# Patient Record
Sex: MALE | Race: BLACK OR AFRICAN AMERICAN | NOT HISPANIC OR LATINO
[De-identification: names, ages, dates, MRNs, and addresses within clinical notes are randomized per-mention and may not be internally consistent; named-entity substitution may affect disease eponyms.]

---

## 2024-03-28 ENCOUNTER — TRANSCRIPTION ENCOUNTER (OUTPATIENT)
Age: 69
End: 2024-03-28

## 2024-03-29 ENCOUNTER — INPATIENT (INPATIENT)
Facility: HOSPITAL | Age: 69
LOS: 24 days | Discharge: NURSING, REHAB OR LONG-TERM CARE | DRG: 329 | End: 2024-04-23
Attending: STUDENT IN AN ORGANIZED HEALTH CARE EDUCATION/TRAINING PROGRAM | Admitting: STUDENT IN AN ORGANIZED HEALTH CARE EDUCATION/TRAINING PROGRAM
Payer: COMMERCIAL

## 2024-03-29 ENCOUNTER — RESULT REVIEW (OUTPATIENT)
Age: 69
End: 2024-03-29

## 2024-03-29 VITALS
HEIGHT: 68 IN | SYSTOLIC BLOOD PRESSURE: 157 MMHG | HEART RATE: 104 BPM | WEIGHT: 139.99 LBS | DIASTOLIC BLOOD PRESSURE: 104 MMHG | RESPIRATION RATE: 18 BRPM | OXYGEN SATURATION: 96 % | TEMPERATURE: 100 F

## 2024-03-29 DIAGNOSIS — Z98.890 OTHER SPECIFIED POSTPROCEDURAL STATES: Chronic | ICD-10-CM

## 2024-03-29 LAB
ALBUMIN SERPL ELPH-MCNC: 3 G/DL — LOW (ref 3.4–5)
ALP SERPL-CCNC: 122 U/L — HIGH (ref 40–120)
ALT FLD-CCNC: 49 U/L — HIGH (ref 12–42)
AMPHET UR-MCNC: NEGATIVE — SIGNIFICANT CHANGE UP
ANION GAP SERPL CALC-SCNC: 10 MMOL/L — SIGNIFICANT CHANGE UP (ref 9–16)
APPEARANCE UR: CLEAR — SIGNIFICANT CHANGE UP
APTT BLD: 31.4 SEC — SIGNIFICANT CHANGE UP (ref 24.5–35.6)
AST SERPL-CCNC: 64 U/L — HIGH (ref 15–37)
BACTERIA # UR AUTO: ABNORMAL /HPF
BARBITURATES UR SCN-MCNC: NEGATIVE — SIGNIFICANT CHANGE UP
BASOPHILS # BLD AUTO: 0.01 K/UL — SIGNIFICANT CHANGE UP (ref 0–0.2)
BASOPHILS NFR BLD AUTO: 0.1 % — SIGNIFICANT CHANGE UP (ref 0–2)
BENZODIAZ UR-MCNC: NEGATIVE — SIGNIFICANT CHANGE UP
BILIRUB SERPL-MCNC: 1.7 MG/DL — HIGH (ref 0.2–1.2)
BILIRUB UR-MCNC: NEGATIVE — SIGNIFICANT CHANGE UP
BLD GP AB SCN SERPL QL: NEGATIVE — SIGNIFICANT CHANGE UP
BUN SERPL-MCNC: 25 MG/DL — HIGH (ref 7–23)
CALCIUM SERPL-MCNC: 9.1 MG/DL — SIGNIFICANT CHANGE UP (ref 8.5–10.5)
CHLORIDE SERPL-SCNC: 103 MMOL/L — SIGNIFICANT CHANGE UP (ref 96–108)
CO2 SERPL-SCNC: 27 MMOL/L — SIGNIFICANT CHANGE UP (ref 22–31)
COCAINE METAB.OTHER UR-MCNC: NEGATIVE — SIGNIFICANT CHANGE UP
COLOR SPEC: SIGNIFICANT CHANGE UP
COMMENT - URINE: SIGNIFICANT CHANGE UP
CREAT SERPL-MCNC: 1.49 MG/DL — HIGH (ref 0.5–1.3)
DIFF PNL FLD: NEGATIVE — SIGNIFICANT CHANGE UP
EGFR: 50 ML/MIN/1.73M2 — LOW
EOSINOPHIL # BLD AUTO: 0 K/UL — SIGNIFICANT CHANGE UP (ref 0–0.5)
EOSINOPHIL NFR BLD AUTO: 0 % — SIGNIFICANT CHANGE UP (ref 0–6)
EPI CELLS # UR: PRESENT
ETHANOL SERPL-MCNC: <3 MG/DL — SIGNIFICANT CHANGE UP
FENTANYL UR QL SCN: NEGATIVE — SIGNIFICANT CHANGE UP
FLUAV AG NPH QL: SIGNIFICANT CHANGE UP
FLUBV AG NPH QL: SIGNIFICANT CHANGE UP
GLUCOSE SERPL-MCNC: 101 MG/DL — HIGH (ref 70–99)
GLUCOSE UR QL: NEGATIVE MG/DL — SIGNIFICANT CHANGE UP
GRAN CASTS # UR COMP ASSIST: PRESENT
HCT VFR BLD CALC: 40.3 % — SIGNIFICANT CHANGE UP (ref 39–50)
HGB BLD-MCNC: 13.8 G/DL — SIGNIFICANT CHANGE UP (ref 13–17)
HIV 1 & 2 AB SERPL IA.RAPID: SIGNIFICANT CHANGE UP
HYALINE CASTS # UR AUTO: PRESENT
IMM GRANULOCYTES NFR BLD AUTO: 0.3 % — SIGNIFICANT CHANGE UP (ref 0–0.9)
INR BLD: 0.97 — SIGNIFICANT CHANGE UP (ref 0.85–1.18)
KETONES UR-MCNC: ABNORMAL MG/DL
LACTATE BLDV-MCNC: 0.9 MMOL/L — SIGNIFICANT CHANGE UP (ref 0.5–2)
LACTATE BLDV-MCNC: 2.2 MMOL/L — HIGH (ref 0.5–2)
LACTATE SERPL-SCNC: 1.3 MMOL/L — SIGNIFICANT CHANGE UP (ref 0.5–2)
LEUKOCYTE ESTERASE UR-ACNC: NEGATIVE — SIGNIFICANT CHANGE UP
LYMPHOCYTES # BLD AUTO: 0.89 K/UL — LOW (ref 1–3.3)
LYMPHOCYTES # BLD AUTO: 11.9 % — LOW (ref 13–44)
MCHC RBC-ENTMCNC: 32.3 PG — SIGNIFICANT CHANGE UP (ref 27–34)
MCHC RBC-ENTMCNC: 34.2 GM/DL — SIGNIFICANT CHANGE UP (ref 32–36)
MCV RBC AUTO: 94.4 FL — SIGNIFICANT CHANGE UP (ref 80–100)
METHADONE UR-MCNC: POSITIVE
MONOCYTES # BLD AUTO: 0.48 K/UL — SIGNIFICANT CHANGE UP (ref 0–0.9)
MONOCYTES NFR BLD AUTO: 6.4 % — SIGNIFICANT CHANGE UP (ref 2–14)
NEUTROPHILS # BLD AUTO: 6.11 K/UL — SIGNIFICANT CHANGE UP (ref 1.8–7.4)
NEUTROPHILS NFR BLD AUTO: 81.3 % — HIGH (ref 43–77)
NITRITE UR-MCNC: NEGATIVE — SIGNIFICANT CHANGE UP
NRBC # BLD: 0 /100 WBCS — SIGNIFICANT CHANGE UP (ref 0–0)
OPIATES UR-MCNC: POSITIVE
PCP SPEC-MCNC: SIGNIFICANT CHANGE UP
PCP UR-MCNC: NEGATIVE — SIGNIFICANT CHANGE UP
PH UR: 6 — SIGNIFICANT CHANGE UP (ref 5–8)
PLATELET # BLD AUTO: 122 K/UL — LOW (ref 150–400)
POTASSIUM SERPL-MCNC: 3.7 MMOL/L — SIGNIFICANT CHANGE UP (ref 3.5–5.3)
POTASSIUM SERPL-SCNC: 3.7 MMOL/L — SIGNIFICANT CHANGE UP (ref 3.5–5.3)
PROT SERPL-MCNC: 8.9 G/DL — HIGH (ref 6.4–8.2)
PROT UR-MCNC: ABNORMAL MG/DL
PROTHROM AB SERPL-ACNC: 11 SEC — SIGNIFICANT CHANGE UP (ref 9.5–13)
RBC # BLD: 4.27 M/UL — SIGNIFICANT CHANGE UP (ref 4.2–5.8)
RBC # FLD: 14.3 % — SIGNIFICANT CHANGE UP (ref 10.3–14.5)
RBC CASTS # UR COMP ASSIST: 2 /HPF — SIGNIFICANT CHANGE UP (ref 0–4)
RH IG SCN BLD-IMP: NEGATIVE — SIGNIFICANT CHANGE UP
RSV RNA NPH QL NAA+NON-PROBE: SIGNIFICANT CHANGE UP
SARS-COV-2 RNA SPEC QL NAA+PROBE: SIGNIFICANT CHANGE UP
SODIUM SERPL-SCNC: 140 MMOL/L — SIGNIFICANT CHANGE UP (ref 132–145)
SP GR SPEC: 1.02 — SIGNIFICANT CHANGE UP (ref 1–1.03)
THC UR QL: NEGATIVE — SIGNIFICANT CHANGE UP
TROPONIN I, HIGH SENSITIVITY RESULT: 5.9 NG/L — SIGNIFICANT CHANGE UP
UROBILINOGEN FLD QL: 1 MG/DL — SIGNIFICANT CHANGE UP (ref 0.2–1)
WBC # BLD: 7.51 K/UL — SIGNIFICANT CHANGE UP (ref 3.8–10.5)
WBC # FLD AUTO: 7.51 K/UL — SIGNIFICANT CHANGE UP (ref 3.8–10.5)
WBC UR QL: 2 /HPF — SIGNIFICANT CHANGE UP (ref 0–5)

## 2024-03-29 PROCEDURE — 93010 ELECTROCARDIOGRAM REPORT: CPT

## 2024-03-29 PROCEDURE — 99291 CRITICAL CARE FIRST HOUR: CPT

## 2024-03-29 PROCEDURE — 44140 PARTIAL REMOVAL OF COLON: CPT

## 2024-03-29 PROCEDURE — 99223 1ST HOSP IP/OBS HIGH 75: CPT | Mod: 57

## 2024-03-29 PROCEDURE — 74177 CT ABD & PELVIS W/CONTRAST: CPT | Mod: 26,MC

## 2024-03-29 PROCEDURE — 71046 X-RAY EXAM CHEST 2 VIEWS: CPT | Mod: 26

## 2024-03-29 PROCEDURE — 71045 X-RAY EXAM CHEST 1 VIEW: CPT | Mod: 26,59

## 2024-03-29 PROCEDURE — 88307 TISSUE EXAM BY PATHOLOGIST: CPT | Mod: 26

## 2024-03-29 DEVICE — SURGICEL POWDER 3 GRAMS: Type: IMPLANTABLE DEVICE | Status: FUNCTIONAL

## 2024-03-29 DEVICE — STAPLER ETHICON PROXIMATE RELOAD 100MM BLUE: Type: IMPLANTABLE DEVICE | Status: FUNCTIONAL

## 2024-03-29 DEVICE — STAPLER ETHICON PROXIMATE 75MM WITH BLUE RELOAD: Type: IMPLANTABLE DEVICE | Status: FUNCTIONAL

## 2024-03-29 DEVICE — STAPLER ETHICON PROXIMATE 100MM WITH BLUE RELOAD: Type: IMPLANTABLE DEVICE | Status: FUNCTIONAL

## 2024-03-29 RX ORDER — ONDANSETRON 8 MG/1
4 TABLET, FILM COATED ORAL EVERY 6 HOURS
Refills: 0 | Status: DISCONTINUED | OUTPATIENT
Start: 2024-03-29 | End: 2024-04-23

## 2024-03-29 RX ORDER — BENZOCAINE 10 %
1 GEL (GRAM) MUCOUS MEMBRANE ONCE
Refills: 0 | Status: COMPLETED | OUTPATIENT
Start: 2024-03-29 | End: 2024-03-29

## 2024-03-29 RX ORDER — SODIUM CHLORIDE 9 MG/ML
1950 INJECTION INTRAMUSCULAR; INTRAVENOUS; SUBCUTANEOUS ONCE
Refills: 0 | Status: COMPLETED | OUTPATIENT
Start: 2024-03-29 | End: 2024-03-29

## 2024-03-29 RX ORDER — MORPHINE SULFATE 50 MG/1
2 CAPSULE, EXTENDED RELEASE ORAL ONCE
Refills: 0 | Status: DISCONTINUED | OUTPATIENT
Start: 2024-03-29 | End: 2024-03-29

## 2024-03-29 RX ORDER — MORPHINE SULFATE 50 MG/1
4 CAPSULE, EXTENDED RELEASE ORAL ONCE
Refills: 0 | Status: DISCONTINUED | OUTPATIENT
Start: 2024-03-29 | End: 2024-03-29

## 2024-03-29 RX ORDER — SODIUM CHLORIDE 9 MG/ML
1000 INJECTION, SOLUTION INTRAVENOUS
Refills: 0 | Status: DISCONTINUED | OUTPATIENT
Start: 2024-03-29 | End: 2024-04-01

## 2024-03-29 RX ORDER — IOHEXOL 300 MG/ML
30 INJECTION, SOLUTION INTRAVENOUS ONCE
Refills: 0 | Status: COMPLETED | OUTPATIENT
Start: 2024-03-29 | End: 2024-03-29

## 2024-03-29 RX ORDER — AZITHROMYCIN 500 MG/1
500 TABLET, FILM COATED ORAL ONCE
Refills: 0 | Status: COMPLETED | OUTPATIENT
Start: 2024-03-29 | End: 2024-03-29

## 2024-03-29 RX ORDER — CEFTRIAXONE 500 MG/1
1000 INJECTION, POWDER, FOR SOLUTION INTRAMUSCULAR; INTRAVENOUS ONCE
Refills: 0 | Status: COMPLETED | OUTPATIENT
Start: 2024-03-29 | End: 2024-03-29

## 2024-03-29 RX ORDER — HYDROMORPHONE HYDROCHLORIDE 2 MG/ML
0.5 INJECTION INTRAMUSCULAR; INTRAVENOUS; SUBCUTANEOUS ONCE
Refills: 0 | Status: DISCONTINUED | OUTPATIENT
Start: 2024-03-29 | End: 2024-03-29

## 2024-03-29 RX ADMIN — HYDROMORPHONE HYDROCHLORIDE 0.5 MILLIGRAM(S): 2 INJECTION INTRAMUSCULAR; INTRAVENOUS; SUBCUTANEOUS at 22:30

## 2024-03-29 RX ADMIN — SODIUM CHLORIDE 1950 MILLILITER(S): 9 INJECTION INTRAMUSCULAR; INTRAVENOUS; SUBCUTANEOUS at 15:41

## 2024-03-29 RX ADMIN — CEFTRIAXONE 100 MILLIGRAM(S): 500 INJECTION, POWDER, FOR SOLUTION INTRAMUSCULAR; INTRAVENOUS at 15:40

## 2024-03-29 RX ADMIN — AZITHROMYCIN 255 MILLIGRAM(S): 500 TABLET, FILM COATED ORAL at 15:56

## 2024-03-29 RX ADMIN — MORPHINE SULFATE 2 MILLIGRAM(S): 50 CAPSULE, EXTENDED RELEASE ORAL at 17:16

## 2024-03-29 RX ADMIN — MORPHINE SULFATE 2 MILLIGRAM(S): 50 CAPSULE, EXTENDED RELEASE ORAL at 20:09

## 2024-03-29 RX ADMIN — IOHEXOL 30 MILLILITER(S): 300 INJECTION, SOLUTION INTRAVENOUS at 15:40

## 2024-03-29 RX ADMIN — MORPHINE SULFATE 4 MILLIGRAM(S): 50 CAPSULE, EXTENDED RELEASE ORAL at 19:07

## 2024-03-29 RX ADMIN — HYDROMORPHONE HYDROCHLORIDE 0.5 MILLIGRAM(S): 2 INJECTION INTRAMUSCULAR; INTRAVENOUS; SUBCUTANEOUS at 22:01

## 2024-03-29 NOTE — ED PROVIDER NOTE - CLINICAL SUMMARY MEDICAL DECISION MAKING FREE TEXT BOX
Left abd pain x days with constipation but had one bloody bm.  No vomiting.  Takes only methadone.  No allergies to meds.    CT abd pelv ordered, sepsis bundle ordered because patient with tachycardia 104 and temp 99. Left abd pain x days with constipation but had one bloody bm.  No vomiting.  Takes only methadone.  No allergies to meds.    CT abd pelv ordered, sepsis bundle ordered because patient with tachycardia 104 and temp 99.    7:12pm cecal volvulus as per radiology, case d/w Dr Rordiguez surgery attending at [facility] admit to monitored bed Tier 1 transfer

## 2024-03-29 NOTE — H&P ADULT - NSHPPHYSICALEXAM_GEN_ALL_CORE
T(C): 37.5 (03-29-24 @ 21:37), Max: 38 (03-29-24 @ 15:46)  HR: 93 (03-29-24 @ 21:37) (84 - 104)  BP: 133/84 (03-29-24 @ 21:37) (133/84 - 158/85)  RR: 20 (03-29-24 @ 21:37) (18 - 20)  SpO2: 95% (03-29-24 @ 21:37) (95% - 99%)    CONSTITUTIONAL: Well groomed, no apparent distress  RESP: No respiratory distress, no use of accessory muscles  CV: RRR; no JVD; no peripheral edema  GI: Soft, diffusely tender to palpation (bilateral lower quadrants worse than upper), mildly distended, no rebound, no guarding; no palpable masses; no hepatosplenomegaly; no hernia palpated  MSK: R upper extremitiy well healed surgical scar noted  NEURO: CN II-XII intact; sensation intact in upper and lower extremities b/l to light touch   PSYCH: Appropriate insight/judgment; A+O x 3, mood and affect appropriate, recent/remote memory intact

## 2024-03-29 NOTE — H&P ADULT - ATTENDING COMMENTS
Patient is a 68 year old gentleman with history of methadone use for unknown pain disorder, right inguinal hernia repair 20 years ago, right upper extremity shrapnel removal 40 years ago ([place] ) who presents with clinical, laboratory, radiographic information consistent with cecal volvulus. He presented to ED with several day history of progressively worsening abdominal pain and obstipation. At time of evaluation, febrile, hemodynamically stable, sinus tachycardia, abdomen diffusely tender, distended, no rebound or guarding. CT imaging consistent with cecal volvulus and associated small bowel obstruction. Plan for admission, bowel rest, NPO, IV fluids, urgent OR for exploratory laparotomy, right hemicolectomy, possible ostomy. Late entry, date of service 3/29/24.

## 2024-03-29 NOTE — ED ADULT TRIAGE NOTE - CHIEF COMPLAINT QUOTE
Pt walks in c/o generalized abdominal pain with constipation for 4 days. Pt states his last BM was bloody.

## 2024-03-29 NOTE — H&P ADULT - ASSESSMENT
68y M with PMHx of methadone use (for unknown pain disorder) and PSHx of R inguinal hernia repair open (20+yrs ago) and R upper arm shrapnel removal (40yrs ago) presented to [facility] with 5d hx of worsening abdominal pain, 1 episode of bloody BM at onset of symptoms, and constipation (last flatus and BM 4d ago). Upon presentation patient was febrile 100.4 (rectal), tachycardic 104, and hypertensive 157/104. 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 Patient was transferred to [facility] surgery for further management of cecal volvulous.    Plan:  NPO/IVF  NGT LIWS  SCD/OOB  AM Labs  Added on class 2 for exlap

## 2024-03-29 NOTE — H&P ADULT - HISTORY OF PRESENT ILLNESS
68y M with PMHx of methadone use (for unknown pain disorder) and PSHx of R inguinal hernia repair open (20+yrs ago) and R upper arm shrapnel removal (40yrs ago) presented to [facility] with 5d hx of worsening abdominal pain, 1 episode of bloody BM at onset of symptoms, and constipation (last flatus and BM 4d ago). Upon presentation patient was febrile 100.4 (rectal), tachycardic 104, and hypertensive 157/104. Labs notable for WBC (7.51) with left shift, Cr 1.49 (unclear baseline), BUN 25, Tbili 1.7, Alk Phos 122, AST/ALT 64/49, and lactate 2.2. Rest of CBC, CMP, UA, Resp Panel, and HIV testing wnl. Urine drug screen positive for opiates and methadone. Patient received 2L NS bolue and repeat lactate normalized to 0.9. The following imaging findings were obtained: CXR wnl w/ exception of gaseous dilatation of the stomach and bowel with nondifferential air-fluid levels. 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 No free air. Main pancreatic duct dilation measuring up to 8 mm. Mild bilateral hydronephrosis may be due to distended bladder. Patient received azithromycin and ceftriaxone at 4pm. Patient was transferred to [facility] surgery for further management of cecal volvulous.     PMHx:Methadone use (unknown pain disorder)  PSHx: R open inguinal hernia repair, R upper arm shrapnel removal  Meds: Methadone (weekly, last taken Tuesday)  Allergies: NKDA  Last C-scope: Denies  FHx: Denies  Social: Denies

## 2024-03-29 NOTE — H&P ADULT - TIME BILLING
evaluation and management of cecal volvulus, review of labs and imaging, discussion with patient regarding benefits and risks of operative intervention, documentation

## 2024-03-29 NOTE — H&P ADULT - NSICDXFAMILYHX_GEN_ALL_CORE_FT
8:42 AM    Reason for Call: OVERBOOK    Patient is having the following symptoms: Possible UTI for 1 day .    The patient is requesting an appointment for Today , or urine sample to [place]  with Dr. Edward.    Was an appointment offered for this call? No  If yes : Appointment type              Date    Preferred method for responding to this message: Telephone Call  What is your phone number ?  [phone]     If we cannot reach you directly, may we leave a detailed response at the number you provided? Yes    Can this message wait until your PCP/provider returns, if unavailable today? Not applicable    Estrella Wyatt     FAMILY HISTORY:  No pertinent family history in first degree relatives

## 2024-03-29 NOTE — ED PROVIDER NOTE - OBJECTIVE STATEMENT
Left abd pain x days with constipation but had one bloody bm.  No vomiting.  Takes only methadone.  No allergies to meds.

## 2024-03-29 NOTE — ED PROVIDER NOTE - CRITICAL CARE ATTENDING CONTRIBUTION TO CARE
cecal volvulus on CT with elevated lactate, Tier 1 transfer to [facility] monitored bed Dr Rodriguez surgery attending d/w cecal volvulus on CT with massively dilated bowels, elevated lactate, Tier 1 transfer to [facility] bed Dr Rodriguez surgery attending d/w him through [facility]-BED

## 2024-03-29 NOTE — H&P ADULT - NSHPLABSRESULTS_GEN_ALL_CORE
LABS:                   13.8   7.51  )-----------( 122      ( 29 Mar 2024 15:32 )             40.3   03-29  140  |  103  |  25<H>  ----------------------------<  101<H>  3.7   |  27  |  1.49<H>  Ca    9.1      29 Mar 2024 15:32  TPro  8.9<H>  /  Alb  3.0<L>  /  TBili  1.7<H>  /  DBili  x   /  AST  64<H>  /  ALT  49<H>  /  AlkPhos  122<H>  03-29  PT/INR - ( 29 Mar 2024 15:32 )   PT: 11.0 sec;   INR: 0.97     PTT - ( 29 Mar 2024 15:32 )  PTT:31.4 sec    CT Abdomen and Pelvis w/ Oral Cont and w/ IV Cont:   ACC: [id number] EXAM:  CT ABDOMEN AND PELVIS OC IC   ORDERED BY: TEJINDER EDDY     PROCEDURE DATE:  03/29/2024          INTERPRETATION:  CLINICAL INFORMATION: Left-sided abdominal pain. Fever.   Tachycardia.    COMPARISON: None.    CONTRAST/COMPLICATIONS:  IV Contrast: Omnipaque 350  96 cc administered   4 cc discarded  Oral Contrast: Omnipaque 300  Complications: None reported at time of study completion    PROCEDURE:  CT of the Abdomen and Pelvis was performed.  Sagittal and coronal reformats were performed.    FINDINGS:  LOWER CHEST: Likely dependent densities of atelectasis posteriorly within   the right lower lobe.  Remote posterior left ninth rib fracture with deformity    LIVER: Probably elongated Riedel's lobe, normal variant.  Subcentimeter hypodensity within the right lobe near dome too small for   definitive characterization.  Small volume perihepatic ascites.  BILE DUCTS: Normal caliber.  GALLBLADDER: Decompressed  SPLEEN: Within normal limits.  PANCREAS: Fusiform main pancreatic duct dilation involving the head and   neck measures up to 8 mm at the neck  Mild main pancreatic duct dilation at the distal body measures up to 3 mm  ADRENALS: Within normal limits.  KIDNEYS/URETERS: Horseshoe kidney. Mild bilateral hydronephrosis which   may be due to distended bladder.  Variably is left simple cortical cysts measuring up to 4.5 cm    BLADDER: Distended. Partially obscured by artifact  REPRODUCTIVE ORGANS: Prostate is enlarged. Partially obscured by artifact   from the right total hip arthroplasty    BOWEL: Massively distended air-filled cecum measuring over 12 cm in   diameter with air-fluid level projects towards the left upper quadrant   with a right upper pelvic area of angulation and kinking in keeping with   acecal volvulus   Dilated distal small bowel measures up to 3.2 cm. Decompressed colon  Oral contrast within nondistended stomach.  PERITONEUM: Small volume abdominal and pelvic ascites No free air.  VESSELS: Tortuous iliac vasculature and aorta  RETROPERITONEUM/LYMPH NODES: No lymphadenopathy.  ABDOMINAL WALL: Tiny fluid-filled umbilical and right inguinal hernias  BONES: Multilevel degenerative changes throughout the spine. Right total   hip arthroplasty    IMPRESSION:  Cecal volvulus resulting in a distal small bowel obstruction. No free air   at this time. Recommend surgical consultation  Findings were discussed with Dr. TEJINDER EDDY [id number] 3/29/2024   6:59 PM by Dr. Digna Curtis    Main pancreatic duct dilation measuring up to 8 mm. Recommend nonemergent   abdominal MRI with MRCP to further evaluate    Mild bilateral hydronephrosis may be due to distended bladder. If the   patient is unable to void, consider Farias catheterization.    --- End of Report ---          DIGNA CURTIS MD; Resident Radiologist  This document has been electronically signed.  JONA MURPHY MD; Attending Radiologist  This document has been electronically signed. Mar 29 2024  7:03PM (03-29-24 @ 17:55)

## 2024-03-30 LAB
A1C WITH ESTIMATED AVERAGE GLUCOSE RESULT: 4.8 % — SIGNIFICANT CHANGE UP (ref 4–5.6)
ALBUMIN SERPL ELPH-MCNC: 2.7 G/DL — LOW (ref 3.3–5)
ALBUMIN SERPL ELPH-MCNC: 2.7 G/DL — LOW (ref 3.3–5)
ALP SERPL-CCNC: 72 U/L — SIGNIFICANT CHANGE UP (ref 40–120)
ALP SERPL-CCNC: 78 U/L — SIGNIFICANT CHANGE UP (ref 40–120)
ALT FLD-CCNC: 22 U/L — SIGNIFICANT CHANGE UP (ref 10–45)
ALT FLD-CCNC: 25 U/L — SIGNIFICANT CHANGE UP (ref 10–45)
ANION GAP SERPL CALC-SCNC: 5 MMOL/L — SIGNIFICANT CHANGE UP (ref 5–17)
ANION GAP SERPL CALC-SCNC: 6 MMOL/L — SIGNIFICANT CHANGE UP (ref 5–17)
APTT BLD: 28.7 SEC — SIGNIFICANT CHANGE UP (ref 24.5–35.6)
AST SERPL-CCNC: 44 U/L — HIGH (ref 10–40)
AST SERPL-CCNC: 47 U/L — HIGH (ref 10–40)
BASE EXCESS BLDA CALC-SCNC: -0.4 MMOL/L — SIGNIFICANT CHANGE UP (ref -2–3)
BILIRUB DIRECT SERPL-MCNC: 0.9 MG/DL — HIGH (ref 0–0.3)
BILIRUB DIRECT SERPL-MCNC: 0.9 MG/DL — HIGH (ref 0–0.3)
BILIRUB INDIRECT FLD-MCNC: 0.7 MG/DL — SIGNIFICANT CHANGE UP (ref 0.2–1)
BILIRUB SERPL-MCNC: 1.5 MG/DL — HIGH (ref 0.2–1.2)
BILIRUB SERPL-MCNC: 1.6 MG/DL — HIGH (ref 0.2–1.2)
BUN SERPL-MCNC: 13 MG/DL — SIGNIFICANT CHANGE UP (ref 7–23)
BUN SERPL-MCNC: 18 MG/DL — SIGNIFICANT CHANGE UP (ref 7–23)
CA-I BLDA-SCNC: 1.19 MMOL/L — SIGNIFICANT CHANGE UP (ref 1.15–1.33)
CALCIUM SERPL-MCNC: 8.1 MG/DL — LOW (ref 8.4–10.5)
CALCIUM SERPL-MCNC: 8.3 MG/DL — LOW (ref 8.4–10.5)
CHLORIDE SERPL-SCNC: 105 MMOL/L — SIGNIFICANT CHANGE UP (ref 96–108)
CHLORIDE SERPL-SCNC: 105 MMOL/L — SIGNIFICANT CHANGE UP (ref 96–108)
CO2 BLDA-SCNC: 25 MMOL/L — HIGH (ref 19–24)
CO2 SERPL-SCNC: 26 MMOL/L — SIGNIFICANT CHANGE UP (ref 22–31)
CO2 SERPL-SCNC: 29 MMOL/L — SIGNIFICANT CHANGE UP (ref 22–31)
COHGB MFR BLDA: 2.6 % — SIGNIFICANT CHANGE UP
CREAT SERPL-MCNC: 0.79 MG/DL — SIGNIFICANT CHANGE UP (ref 0.5–1.3)
CREAT SERPL-MCNC: 0.93 MG/DL — SIGNIFICANT CHANGE UP (ref 0.5–1.3)
CULTURE RESULTS: SIGNIFICANT CHANGE UP
EGFR: 88 ML/MIN/1.73M2 — SIGNIFICANT CHANGE UP
EGFR: 96 ML/MIN/1.73M2 — SIGNIFICANT CHANGE UP
ESTIMATED AVERAGE GLUCOSE: 91 MG/DL — SIGNIFICANT CHANGE UP (ref 68–114)
GLUCOSE BLDA-MCNC: 77 MG/DL — SIGNIFICANT CHANGE UP (ref 70–99)
GLUCOSE SERPL-MCNC: 109 MG/DL — HIGH (ref 70–99)
GLUCOSE SERPL-MCNC: 99 MG/DL — SIGNIFICANT CHANGE UP (ref 70–99)
HCO3 BLDA-SCNC: 24 MMOL/L — SIGNIFICANT CHANGE UP (ref 21–28)
HCT VFR BLD CALC: 33.5 % — LOW (ref 39–50)
HCT VFR BLD CALC: 38.1 % — LOW (ref 39–50)
HCV AB S/CO SERPL IA: 32.62 S/CO — HIGH
HCV AB SERPL-IMP: REACTIVE
HGB BLD-MCNC: 11.7 G/DL — LOW (ref 13–17)
HGB BLD-MCNC: 12.8 G/DL — LOW (ref 13–17)
HGB BLDA-MCNC: 11.3 G/DL — LOW (ref 12.6–17.4)
INR BLD: 1.13 — SIGNIFICANT CHANGE UP (ref 0.85–1.18)
LACTATE SERPL-SCNC: 1 MMOL/L — SIGNIFICANT CHANGE UP (ref 0.5–2)
MAGNESIUM SERPL-MCNC: 1.3 MG/DL — LOW (ref 1.6–2.6)
MCHC RBC-ENTMCNC: 32.5 PG — SIGNIFICANT CHANGE UP (ref 27–34)
MCHC RBC-ENTMCNC: 32.6 PG — SIGNIFICANT CHANGE UP (ref 27–34)
MCHC RBC-ENTMCNC: 33.6 GM/DL — SIGNIFICANT CHANGE UP (ref 32–36)
MCHC RBC-ENTMCNC: 34.9 GM/DL — SIGNIFICANT CHANGE UP (ref 32–36)
MCV RBC AUTO: 93.3 FL — SIGNIFICANT CHANGE UP (ref 80–100)
MCV RBC AUTO: 96.7 FL — SIGNIFICANT CHANGE UP (ref 80–100)
METHGB MFR BLDA: 0.8 % — SIGNIFICANT CHANGE UP
NRBC # BLD: 0 /100 WBCS — SIGNIFICANT CHANGE UP (ref 0–0)
NRBC # BLD: 0 /100 WBCS — SIGNIFICANT CHANGE UP (ref 0–0)
OXYHGB MFR BLDA: 96.6 % — HIGH (ref 90–95)
PCO2 BLDA: 38 MMHG — SIGNIFICANT CHANGE UP (ref 35–48)
PH BLDA: 7.41 — SIGNIFICANT CHANGE UP (ref 7.35–7.45)
PHOSPHATE SERPL-MCNC: 2.7 MG/DL — SIGNIFICANT CHANGE UP (ref 2.5–4.5)
PLATELET # BLD AUTO: 85 K/UL — LOW (ref 150–400)
PLATELET # BLD AUTO: 89 K/UL — LOW (ref 150–400)
PO2 BLDA: 275 MMHG — HIGH (ref 83–108)
POTASSIUM BLDA-SCNC: 3.8 MMOL/L — SIGNIFICANT CHANGE UP (ref 3.5–5.1)
POTASSIUM SERPL-MCNC: 3.6 MMOL/L — SIGNIFICANT CHANGE UP (ref 3.5–5.3)
POTASSIUM SERPL-MCNC: 3.9 MMOL/L — SIGNIFICANT CHANGE UP (ref 3.5–5.3)
POTASSIUM SERPL-SCNC: 3.6 MMOL/L — SIGNIFICANT CHANGE UP (ref 3.5–5.3)
POTASSIUM SERPL-SCNC: 3.9 MMOL/L — SIGNIFICANT CHANGE UP (ref 3.5–5.3)
PROT SERPL-MCNC: 5.5 G/DL — LOW (ref 6–8.3)
PROT SERPL-MCNC: 6 G/DL — SIGNIFICANT CHANGE UP (ref 6–8.3)
PROTHROM AB SERPL-ACNC: 12.8 SEC — SIGNIFICANT CHANGE UP (ref 9.5–13)
RBC # BLD: 3.59 M/UL — LOW (ref 4.2–5.8)
RBC # BLD: 3.94 M/UL — LOW (ref 4.2–5.8)
RBC # FLD: 14 % — SIGNIFICANT CHANGE UP (ref 10.3–14.5)
RBC # FLD: 14.4 % — SIGNIFICANT CHANGE UP (ref 10.3–14.5)
SAO2 % BLDA: 100 % — HIGH (ref 94–98)
SODIUM BLDA-SCNC: 133 MMOL/L — LOW (ref 136–145)
SODIUM SERPL-SCNC: 137 MMOL/L — SIGNIFICANT CHANGE UP (ref 135–145)
SODIUM SERPL-SCNC: 139 MMOL/L — SIGNIFICANT CHANGE UP (ref 135–145)
SPECIMEN SOURCE: SIGNIFICANT CHANGE UP
WBC # BLD: 1.93 K/UL — LOW (ref 3.8–10.5)
WBC # BLD: 8.27 K/UL — SIGNIFICANT CHANGE UP (ref 3.8–10.5)
WBC # FLD AUTO: 1.93 K/UL — LOW (ref 3.8–10.5)
WBC # FLD AUTO: 8.27 K/UL — SIGNIFICANT CHANGE UP (ref 3.8–10.5)

## 2024-03-30 PROCEDURE — 99223 1ST HOSP IP/OBS HIGH 75: CPT

## 2024-03-30 RX ORDER — ACETAMINOPHEN 500 MG
1000 TABLET ORAL EVERY 6 HOURS
Refills: 0 | Status: DISCONTINUED | OUTPATIENT
Start: 2024-03-30 | End: 2024-03-30

## 2024-03-30 RX ORDER — ACETAMINOPHEN 500 MG
1000 TABLET ORAL ONCE
Refills: 0 | Status: DISCONTINUED | OUTPATIENT
Start: 2024-03-30 | End: 2024-03-30

## 2024-03-30 RX ORDER — MAGNESIUM SULFATE 500 MG/ML
2 VIAL (ML) INJECTION EVERY 6 HOURS
Refills: 0 | Status: COMPLETED | OUTPATIENT
Start: 2024-03-30 | End: 2024-03-30

## 2024-03-30 RX ORDER — CEFOTETAN DISODIUM 1 G
2 VIAL (EA) INJECTION EVERY 12 HOURS
Refills: 0 | Status: COMPLETED | OUTPATIENT
Start: 2024-03-30 | End: 2024-03-31

## 2024-03-30 RX ORDER — HYDROMORPHONE HYDROCHLORIDE 2 MG/ML
30 INJECTION INTRAMUSCULAR; INTRAVENOUS; SUBCUTANEOUS
Refills: 0 | Status: DISCONTINUED | OUTPATIENT
Start: 2024-03-30 | End: 2024-03-31

## 2024-03-30 RX ORDER — LIDOCAINE 4 G/100G
1 CREAM TOPICAL ONCE
Refills: 0 | Status: COMPLETED | OUTPATIENT
Start: 2024-03-30 | End: 2024-03-30

## 2024-03-30 RX ORDER — HYDROMORPHONE HYDROCHLORIDE 2 MG/ML
0.5 INJECTION INTRAMUSCULAR; INTRAVENOUS; SUBCUTANEOUS
Refills: 0 | Status: DISCONTINUED | OUTPATIENT
Start: 2024-03-30 | End: 2024-03-30

## 2024-03-30 RX ORDER — INFLUENZA VIRUS VACCINE 15; 15; 15; 15 UG/.5ML; UG/.5ML; UG/.5ML; UG/.5ML
0.7 SUSPENSION INTRAMUSCULAR ONCE
Refills: 0 | Status: DISCONTINUED | OUTPATIENT
Start: 2024-03-30 | End: 2024-04-23

## 2024-03-30 RX ORDER — NALOXONE HYDROCHLORIDE 4 MG/.1ML
0.1 SPRAY NASAL
Refills: 0 | Status: DISCONTINUED | OUTPATIENT
Start: 2024-03-30 | End: 2024-04-23

## 2024-03-30 RX ORDER — ACETAMINOPHEN 500 MG
1000 TABLET ORAL ONCE
Refills: 0 | Status: COMPLETED | OUTPATIENT
Start: 2024-03-30 | End: 2024-03-30

## 2024-03-30 RX ADMIN — LIDOCAINE 1 PATCH: 4 CREAM TOPICAL at 14:19

## 2024-03-30 RX ADMIN — Medication 1000 MILLIGRAM(S): at 07:45

## 2024-03-30 RX ADMIN — LIDOCAINE 1 PATCH: 4 CREAM TOPICAL at 15:23

## 2024-03-30 RX ADMIN — Medication 100 GRAM(S): at 12:02

## 2024-03-30 RX ADMIN — Medication 100 GRAM(S): at 12:04

## 2024-03-30 RX ADMIN — Medication 400 MILLIGRAM(S): at 07:15

## 2024-03-30 RX ADMIN — HYDROMORPHONE HYDROCHLORIDE 0.5 MILLIGRAM(S): 2 INJECTION INTRAMUSCULAR; INTRAVENOUS; SUBCUTANEOUS at 02:56

## 2024-03-30 RX ADMIN — Medication 100 GRAM(S): at 04:15

## 2024-03-30 RX ADMIN — HYDROMORPHONE HYDROCHLORIDE 0.5 MILLIGRAM(S): 2 INJECTION INTRAMUSCULAR; INTRAVENOUS; SUBCUTANEOUS at 03:35

## 2024-03-30 RX ADMIN — HYDROMORPHONE HYDROCHLORIDE 30 MILLILITER(S): 2 INJECTION INTRAMUSCULAR; INTRAVENOUS; SUBCUTANEOUS at 02:55

## 2024-03-30 NOTE — PROGRESS NOTE ADULT - SUBJECTIVE AND OBJECTIVE BOX
O/n: admitted for cecal volvulous, lactate 1.3, s/p exlap, R-hemicolectomy, side to side stapled anastamosis. Post-op labs wnl, mag repleted, Leukopenic 1.93, Hep C+, POC wnl    POST-OP DAY: 1 s/p exlap, R-hemicolectomy, side to side stapled anastomosis     SUBJECTIVE: Patient seen and examined bedside by Surgical resident. pt resting in bed this am, states that pain is present and not limiting but feels it could be better controlled, is using PCA properly. denies ay f/bm, -n/v, sob, cp at this time.     cefoTEtan  IVPB 2 Gram(s) IV Intermittent every 12 hours    MEDICATIONS  (PRN):  HYDROmorphone  Injectable 0.5 milliGRAM(s) IV Push every 1 hour PRN breakthrough pain  naloxone Injectable 0.1 milliGRAM(s) IV Push every 3 minutes PRN For ANY of the following changes in patient status:  A. RR LESS THAN 10 breaths per minute, B. Oxygen saturation LESS THAN 90%, C. Sedation score of 6  ondansetron Injectable 4 milliGRAM(s) IV Push every 6 hours PRN Nausea and/or Vomiting      I&O's Detail    29 Mar 2024 07:01  -  30 Mar 2024 07:00  --------------------------------------------------------  IN:    IV PiggyBack: 100 mL    Lactated Ringers: 600 mL  Total IN: 700 mL    OUT:    Indwelling Catheter - Urethral (mL): 1100 mL    Nasogastric/Oral tube (mL): 100 mL    Voided (mL): 250 mL  Total OUT: 1450 mL    Total NET: -750 mL      30 Mar 2024 07:01  -  30 Mar 2024 08:58  --------------------------------------------------------  IN:    Lactated Ringers: 100 mL  Total IN: 100 mL    OUT:  Total OUT: 0 mL    Total NET: 100 mL          Vital Signs Last 24 Hrs  T(C): 36.8 (30 Mar 2024 05:50), Max: 38 (29 Mar 2024 15:46)  T(F): 98.2 (30 Mar 2024 05:50), Max: 100.4 (29 Mar 2024 15:46)  HR: 80 (30 Mar 2024 05:50) (71 - 104)  BP: 144/90 (30 Mar 2024 05:50) (133/84 - 171/89)  BP(mean): 112 (30 Mar 2024 02:58) (108 - 124)  RR: 18 (30 Mar 2024 05:50) (18 - 20)  SpO2: 95% (30 Mar 2024 05:50) (95% - 100%)    Parameters below as of 30 Mar 2024 05:50  Patient On (Oxygen Delivery Method): room air        Physical Exam:  General: NAD, resting comfortably in bed  Pulmonary: Nonlabored breathing, no respiratory distress  Cardiovascular: NSR  Abdominal: soft, ND, appropriate TTP at incision site; incision c/d/i with abdominal binder in place; Farias in place; NGT draining bilious fluid  Extremities: WWP, normal strength  Neuro: A/O x 3, CNs II-XII grossly intact, no focal deficits, normal sensation  Pulses: palpable distal pulses    LABS:                        11.7   1.93  )-----------( 85       ( 30 Mar 2024 02:15 )             33.5     03-30    139  |  105  |  13  ----------------------------<  99  3.9   |  29  |  0.79    Ca    8.1<L>      30 Mar 2024 05:30  Phos  2.7     03-30  Mg     1.3     03-30    TPro  6.0  /  Alb  2.7<L>  /  TBili  1.6<H>  /  DBili  0.9<H>  /  AST  44<H>  /  ALT  22  /  AlkPhos  72  03-30    PT/INR - ( 30 Mar 2024 02:15 )   PT: 12.8 sec;   INR: 1.13          PTT - ( 30 Mar 2024 02:15 )  PTT:28.7 sec  Urinalysis Basic - ( 30 Mar 2024 05:30 )    Color: x / Appearance: x / SG: x / pH: x  Gluc: 99 mg/dL / Ketone: x  / Bili: x / Urobili: x   Blood: x / Protein: x / Nitrite: x   Leuk Esterase: x / RBC: x / WBC x   Sq Epi: x / Non Sq Epi: x / Bacteria: x        RADIOLOGY & ADDITIONAL STUDIES:  CT Abdomen and Pelvis w/ Oral Cont and w/ IV Cont:   ACC: [id number] EXAM:  CT ABDOMEN AND PELVIS OC IC   ORDERED BY: TEJINDER EDDY     PROCEDURE DATE:  03/29/2024          INTERPRETATION:  CLINICAL INFORMATION: Left-sided abdominal pain. Fever.   Tachycardia.    COMPARISON: None.    CONTRAST/COMPLICATIONS:  IV Contrast: Omnipaque 350  96 cc administered   4 cc discarded  Oral Contrast: Omnipaque 300  Complications: None reported at time of study completion    PROCEDURE:  CT of the Abdomen and Pelvis was performed.  Sagittal and coronal reformats were performed.    FINDINGS:  LOWER CHEST: Likely dependent densities of atelectasis posteriorly within   the right lower lobe.  Remote posterior left ninth rib fracture with deformity    LIVER: Probably elongated Riedel's lobe, normal variant.  Subcentimeter hypodensity within the right lobe near dome too small for   definitive characterization.  Small volume perihepatic ascites.  BILE DUCTS: Normal caliber.  GALLBLADDER: Decompressed  SPLEEN: Within normal limits.  PANCREAS: Fusiform main pancreatic duct dilation involving the head and   neck measures up to 8 mm at the neck  Mild main pancreatic duct dilation at the distal body measures up to 3 mm  ADRENALS: Within normal limits.  KIDNEYS/URETERS: Horseshoe kidney. Mild bilateral hydronephrosis which   may be due to distended bladder.  Variably is left simple cortical cysts measuring up to 4.5 cm    BLADDER: Distended. Partially obscured by artifact  REPRODUCTIVE ORGANS: Prostate is enlarged. Partially obscured by artifact   from the right total hip arthroplasty    BOWEL: Massively distended air-filled cecum measuring over 12 cm in   diameter with air-fluid level projects towards the left upper quadrant   with a right upper pelvic area of angulation and kinking in keeping with   acecal volvulus   Dilated distal small bowel measures up to 3.2 cm. Decompressed colon  Oral contrast within nondistended stomach.  PERITONEUM: Small volume abdominal and pelvic ascites No free air.  VESSELS: Tortuous iliac vasculature and aorta  RETROPERITONEUM/LYMPH NODES: No lymphadenopathy.  ABDOMINAL WALL: Tiny fluid-filled umbilical and right inguinal hernias  BONES: Multilevel degenerative changes throughout the spine. Right total   hip arthroplasty    IMPRESSION:  Cecal volvulus resulting in a distal small bowel obstruction. No free air   at this time. Recommend surgical consultation  Findings were discussed with Dr. TEJINDER EDDY [id number] 3/29/2024   6:59 PM by Dr. Digna Curtis    Main pancreatic duct dilation measuring up to 8 mm. Recommend nonemergent   abdominal MRI with MRCP to further evaluate    Mild bilateral hydronephrosis may be due to distended bladder. If the   patient is unable to void, consider Farias catheterization.    --- End of Report ---          DIGNA CURTIS MD; Resident Radiologist  This document has been electronically signed.  JONA MURPHY MD; Attending Radiologist  This document has been electronically signed. Mar 29 2024  7:03PM (03-29-24 @ 17:55)

## 2024-03-30 NOTE — PROGRESS NOTE ADULT - SUBJECTIVE AND OBJECTIVE BOX
Procedure: Exploratory laparotomy    Open right hemicolectomy      Surgeon: Dr. Rodriguez    Subjective: Patient was evaluated at bedside postoperatively. Patient is doing well and denies nausea or emesis. Patient remarks abdominal pain but believes the PCA to help.      Vital Signs Last 24 Hrs  T(C): 36.7 (30 Mar 2024 03:35), Max: 38 (29 Mar 2024 15:46)  T(F): 98 (30 Mar 2024 03:35), Max: 100.4 (29 Mar 2024 15:46)  HR: 76 (30 Mar 2024 03:35) (71 - 104)  BP: 161/85 (30 Mar 2024 03:35) (133/84 - 171/89)  BP(mean): 112 (30 Mar 2024 02:58) (108 - 124)  RR: 20 (30 Mar 2024 03:35) (18 - 20)  SpO2: 99% (30 Mar 2024 03:35) (95% - 100%)    Parameters below as of 30 Mar 2024 03:35  Patient On (Oxygen Delivery Method): room air        Physical Exam:  General: NAD, resting comfortably in bed  Pulmonary: Nonlabored breathing, no respiratory distress  Cardiovascular: NSR  Abdominal: soft, ND, appropriate TTP at incision site; incision c/d/i with abdominal binder in place; Farias in place; NGT draining bilious fluid  Extremities: WWP, normal strength  Neuro: A/O x 3, CNs II-XII grossly intact, no focal deficits, normal sensation  Pulses: palpable distal pulses      LABS:                        11.7   1.93  )-----------( 85       ( 30 Mar 2024 02:15 )             33.5     03-30    137  |  105  |  18  ----------------------------<  109<H>  3.6   |  26  |  0.93    Ca    8.3<L>      30 Mar 2024 02:15  Phos  2.7     03-30  Mg     1.3     03-30    TPro  5.5<L>  /  Alb  2.7<L>  /  TBili  1.5<H>  /  DBili  0.9<H>  /  AST  47<H>  /  ALT  25  /  AlkPhos  78  03-30    PT/INR - ( 30 Mar 2024 02:15 )   PT: 12.8 sec;   INR: 1.13          PTT - ( 30 Mar 2024 02:15 )  PTT:28.7 sec  CAPILLARY BLOOD GLUCOSE        Urinalysis Basic - ( 30 Mar 2024 02:15 )    Color: x / Appearance: x / SG: x / pH: x  Gluc: 109 mg/dL / Ketone: x  / Bili: x / Urobili: x   Blood: x / Protein: x / Nitrite: x   Leuk Esterase: x / RBC: x / WBC x   Sq Epi: x / Non Sq Epi: x / Bacteria: x      LIVER FUNCTIONS - ( 30 Mar 2024 02:15 )  Alb: 2.7 g/dL / Pro: 5.5 g/dL / ALK PHOS: 78 U/L / ALT: 25 U/L / AST: 47 U/L / GGT: x           ABO Interpretation: O (03-29 @ 23:41)        Radiology and Additional Studies:    Assessment:68y Male s/p above procedure      Plan:  NPO/IVF  Cefotetan  NGT LIWS  SCD/OOB  Farias   Midline penrose   Abdominal bidner   AM Labs

## 2024-03-30 NOTE — PATIENT PROFILE ADULT - FUNCTIONAL ASSESSMENT - DAILY ACTIVITY 3.
[facility]    [address]    [place] [address]    Phone:  [phone]       Thank You for choosing us for your health care visit. We are glad to serve you and happy to provide you with this summary of your visit. Please help us to ensure we have accurate records. If you find anything that needs to be changed, please let our staff know as soon as possible.          Your Demographic Information     Patient Name Sex Caprice Galvin Female 2011       Ethnic Group Patient Race    Not of  or  Origin White      Your Visit Details     Date & Time Provider Department    3/1/2017 2:30 PM Nayely Hawkins MD [facility]      Your Upcoming Appointment*(Max 10)     2017  9:00 AM CDT   Well Child Exam with Nayely Hawkins MD   [facility] ([facility])    [address]  [place] [address] [phone]              Your To Do List     Follow-Up    Return if symptoms worsen or fail to improve.      We Ordered or Performed the Following     STREP GROUP A CULTURE     STREP GROUP A SCREEN RAPID WITH REFLEX TO CULTURE       Conditions Discussed Today or Order-Related Diagnoses        Comments    Sore throat    -  Primary       Your Vitals Were     Temp Weight Smoking Status             99 °F (37.2 °C) (Temporal Artery) 116 lb 9.6 oz (52.9 kg) (>99 %, Z= 3.08)* Never Smoker       *Growth percentiles are based on CDC 2-20 Years data.      Medications Prescribed or Re-Ordered Today     None      Allergies     Bee Sting SWELLING    Limb       Immunizations History as of 3/1/2017     Name Date    DTaP/HIB/IPV 2010, 2010    DTaP/Hep B/IPV 3/10/2010, 2009    DTaP/IPV 10/16/2013  4:05 PM    HEPATITIS A CHILD 3/17/2011, 2010    HIB 3/10/2010, 2009    Hepatitis B Child 2009    Influenza 3/17/2011, 12/15/2010    Influenza Live Trivalent 2012,  9/12/2011    Influenza Preservative Free 10/16/2013  4:08 PM    MMR 12/15/2010    MMRV 10/16/2013  4:06 PM    Pneumococcal Conjugate 13 Valent 9/13/2010    Pneumococcal Conjugate 7 Valent 3/10/2010, 1/11/2010, 2009    Rotavirus - Pentavalent 3/10/2010, 1/11/2010, 2009    Varicella 12/15/2010      Problem List as of 3/1/2017     Toxic effect of venom(989.5)      Results of Testing Done Today     STREP GROUP A SCREEN RAPID WITH REFLEX TO CULTURE      Component    RAPID STREP GROUP A    NEG                        Patient Instructions     None       3 = A little assistance

## 2024-03-30 NOTE — PATIENT PROFILE ADULT - FALL HARM RISK - HARM RISK INTERVENTIONS

## 2024-03-30 NOTE — CONSULT NOTE ADULT - ASSESSMENT
68y M with PMHx of methadone use (for unknown pain disorder) and PSHx of R inguinal hernia repair open (20+yrs ago) and R upper arm shrapnel removal (40yrs ago) presented to [facility] with 5d hx of worsening abdominal pain, 1 episode of bloody BM at onset of symptoms, and constipation. pt admitted to surgery for further management of cecal volvulous.       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  s/p exlap, R-hemicolectomy, side to side stapled anastamosis.  OOTB to chair   NPO w NGt for now   Encourage ambulation ; Encourage incentive spirometry  rest of the management per primary team    #methadone dependance   pt on 40 mg qd dose - npo for now   confirm dose and restart when able to tolerate po meds   pain management per primary     #hypomagnesemia   mag <2 -- recommend replacement   repeat labs in am     dvt ppx - chemo ppx on hold - scd in place

## 2024-03-30 NOTE — CONSULT NOTE ADULT - SUBJECTIVE AND OBJECTIVE BOX
Patient is a 68y old  Male who presents with a chief complaint of Abdominal Pain (30 Mar 2024 06:21)      HPI:  68y M with PMHx of methadone use (for unknown pain disorder) and PSHx of R inguinal hernia repair open (20+yrs ago) and R upper arm shrapnel removal (40yrs ago) presented to [facility] with 5d hx of worsening abdominal pain, 1 episode of bloody BM at onset of symptoms, and constipation (last flatus and BM 4d ago). Upon presentation patient was febrile 100.4 (rectal), tachycardic 104, and hypertensive 157/104. Labs notable for WBC (7.51) with left shift, Cr 1.49 (unclear baseline), BUN 25, Tbili 1.7, Alk Phos 122, AST/ALT 64/49, and lactate 2.2. Rest of CBC, CMP, UA, Resp Panel, and HIV testing wnl. Urine drug screen positive for opiates and methadone. Patient received 2L NS bolue and repeat lactate normalized to 0.9. The following imaging findings were obtained: CXR wnl w/ exception of gaseous dilatation of the stomach and bowel with nondifferential air-fluid levels. 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 No free air. Main pancreatic duct dilation measuring up to 8 mm. Mild bilateral hydronephrosis may be due to distended bladder. Patient received azithromycin and ceftriaxone at 4pm. Patient was transferred to [facility] surgery for further management of cecal volvulous.     PMHx:Methadone use (unknown pain disorder)  PSHx: R open inguinal hernia repair, R upper arm shrapnel removal  Meds: Methadone (weekly, last taken Tuesday)  Allergies: NKDA  Last C-scope: Denies  FHx: Denies  Social: Denies   (29 Mar 2024 21:39)      Allergies    No Known Allergies    Intolerances        MEDICATIONS  (STANDING):  cefoTEtan  IVPB 2 Gram(s) IV Intermittent every 12 hours  HYDROmorphone PCA (1 mG/mL) 30 milliLiter(s) PCA Continuous PCA Continuous  influenza  Vaccine (HIGH DOSE) 0.7 milliLiter(s) IntraMuscular once  lactated ringers. 1000 milliLiter(s) (100 mL/Hr) IV Continuous <Continuous>    MEDICATIONS  (PRN):  naloxone Injectable 0.1 milliGRAM(s) IV Push every 3 minutes PRN For ANY of the following changes in patient status:  A. RR LESS THAN 10 breaths per minute, B. Oxygen saturation LESS THAN 90%, C. Sedation score of 6  ondansetron Injectable 4 milliGRAM(s) IV Push every 6 hours PRN Nausea and/or Vomiting      Daily Height in cm: 172.72 (29 Mar 2024 23:56)    Daily     Drug Dosing Weight  Height (cm): 172.7 (29 Mar 2024 23:56)  Weight (kg): 63.5 (29 Mar 2024 23:56)  BMI (kg/m2): 21.3 (29 Mar 2024 23:56)  BSA (m2): 1.76 (29 Mar 2024 23:56)    PAST MEDICAL & SURGICAL HISTORY:  Methadone use      H/O right inguinal hernia repair          FAMILY HISTORY:  No pertinent family history in first degree relatives          REVIEW OF SYSTEMS:    CONSTITUTIONAL: No fever, generalized body carlos   EYES: No eye pain, visual disturbances, or discharge  ENMT:  No difficulty hearing, tinnitus, vertigo; No sinus or throat pain  NECK: No pain or stiffness  RESPIRATORY: No cough, wheezing, chills or hemoptysis; No shortness of breath  CARDIOVASCULAR: No chest pain, palpitations, dizziness, or leg swelling  GASTROINTESTINAL: + abdominal pain   GENITOURINARY: No dysuria, frequency, hematuria, or incontinence  LYMPH NODES: No enlarged glands  ENDOCRINE: No heat or cold intolerance; No hair loss  MUSCULOSKELETAL: + back pain ,   NEUROLOGICAL: No headaches, memory loss, loss of strength, numbness, or tremors  SKIN: No itching, burning, rashes, or lesion              Vital Signs Last 24 Hrs  T(C): 37.4 (30 Mar 2024 17:27), Max: 37.5 (29 Mar 2024 21:37)  T(F): 99.4 (30 Mar 2024 17:27), Max: 99.5 (29 Mar 2024 21:37)  HR: 86 (30 Mar 2024 17:27) (71 - 93)  BP: 173/81 (30 Mar 2024 17:27) (133/84 - 173/81)  BP(mean): 112 (30 Mar 2024 02:58) (108 - 124)  ABP: 168/62 (30 Mar 2024 02:43) (167/63 - 176/71)  ABP(mean): 96 (30 Mar 2024 02:43) (96 - 109)  RR: 18 (30 Mar 2024 17:27) (18 - 20)  SpO2: 92% (30 Mar 2024 17:27) (92% - 100%)    O2 Parameters below as of 30 Mar 2024 17:27  Patient On (Oxygen Delivery Method): room air            ABG - ( 30 Mar 2024 01:00 )  pH, Arterial: 7.41  pH, Blood: x     /  pCO2: 38    /  pO2: 275   / HCO3: 24    / Base Excess: -0.4  /  SaO2: x                   I&O's Detail    29 Mar 2024 07:01  -  30 Mar 2024 07:00  --------------------------------------------------------  IN:    IV PiggyBack: 100 mL    Lactated Ringers: 600 mL  Total IN: 700 mL    OUT:    Indwelling Catheter - Urethral (mL): 1100 mL    Nasogastric/Oral tube (mL): 100 mL    Voided (mL): 250 mL  Total OUT: 1450 mL    Total NET: -750 mL      30 Mar 2024 07:01  -  30 Mar 2024 20:28  --------------------------------------------------------  IN:    IV PiggyBack: 200 mL    Lactated Ringers: 600 mL  Total IN: 800 mL    OUT:    Indwelling Catheter - Urethral (mL): 1100 mL    Nasogastric/Oral tube (mL): 20 mL  Total OUT: 1120 mL    Total NET: -320 mL          PHYSICAL EXAM:    GENERAL: NAD, well-groomed, well-developed  HEAD:  Atraumatic, Normocephalic  EYES: EOMI, PERRLA, conjunctiva and sclera clear  ENMT: NGT in place  NECK: Supple, No JVD, Normal thyroid  NERVOUS SYSTEM:  Alert & Oriented X3, Good concentration;  CHEST/LUNG: Clear to percussion bilaterally; No rales, rhonchi, wheezing, or rubs  HEART: Regular rate and rhythm; No murmurs, rubs, or gallops  ABDOMEN: Soft, post op tenderness, Nondistended; incision covered with gauze with abdominal binder in place; Farias in place   EXTREMITIES:  2+ Peripheral Pulses, No clubbing, cyanosis, or edema  LYMPH: No lymphadenopathy noted  SKIN: No rashes or lesions    LABS:  CBC Full  -  ( 30 Mar 2024 18:07 )  WBC Count : 8.27 K/uL  RBC Count : 3.94 M/uL  Hemoglobin : 12.8 g/dL  Hematocrit : 38.1 %  Platelet Count - Automated : 89 K/uL  Mean Cell Volume : 96.7 fl  Mean Cell Hemoglobin : 32.5 pg  Mean Cell Hemoglobin Concentration : 33.6 gm/dL  Auto Neutrophil # : x  Auto Lymphocyte # : x  Auto Monocyte # : x  Auto Eosinophil # : x  Auto Basophil # : x  Auto Neutrophil % : x  Auto Lymphocyte % : x  Auto Monocyte % : x  Auto Eosinophil % : x  Auto Basophil % : x    03-30    139  |  105  |  13  ----------------------------<  99  3.9   |  29  |  0.79    Ca    8.1<L>      30 Mar 2024 05:30  Phos  2.7     03-30  Mg     1.3     03-30    TPro  6.0  /  Alb  2.7<L>  /  TBili  1.6<H>  /  DBili  0.9<H>  /  AST  44<H>  /  ALT  22  /  AlkPhos  72  03-30    CAPILLARY BLOOD GLUCOSE        PT/INR - ( 30 Mar 2024 02:15 )   PT: 12.8 sec;   INR: 1.13          PTT - ( 30 Mar 2024 02:15 )  PTT:28.7 sec  Urinalysis Basic - ( 30 Mar 2024 05:30 )    Color: x / Appearance: x / SG: x / pH: x  Gluc: 99 mg/dL / Ketone: x  / Bili: x / Urobili: x   Blood: x / Protein: x / Nitrite: x   Leuk Esterase: x / RBC: x / WBC x   Sq Epi: x / Non Sq Epi: x / Bacteria: x          Culture Results:   No growth at 24 hours (03-29 @ 15:44)  Culture Results:   No growth at 24 hours (03-29 @ 15:44)      EKG:    ECHO, US:    RADIOLOGY:

## 2024-03-30 NOTE — BRIEF OPERATIVE NOTE - OPERATION/FINDINGS
Midline laparotomy incision. Detorsion of volvulus and decompression of cecum. Terminal ileum divided with blue load ROBINSON stapler. Lateral to medial dissection of right colon along white line of Toldt. Colon transected at the proximal transverse colon with blue load ROBINSON stapler. Side to side ileocolic stapled anastomosis with blue load stapler. Common channel closed with 3-0 PDS and oversewn with lembert silk sutures. Mesenteric bed with oozing, surgicel powder applied. Abdomen washed out with warm saline. Fascia closed with #1 PDS x2. Skin closed with staples. Penrose left in subcutaneous spaced.

## 2024-03-30 NOTE — BRIEF OPERATIVE NOTE - NSICDXBRIEFPROCEDURE_GEN_ALL_CORE_FT
PROCEDURES:  Exploratory laparotomy 30-Mar-2024 01:46:24  Vanesa Todd  Open right hemicolectomy 30-Mar-2024 01:46:38  Vanesa Todd

## 2024-03-30 NOTE — PROGRESS NOTE ADULT - ASSESSMENT
68y M with PMHx of methadone use (for unknown pain disorder) and PSHx of R inguinal hernia repair open (20+yrs ago) and R upper arm shrapnel removal (40yrs ago) presented to [facility] with 5d hx of worsening abdominal pain, 1 episode of bloody BM at onset of symptoms, and constipation (last flatus and BM 4d ago). Upon presentation patient was febrile 100.4 (rectal), tachycardic 104, and hypertensive 157/104. 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 Patient was transferred to [facility] surgery for further management of cecal volvulous. Pt is now s/p exlap, R-hemicolectomy, side to side stapled anastamosis.    Plan:  NPO/IVF  Cefotetan  NGT LIWS  SCD/OOB  Farias   Midline penrose   Abdominal bidner   AM Labs

## 2024-03-31 LAB
HCT VFR BLD CALC: 35.1 % — LOW (ref 39–50)
HGB BLD-MCNC: 12.5 G/DL — LOW (ref 13–17)
MAGNESIUM SERPL-MCNC: 2.5 MG/DL — SIGNIFICANT CHANGE UP (ref 1.6–2.6)
MCHC RBC-ENTMCNC: 32.8 PG — SIGNIFICANT CHANGE UP (ref 27–34)
MCHC RBC-ENTMCNC: 35.6 GM/DL — SIGNIFICANT CHANGE UP (ref 32–36)
MCV RBC AUTO: 92.1 FL — SIGNIFICANT CHANGE UP (ref 80–100)
NRBC # BLD: 0 /100 WBCS — SIGNIFICANT CHANGE UP (ref 0–0)
PHOSPHATE SERPL-MCNC: 2.8 MG/DL — SIGNIFICANT CHANGE UP (ref 2.5–4.5)
PLATELET # BLD AUTO: 87 K/UL — LOW (ref 150–400)
RBC # BLD: 3.81 M/UL — LOW (ref 4.2–5.8)
RBC # FLD: 14.1 % — SIGNIFICANT CHANGE UP (ref 10.3–14.5)
WBC # BLD: 8.83 K/UL — SIGNIFICANT CHANGE UP (ref 3.8–10.5)
WBC # FLD AUTO: 8.83 K/UL — SIGNIFICANT CHANGE UP (ref 3.8–10.5)

## 2024-03-31 PROCEDURE — 99233 SBSQ HOSP IP/OBS HIGH 50: CPT

## 2024-03-31 RX ORDER — HYDROMORPHONE HYDROCHLORIDE 2 MG/ML
30 INJECTION INTRAMUSCULAR; INTRAVENOUS; SUBCUTANEOUS
Refills: 0 | Status: DISCONTINUED | OUTPATIENT
Start: 2024-03-31 | End: 2024-03-31

## 2024-03-31 RX ORDER — HYDROMORPHONE HYDROCHLORIDE 2 MG/ML
30 INJECTION INTRAMUSCULAR; INTRAVENOUS; SUBCUTANEOUS
Refills: 0 | Status: DISCONTINUED | OUTPATIENT
Start: 2024-03-31 | End: 2024-04-04

## 2024-03-31 RX ORDER — DIAZEPAM 5 MG
5 TABLET ORAL ONCE
Refills: 0 | Status: DISCONTINUED | OUTPATIENT
Start: 2024-03-31 | End: 2024-03-31

## 2024-03-31 RX ORDER — LABETALOL HCL 100 MG
10 TABLET ORAL ONCE
Refills: 0 | Status: COMPLETED | OUTPATIENT
Start: 2024-03-31 | End: 2024-03-31

## 2024-03-31 RX ORDER — HEPARIN SODIUM 5000 [USP'U]/ML
5000 INJECTION INTRAVENOUS; SUBCUTANEOUS EVERY 8 HOURS
Refills: 0 | Status: DISCONTINUED | OUTPATIENT
Start: 2024-03-31 | End: 2024-04-23

## 2024-03-31 RX ADMIN — LIDOCAINE 1 PATCH: 4 CREAM TOPICAL at 04:28

## 2024-03-31 RX ADMIN — HYDROMORPHONE HYDROCHLORIDE 30 MILLILITER(S): 2 INJECTION INTRAMUSCULAR; INTRAVENOUS; SUBCUTANEOUS at 12:39

## 2024-03-31 RX ADMIN — Medication 10 MILLIGRAM(S): at 16:32

## 2024-03-31 RX ADMIN — SODIUM CHLORIDE 100 MILLILITER(S): 9 INJECTION, SOLUTION INTRAVENOUS at 06:37

## 2024-03-31 RX ADMIN — HEPARIN SODIUM 5000 UNIT(S): 5000 INJECTION INTRAVENOUS; SUBCUTANEOUS at 12:11

## 2024-03-31 RX ADMIN — Medication 100 GRAM(S): at 00:19

## 2024-03-31 RX ADMIN — HYDROMORPHONE HYDROCHLORIDE 30 MILLILITER(S): 2 INJECTION INTRAMUSCULAR; INTRAVENOUS; SUBCUTANEOUS at 12:25

## 2024-03-31 RX ADMIN — HYDROMORPHONE HYDROCHLORIDE 30 MILLILITER(S): 2 INJECTION INTRAMUSCULAR; INTRAVENOUS; SUBCUTANEOUS at 06:30

## 2024-03-31 RX ADMIN — Medication 5 MILLIGRAM(S): at 17:54

## 2024-03-31 RX ADMIN — Medication 10 MILLIGRAM(S): at 20:54

## 2024-03-31 RX ADMIN — LIDOCAINE 1 PATCH: 4 CREAM TOPICAL at 02:05

## 2024-03-31 RX ADMIN — SODIUM CHLORIDE 100 MILLILITER(S): 9 INJECTION, SOLUTION INTRAVENOUS at 00:19

## 2024-03-31 RX ADMIN — Medication 100 GRAM(S): at 12:11

## 2024-03-31 RX ADMIN — HEPARIN SODIUM 5000 UNIT(S): 5000 INJECTION INTRAVENOUS; SUBCUTANEOUS at 21:17

## 2024-03-31 RX ADMIN — SODIUM CHLORIDE 100 MILLILITER(S): 9 INJECTION, SOLUTION INTRAVENOUS at 20:54

## 2024-03-31 NOTE — PROGRESS NOTE ADULT - ASSESSMENT
68y M with PMHx of methadone use (for unknown pain disorder) and PSHx of R inguinal hernia repair open (20+yrs ago) and R upper arm shrapnel removal (40yrs ago) presented to [facility] with 5d hx of worsening abdominal pain, 1 episode of bloody BM at onset of symptoms, and constipation. pt admitted to surgery for further management of cecal volvulous.       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  s/p exlap, R-hemicolectomy, side to side stapled anastamosis.  OOTB to chair   NPO   removed NGt 3/31  Encourage ambulation ; Encourage incentive spirometry  rest of the management per primary team    #methadone dependance   pt on 40 mg qd dose - npo for now   confirm dose and restart when able to tolerate po meds   pain management per primary     #hypomagnesemia   mag <2 -- s/p replacement   improved today 2.5       #thrombocytopenia   plts in 80's lateral   will ctm   dvt ppx - chemo ppx on hold - scd in place

## 2024-03-31 NOTE — PROGRESS NOTE ADULT - SUBJECTIVE AND OBJECTIVE BOX
Patient is a 68y old  Male who presents with a chief complaint of Abdominal Pain (30 Mar 2024 12:14)      INTERVAL HPI/OVERNIGHT EVENTS: offers no new complaints; current symptoms resolving    MEDICATIONS  (STANDING):  heparin   Injectable 5000 Unit(s) SubCutaneous every 8 hours  HYDROmorphone PCA (1 mG/mL) 30 milliLiter(s) PCA Continuous PCA Continuous  influenza  Vaccine (HIGH DOSE) 0.7 milliLiter(s) IntraMuscular once  lactated ringers. 1000 milliLiter(s) (100 mL/Hr) IV Continuous <Continuous>    MEDICATIONS  (PRN):  naloxone Injectable 0.1 milliGRAM(s) IV Push every 3 minutes PRN For ANY of the following changes in patient status:  A. RR LESS THAN 10 breaths per minute, B. Oxygen saturation LESS THAN 90%, C. Sedation score of 6  ondansetron Injectable 4 milliGRAM(s) IV Push every 6 hours PRN Nausea and/or Vomiting      __________________________________________________  REVIEW OF SYSTEMS:    CONSTITUTIONAL: No fever,   EYES: no acute visual disturbances  NECK: No pain or stiffness  RESPIRATORY: No cough; No shortness of breath  CARDIOVASCULAR: No chest pain, no palpitations  GASTROINTESTINAL: +diffuse pain   NEUROLOGICAL: No headache or numbness, no tremors  MUSCULOSKELETAL: No joint pain, no muscle pain  GENITOURINARY: no dysuria, no frequency, no hesitancy  PSYCHIATRY: no depression , no anxiety  ALL OTHER  ROS negative        Vital Signs Last 24 Hrs  T(C): 37.4 (31 Mar 2024 08:05), Max: 37.7 (30 Mar 2024 20:35)  T(F): 99.4 (31 Mar 2024 08:05), Max: 99.9 (30 Mar 2024 20:35)  HR: 84 (31 Mar 2024 08:05) (81 - 91)  BP: 171/99 (31 Mar 2024 08:05) (136/87 - 173/81)  BP(mean): --  RR: 18 (31 Mar 2024 08:05) (17 - 19)  SpO2: 96% (31 Mar 2024 08:05) (92% - 97%)    Parameters below as of 31 Mar 2024 08:05  Patient On (Oxygen Delivery Method): room air        ________________________________________________  PHYSICAL EXAM:    GENERAL: diffuse abd pain   HEAD:  Atraumatic, Normocephalic  EYES: EOMI, PERRLA, conjunctiva and sclera clear  ENMT: NGT in place  NECK: Supple, No JVD, Normal thyroid  NERVOUS SYSTEM:  Alert & Oriented X3, Good concentration;  CHEST/LUNG: Clear to percussion bilaterally; No rales, rhonchi, wheezing, or rubs  HEART: Regular rate and rhythm; No murmurs, rubs, or gallops  ABDOMEN: Soft, post op tenderness, Nondistended; incision covered with gauze with abdominal binder in place;    EXTREMITIES:  2+ Peripheral Pulses, No clubbing, cyanosis, or edema  LYMPH: No lymphadenopathy noted  SKIN: No rashes or lesions    _________________________________________________  LABS:                        12.5   8.83  )-----------( 87       ( 31 Mar 2024 09:58 )             35.1     03-30    139  |  105  |  13  ----------------------------<  99  3.9   |  29  |  0.79    Ca    8.1<L>      30 Mar 2024 05:30  Phos  2.8     03-31  Mg     2.5     03-31    TPro  6.0  /  Alb  2.7<L>  /  TBili  1.6<H>  /  DBili  0.9<H>  /  AST  44<H>  /  ALT  22  /  AlkPhos  72  03-30    PT/INR - ( 30 Mar 2024 02:15 )   PT: 12.8 sec;   INR: 1.13          PTT - ( 30 Mar 2024 02:15 )  PTT:28.7 sec  Urinalysis Basic - ( 30 Mar 2024 05:30 )    Color: x / Appearance: x / SG: x / pH: x  Gluc: 99 mg/dL / Ketone: x  / Bili: x / Urobili: x   Blood: x / Protein: x / Nitrite: x   Leuk Esterase: x / RBC: x / WBC x   Sq Epi: x / Non Sq Epi: x / Bacteria: x      CAPILLARY BLOOD GLUCOSE            RADIOLOGY & ADDITIONAL TESTS:      Plan of care was discussed with patient and /or primary care giver; all questions and concerns were addressed and care was aligned with patient's wishes.

## 2024-03-31 NOTE — PROGRESS NOTE ADULT - ASSESSMENT
68y M with PMHx of methadone use (for unknown pain disorder) and PSHx of R inguinal hernia repair open (20+yrs ago) and R upper arm shrapnel removal (40yrs ago) presented to [facility] with 5d hx of worsening abdominal pain, 1 episode of bloody BM at onset of symptoms, and constipation (last flatus and BM 4d ago). Upon presentation patient was febrile 100.4 (rectal), tachycardic 104, and hypertensive 157/104. 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 Patient was transferred to [facility] surgery for further management of cecal volvulous. Pt is now s/p exlap, R-hemicolectomy, side to side stapled anastamosis.    NPO/IVF  Cefotetan  SQH/SCD/OOB  Midline penrose   AM Labs

## 2024-03-31 NOTE — PROGRESS NOTE ADULT - SUBJECTIVE AND OBJECTIVE BOX
STATUS POST:  Exploratory laparotomy, open right hemicolectomy    POST OPERATIVE DAY #: 1    SUBJECTIVE: Patient was seen and examined by chief resident. Patient resting in bed c/o diffuse pain, PCA only somewhat helping relieve pain.       Vital Signs Last 24 Hrs  T(C): 37.4 (31 Mar 2024 08:05), Max: 37.7 (30 Mar 2024 20:35)  T(F): 99.4 (31 Mar 2024 08:05), Max: 99.9 (30 Mar 2024 20:35)  HR: 84 (31 Mar 2024 08:05) (81 - 91)  BP: 171/99 (31 Mar 2024 08:05) (136/87 - 173/81)  BP(mean): --  RR: 18 (31 Mar 2024 08:05) (17 - 19)  SpO2: 96% (31 Mar 2024 08:05) (92% - 97%)    Parameters below as of 31 Mar 2024 08:05  Patient On (Oxygen Delivery Method): room air        I&O's Summary    30 Mar 2024 07:01  -  31 Mar 2024 07:00  --------------------------------------------------------  IN: 1900 mL / OUT: 1370 mL / NET: 530 mL    31 Mar 2024 07:01  -  31 Mar 2024 13:06  --------------------------------------------------------  IN: 0 mL / OUT: 450 mL / NET: -450 mL        Physical Exam:  General Appearance: Appears well, NAD  Pulmonary: Nonlabored breathing, no respiratory distress  Cardiovascular: NSR  Abdomen: Soft, ND, appropriate incisional tenderness, midline incision w staples CDI, penrose at inferior portion of wound leaking SS output.  Extremities: WWP, SCD's in place     LABS:                        12.5   8.83  )-----------( 87       ( 31 Mar 2024 09:58 )             35.1     03-30    139  |  105  |  13  ----------------------------<  99  3.9   |  29  |  0.79    Ca    8.1<L>      30 Mar 2024 05:30  Phos  2.8     03-31  Mg     2.5     03-31    TPro  6.0  /  Alb  2.7<L>  /  TBili  1.6<H>  /  DBili  0.9<H>  /  AST  44<H>  /  ALT  22  /  AlkPhos  72  03-30    PT/INR - ( 30 Mar 2024 02:15 )   PT: 12.8 sec;   INR: 1.13          PTT - ( 30 Mar 2024 02:15 )  PTT:28.7 sec  Urinalysis Basic - ( 30 Mar 2024 05:30 )    Color: x / Appearance: x / SG: x / pH: x  Gluc: 99 mg/dL / Ketone: x  / Bili: x / Urobili: x   Blood: x / Protein: x / Nitrite: x   Leuk Esterase: x / RBC: x / WBC x   Sq Epi: x / Non Sq Epi: x / Bacteria: x

## 2024-04-01 ENCOUNTER — RESULT REVIEW (OUTPATIENT)
Age: 69
End: 2024-04-01

## 2024-04-01 LAB
ANION GAP SERPL CALC-SCNC: 9 MMOL/L — SIGNIFICANT CHANGE UP (ref 5–17)
APPEARANCE UR: CLEAR — SIGNIFICANT CHANGE UP
BACTERIA # UR AUTO: NEGATIVE /HPF — SIGNIFICANT CHANGE UP
BILIRUB UR-MCNC: NEGATIVE — SIGNIFICANT CHANGE UP
BUN SERPL-MCNC: 17 MG/DL — SIGNIFICANT CHANGE UP (ref 7–23)
CALCIUM SERPL-MCNC: 8.4 MG/DL — SIGNIFICANT CHANGE UP (ref 8.4–10.5)
CAST: 1 /LPF — SIGNIFICANT CHANGE UP (ref 0–4)
CHLORIDE SERPL-SCNC: 98 MMOL/L — SIGNIFICANT CHANGE UP (ref 96–108)
CO2 SERPL-SCNC: 25 MMOL/L — SIGNIFICANT CHANGE UP (ref 22–31)
COLOR SPEC: YELLOW — SIGNIFICANT CHANGE UP
CREAT SERPL-MCNC: 0.7 MG/DL — SIGNIFICANT CHANGE UP (ref 0.5–1.3)
DIFF PNL FLD: ABNORMAL
EGFR: 99 ML/MIN/1.73M2 — SIGNIFICANT CHANGE UP
GLUCOSE SERPL-MCNC: 92 MG/DL — SIGNIFICANT CHANGE UP (ref 70–99)
GLUCOSE UR QL: NEGATIVE MG/DL — SIGNIFICANT CHANGE UP
HCT VFR BLD CALC: 31.5 % — LOW (ref 39–50)
HCV RNA FLD QL NAA+PROBE: SIGNIFICANT CHANGE UP
HCV RNA SPEC QL PROBE+SIG AMP: DETECTED
HGB BLD-MCNC: 10.8 G/DL — LOW (ref 13–17)
KETONES UR-MCNC: NEGATIVE MG/DL — SIGNIFICANT CHANGE UP
LEUKOCYTE ESTERASE UR-ACNC: NEGATIVE — SIGNIFICANT CHANGE UP
MAGNESIUM SERPL-MCNC: 2.2 MG/DL — SIGNIFICANT CHANGE UP (ref 1.6–2.6)
MCHC RBC-ENTMCNC: 32.4 PG — SIGNIFICANT CHANGE UP (ref 27–34)
MCHC RBC-ENTMCNC: 34.3 GM/DL — SIGNIFICANT CHANGE UP (ref 32–36)
MCV RBC AUTO: 94.6 FL — SIGNIFICANT CHANGE UP (ref 80–100)
NITRITE UR-MCNC: NEGATIVE — SIGNIFICANT CHANGE UP
NRBC # BLD: 0 /100 WBCS — SIGNIFICANT CHANGE UP (ref 0–0)
PH UR: 7.5 — SIGNIFICANT CHANGE UP (ref 5–8)
PHOSPHATE SERPL-MCNC: 2.5 MG/DL — SIGNIFICANT CHANGE UP (ref 2.5–4.5)
PLATELET # BLD AUTO: 82 K/UL — LOW (ref 150–400)
POTASSIUM SERPL-MCNC: 4 MMOL/L — SIGNIFICANT CHANGE UP (ref 3.5–5.3)
POTASSIUM SERPL-SCNC: 4 MMOL/L — SIGNIFICANT CHANGE UP (ref 3.5–5.3)
PROT UR-MCNC: NEGATIVE MG/DL — SIGNIFICANT CHANGE UP
RBC # BLD: 3.33 M/UL — LOW (ref 4.2–5.8)
RBC # FLD: 13.6 % — SIGNIFICANT CHANGE UP (ref 10.3–14.5)
RBC CASTS # UR COMP ASSIST: 6 /HPF — HIGH (ref 0–4)
SODIUM SERPL-SCNC: 132 MMOL/L — LOW (ref 135–145)
SP GR SPEC: 1.01 — SIGNIFICANT CHANGE UP (ref 1–1.03)
UROBILINOGEN FLD QL: 1 MG/DL — SIGNIFICANT CHANGE UP (ref 0.2–1)
WBC # BLD: 7.95 K/UL — SIGNIFICANT CHANGE UP (ref 3.8–10.5)
WBC # FLD AUTO: 7.95 K/UL — SIGNIFICANT CHANGE UP (ref 3.8–10.5)
WBC UR QL: 0 /HPF — SIGNIFICANT CHANGE UP (ref 0–5)

## 2024-04-01 PROCEDURE — 93306 TTE W/DOPPLER COMPLETE: CPT | Mod: 26

## 2024-04-01 PROCEDURE — 99233 SBSQ HOSP IP/OBS HIGH 50: CPT

## 2024-04-01 RX ORDER — METHADONE HYDROCHLORIDE 40 MG/1
180 TABLET ORAL ONCE
Refills: 0 | Status: DISCONTINUED | OUTPATIENT
Start: 2024-04-01 | End: 2024-04-01

## 2024-04-01 RX ORDER — DIAZEPAM 5 MG
5 TABLET ORAL DAILY
Refills: 0 | Status: DISCONTINUED | OUTPATIENT
Start: 2024-04-01 | End: 2024-04-08

## 2024-04-01 RX ORDER — GABAPENTIN 400 MG/1
300 CAPSULE ORAL EVERY 24 HOURS
Refills: 0 | Status: DISCONTINUED | OUTPATIENT
Start: 2024-04-01 | End: 2024-04-01

## 2024-04-01 RX ORDER — METHADONE HYDROCHLORIDE 40 MG/1
190 TABLET ORAL DAILY
Refills: 0 | Status: DISCONTINUED | OUTPATIENT
Start: 2024-04-02 | End: 2024-04-09

## 2024-04-01 RX ORDER — GABAPENTIN 400 MG/1
100 CAPSULE ORAL EVERY 24 HOURS
Refills: 0 | Status: DISCONTINUED | OUTPATIENT
Start: 2024-04-01 | End: 2024-04-01

## 2024-04-01 RX ORDER — ACETAMINOPHEN 500 MG
1000 TABLET ORAL ONCE
Refills: 0 | Status: COMPLETED | OUTPATIENT
Start: 2024-04-01 | End: 2024-04-01

## 2024-04-01 RX ORDER — GABAPENTIN 400 MG/1
300 CAPSULE ORAL EVERY 8 HOURS
Refills: 0 | Status: DISCONTINUED | OUTPATIENT
Start: 2024-04-01 | End: 2024-04-23

## 2024-04-01 RX ORDER — DIAZEPAM 5 MG
5 TABLET ORAL DAILY
Refills: 0 | Status: DISCONTINUED | OUTPATIENT
Start: 2024-04-01 | End: 2024-04-01

## 2024-04-01 RX ORDER — ACETAMINOPHEN 500 MG
1000 TABLET ORAL ONCE
Refills: 0 | Status: COMPLETED | OUTPATIENT
Start: 2024-04-02 | End: 2024-04-02

## 2024-04-01 RX ORDER — LABETALOL HCL 100 MG
10 TABLET ORAL ONCE
Refills: 0 | Status: COMPLETED | OUTPATIENT
Start: 2024-04-01 | End: 2024-04-01

## 2024-04-01 RX ORDER — ACETAMINOPHEN 500 MG
1000 TABLET ORAL ONCE
Refills: 0 | Status: DISCONTINUED | OUTPATIENT
Start: 2024-04-02 | End: 2024-04-02

## 2024-04-01 RX ORDER — SODIUM CHLORIDE 9 MG/ML
1000 INJECTION, SOLUTION INTRAVENOUS
Refills: 0 | Status: DISCONTINUED | OUTPATIENT
Start: 2024-04-01 | End: 2024-04-07

## 2024-04-01 RX ORDER — LIDOCAINE 4 G/100G
1 CREAM TOPICAL ONCE
Refills: 0 | Status: COMPLETED | OUTPATIENT
Start: 2024-04-01 | End: 2024-04-01

## 2024-04-01 RX ORDER — ACETAMINOPHEN 500 MG
650 TABLET ORAL EVERY 6 HOURS
Refills: 0 | Status: DISCONTINUED | OUTPATIENT
Start: 2024-04-01 | End: 2024-04-01

## 2024-04-01 RX ADMIN — HEPARIN SODIUM 5000 UNIT(S): 5000 INJECTION INTRAVENOUS; SUBCUTANEOUS at 13:15

## 2024-04-01 RX ADMIN — LIDOCAINE 1 PATCH: 4 CREAM TOPICAL at 17:37

## 2024-04-01 RX ADMIN — GABAPENTIN 300 MILLIGRAM(S): 400 CAPSULE ORAL at 21:11

## 2024-04-01 RX ADMIN — HEPARIN SODIUM 5000 UNIT(S): 5000 INJECTION INTRAVENOUS; SUBCUTANEOUS at 21:09

## 2024-04-01 RX ADMIN — GABAPENTIN 100 MILLIGRAM(S): 400 CAPSULE ORAL at 08:24

## 2024-04-01 RX ADMIN — SODIUM CHLORIDE 100 MILLILITER(S): 9 INJECTION, SOLUTION INTRAVENOUS at 07:44

## 2024-04-01 RX ADMIN — Medication 10 MILLIGRAM(S): at 09:02

## 2024-04-01 RX ADMIN — Medication 1000 MILLIGRAM(S): at 08:39

## 2024-04-01 RX ADMIN — LIDOCAINE 1 PATCH: 4 CREAM TOPICAL at 05:18

## 2024-04-01 RX ADMIN — GABAPENTIN 300 MILLIGRAM(S): 400 CAPSULE ORAL at 13:15

## 2024-04-01 RX ADMIN — SODIUM CHLORIDE 100 MILLILITER(S): 9 INJECTION, SOLUTION INTRAVENOUS at 06:51

## 2024-04-01 RX ADMIN — LIDOCAINE 1 PATCH: 4 CREAM TOPICAL at 06:58

## 2024-04-01 RX ADMIN — HYDROMORPHONE HYDROCHLORIDE 30 MILLILITER(S): 2 INJECTION INTRAMUSCULAR; INTRAVENOUS; SUBCUTANEOUS at 06:51

## 2024-04-01 RX ADMIN — Medication 400 MILLIGRAM(S): at 08:24

## 2024-04-01 RX ADMIN — HEPARIN SODIUM 5000 UNIT(S): 5000 INJECTION INTRAVENOUS; SUBCUTANEOUS at 05:18

## 2024-04-01 RX ADMIN — METHADONE HYDROCHLORIDE 180 MILLIGRAM(S): 40 TABLET ORAL at 18:51

## 2024-04-01 RX ADMIN — Medication 62.5 MILLIMOLE(S): at 09:28

## 2024-04-01 RX ADMIN — Medication 400 MILLIGRAM(S): at 13:16

## 2024-04-01 RX ADMIN — Medication 400 MILLIGRAM(S): at 19:05

## 2024-04-01 NOTE — PHYSICAL THERAPY INITIAL EVALUATION ADULT - GAIT DEVIATIONS NOTED, PT EVAL
decreased chantelle/decreased velocity of limb motion/decreased step length/decreased stride length/decreased weight-shifting ability

## 2024-04-01 NOTE — DIETITIAN INITIAL EVALUATION ADULT - ORAL INTAKE PTA/DIET HISTORY
Visited pt at bedside on 9WO. States that he has been eating less for the past 2 months. Endorses having smaller frequent meals throughout the day. Does not follow any therapeutic diet at home. No cultural, ethnic, Church food preferences noted. Confirms No known food allergies.

## 2024-04-01 NOTE — DIETITIAN INITIAL EVALUATION ADULT - NSFNSGIASSESSMENTFT_GEN_A_CORE
No issues with nausea, vomiting, diarrhea reported at time of visit. Endorses feeling constipated. Last BM noted on 3/26; pt has not had a BM since admission. States that he has not been able to pass flatulence. Pt is not currently on a bowel regimen.

## 2024-04-01 NOTE — DIETITIAN INITIAL EVALUATION ADULT - NSFNSNUTRCHEWSWALLOWFT_GEN_A_CORE
Endorses having chewing difficulties due to poor dentition. States that he typically consumes softer foods at home (yogurt, mashed potatoes, salmon, oatmeal) Recommend slp evaluation to better assess chewing/swallow capabilities.

## 2024-04-01 NOTE — PROGRESS NOTE ADULT - SUBJECTIVE AND OBJECTIVE BOX
Overnight events:       POD#    SUBJECTIVE:      MEDICATIONS  (STANDING):  heparin   Injectable 5000 Unit(s) SubCutaneous every 8 hours  HYDROmorphone PCA (1 mG/mL) 30 milliLiter(s) PCA Continuous PCA Continuous  influenza  Vaccine (HIGH DOSE) 0.7 milliLiter(s) IntraMuscular once  lactated ringers. 1000 milliLiter(s) (100 mL/Hr) IV Continuous <Continuous>    MEDICATIONS  (PRN):  naloxone Injectable 0.1 milliGRAM(s) IV Push every 3 minutes PRN For ANY of the following changes in patient status:  A. RR LESS THAN 10 breaths per minute, B. Oxygen saturation LESS THAN 90%, C. Sedation score of 6  ondansetron Injectable 4 milliGRAM(s) IV Push every 6 hours PRN Nausea and/or Vomiting      Vital Signs Last 24 Hrs  T(C): 37 (01 Apr 2024 04:26), Max: 37.4 (31 Mar 2024 08:05)  T(F): 98.6 (01 Apr 2024 04:26), Max: 99.4 (31 Mar 2024 08:05)  HR: 86 (01 Apr 2024 04:26) (73 - 90)  BP: 173/103 (01 Apr 2024 04:26) (161/91 - 194/80)  BP(mean): --  RR: 19 (01 Apr 2024 04:26) (18 - 19)  SpO2: 98% (01 Apr 2024 04:26) (95% - 98%)    Parameters below as of 01 Apr 2024 04:26  Patient On (Oxygen Delivery Method): room air        Physical Exam:  General: NAD, resting comfortably in bed  Pulmonary: Nonlabored breathing, no respiratory distress  Cardiovascular: NSR  Abdominal: soft, NT/ND  Extremities: WWP, normal strength  Neuro: A/O x 3, CNs II-XII grossly intact, no focal deficits, normal motor/sensation  Pulses: palpable distal pulses    I&O's Summary    30 Mar 2024 07:01  -  31 Mar 2024 07:00  --------------------------------------------------------  IN: 1900 mL / OUT: 1370 mL / NET: 530 mL    31 Mar 2024 07:01  -  01 Apr 2024 06:17  --------------------------------------------------------  IN: 800 mL / OUT: 1775 mL / NET: -975 mL        LABS:                        12.5   8.83  )-----------( 87       ( 31 Mar 2024 09:58 )             35.1       Phos  2.8     03-31  Mg     2.5     03-31          CAPILLARY BLOOD GLUCOSE            RADIOLOGY & ADDITIONAL STUDIES:   Overnight events: hypertensive, labetalol 10 x 1        POD#3/30: exlap, R antoni, side to side stapled anastomosis, penrose x1 midline     SUBJECTIVE: Patient seen at bedside with chief resident. Patient endorses a lot of abdominal pain.       MEDICATIONS  (STANDING):  heparin   Injectable 5000 Unit(s) SubCutaneous every 8 hours  HYDROmorphone PCA (1 mG/mL) 30 milliLiter(s) PCA Continuous PCA Continuous  influenza  Vaccine (HIGH DOSE) 0.7 milliLiter(s) IntraMuscular once  lactated ringers. 1000 milliLiter(s) (100 mL/Hr) IV Continuous <Continuous>    MEDICATIONS  (PRN):  naloxone Injectable 0.1 milliGRAM(s) IV Push every 3 minutes PRN For ANY of the following changes in patient status:  A. RR LESS THAN 10 breaths per minute, B. Oxygen saturation LESS THAN 90%, C. Sedation score of 6  ondansetron Injectable 4 milliGRAM(s) IV Push every 6 hours PRN Nausea and/or Vomiting      Vital Signs Last 24 Hrs  T(C): 37 (01 Apr 2024 04:26), Max: 37.4 (31 Mar 2024 08:05)  T(F): 98.6 (01 Apr 2024 04:26), Max: 99.4 (31 Mar 2024 08:05)  HR: 86 (01 Apr 2024 04:26) (73 - 90)  BP: 173/103 (01 Apr 2024 04:26) (161/91 - 194/80)  BP(mean): --  RR: 19 (01 Apr 2024 04:26) (18 - 19)  SpO2: 98% (01 Apr 2024 04:26) (95% - 98%)    Parameters below as of 01 Apr 2024 04:26  Patient On (Oxygen Delivery Method): room air        Physical Exam:  General: NAD, resting comfortably in bed  Pulmonary: Nonlabored breathing, no respiratory distress  Cardiovascular: NSR  Abdominal: soft, mild distended, diffusely tender but improvement since admission, penrose in place in lower midline dressing   Extremities: WWP, normal strength  Neuro: A/O x 3, CNs II-XII grossly intact, no focal deficits, normal motor/sensation  Pulses: palpable distal pulses    I&O's Summary    30 Mar 2024 07:01  -  31 Mar 2024 07:00  --------------------------------------------------------  IN: 1900 mL / OUT: 1370 mL / NET: 530 mL    31 Mar 2024 07:01  -  01 Apr 2024 06:17  --------------------------------------------------------  IN: 800 mL / OUT: 1775 mL / NET: -975 mL        LABS:                        12.5   8.83  )-----------( 87       ( 31 Mar 2024 09:58 )             35.1       Phos  2.8     03-31  Mg     2.5     03-31          CAPILLARY BLOOD GLUCOSE            RADIOLOGY & ADDITIONAL STUDIES:

## 2024-04-01 NOTE — DIETITIAN INITIAL EVALUATION ADULT - ADD RECOMMEND
1. NPO  **as medically feasible, advance diet as tolerated to Clear liquid, then to low fiber diet, provide nourishments and/or oral nutrition supplements per pt preference  **IF pt's diet is not advanced in the next 24-48h, recommend that team consider alternate means of nutrition** please consult dietitians for recommendations**  2. Recommend micronutrient supplementation: multivitamin to prevent deficiencies unless otherwise contraindicated   When appropriate monitor PO intake, diet tolerance, weight trends, labs, and skin integrity and bowel movement regularity.    3. Nutrition care plan to remain consistent with GOC   4. RD remains available upon request and will follow-up per protocol.  1. NPO  **as medically feasible, advance diet as tolerated to Clear liquid, then to low fiber diet, provide nourishments and/or oral nutrition supplements per pt preference  **IF pt's diet is not advanced in the next 24-48h, recommend that team consider alternate means of nutrition** please consult dietitians for recommendations**  2. Recommend micronutrient supplementation: multivitamin to prevent deficiencies unless otherwise contraindicated   When appropriate monitor PO intake, diet tolerance, weight trends, labs, and skin integrity and bowel movement regularity.    3. Nutrition care plan to remain consistent with GOC   4. RD remains available upon request and will follow-up as able.

## 2024-04-01 NOTE — DIETITIAN INITIAL EVALUATION ADULT - OTHER INFO
- Pt is ordered for dextrose 5% + sodium chloride .9%   -Nutritionally Pertinent Labs 4/1 Na: 132 (L),

## 2024-04-01 NOTE — PROGRESS NOTE ADULT - ASSESSMENT
68y M with PMHx of methadone use (for unknown pain disorder) and PSHx of R inguinal hernia repair open (20+yrs ago) and R upper arm shrapnel removal (40yrs ago) presented to [facility] with 5d hx of worsening abdominal pain, 1 episode of bloody BM at onset of symptoms, and constipation (last flatus and BM 4d ago). Upon presentation patient was febrile 100.4 (rectal), tachycardic 104, and hypertensive 157/104. 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 Patient was transferred to [facility] surgery for further management of cecal volvulous. Pt is now s/p exlap, R-hemicolectomy, side to side stapled anastamosis.    NPO/IVF  Cefotetan  SQH/SCD/OOB  Midline penrose   AM Labs   68y M with PMHx of methadone use (for unknown pain disorder) and PSHx of R inguinal hernia repair open (20+yrs ago) and R upper arm shrapnel removal (40yrs ago) presented to [facility] with 5d hx of worsening abdominal pain, 1 episode of bloody BM at onset of symptoms, and constipation (last flatus and BM 4d ago). Upon presentation patient was febrile 100.4 (rectal), tachycardic 104, and hypertensive 157/104. 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 Patient was transferred to [facility] surgery for further management of cecal volvulous. Pt is now s/p exlap, R-hemicolectomy, side to side stapled anastamosis. Will add acetaminophen to pain regiment along with gabapentin. Will consult PT and ambulate out of bed.     NPO/IVF  Cefotetan  SQH/SCD/OOB  Midline penrose   AM Labs

## 2024-04-01 NOTE — DIETITIAN INITIAL EVALUATION ADULT - REASON FOR ADMISSION
ABDOMINAL PAIN  "68y M with PMHx of methadone use (for unknown pain disorder) and PSHx of R inguinal hernia repair open (20+yrs ago) and R upper arm shrapnel removal (40yrs ago) presented to [facility] with 5d hx of worsening abdominal pain, 1 episode of bloody BM at onset of symptoms, and constipation (last flatus and BM 4d ago). Upon presentation patient was febrile 100.4 (rectal), tachycardic 104, and hypertensive 157/104. 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 Patient was transferred to [facility] surgery for further management of cecal volvulous. Pt is now s/p exlap, R-hemicolectomy, side to side stapled anastamosis. Will add acetaminophen to pain regiment along with gabapentin. Will consult PT and ambulate out of bed."

## 2024-04-01 NOTE — PROVIDER CONTACT NOTE (OTHER) - ACTION/TREATMENT ORDERED:
Awaiting team response for intervention at this time. Pt encouraged to keep trying to void. Care ongoing.
Give pt one hour and encourage pt to ambulate, bladder scan at 02:00 and straight cath if over 350ml. Care ongoing.
Labetalol IVP will be ordered and given, blood pressure to be checked again 15 minutes after administration and team will be notified. Care ongoing.
Provider is aware of blood pressure, no interventions needed. Pt unable to get any other pain medications at this time and is encouraged to use PCA pump as needed. Care ongoing.
MD Martha came to bedside. Ordered IVP Labetalol and administered to Pt.

## 2024-04-01 NOTE — DIETITIAN INITIAL EVALUATION ADULT - PHYSCIAL ASSESSMENT
Weight History:  3/29 140 pounds (dosing)    UBW: ~145 pounds (per pt) x 2 months   IBW: 154 pounds   %IBW: 90%     Weights noted. RD to continue to monitor weight trends as available.

## 2024-04-01 NOTE — PROGRESS NOTE ADULT - ASSESSMENT
68y M with PMHx of methadone use (for unknown pain disorder) and PSHx of R inguinal hernia repair open (20+yrs ago) and R upper arm shrapnel removal (40yrs ago) presented to [facility] with 5d hx of worsening abdominal pain, 1 episode of bloody BM at onset of symptoms, and constipation. pt admitted to surgery for further management of cecal volvulous.       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  s/p exlap, R-hemicolectomy, side to side stapled anastamosis.  OOTB to chair   NPO except for meds  removed NGt 3/31  Encourage ambulation ; Encourage incentive spirometry - patient reports issues using IS due to presence of TMJ and history of jaw surgery    #methadone dependance   pt on 40 mg qd dose - npo for now.  Team to confirm dose and restart meds  pain management per primary     #hypomagnesemia   mag <2 -- s/p replacement   improved today 2.5     #HCV  - is HCV positive (both RNA and antigen)  - small amount of perihepatic ascites, but no comment on cirrhosis.    - should have outpatient follow up    #thrombocytopenia   plts in 80's lateral   will ctm   dvt ppx per primary team.  Monitor for bleeding    #Hypertension  - has been having intermittently elevated systolic blood pressures in the setting of pain.    - once home dose of methadone confirmed and resumed on dose (which he takes for chronic pain) - assess blood pressure.  Would avoid ACE/ARB in setting of ?cirrhosis, but can trial lowest dose of nifedipine PO with careful monitoring if persistently elevated.       High level of medical decision making required for this encounter, including discussion of plan with the surgery team, patient being on PCA dilaudid, personal review of imaging, personal review of labs, trending of labs.

## 2024-04-01 NOTE — DIETITIAN INITIAL EVALUATION ADULT - OTHER CALCULATIONS
Based on Standards of Care pt within % IBW (90%) thus actual body weight (63.5kg) used for all calculations. Needs adjusted for advanced age and malnutrition.

## 2024-04-01 NOTE — PHYSICAL THERAPY INITIAL EVALUATION ADULT - ADDITIONAL COMMENTS
Pt lives alone in apartment with elevator access and 4STE. At baseline, pt in Independent with all functional mobility and utilizes a SC to ambulate.

## 2024-04-01 NOTE — DIETITIAN INITIAL EVALUATION ADULT - PERTINENT MEDS FT
MEDICATIONS  (STANDING):  acetaminophen   IVPB .. 1000 milliGRAM(s) IV Intermittent once  acetaminophen   IVPB .. 1000 milliGRAM(s) IV Intermittent once  dextrose 5% + sodium chloride 0.9%. 1000 milliLiter(s) (100 mL/Hr) IV Continuous <Continuous>  gabapentin 300 milliGRAM(s) Oral every 8 hours  heparin   Injectable 5000 Unit(s) SubCutaneous every 8 hours  HYDROmorphone PCA (1 mG/mL) 30 milliLiter(s) PCA Continuous PCA Continuous  influenza  Vaccine (HIGH DOSE) 0.7 milliLiter(s) IntraMuscular once    MEDICATIONS  (PRN):  diazepam    Tablet 5 milliGRAM(s) Oral daily PRN muscle spasms  naloxone Injectable 0.1 milliGRAM(s) IV Push every 3 minutes PRN For ANY of the following changes in patient status:  A. RR LESS THAN 10 breaths per minute, B. Oxygen saturation LESS THAN 90%, C. Sedation score of 6  ondansetron Injectable 4 milliGRAM(s) IV Push every 6 hours PRN Nausea and/or Vomiting

## 2024-04-01 NOTE — PROVIDER CONTACT NOTE (OTHER) - BACKGROUND
Pt is here s/p ex-lap and right antoni on 3/30.
Pt is s/p exlap with right antoni on 3/30.
Pt is here s/p exlap and right antoni on 3/30.
Pt admitted w SOB on 3/29.
Pt is here s/p exlap and right antoni on 3/30.

## 2024-04-01 NOTE — PHYSICAL THERAPY INITIAL EVALUATION ADULT - PERTINENT HX OF CURRENT PROBLEM, REHAB EVAL
68y M with PMHx of methadone use (for unknown pain disorder) and PSHx of R inguinal hernia repair open (20+yrs ago) and R upper arm shrapnel removal (40yrs ago) presented to [facility] with 5d hx of worsening abdominal pain, 1 episode of bloody BM at onset of symptoms, and constipation (last flatus and BM 4d ago). Upon presentation patient was febrile 100.4 (rectal), tachycardic 104, and hypertensive 157/104. 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 Patient was transferred to [facility] surgery for further management of cecal volvulous. Pt is now s/p exlap, R-hemicolectomy, side to side stapled anastamosis.

## 2024-04-01 NOTE — PHYSICAL THERAPY INITIAL EVALUATION ADULT - IMPAIRMENTS FOUND, PT EVAL
aerobic capacity/endurance/cognitive impairment/decreased midline orientation/ergonomics and body mechanics/gait, locomotion, and balance/gross motor/joint integrity and mobility/muscle strength/posture

## 2024-04-01 NOTE — PHYSICAL THERAPY INITIAL EVALUATION ADULT - MD ORDER
Exploratory laparotomy 30-Mar-2024 01:46:24  Vanesa Todd  Open right hemicolectomy 30-Mar-2024 01:46:38  Vanesa Todd

## 2024-04-01 NOTE — DIETITIAN NUTRITION RISK NOTIFICATION - ADDITIONAL COMMENTS/DIETITIAN RECOMMENDATIONS
Severe Chronic Malnutrition RT inadequate PO intake in setting on physical demands AEB Severe muscle and fat depletions.    1. NPO  **as medically feasible, advance diet as tolerated to Clear liquid, then to low fiber diet, provide nourishments and/or oral nutrition supplements per pt preference  **IF pt's diet is not advanced in the next 24-48h, recommend that team consider alternate means of nutrition** please consult dietitians for recommendations**  2. Recommend micronutrient supplementation: multivitamin to prevent deficiencies unless otherwise contraindicated   When appropriate monitor PO intake, diet tolerance, weight trends, labs, and skin integrity and bowel movement regularity.    3. Nutrition care plan to remain consistent with GOC.  4. RD remains available upon request and will follow-up as able.

## 2024-04-01 NOTE — PROGRESS NOTE ADULT - SUBJECTIVE AND OBJECTIVE BOX
Still having a lot of pain.  Still NPO.  Denies any chest pain, shortness of breath or other new symptoms.     Remaining ROS negative     PHYSICAL EXAM:    General: nad, sitting up in bed  HEENT: NC/AT; MMM  Cardiovascular: +S1/S2, RRR  Respiratory: CTA B/L; no W/R/R  Gastrointestinal: soft, nondistended, bowel sounds present  Extremities: WWP; no edema  Neurological: speech fluent, no focal deficits noted  Psychiatric: pleasant mood and affect    VITAL SIGNS:  Vital Signs Last 24 Hrs  T(C): 36.7 (01 Apr 2024 08:26), Max: 37.2 (31 Mar 2024 16:10)  T(F): 98.1 (01 Apr 2024 08:26), Max: 98.9 (31 Mar 2024 16:10)  HR: 65 (01 Apr 2024 09:10) (65 - 90)  BP: 146/76 (01 Apr 2024 09:10) (146/76 - 194/80)  BP(mean): --  RR: 18 (01 Apr 2024 09:10) (18 - 19)  SpO2: 98% (01 Apr 2024 09:10) (95% - 98%)    Parameters below as of 01 Apr 2024 09:10  Patient On (Oxygen Delivery Method): room air      MEDICATIONS:  MEDICATIONS  (STANDING):  acetaminophen   IVPB .. 1000 milliGRAM(s) IV Intermittent once  acetaminophen   IVPB .. 1000 milliGRAM(s) IV Intermittent once  dextrose 5% + sodium chloride 0.9%. 1000 milliLiter(s) (100 mL/Hr) IV Continuous <Continuous>  gabapentin 300 milliGRAM(s) Oral every 8 hours  heparin   Injectable 5000 Unit(s) SubCutaneous every 8 hours  HYDROmorphone PCA (1 mG/mL) 30 milliLiter(s) PCA Continuous PCA Continuous  influenza  Vaccine (HIGH DOSE) 0.7 milliLiter(s) IntraMuscular once    MEDICATIONS  (PRN):  diazepam    Tablet 5 milliGRAM(s) Oral daily PRN muscle spasms  naloxone Injectable 0.1 milliGRAM(s) IV Push every 3 minutes PRN For ANY of the following changes in patient status:  A. RR LESS THAN 10 breaths per minute, B. Oxygen saturation LESS THAN 90%, C. Sedation score of 6  ondansetron Injectable 4 milliGRAM(s) IV Push every 6 hours PRN Nausea and/or Vomiting      ALLERGIES:  Allergies    No Known Allergies    Intolerances        LABS:                        10.8   7.95  )-----------( 82       ( 01 Apr 2024 05:30 )             31.5     04-01    132<L>  |  98  |  17  ----------------------------<  92  4.0   |  25  |  0.70    Ca    8.4      01 Apr 2024 05:30  Phos  2.5     04-01  Mg     2.2     04-01        Urinalysis Basic - ( 01 Apr 2024 05:30 )    Color: x / Appearance: x / SG: x / pH: x  Gluc: 92 mg/dL / Ketone: x  / Bili: x / Urobili: x   Blood: x / Protein: x / Nitrite: x   Leuk Esterase: x / RBC: x / WBC x   Sq Epi: x / Non Sq Epi: x / Bacteria: x      CAPILLARY BLOOD GLUCOSE          RADIOLOGY & ADDITIONAL TESTS: Reviewed.

## 2024-04-01 NOTE — DIETITIAN INITIAL EVALUATION ADULT - ENERGY INTAKE
NPO Pt is currently NPO since admission, s/p R antoni 3/29. Endorses that not had anything to eat this morning

## 2024-04-01 NOTE — PHYSICAL THERAPY INITIAL EVALUATION ADULT - GENERAL OBSERVATIONS, REHAB EVAL
Pt received/returned semi-supine in bed +PCA, +tele, +BLE SCDs, +IV, +tele. Pt with c/o abdominal pain 8/10

## 2024-04-01 NOTE — DIETITIAN INITIAL EVALUATION ADULT - PERTINENT LABORATORY DATA
04-01    132<L>  |  98  |  17  ----------------------------<  92  4.0   |  25  |  0.70    Ca    8.4      01 Apr 2024 05:30  Phos  2.5     04-01  Mg     2.2     04-01    A1C with Estimated Average Glucose Result: 4.8 % (03-30-24 @ 05:30)

## 2024-04-01 NOTE — PHYSICAL THERAPY INITIAL EVALUATION ADULT - IMPAIRMENTS CONTRIBUTING TO GAIT DEVIATIONS, PT EVAL
impaired balance/impaired coordination/impaired motor control/narrow base of support/pain/impaired postural control/decreased strength

## 2024-04-01 NOTE — PROVIDER CONTACT NOTE (OTHER) - ASSESSMENT
Upon change of shift assessment, pt is hypertensive to 194/80, HR 80, 95% on room air. Pt is asymptomatic.
Pt is c/o bladder discomfort and is unable to void on own. Bladder scan is 485ml.
Pt has not voided since start of shift and is still c/o 10/10 abdominal pain even with Dilaudid PCA. Bladder scan is 430 ml.
Upon change of shift, pt c/o 10/10 pain to abdomen. PCA pump in use. Pt also has BP of 164/86, HR 88.
Pt A&O x4. Pt denies chest pain, SOB and dizziness. Denies nausea/vomitting. Pt in unresolved, ongoing pain with PCA pump in abdomen.

## 2024-04-01 NOTE — DIETITIAN INITIAL EVALUATION ADULT - EDUCATION DIETARY MODIFICATIONS
Discussed/Educated with possible pt diet progression. Answered questions related to diet. When medically appropriate educated/Discussed the importance to prioritize protein at meal times. Reviewed good sources of protein on the menu. Pt amenable to oral nutrition supplement and states that he will consume them in between meals. Pt receptive and verbalizes understanding to education./(2) meets goals/outcomes/verbalization

## 2024-04-02 LAB
ALBUMIN SERPL ELPH-MCNC: 2.2 G/DL — LOW (ref 3.3–5)
ALP SERPL-CCNC: 54 U/L — SIGNIFICANT CHANGE UP (ref 40–120)
ALT FLD-CCNC: 15 U/L — SIGNIFICANT CHANGE UP (ref 10–45)
ANION GAP SERPL CALC-SCNC: 6 MMOL/L — SIGNIFICANT CHANGE UP (ref 5–17)
AST SERPL-CCNC: 33 U/L — SIGNIFICANT CHANGE UP (ref 10–40)
BILIRUB SERPL-MCNC: 0.7 MG/DL — SIGNIFICANT CHANGE UP (ref 0.2–1.2)
BUN SERPL-MCNC: 13 MG/DL — SIGNIFICANT CHANGE UP (ref 7–23)
CALCIUM SERPL-MCNC: 8.2 MG/DL — LOW (ref 8.4–10.5)
CHLORIDE SERPL-SCNC: 101 MMOL/L — SIGNIFICANT CHANGE UP (ref 96–108)
CO2 SERPL-SCNC: 28 MMOL/L — SIGNIFICANT CHANGE UP (ref 22–31)
CREAT SERPL-MCNC: 0.65 MG/DL — SIGNIFICANT CHANGE UP (ref 0.5–1.3)
EGFR: 101 ML/MIN/1.73M2 — SIGNIFICANT CHANGE UP
GLUCOSE SERPL-MCNC: 144 MG/DL — HIGH (ref 70–99)
HCT VFR BLD CALC: 32.4 % — LOW (ref 39–50)
HCV RNA FLD QL NAA+PROBE: SIGNIFICANT CHANGE UP
HCV RNA SPEC QL PROBE+SIG AMP: DETECTED
HGB BLD-MCNC: 11 G/DL — LOW (ref 13–17)
MAGNESIUM SERPL-MCNC: 1.8 MG/DL — SIGNIFICANT CHANGE UP (ref 1.6–2.6)
MCHC RBC-ENTMCNC: 32.2 PG — SIGNIFICANT CHANGE UP (ref 27–34)
MCHC RBC-ENTMCNC: 34 GM/DL — SIGNIFICANT CHANGE UP (ref 32–36)
MCV RBC AUTO: 94.7 FL — SIGNIFICANT CHANGE UP (ref 80–100)
NRBC # BLD: 0 /100 WBCS — SIGNIFICANT CHANGE UP (ref 0–0)
PHOSPHATE SERPL-MCNC: 3.1 MG/DL — SIGNIFICANT CHANGE UP (ref 2.5–4.5)
PLATELET # BLD AUTO: 99 K/UL — LOW (ref 150–400)
POTASSIUM SERPL-MCNC: 3.6 MMOL/L — SIGNIFICANT CHANGE UP (ref 3.5–5.3)
POTASSIUM SERPL-SCNC: 3.6 MMOL/L — SIGNIFICANT CHANGE UP (ref 3.5–5.3)
PROT SERPL-MCNC: 5.6 G/DL — LOW (ref 6–8.3)
RBC # BLD: 3.42 M/UL — LOW (ref 4.2–5.8)
RBC # FLD: 13.3 % — SIGNIFICANT CHANGE UP (ref 10.3–14.5)
SODIUM SERPL-SCNC: 135 MMOL/L — SIGNIFICANT CHANGE UP (ref 135–145)
WBC # BLD: 6.61 K/UL — SIGNIFICANT CHANGE UP (ref 3.8–10.5)
WBC # FLD AUTO: 6.61 K/UL — SIGNIFICANT CHANGE UP (ref 3.8–10.5)

## 2024-04-02 PROCEDURE — 99233 SBSQ HOSP IP/OBS HIGH 50: CPT

## 2024-04-02 PROCEDURE — 74019 RADEX ABDOMEN 2 VIEWS: CPT | Mod: 26

## 2024-04-02 RX ORDER — LABETALOL HCL 100 MG
10 TABLET ORAL EVERY 6 HOURS
Refills: 0 | Status: DISCONTINUED | OUTPATIENT
Start: 2024-04-02 | End: 2024-04-05

## 2024-04-02 RX ORDER — POTASSIUM CHLORIDE 20 MEQ
10 PACKET (EA) ORAL
Refills: 0 | Status: COMPLETED | OUTPATIENT
Start: 2024-04-02 | End: 2024-04-02

## 2024-04-02 RX ORDER — ACETAMINOPHEN 500 MG
1000 TABLET ORAL EVERY 6 HOURS
Refills: 0 | Status: DISCONTINUED | OUTPATIENT
Start: 2024-04-02 | End: 2024-04-23

## 2024-04-02 RX ORDER — MAGNESIUM SULFATE 500 MG/ML
1 VIAL (ML) INJECTION ONCE
Refills: 0 | Status: COMPLETED | OUTPATIENT
Start: 2024-04-02 | End: 2024-04-02

## 2024-04-02 RX ADMIN — HEPARIN SODIUM 5000 UNIT(S): 5000 INJECTION INTRAVENOUS; SUBCUTANEOUS at 21:36

## 2024-04-02 RX ADMIN — Medication 400 MILLIGRAM(S): at 02:07

## 2024-04-02 RX ADMIN — GABAPENTIN 300 MILLIGRAM(S): 400 CAPSULE ORAL at 21:36

## 2024-04-02 RX ADMIN — HEPARIN SODIUM 5000 UNIT(S): 5000 INJECTION INTRAVENOUS; SUBCUTANEOUS at 05:32

## 2024-04-02 RX ADMIN — Medication 100 MILLIEQUIVALENT(S): at 09:55

## 2024-04-02 RX ADMIN — HEPARIN SODIUM 5000 UNIT(S): 5000 INJECTION INTRAVENOUS; SUBCUTANEOUS at 14:04

## 2024-04-02 RX ADMIN — SODIUM CHLORIDE 100 MILLILITER(S): 9 INJECTION, SOLUTION INTRAVENOUS at 05:31

## 2024-04-02 RX ADMIN — Medication 100 GRAM(S): at 07:38

## 2024-04-02 RX ADMIN — Medication 100 MILLIEQUIVALENT(S): at 15:31

## 2024-04-02 RX ADMIN — SODIUM CHLORIDE 100 MILLILITER(S): 9 INJECTION, SOLUTION INTRAVENOUS at 16:29

## 2024-04-02 RX ADMIN — Medication 100 MILLIEQUIVALENT(S): at 12:30

## 2024-04-02 RX ADMIN — GABAPENTIN 300 MILLIGRAM(S): 400 CAPSULE ORAL at 14:04

## 2024-04-02 RX ADMIN — GABAPENTIN 300 MILLIGRAM(S): 400 CAPSULE ORAL at 05:32

## 2024-04-02 RX ADMIN — Medication 1000 MILLIGRAM(S): at 03:07

## 2024-04-02 RX ADMIN — HYDROMORPHONE HYDROCHLORIDE 30 MILLILITER(S): 2 INJECTION INTRAMUSCULAR; INTRAVENOUS; SUBCUTANEOUS at 17:46

## 2024-04-02 NOTE — PROGRESS NOTE ADULT - ASSESSMENT
68y M with PMHx of methadone use (for opioid use) and PSHx of R inguinal hernia repair open (20+yrs ago) and R upper arm shrapnel removal (40yrs ago) presented to [facility] with 5d hx of worsening abdominal pain, 1 episode of bloody BM at onset of symptoms, and constipation (last flatus and BM 4d ago). Upon presentation patient was febrile 100.4 (rectal), tachycardic 104, and hypertensive 157/104. 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 Patient was transferred to [facility] surgery for further management of cecal volvulous. Pt is now s/p exlap, R-hemicolectomy, side to side stapled anastamosis (3/30).    NPO/IVF  Cefotetan  SQH/SCD/OOB  Midline penrose

## 2024-04-02 NOTE — PROGRESS NOTE ADULT - ASSESSMENT
68y M with PMHx of methadone use (for unknown pain disorder) and PSHx of R inguinal hernia repair open (20+yrs ago) and R upper arm shrapnel removal (40yrs ago) presented to [facility] with 5d hx of worsening abdominal pain, 1 episode of bloody BM at onset of symptoms, and constipation. pt admitted to surgery for further management of cecal volvulous.       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  s/p exlap, R-hemicolectomy, side to side stapled anastamosis.  OOTB to chair   NPO except for meds  removed NGt 3/31  Encourage ambulation ; Encourage incentive spirometry - patient reports issues using IS due to presence of TMJ and history of jaw surgery  - currently without NGT, however if develops nausea and vomiting, will need to replace, per surgery team  - abdominal XR shows dilated loops of bowel consistent with ileus    #methadone dependance   pt on 40 mg qd dose - npo for now.  Team to confirm dose and restart meds - now has ileus, holding doses for now  pain management per primary     #hypomagnesemia   mag <2 -- s/p replacement   replete as needed    #HCV  - is HCV positive (both RNA and antigen)  - small amount of perihepatic ascites, but no comment on cirrhosis.    - should have outpatient follow up    #thrombocytopenia   plts in 80's lateral   will ctm   dvt ppx per primary team.  Monitor for bleeding    #Severe protein calorie malnutrition  - appreciate input from nutrition    #Hypertension  - has been having intermittently elevated systolic blood pressures in the setting of pain.    - not on any medications outpatient  - asked team to confirm if cirrhosis present on CT, as this can impact type of regimen.  Given that has ileus at this time, avoiding PO.  Can give PRN labetalol IV for hypertension.      50 minutes spent on this encounter, including face to face with patient, care coordination and documentation. Plan of care discussed with primary team

## 2024-04-02 NOTE — PROGRESS NOTE ADULT - SUBJECTIVE AND OBJECTIVE BOX
POST-OP DAY: #3 s/p exlap, R-hemicolectomy     SUBJECTIVE: Patient seen and examined bedside by chief resident resting comfrtably in bed.     heparin   Injectable 5000 Unit(s) SubCutaneous every 8 hours    MEDICATIONS  (PRN):  diazepam    Tablet 5 milliGRAM(s) Oral daily PRN muscle spasms  naloxone Injectable 0.1 milliGRAM(s) IV Push every 3 minutes PRN For ANY of the following changes in patient status:  A. RR LESS THAN 10 breaths per minute, B. Oxygen saturation LESS THAN 90%, C. Sedation score of 6  ondansetron Injectable 4 milliGRAM(s) IV Push every 6 hours PRN Nausea and/or Vomiting      I&O's Detail    2024 07:01  -  2024 07:00  --------------------------------------------------------  IN:    dextrose 5% + sodium chloride 0.9%: 2300 mL  Total IN: 2300 mL    OUT:    Voided (mL): 2070 mL  Total OUT: 2070 mL    Total NET: 230 mL          Vital Signs Last 24 Hrs  T(C): 36.6 (2024 20:29), Max: 36.7 (2024 08:26)  T(F): 97.8 (2024 20:29), Max: 98.1 (2024 08:26)  HR: 63 (2024 04:15) (63 - 86)  BP: 148/110 (2024 04:15) (140/86 - 200/109)  BP(mean): --  RR: 19 (2024 04:15) (18 - 20)  SpO2: 98% (2024 04:15) (93% - 99%)    Parameters below as of 2024 04:15  Patient On (Oxygen Delivery Method): room air        General: NAD, resting comfortably in bed  C/V: NSR  Pulm: Nonlabored breathing, no respiratory distress  Abd: soft, NT/ND  Extrem: WWP, no edema, SCDs in place    LABS:                        11.0   6.61  )-----------( 99       ( 2024 05:30 )             32.4     04-01    132<L>  |  98  |  17  ----------------------------<  92  4.0   |  25  |  0.70    Ca    8.4      2024 05:30  Phos  2.5     04-01  Mg     2.2     04-01        Urinalysis Basic - ( 2024 15:53 )    Color: Yellow / Appearance: Clear / S.010 / pH: x  Gluc: x / Ketone: Negative mg/dL  / Bili: Negative / Urobili: 1.0 mg/dL   Blood: x / Protein: Negative mg/dL / Nitrite: Negative   Leuk Esterase: Negative / RBC: 6 /HPF / WBC 0 /HPF   Sq Epi: x / Non Sq Epi: x / Bacteria: Negative /HPF        RADIOLOGY & ADDITIONAL STUDIES:      Urinalysis with Rflx Culture (collected 24 @ 15:53)     POST-OP DAY: #3 s/p exlap, R-hemicolectomy     SUBJECTIVE: Patient seen and examined bedside by chief resident resting comfortably in bed. Patient endorses pain wax and wanes and is not well controlled after stopping the PCA. Patient denies nausea vomiting, passing flatus, or having bowel movement. Patient is voiding adequately however has not been ambulating at baseline. Denies chest pain, SOB, HA, dizziness.     heparin   Injectable 5000 Unit(s) SubCutaneous every 8 hours    MEDICATIONS  (PRN):  diazepam    Tablet 5 milliGRAM(s) Oral daily PRN muscle spasms  naloxone Injectable 0.1 milliGRAM(s) IV Push every 3 minutes PRN For ANY of the following changes in patient status:  A. RR LESS THAN 10 breaths per minute, B. Oxygen saturation LESS THAN 90%, C. Sedation score of 6  ondansetron Injectable 4 milliGRAM(s) IV Push every 6 hours PRN Nausea and/or Vomiting      I&O's Detail    2024 07:01  -  2024 07:00  --------------------------------------------------------  IN:    dextrose 5% + sodium chloride 0.9%: 2300 mL  Total IN: 2300 mL    OUT:    Voided (mL): 2070 mL  Total OUT: 2070 mL    Total NET: 230 mL          Vital Signs Last 24 Hrs  T(C): 36.6 (2024 20:29), Max: 36.7 (2024 08:26)  T(F): 97.8 (2024 20:29), Max: 98.1 (2024 08:26)  HR: 63 (2024 04:15) (63 - 86)  BP: 148/110 (2024 04:15) (140/86 - 200/109)  BP(mean): --  RR: 19 (2024 04:15) (18 - 20)  SpO2: 98% (2024 04:15) (93% - 99%)    Parameters below as of 2024 04:15  Patient On (Oxygen Delivery Method): room air        General: NAD, resting comfortably in bed  Pulm: Nonlabored breathing, no respiratory distress  Abd: soft, non-tender. (+) distended.  (+) midline penrose in place with minimal drainage.   Extrem: WWP, no edema, SCDs in place    LABS:                        11.0   6.61  )-----------( 99       ( 2024 05:30 )             32.4     04-01    132<L>  |  98  |  17  ----------------------------<  92  4.0   |  25  |  0.70    Ca    8.4      2024 05:30  Phos  2.5     04-01  Mg     2.2     04-01        Urinalysis Basic - ( 2024 15:53 )    Color: Yellow / Appearance: Clear / S.010 / pH: x  Gluc: x / Ketone: Negative mg/dL  / Bili: Negative / Urobili: 1.0 mg/dL   Blood: x / Protein: Negative mg/dL / Nitrite: Negative   Leuk Esterase: Negative / RBC: 6 /HPF / WBC 0 /HPF   Sq Epi: x / Non Sq Epi: x / Bacteria: Negative /HPF        RADIOLOGY & ADDITIONAL STUDIES:      Urinalysis with Rflx Culture (collected 24 @ 15:53)

## 2024-04-02 NOTE — PROGRESS NOTE ADULT - SUBJECTIVE AND OBJECTIVE BOX
No bowel movement or flatus.  Still having pain.  No vomiting or nausea.      Remaining ROS negative     PHYSICAL EXAM:    General: nad, sitting up in bed  HEENT: NC/AT; MMM  Cardiovascular: +S1/S2, RRR  Respiratory: CTA B/L; no W/R/R  Gastrointestinal: distended, very quiet bowel sounds  Extremities: WWP; no edema  Neurological: speech fluent, no focal deficits noted  Psychiatric: pleasant mood and affect    VITAL SIGNS:  Vital Signs Last 24 Hrs  T(C): 36.5 (02 Apr 2024 14:35), Max: 36.8 (02 Apr 2024 08:58)  T(F): 97.7 (02 Apr 2024 14:35), Max: 98.2 (02 Apr 2024 08:58)  HR: 66 (02 Apr 2024 08:58) (63 - 86)  BP: 155/100 (02 Apr 2024 14:35) (140/86 - 186/109)  BP(mean): --  RR: 18 (02 Apr 2024 14:35) (18 - 20)  SpO2: 98% (02 Apr 2024 14:35) (93% - 98%)    Parameters below as of 02 Apr 2024 14:35  Patient On (Oxygen Delivery Method): room air      MEDICATIONS:  MEDICATIONS  (STANDING):  acetaminophen     Tablet .. 1000 milliGRAM(s) Oral every 6 hours  dextrose 5% + sodium chloride 0.9%. 1000 milliLiter(s) (100 mL/Hr) IV Continuous <Continuous>  gabapentin 300 milliGRAM(s) Oral every 8 hours  heparin   Injectable 5000 Unit(s) SubCutaneous every 8 hours  HYDROmorphone PCA (1 mG/mL) 30 milliLiter(s) PCA Continuous PCA Continuous  influenza  Vaccine (HIGH DOSE) 0.7 milliLiter(s) IntraMuscular once  methadone  Concentrate 190 milliGRAM(s) Oral daily    MEDICATIONS  (PRN):  diazepam    Tablet 5 milliGRAM(s) Oral daily PRN muscle spasms  naloxone Injectable 0.1 milliGRAM(s) IV Push every 3 minutes PRN For ANY of the following changes in patient status:  A. RR LESS THAN 10 breaths per minute, B. Oxygen saturation LESS THAN 90%, C. Sedation score of 6  ondansetron Injectable 4 milliGRAM(s) IV Push every 6 hours PRN Nausea and/or Vomiting      ALLERGIES:  Allergies    No Known Allergies    Intolerances        LABS:                        11.0   6.61  )-----------( 99       ( 02 Apr 2024 05:30 )             32.4     04-02    135  |  101  |  13  ----------------------------<  144<H>  3.6   |  28  |  0.65    Ca    8.2<L>      02 Apr 2024 05:30  Phos  3.1     04-02  Mg     1.8     04-02    TPro  5.6<L>  /  Alb  2.2<L>  /  TBili  0.7  /  DBili  x   /  AST  33  /  ALT  15  /  AlkPhos  54  04-02      Urinalysis Basic - ( 02 Apr 2024 05:30 )    Color: x / Appearance: x / SG: x / pH: x  Gluc: 144 mg/dL / Ketone: x  / Bili: x / Urobili: x   Blood: x / Protein: x / Nitrite: x   Leuk Esterase: x / RBC: x / WBC x   Sq Epi: x / Non Sq Epi: x / Bacteria: x      CAPILLARY BLOOD GLUCOSE          RADIOLOGY & ADDITIONAL TESTS: Reviewed.

## 2024-04-03 LAB
ALBUMIN SERPL ELPH-MCNC: 2.3 G/DL — LOW (ref 3.3–5)
ALP SERPL-CCNC: 53 U/L — SIGNIFICANT CHANGE UP (ref 40–120)
ALT FLD-CCNC: 16 U/L — SIGNIFICANT CHANGE UP (ref 10–45)
ANION GAP SERPL CALC-SCNC: 7 MMOL/L — SIGNIFICANT CHANGE UP (ref 5–17)
AST SERPL-CCNC: 32 U/L — SIGNIFICANT CHANGE UP (ref 10–40)
BILIRUB SERPL-MCNC: 0.6 MG/DL — SIGNIFICANT CHANGE UP (ref 0.2–1.2)
BUN SERPL-MCNC: 12 MG/DL — SIGNIFICANT CHANGE UP (ref 7–23)
CALCIUM SERPL-MCNC: 8.1 MG/DL — LOW (ref 8.4–10.5)
CHLORIDE SERPL-SCNC: 102 MMOL/L — SIGNIFICANT CHANGE UP (ref 96–108)
CO2 SERPL-SCNC: 24 MMOL/L — SIGNIFICANT CHANGE UP (ref 22–31)
CREAT SERPL-MCNC: 0.71 MG/DL — SIGNIFICANT CHANGE UP (ref 0.5–1.3)
CULTURE RESULTS: SIGNIFICANT CHANGE UP
CULTURE RESULTS: SIGNIFICANT CHANGE UP
EGFR: 99 ML/MIN/1.73M2 — SIGNIFICANT CHANGE UP
GLUCOSE SERPL-MCNC: 113 MG/DL — HIGH (ref 70–99)
HCT VFR BLD CALC: 31.8 % — LOW (ref 39–50)
HGB BLD-MCNC: 10.9 G/DL — LOW (ref 13–17)
MAGNESIUM SERPL-MCNC: 1.8 MG/DL — SIGNIFICANT CHANGE UP (ref 1.6–2.6)
MCHC RBC-ENTMCNC: 32.3 PG — SIGNIFICANT CHANGE UP (ref 27–34)
MCHC RBC-ENTMCNC: 34.3 GM/DL — SIGNIFICANT CHANGE UP (ref 32–36)
MCV RBC AUTO: 94.4 FL — SIGNIFICANT CHANGE UP (ref 80–100)
NRBC # BLD: 0 /100 WBCS — SIGNIFICANT CHANGE UP (ref 0–0)
PHOSPHATE SERPL-MCNC: 2.6 MG/DL — SIGNIFICANT CHANGE UP (ref 2.5–4.5)
PLATELET # BLD AUTO: 103 K/UL — LOW (ref 150–400)
POTASSIUM SERPL-MCNC: 3.8 MMOL/L — SIGNIFICANT CHANGE UP (ref 3.5–5.3)
POTASSIUM SERPL-SCNC: 3.8 MMOL/L — SIGNIFICANT CHANGE UP (ref 3.5–5.3)
PROT SERPL-MCNC: 5.9 G/DL — LOW (ref 6–8.3)
RBC # BLD: 3.37 M/UL — LOW (ref 4.2–5.8)
RBC # FLD: 13.8 % — SIGNIFICANT CHANGE UP (ref 10.3–14.5)
SODIUM SERPL-SCNC: 133 MMOL/L — LOW (ref 135–145)
SPECIMEN SOURCE: SIGNIFICANT CHANGE UP
SPECIMEN SOURCE: SIGNIFICANT CHANGE UP
WBC # BLD: 5.16 K/UL — SIGNIFICANT CHANGE UP (ref 3.8–10.5)
WBC # FLD AUTO: 5.16 K/UL — SIGNIFICANT CHANGE UP (ref 3.8–10.5)

## 2024-04-03 PROCEDURE — 99233 SBSQ HOSP IP/OBS HIGH 50: CPT

## 2024-04-03 RX ORDER — POTASSIUM PHOSPHATE, MONOBASIC POTASSIUM PHOSPHATE, DIBASIC 236; 224 MG/ML; MG/ML
15 INJECTION, SOLUTION INTRAVENOUS ONCE
Refills: 0 | Status: COMPLETED | OUTPATIENT
Start: 2024-04-03 | End: 2024-04-03

## 2024-04-03 RX ORDER — MAGNESIUM SULFATE 500 MG/ML
1 VIAL (ML) INJECTION ONCE
Refills: 0 | Status: COMPLETED | OUTPATIENT
Start: 2024-04-03 | End: 2024-04-03

## 2024-04-03 RX ADMIN — HEPARIN SODIUM 5000 UNIT(S): 5000 INJECTION INTRAVENOUS; SUBCUTANEOUS at 22:00

## 2024-04-03 RX ADMIN — METHADONE HYDROCHLORIDE 190 MILLIGRAM(S): 40 TABLET ORAL at 15:25

## 2024-04-03 RX ADMIN — HEPARIN SODIUM 5000 UNIT(S): 5000 INJECTION INTRAVENOUS; SUBCUTANEOUS at 05:43

## 2024-04-03 RX ADMIN — Medication 10 MILLIGRAM(S): at 01:02

## 2024-04-03 RX ADMIN — Medication 100 GRAM(S): at 09:35

## 2024-04-03 RX ADMIN — Medication 1000 MILLIGRAM(S): at 00:27

## 2024-04-03 RX ADMIN — Medication 10 MILLIGRAM(S): at 09:38

## 2024-04-03 RX ADMIN — POTASSIUM PHOSPHATE, MONOBASIC POTASSIUM PHOSPHATE, DIBASIC 62.5 MILLIMOLE(S): 236; 224 INJECTION, SOLUTION INTRAVENOUS at 11:27

## 2024-04-03 RX ADMIN — GABAPENTIN 300 MILLIGRAM(S): 400 CAPSULE ORAL at 22:00

## 2024-04-03 RX ADMIN — HEPARIN SODIUM 5000 UNIT(S): 5000 INJECTION INTRAVENOUS; SUBCUTANEOUS at 15:30

## 2024-04-03 RX ADMIN — Medication 1000 MILLIGRAM(S): at 18:13

## 2024-04-03 RX ADMIN — GABAPENTIN 300 MILLIGRAM(S): 400 CAPSULE ORAL at 15:30

## 2024-04-03 RX ADMIN — HYDROMORPHONE HYDROCHLORIDE 30 MILLILITER(S): 2 INJECTION INTRAMUSCULAR; INTRAVENOUS; SUBCUTANEOUS at 17:52

## 2024-04-03 RX ADMIN — Medication 1000 MILLIGRAM(S): at 05:43

## 2024-04-03 RX ADMIN — GABAPENTIN 300 MILLIGRAM(S): 400 CAPSULE ORAL at 05:43

## 2024-04-03 NOTE — PROGRESS NOTE ADULT - ASSESSMENT
68y M with PMHx of methadone use (for unknown pain disorder) and PSHx of R inguinal hernia repair open (20+yrs ago) and R upper arm shrapnel removal (40yrs ago) presented to [facility] with 5d hx of worsening abdominal pain, 1 episode of bloody BM at onset of symptoms, and constipation. pt admitted to surgery for further management of cecal volvulous.       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  s/p exlap, R-hemicolectomy, side to side stapled anastamosis.  OOTB to chair   NPO except for meds  removed NGt 3/31  Encourage ambulation ; Encourage incentive spirometry - patient reports issues using IS due to presence of TMJ and history of jaw surgery  - currently without NGT, however if develops nausea and vomiting, will need to replace, per surgery team  - abdominal XR shows dilated loops of bowel consistent with ileus    #methadone dependance   pt on 40 mg qd dose - npo for now.  Team to confirm dose and restart meds - now has ileus, holding doses for now  pain management per primary     #hypomagnesemia   mag <2 -- s/p replacement   replete as needed    #HCV  - is HCV positive (both RNA and antigen)  - small amount of perihepatic ascites, but no comment on cirrhosis.    - should have outpatient follow up    #thrombocytopenia   plts in 80's- 100s lateral   will ctm   dvt ppx per primary team.  Monitor for bleeding    #Severe protein calorie malnutrition  - appreciate input from nutrition    #Hypertension  - has been having intermittently elevated systolic blood pressures in the setting of pain.    - not on any medications outpatient  - asked team to confirm if cirrhosis present on CT, as this can impact type of regimen.  Given that has ileus at this time, avoiding PO.  Can give PRN labetalol IV for hypertension.

## 2024-04-03 NOTE — PROGRESS NOTE ADULT - SUBJECTIVE AND OBJECTIVE BOX
Patient is a 68y old  Male who presents with a chief complaint of Abdominal Pain (03 Apr 2024 07:20)      INTERVAL HPI/OVERNIGHT EVENTS: offers no new complaints; current symptoms resolving    MEDICATIONS  (STANDING):  acetaminophen     Tablet .. 1000 milliGRAM(s) Oral every 6 hours  dextrose 5% + sodium chloride 0.9%. 1000 milliLiter(s) (100 mL/Hr) IV Continuous <Continuous>  gabapentin 300 milliGRAM(s) Oral every 8 hours  heparin   Injectable 5000 Unit(s) SubCutaneous every 8 hours  HYDROmorphone PCA (1 mG/mL) 30 milliLiter(s) PCA Continuous PCA Continuous  influenza  Vaccine (HIGH DOSE) 0.7 milliLiter(s) IntraMuscular once  methadone  Concentrate 190 milliGRAM(s) Oral daily    MEDICATIONS  (PRN):  diazepam    Tablet 5 milliGRAM(s) Oral daily PRN muscle spasms  labetalol Injectable 10 milliGRAM(s) IV Push every 6 hours PRN Diastolic blood pressure > 100  naloxone Injectable 0.1 milliGRAM(s) IV Push every 3 minutes PRN For ANY of the following changes in patient status:  A. RR LESS THAN 10 breaths per minute, B. Oxygen saturation LESS THAN 90%, C. Sedation score of 6  ondansetron Injectable 4 milliGRAM(s) IV Push every 6 hours PRN Nausea and/or Vomiting      __________________________________________________  REVIEW OF SYSTEMS:    CONSTITUTIONAL: No fever,   EYES: no acute visual disturbances  NECK: No pain or stiffness  RESPIRATORY: No cough; No shortness of breath  CARDIOVASCULAR: No chest pain, no palpitations  GASTROINTESTINAL: +pain   NEUROLOGICAL: No headache or numbness, no tremors  MUSCULOSKELETAL: No joint pain, no muscle pain  GENITOURINARY: no dysuria, no frequency, no hesitancy  PSYCHIATRY: no depression , no anxiety  ALL OTHER  ROS negative        Vital Signs Last 24 Hrs  T(C): 36.6 (03 Apr 2024 09:16), Max: 37.3 (02 Apr 2024 20:49)  T(F): 97.8 (03 Apr 2024 09:16), Max: 99.2 (02 Apr 2024 20:49)  HR: 58 (03 Apr 2024 09:58) (58 - 94)  BP: 145/90 (03 Apr 2024 09:58) (145/90 - 172/91)  BP(mean): --  RR: 18 (03 Apr 2024 09:58) (18 - 19)  SpO2: 100% (03 Apr 2024 09:58) (94% - 100%)    Parameters below as of 03 Apr 2024 09:58  Patient On (Oxygen Delivery Method): room air        ________________________________________________  PHYSICAL EXAM:  GENERAL: NAD  HEENT: Normocephalic;  conjunctivae and sclerae clear; moist mucous membranes;   NECK : supple  CHEST/LUNG: Clear to auscultation bilaterally with good air entry   HEART: S1 S2  regular; no murmurs, gallops or rubs  ABDOMEN: Soft, Nontender, TTP; Bowel sounds present, incision c/d/i  EXTREMITIES: no cyanosis; no edema; no calf tenderness  SKIN: warm and dry; no rash  NERVOUS SYSTEM:  Awake and alert; Oriented  to place, person and time ; no new deficits    _________________________________________________  LABS:                        10.9   5.16  )-----------( 103      ( 03 Apr 2024 07:21 )             31.8     04-03    133<L>  |  102  |  12  ----------------------------<  113<H>  3.8   |  24  |  0.71    Ca    8.1<L>      03 Apr 2024 07:21  Phos  2.6     04-03  Mg     1.8     04-03    TPro  5.9<L>  /  Alb  2.3<L>  /  TBili  0.6  /  DBili  x   /  AST  32  /  ALT  16  /  AlkPhos  53  04-03      Urinalysis Basic - ( 03 Apr 2024 07:21 )    Color: x / Appearance: x / SG: x / pH: x  Gluc: 113 mg/dL / Ketone: x  / Bili: x / Urobili: x   Blood: x / Protein: x / Nitrite: x   Leuk Esterase: x / RBC: x / WBC x   Sq Epi: x / Non Sq Epi: x / Bacteria: x      CAPILLARY BLOOD GLUCOSE            RADIOLOGY & ADDITIONAL TESTS:      Plan of care was discussed with patient and /or primary care giver; all questions and concerns were addressed and care was aligned with patient's wishes.

## 2024-04-03 NOTE — PROGRESS NOTE ADULT - SUBJECTIVE AND OBJECTIVE BOX
INTERVAL HPI/OVERNIGHT EVENTS: 170/102, x1 labetolol given    STATUS POST: 3/30: exlap, R antoni, side to side stapled anastomosis, penrose x1 midline     SUBJECTIVE: Patient seen and examined bedside with chief resident on AM rounds. He reports feeling a little better this morning and states that his pain has improved slightly. +OOBTC. He reports that he still feels bloated, but admits to feeling some "bubbling" and feels he may have some bowel function today. -BF. Denies cp, sob, nausea, emesis, or fevers.    MEDICATIONS  (STANDING):  acetaminophen     Tablet .. 1000 milliGRAM(s) Oral every 6 hours  dextrose 5% + sodium chloride 0.9%. 1000 milliLiter(s) (100 mL/Hr) IV Continuous <Continuous>  gabapentin 300 milliGRAM(s) Oral every 8 hours  heparin   Injectable 5000 Unit(s) SubCutaneous every 8 hours  HYDROmorphone PCA (1 mG/mL) 30 milliLiter(s) PCA Continuous PCA Continuous  influenza  Vaccine (HIGH DOSE) 0.7 milliLiter(s) IntraMuscular once  methadone  Concentrate 190 milliGRAM(s) Oral daily    MEDICATIONS  (PRN):  diazepam    Tablet 5 milliGRAM(s) Oral daily PRN muscle spasms  labetalol Injectable 10 milliGRAM(s) IV Push every 6 hours PRN Diastolic blood pressure > 100  naloxone Injectable 0.1 milliGRAM(s) IV Push every 3 minutes PRN For ANY of the following changes in patient status:  A. RR LESS THAN 10 breaths per minute, B. Oxygen saturation LESS THAN 90%, C. Sedation score of 6  ondansetron Injectable 4 milliGRAM(s) IV Push every 6 hours PRN Nausea and/or Vomiting      Vital Signs Last 24 Hrs  T(C): 36.6 (03 Apr 2024 05:55), Max: 37.3 (02 Apr 2024 20:49)  T(F): 97.8 (03 Apr 2024 05:55), Max: 99.2 (02 Apr 2024 20:49)  HR: 76 (03 Apr 2024 05:55) (66 - 94)  BP: 161/95 (03 Apr 2024 05:55) (146/93 - 170/102)  BP(mean): --  RR: 19 (03 Apr 2024 05:55) (18 - 19)  SpO2: 97% (03 Apr 2024 05:55) (94% - 99%)    Parameters below as of 03 Apr 2024 05:55  Patient On (Oxygen Delivery Method): room air        PHYSICAL EXAM:  Constitutional: A&Ox3, resting comfortably in bed  Respiratory: non labored breathing, no respiratory distress  Cardiovascular: NSR, RRR  Gastrointestinal: Abdomen soft, moderately distended, nontender to palpation                 Incision: midline incision c/d/i with staples. Penrose removed.   Genitourinary: voiding  Extremities: wwp, no calf tenderness or edema, +SCDs      I&O's Detail    02 Apr 2024 07:01  -  03 Apr 2024 07:00  --------------------------------------------------------  IN:    dextrose 5% + sodium chloride 0.9%: 2300 mL    IV PiggyBack: 300 mL  Total IN: 2600 mL    OUT:    Voided (mL): 1100 mL  Total OUT: 1100 mL    Total NET: 1500 mL          LABS:                        11.0   6.61  )-----------( 99       ( 02 Apr 2024 05:30 )             32.4     04-02    135  |  101  |  13  ----------------------------<  144<H>  3.6   |  28  |  0.65    Ca    8.2<L>      02 Apr 2024 05:30  Phos  3.1     04-02  Mg     1.8     04-02    TPro  5.6<L>  /  Alb  2.2<L>  /  TBili  0.7  /  DBili  x   /  AST  33  /  ALT  15  /  AlkPhos  54  04-02      Urinalysis Basic - ( 02 Apr 2024 05:30 )    Color: x / Appearance: x / SG: x / pH: x  Gluc: 144 mg/dL / Ketone: x  / Bili: x / Urobili: x   Blood: x / Protein: x / Nitrite: x   Leuk Esterase: x / RBC: x / WBC x   Sq Epi: x / Non Sq Epi: x / Bacteria: x        RADIOLOGY & ADDITIONAL STUDIES:

## 2024-04-03 NOTE — PROGRESS NOTE ADULT - ASSESSMENT
68y M with PMHx of methadone use (for opioid use) and PSHx of R inguinal hernia repair open (20+yrs ago) and R upper arm shrapnel removal (40yrs ago) presented to [facility] with 5d hx of worsening abdominal pain, 1 episode of bloody BM at onset of symptoms, and constipation (last flatus and BM 4d ago). Upon presentation patient was febrile 100.4 (rectal), tachycardic 104, and hypertensive 157/104. 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 Patient was transferred to [facility] surgery for further management of cecal volvulous. Pt is now s/p exlap, R-hemicolectomy, side to side stapled anastamosis (3/30).    NPO/IVF  Pain/nausea control PRN  Home methadone  SQH/SCD/OOB  AM Labs  dispo; [facility]

## 2024-04-04 ENCOUNTER — TRANSCRIPTION ENCOUNTER (OUTPATIENT)
Age: 69
End: 2024-04-04

## 2024-04-04 LAB
ANION GAP SERPL CALC-SCNC: 3 MMOL/L — LOW (ref 5–17)
BUN SERPL-MCNC: 8 MG/DL — SIGNIFICANT CHANGE UP (ref 7–23)
CALCIUM SERPL-MCNC: 8.5 MG/DL — SIGNIFICANT CHANGE UP (ref 8.4–10.5)
CHLORIDE SERPL-SCNC: 105 MMOL/L — SIGNIFICANT CHANGE UP (ref 96–108)
CO2 SERPL-SCNC: 29 MMOL/L — SIGNIFICANT CHANGE UP (ref 22–31)
CREAT SERPL-MCNC: 0.8 MG/DL — SIGNIFICANT CHANGE UP (ref 0.5–1.3)
EGFR: 95 ML/MIN/1.73M2 — SIGNIFICANT CHANGE UP
GLUCOSE SERPL-MCNC: 100 MG/DL — HIGH (ref 70–99)
HCT VFR BLD CALC: 28 % — LOW (ref 39–50)
HGB BLD-MCNC: 9.7 G/DL — LOW (ref 13–17)
MAGNESIUM SERPL-MCNC: 2 MG/DL — SIGNIFICANT CHANGE UP (ref 1.6–2.6)
MCHC RBC-ENTMCNC: 32.6 PG — SIGNIFICANT CHANGE UP (ref 27–34)
MCHC RBC-ENTMCNC: 34.6 GM/DL — SIGNIFICANT CHANGE UP (ref 32–36)
MCV RBC AUTO: 94 FL — SIGNIFICANT CHANGE UP (ref 80–100)
NRBC # BLD: 0 /100 WBCS — SIGNIFICANT CHANGE UP (ref 0–0)
PHOSPHATE SERPL-MCNC: 2.7 MG/DL — SIGNIFICANT CHANGE UP (ref 2.5–4.5)
PLATELET # BLD AUTO: 108 K/UL — LOW (ref 150–400)
POTASSIUM SERPL-MCNC: 3.8 MMOL/L — SIGNIFICANT CHANGE UP (ref 3.5–5.3)
POTASSIUM SERPL-SCNC: 3.8 MMOL/L — SIGNIFICANT CHANGE UP (ref 3.5–5.3)
RBC # BLD: 2.98 M/UL — LOW (ref 4.2–5.8)
RBC # FLD: 14.1 % — SIGNIFICANT CHANGE UP (ref 10.3–14.5)
SODIUM SERPL-SCNC: 137 MMOL/L — SIGNIFICANT CHANGE UP (ref 135–145)
SURGICAL PATHOLOGY STUDY: SIGNIFICANT CHANGE UP
WBC # BLD: 4.31 K/UL — SIGNIFICANT CHANGE UP (ref 3.8–10.5)
WBC # FLD AUTO: 4.31 K/UL — SIGNIFICANT CHANGE UP (ref 3.8–10.5)

## 2024-04-04 PROCEDURE — 99233 SBSQ HOSP IP/OBS HIGH 50: CPT

## 2024-04-04 RX ORDER — OXYCODONE HYDROCHLORIDE 5 MG/1
5 TABLET ORAL EVERY 6 HOURS
Refills: 0 | Status: DISCONTINUED | OUTPATIENT
Start: 2024-04-04 | End: 2024-04-08

## 2024-04-04 RX ORDER — SODIUM,POTASSIUM PHOSPHATES 278-250MG
1 POWDER IN PACKET (EA) ORAL ONCE
Refills: 0 | Status: COMPLETED | OUTPATIENT
Start: 2024-04-04 | End: 2024-04-04

## 2024-04-04 RX ORDER — OXYCODONE HYDROCHLORIDE 5 MG/1
10 TABLET ORAL EVERY 6 HOURS
Refills: 0 | Status: DISCONTINUED | OUTPATIENT
Start: 2024-04-04 | End: 2024-04-08

## 2024-04-04 RX ORDER — OXYCODONE HYDROCHLORIDE 5 MG/1
5 TABLET ORAL ONCE
Refills: 0 | Status: DISCONTINUED | OUTPATIENT
Start: 2024-04-04 | End: 2024-04-04

## 2024-04-04 RX ORDER — THIAMINE MONONITRATE (VIT B1) 100 MG
100 TABLET ORAL DAILY
Refills: 0 | Status: COMPLETED | OUTPATIENT
Start: 2024-04-04 | End: 2024-04-06

## 2024-04-04 RX ADMIN — GABAPENTIN 300 MILLIGRAM(S): 400 CAPSULE ORAL at 21:49

## 2024-04-04 RX ADMIN — OXYCODONE HYDROCHLORIDE 5 MILLIGRAM(S): 5 TABLET ORAL at 20:04

## 2024-04-04 RX ADMIN — Medication 1 TABLET(S): at 11:53

## 2024-04-04 RX ADMIN — METHADONE HYDROCHLORIDE 190 MILLIGRAM(S): 40 TABLET ORAL at 12:39

## 2024-04-04 RX ADMIN — OXYCODONE HYDROCHLORIDE 10 MILLIGRAM(S): 5 TABLET ORAL at 15:22

## 2024-04-04 RX ADMIN — OXYCODONE HYDROCHLORIDE 5 MILLIGRAM(S): 5 TABLET ORAL at 20:30

## 2024-04-04 RX ADMIN — OXYCODONE HYDROCHLORIDE 10 MILLIGRAM(S): 5 TABLET ORAL at 16:00

## 2024-04-04 RX ADMIN — HEPARIN SODIUM 5000 UNIT(S): 5000 INJECTION INTRAVENOUS; SUBCUTANEOUS at 21:48

## 2024-04-04 RX ADMIN — GABAPENTIN 300 MILLIGRAM(S): 400 CAPSULE ORAL at 13:14

## 2024-04-04 RX ADMIN — Medication 1000 MILLIGRAM(S): at 11:53

## 2024-04-04 RX ADMIN — OXYCODONE HYDROCHLORIDE 10 MILLIGRAM(S): 5 TABLET ORAL at 21:48

## 2024-04-04 RX ADMIN — GABAPENTIN 300 MILLIGRAM(S): 400 CAPSULE ORAL at 06:28

## 2024-04-04 RX ADMIN — HEPARIN SODIUM 5000 UNIT(S): 5000 INJECTION INTRAVENOUS; SUBCUTANEOUS at 13:14

## 2024-04-04 RX ADMIN — Medication 100 MILLIGRAM(S): at 17:34

## 2024-04-04 RX ADMIN — OXYCODONE HYDROCHLORIDE 10 MILLIGRAM(S): 5 TABLET ORAL at 22:48

## 2024-04-04 RX ADMIN — Medication 1000 MILLIGRAM(S): at 17:34

## 2024-04-04 RX ADMIN — Medication 1000 MILLIGRAM(S): at 00:04

## 2024-04-04 RX ADMIN — Medication 10 MILLIGRAM(S): at 21:48

## 2024-04-04 RX ADMIN — HEPARIN SODIUM 5000 UNIT(S): 5000 INJECTION INTRAVENOUS; SUBCUTANEOUS at 06:28

## 2024-04-04 RX ADMIN — Medication 1000 MILLIGRAM(S): at 06:28

## 2024-04-04 NOTE — DISCHARGE NOTE PROVIDER - HOSPITAL COURSE
68y M with PMHx of methadone use (for opioid use) and PSHx of R inguinal hernia repair open (20+yrs ago) and R upper arm shrapnel removal (40yrs ago) presented to [facility] with 5d hx of worsening abdominal pain, 1 episode of bloody BM at onset of symptoms, and constipation (last flatus and BM 4d ago). Upon presentation patient was febrile 100.4 (rectal), tachycardic 104, and hypertensive 157/104. 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 Patient was transferred to [facility] surgery for further management of cecal volvulous. Pt is now s/p exlap, R-hemicolectomy, side to side stapled anastamosis (3/30). Hospital course was prolonged awaiting return of bowel function and adequate pain control. On 4/2 an abdominal xr was significant for distension of small and large bowel most likely representing an ileus. Physical therapy assessed the patient and recommended [facility]. Patient was restarted on his outpatient Methadone treatment.     The remainder of his postoperative course was unremarkable with advancement of diet, passing trial of void, and pain control. On day of discharge patient was stable to be d/c'd to [facility].      LAST UPDATED 4/4   68y M with PMHx of methadone use (for opioid use) and PSHx of R inguinal hernia repair open (20+yrs ago) and R upper arm shrapnel removal (40yrs ago) presented to [facility] with 5d hx of worsening abdominal pain, 1 episode of bloody BM at onset of symptoms, and constipation (last flatus and BM 4d ago). Upon presentation patient was febrile 100.4 (rectal), tachycardic 104, and hypertensive 157/104. 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 Patient was transferred to [facility] surgery for further management of cecal volvulous. Pt is now s/p exlap, R-hemicolectomy, side to side stapled anastamosis (3/30). Hospital course was prolonged awaiting return of bowel function and adequate pain control. On 4/2 an abdominal xr was significant for distension of small and large bowel most likely representing an ileus. Physical therapy assessed the patient and recommended [facility]. Patient was restarted on his outpatient Methadone treatment. During the patient hospital stay, there were multiple imaging studies done that were significant for post operative ileus.     The remainder of his postoperative course was unremarkable with advancement of diet, passing trial of void, and pain control. On day of discharge patient was stable to be d/c'd to [facility] with TPN __ and on a Clear liquid diet.      LAST UPDATED 4/15   68y M with PMHx of methadone use (for opioid use) and PSHx of R inguinal hernia repair open (20+yrs ago) and R upper arm shrapnel removal (40yrs ago) presented to [facility] with 5d hx of worsening abdominal pain, 1 episode of bloody BM at onset of symptoms, and constipation (last flatus and BM 4d ago). Upon presentation patient was febrile 100.4 (rectal), tachycardic 104, and hypertensive 157/104. 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 Patient was transferred to [facility] surgery for further management of cecal volvulous. Pt is now s/p exlap, R-hemicolectomy, side to side stapled anastamosis (3/30). Hospital course was prolonged awaiting return of bowel function and adequate pain control. On 4/2 an abdominal xr was significant for distension of small and large bowel most likely representing an ileus. Physical therapy assessed the patient and recommended [facility]. Patient was restarted on his outpatient Methadone treatment. During the patient hospital stay, there were multiple imaging studies done that were significant for post operative ileus.     The remainder of his postoperative course was unremarkable with advancement of diet, passing trial of void, and pain control. On day of discharge patient was stable to be d/c'd to [facility].

## 2024-04-04 NOTE — DISCHARGE NOTE PROVIDER - NSDCMRMEDTOKEN_GEN_ALL_CORE_FT
amLODIPine 5 mg oral tablet: 1 tab(s) orally every 24 hours  gabapentin 300 mg oral capsule: 1 cap(s) orally every 8 hours as needed for For pain  methadone 10 mg/mL oral concentrate: 19 milliliter(s) orally every 24 hours

## 2024-04-04 NOTE — OCCUPATIONAL THERAPY INITIAL EVALUATION ADULT - ADDITIONAL COMMENTS
Patient reports living alone in an elevator access apartment building with 4 ANTHONY. Patient states he was independent with all ADL's, IADL's and functional mobility with SC prior to admission. Patient states he is R hand dominant and has a walk in shower.

## 2024-04-04 NOTE — OCCUPATIONAL THERAPY INITIAL EVALUATION ADULT - PERTINENT HX OF CURRENT PROBLEM, REHAB EVAL
68y M with PMHx of methadone use (for opioid use) and PSHx of R inguinal hernia repair open (20+yrs ago) and R upper arm shrapnel removal (40yrs ago) presented to [facility] with 5d hx of worsening abdominal pain, 1 episode of bloody BM at onset of symptoms, and constipation (last flatus and BM 4d ago). Upon presentation patient was febrile 100.4 (rectal), tachycardic 104, and hypertensive 157/104. 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 Patient was transferred to [facility] surgery for further management of cecal volvulous. Pt is now s/p exlap, R-hemicolectomy, side to side stapled anastamosis (3/30). Awaiting return of more consistent bowel function.

## 2024-04-04 NOTE — DISCHARGE NOTE PROVIDER - CARE PROVIDER_API CALL
Shena Rodriguez  Surgery  [address], [address]  [place] [address]  Phone: [phone]  Fax: [phone]  Follow Up Time:

## 2024-04-04 NOTE — PROGRESS NOTE ADULT - ASSESSMENT
68y M with PMHx of methadone use (for unknown pain disorder) and PSHx of R inguinal hernia repair open (20+yrs ago) and R upper arm shrapnel removal (40yrs ago) presented to [facility] with 5d hx of worsening abdominal pain, 1 episode of bloody BM at onset of symptoms, and constipation. pt admitted to surgery for further management of cecal volvulous.       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  s/p exlap, R-hemicolectomy, side to side stapled anastamosis.  OOTB to chair   NPO except for meds  removed NGt 3/31  Encourage ambulation ; Encourage incentive spirometry - patient reports issues using IS due to presence of TMJ and history of jaw surgery  - currently without NGT, however if develops nausea and vomiting, will need to replace, per surgery team  - abdominal XR shows dilated loops of bowel consistent with ileus    #methadone dependance   restarted on home dose - recommend to dc dilaudid pump to avoid overdose /respiratory depression   pain management per primary     #hypomagnesemia   mag <2 -- s/p replacement   replete as needed    #HCV  - is HCV positive (both RNA and antigen)  - CT scan showed small amount of perihepatic ascites, but no comment on cirrhosis.    - should have outpatient follow up    #thrombocytopenia   plts in 80's- 100s lateral   will ctm   dvt ppx per primary team.  Monitor for bleeding    #Severe protein calorie malnutrition  - appreciate input from nutrition    #Hypertension  - has been having intermittently elevated systolic blood pressures in the setting of pain.    - not on any medications outpatient  - asked team to confirm if cirrhosis present on CT, as this can impact type of regimen.  Given that has ileus at this time, avoiding PO.  Can give PRN labetalol IV for hypertension.    - when tolerating diet will start on nifedipine 30 qd     #acute blood loss anemia   Hgb downtrended from time of admission  Hemoglobin: 11.7 on admission   Hemoglobin: 9.7 today   Partially attributable to operative blood losses   continue to trend daily CBC, keep active type and screen      #dvt ppx sqh

## 2024-04-04 NOTE — DISCHARGE NOTE PROVIDER - NSDCFUADDAPPT_GEN_ALL_CORE_FT
Please followup with your primary care provider within 1 week.  Please follow up with Dr. Rodriguez in one week; you may call the office to make an appointment at your earliest convenience.

## 2024-04-04 NOTE — OCCUPATIONAL THERAPY INITIAL EVALUATION ADULT - MODIFIED CLINICAL TEST OF SENSORY INTEGRATION IN BALANCE TEST
Patient functionally ambulated from bed to door with RW and Min A x 2 persons. Patient noted with antalgic gait, kyphotic posture, placing majority of weight on BUE, difficulty pushing RW forward so BLE has room to move, requiring mod verbal/tactile cues throughout for proper positioning and safety.

## 2024-04-04 NOTE — DISCHARGE NOTE PROVIDER - NSDCFUADDINST_GEN_ALL_CORE_FT
General Discharge Instructions:  Please resume all regular home medications unless specifically advised not to take a particular medication. Also, please take any new medications as prescribed.  Please get plenty of rest, continue to ambulate several times per day, and drink adequate amounts of fluids. Avoid lifting weights greater than 5-10 lbs until you follow-up with your surgeon, who will instruct you further regarding activity restrictions.  Avoid driving or operating heavy machinery while taking pain medications.    PAIN MEDS:  Please take Tylenol, the max daily dose is 4000mg. For SEVERE post operative pain you were prescribed Percocet. Please take COLACE while taking percocet to avoid constipation.  Please avoid driving or operating heavy machinery while taking pain medications.      Please follow-up with your surgeon and Primary Care Provider (PCP) as advised.  Incision Care:  *Please call your doctor if you have increased pain, swelling, redness, or drainage from the incision site.  *Avoid swimming and baths until your follow-up appointment.  *You may shower, and wash surgical incisions with a mild soap and warm water. Gently pat the area dry.    Warning Signs:  Please call your doctor if you experience the following:  *You experience new chest pain, pressure, squeezing or tightness.  *New or worsening cough, shortness of breath, or wheeze.  *If you are vomiting and cannot keep down fluids or your medications.  *You are getting dehydrated due to continued vomiting, diarrhea, or other reasons. Signs of dehydration include dry mouth, rapid heartbeat, or feeling dizzy or faint when standing.  *You see blood or dark/black material when you vomit or have a bowel movement.  *You experience burning when you urinate, have blood in your urine, or experience a discharge.  *Your pain is not improving within 8-12 hours or is not gone within 24 hours. Call or return immediately if your pain is getting worse, changes location, or moves to your chest or back.  *You have shaking chills, or fever greater than 101.5 degrees Fahrenheit or 38 degrees Celsius.  *Any change in your symptoms, or any new symptoms that concern you.    .[email or web address] General Discharge Instructions:  Please resume all regular home medications unless specifically advised not to take a particular medication. Also, please take any new medications as prescribed.  Please get plenty of rest, continue to ambulate several times per day, and drink adequate amounts of fluids. Avoid lifting weights greater than 5-10 lbs until you follow-up with your surgeon, who will instruct you further regarding activity restrictions.  Avoid driving or operating heavy machinery while taking pain medications.    PAIN MEDS:  Please take Tylenol, the max daily dose is 4000mg. For SEVERE post operative pain you were prescribed Percocet. Please take COLACE while taking percocet to avoid constipation.  Please avoid driving or operating heavy machinery while taking pain medications.      Please follow-up with your surgeon and Primary Care Provider (PCP) as advised.  Incision Care:  *Please call your doctor if you have increased pain, swelling, redness, or drainage from the incision site.  *Avoid swimming and baths until your follow-up appointment.  *You may shower, and wash surgical incisions with a mild soap and warm water. Gently pat the area dry.    Warning Signs:  Please call your doctor if you experience the following:  *You experience new chest pain, pressure, squeezing or tightness.  *New or worsening cough, shortness of breath, or wheeze.  *If you are vomiting and cannot keep down fluids or your medications.  *You are getting dehydrated due to continued vomiting, diarrhea, or other reasons. Signs of dehydration include dry mouth, rapid heartbeat, or feeling dizzy or faint when standing.  *You see blood or dark/black material when you vomit or have a bowel movement.  *You experience burning when you urinate, have blood in your urine, or experience a discharge.  *Your pain is not improving within 8-12 hours or is not gone within 24 hours. Call or return immediately if your pain is getting worse, changes location, or moves to your chest or back.  *You have shaking chills, or fever greater than 101.5 degrees Fahrenheit or 38 degrees Celsius.  *Any change in your symptoms, or any new symptoms that concern you. General Discharge Instructions:  Please resume all regular home medications unless specifically advised not to take a particular medication. Also, please take any new medications as prescribed.  Please get plenty of rest, continue to ambulate several times per day, and drink adequate amounts of fluids. Avoid lifting weights greater than 5-10 lbs until you follow-up with your surgeon, who will instruct you further regarding activity restrictions.  Avoid driving or operating heavy machinery while taking pain medications.      Please follow-up with your surgeon and Primary Care Provider (PCP) as advised.  Incision Care:  *Please call your doctor if you have increased pain, swelling, redness, or drainage from the incision site.  *Avoid swimming and baths until your follow-up appointment.  *You may shower, and wash surgical incisions with a mild soap and warm water. Gently pat the area dry.    Warning Signs:  Please call your doctor if you experience the following:  *You experience new chest pain, pressure, squeezing or tightness.  *New or worsening cough, shortness of breath, or wheeze.  *If you are vomiting and cannot keep down fluids or your medications.  *You are getting dehydrated due to continued vomiting, diarrhea, or other reasons. Signs of dehydration include dry mouth, rapid heartbeat, or feeling dizzy or faint when standing.  *You see blood or dark/black material when you vomit or have a bowel movement.  *You experience burning when you urinate, have blood in your urine, or experience a discharge.  *Your pain is not improving within 8-12 hours or is not gone within 24 hours. Call or return immediately if your pain is getting worse, changes location, or moves to your chest or back.  *You have shaking chills, or fever greater than 101.5 degrees Fahrenheit or 38 degrees Celsius.  *Any change in your symptoms, or any new symptoms that concern you.

## 2024-04-04 NOTE — OCCUPATIONAL THERAPY INITIAL EVALUATION ADULT - DIAGNOSIS, OT EVAL
Patient POD #5 s/p exlap, R-hemicolectomy, side to side stapled anastamosis, presents with 8/10 abdominal pain at rest, 10/10 during mobility, presents with kyphotic posture, decreased overall strength, balance, activity tolerance impacting independence with functional activities and mobility.

## 2024-04-04 NOTE — DISCHARGE NOTE PROVIDER - DETAILS OF MALNUTRITION DIAGNOSIS/DIAGNOSES
This patient has been assessed with a concern for Malnutrition and was treated during this hospitalization for the following Nutrition diagnosis/diagnoses:     -  04/01/2024: Severe protein-calorie malnutrition

## 2024-04-04 NOTE — OCCUPATIONAL THERAPY INITIAL EVALUATION ADULT - GENERAL OBSERVATIONS, REHAB EVAL
Patient A&Ox4, agreeable and tolerated session fairly. Patient received semisupine in bed +tele, +IV, +PCA, +abdominal binder, +b/l SCD's, room air.

## 2024-04-04 NOTE — DISCHARGE NOTE PROVIDER - NSDCACTIVITY_GEN_ALL_CORE
Do not drive or operate machinery/Do not make important decisions/Stairs allowed/Driving allowed/Walking - Indoors allowed/No heavy lifting/straining/Walking - Outdoors allowed/Follow Instructions Provided by your Surgical Team

## 2024-04-04 NOTE — PROGRESS NOTE ADULT - SUBJECTIVE AND OBJECTIVE BOX
Patient is a 68y old  Male who presents with a chief complaint of Abdominal Pain (04 Apr 2024 08:12)      INTERVAL HPI/OVERNIGHT EVENTS: offers no new complaints; current symptoms resolving    MEDICATIONS  (STANDING):  acetaminophen     Tablet .. 1000 milliGRAM(s) Oral every 6 hours  dextrose 5% + sodium chloride 0.9%. 1000 milliLiter(s) (100 mL/Hr) IV Continuous <Continuous>  gabapentin 300 milliGRAM(s) Oral every 8 hours  heparin   Injectable 5000 Unit(s) SubCutaneous every 8 hours  HYDROmorphone PCA (1 mG/mL) 30 milliLiter(s) PCA Continuous PCA Continuous  influenza  Vaccine (HIGH DOSE) 0.7 milliLiter(s) IntraMuscular once  methadone  Concentrate 190 milliGRAM(s) Oral daily  thiamine Injectable 100 milliGRAM(s) IV Push daily    MEDICATIONS  (PRN):  diazepam    Tablet 5 milliGRAM(s) Oral daily PRN muscle spasms  labetalol Injectable 10 milliGRAM(s) IV Push every 6 hours PRN Diastolic blood pressure > 100  naloxone Injectable 0.1 milliGRAM(s) IV Push every 3 minutes PRN For ANY of the following changes in patient status:  A. RR LESS THAN 10 breaths per minute, B. Oxygen saturation LESS THAN 90%, C. Sedation score of 6  ondansetron Injectable 4 milliGRAM(s) IV Push every 6 hours PRN Nausea and/or Vomiting      __________________________________________________  REVIEW OF SYSTEMS:    CONSTITUTIONAL: No fever,   EYES: no acute visual disturbances  NECK: No pain or stiffness  RESPIRATORY: No cough; No shortness of breath  CARDIOVASCULAR: No chest pain, no palpitations  GASTROINTESTINAL: + pain. no BM +/- flatus   NEUROLOGICAL: No headache or numbness, no tremors  MUSCULOSKELETAL: No joint pain, no muscle pain  GENITOURINARY: no dysuria, no frequency, no hesitancy  PSYCHIATRY: no depression , no anxiety  ALL OTHER  ROS negative        Vital Signs Last 24 Hrs  T(C): 36.4 (04 Apr 2024 08:56), Max: 37.2 (04 Apr 2024 00:10)  T(F): 97.6 (04 Apr 2024 08:56), Max: 98.9 (04 Apr 2024 00:10)  HR: 59 (04 Apr 2024 08:56) (58 - 82)  BP: 159/78 (04 Apr 2024 08:56) (128/88 - 164/90)  BP(mean): --  RR: 18 (04 Apr 2024 08:56) (17 - 19)  SpO2: 99% (04 Apr 2024 08:56) (97% - 99%)    Parameters below as of 04 Apr 2024 08:56  Patient On (Oxygen Delivery Method): room air        ________________________________________________  PHYSICAL EXAM:  GENERAL: NAD  HEENT: Normocephalic;  conjunctivae and sclerae clear; moist mucous membranes;   NECK : supple  CHEST/LUNG: Clear to auscultation bilaterally with good air entry   HEART: S1 S2  regular; no murmurs, gallops or rubs  ABDOMEN: Soft, TTP, + distended; incision covered in gauze   EXTREMITIES: no cyanosis; no edema; no calf tenderness  SKIN: warm and dry; no rash  NERVOUS SYSTEM:  Awake and alert; Oriented  to place, person and time ; no new deficits    _________________________________________________  LABS:                        9.7    4.31  )-----------( 108      ( 04 Apr 2024 06:58 )             28.0     04-04    137  |  105  |  8   ----------------------------<  100<H>  3.8   |  29  |  0.80    Ca    8.5      04 Apr 2024 06:58  Phos  2.7     04-04  Mg     2.0     04-04    TPro  5.9<L>  /  Alb  2.3<L>  /  TBili  0.6  /  DBili  x   /  AST  32  /  ALT  16  /  AlkPhos  53  04-03      Urinalysis Basic - ( 04 Apr 2024 06:58 )    Color: x / Appearance: x / SG: x / pH: x  Gluc: 100 mg/dL / Ketone: x  / Bili: x / Urobili: x   Blood: x / Protein: x / Nitrite: x   Leuk Esterase: x / RBC: x / WBC x   Sq Epi: x / Non Sq Epi: x / Bacteria: x      CAPILLARY BLOOD GLUCOSE            RADIOLOGY & ADDITIONAL TESTS:      Plan of care was discussed with patient and /or primary care giver; all questions and concerns were addressed and care was aligned with patient's wishes.

## 2024-04-04 NOTE — PROGRESS NOTE ADULT - SUBJECTIVE AND OBJECTIVE BOX
1STATUS POST:   3/30: exlap, R antoni, side to side stapled anastomosis, penrose x1 midline      :   ON: SERENA, VS WNL, pain well controlled, +F/-BM, moderate distension    SUBJECTIVE: Patient seen and examined bedside by chief resident. Endorses some flatus ON, no bowel movements., Pain still evident, however controlled with meds. Denies sob/dizziness/cp    heparin   Injectable 5000 Unit(s) SubCutaneous every 8 hours  labetalol Injectable 10 milliGRAM(s) IV Push every 6 hours PRN      Vital Signs Last 24 Hrs  T(C): 36.6 (04 Apr 2024 04:30), Max: 37.2 (04 Apr 2024 00:10)  T(F): 97.9 (04 Apr 2024 04:30), Max: 98.9 (04 Apr 2024 00:10)  HR: 72 (04 Apr 2024 04:30) (58 - 82)  BP: 148/96 (04 Apr 2024 04:30) (128/88 - 172/91)  BP(mean): --  RR: 19 (04 Apr 2024 04:30) (17 - 19)  SpO2: 97% (04 Apr 2024 04:30) (97% - 100%)    Parameters below as of 04 Apr 2024 04:30  Patient On (Oxygen Delivery Method): room air      I&O's Detail    03 Apr 2024 07:01  -  04 Apr 2024 07:00  --------------------------------------------------------  IN:    dextrose 5% + sodium chloride 0.9%: 100 mL  Total IN: 100 mL    OUT:    Voided (mL): 1225 mL  Total OUT: 1225 mL    Total NET: -1125 mL          General: NAD, resting comfortably in bed  C/V: NSR  Pulm: Nonlabored breathing, no respiratory distress  Abd: soft, moderatly distended, NYT. No rebound or guarding  Incisions; c/d/i  Extrem: WWP, no edema, SCDs in place        LABS:                        9.7    4.31  )-----------( 108      ( 04 Apr 2024 06:58 )             28.0     04-04    137  |  105  |  8   ----------------------------<  100<H>  3.8   |  29  |  0.80    Ca    8.5      04 Apr 2024 06:58  Phos  2.7     04-04  Mg     2.0     04-04    TPro  5.9<L>  /  Alb  2.3<L>  /  TBili  0.6  /  DBili  x   /  AST  32  /  ALT  16  /  AlkPhos  53  04-03      Urinalysis Basic - ( 04 Apr 2024 06:58 )    Color: x / Appearance: x / SG: x / pH: x  Gluc: 100 mg/dL / Ketone: x  / Bili: x / Urobili: x   Blood: x / Protein: x / Nitrite: x   Leuk Esterase: x / RBC: x / WBC x   Sq Epi: x / Non Sq Epi: x / Bacteria: x        RADIOLOGY & ADDITIONAL STUDIES:

## 2024-04-04 NOTE — DISCHARGE NOTE PROVIDER - NSDCCPTREATMENT_GEN_ALL_CORE_FT
PRINCIPAL PROCEDURE  Procedure: Open right hemicolectomy  Findings and Treatment:       SECONDARY PROCEDURE  Procedure: Exploratory laparotomy  Findings and Treatment:

## 2024-04-04 NOTE — PROGRESS NOTE ADULT - ASSESSMENT
68y M with PMHx of methadone use (for opioid use) and PSHx of R inguinal hernia repair open (20+yrs ago) and R upper arm shrapnel removal (40yrs ago) presented to [facility] with 5d hx of worsening abdominal pain, 1 episode of bloody BM at onset of symptoms, and constipation (last flatus and BM 4d ago). Upon presentation patient was febrile 100.4 (rectal), tachycardic 104, and hypertensive 157/104. 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 Patient was transferred to [facility] surgery for further management of cecal volvulous. Pt is now s/p exlap, R-hemicolectomy, side to side stapled anastamosis (3/30). Awaiting return of more consistent bowel function.     NPO/IVF  SQH/SCD/OOB  methadone d/c  PCA  AM Labs  dispo; SHERINE

## 2024-04-05 LAB
ADD ON TEST-SPECIMEN IN LAB: SIGNIFICANT CHANGE UP
ANION GAP SERPL CALC-SCNC: 7 MMOL/L — SIGNIFICANT CHANGE UP (ref 5–17)
BUN SERPL-MCNC: 6 MG/DL — LOW (ref 7–23)
CALCIUM SERPL-MCNC: 8.5 MG/DL — SIGNIFICANT CHANGE UP (ref 8.4–10.5)
CHLORIDE SERPL-SCNC: 106 MMOL/L — SIGNIFICANT CHANGE UP (ref 96–108)
CO2 SERPL-SCNC: 24 MMOL/L — SIGNIFICANT CHANGE UP (ref 22–31)
CREAT SERPL-MCNC: 0.8 MG/DL — SIGNIFICANT CHANGE UP (ref 0.5–1.3)
EGFR: 95 ML/MIN/1.73M2 — SIGNIFICANT CHANGE UP
GLUCOSE SERPL-MCNC: 99 MG/DL — SIGNIFICANT CHANGE UP (ref 70–99)
HCT VFR BLD CALC: 31.9 % — LOW (ref 39–50)
HGB BLD-MCNC: 10.8 G/DL — LOW (ref 13–17)
MAGNESIUM SERPL-MCNC: 1.7 MG/DL — SIGNIFICANT CHANGE UP (ref 1.6–2.6)
MCHC RBC-ENTMCNC: 32.3 PG — SIGNIFICANT CHANGE UP (ref 27–34)
MCHC RBC-ENTMCNC: 33.9 GM/DL — SIGNIFICANT CHANGE UP (ref 32–36)
MCV RBC AUTO: 95.5 FL — SIGNIFICANT CHANGE UP (ref 80–100)
NRBC # BLD: 0 /100 WBCS — SIGNIFICANT CHANGE UP (ref 0–0)
PHOSPHATE SERPL-MCNC: 3.5 MG/DL — SIGNIFICANT CHANGE UP (ref 2.5–4.5)
PLATELET # BLD AUTO: 109 K/UL — LOW (ref 150–400)
POTASSIUM SERPL-MCNC: 3.5 MMOL/L — SIGNIFICANT CHANGE UP (ref 3.5–5.3)
POTASSIUM SERPL-SCNC: 3.5 MMOL/L — SIGNIFICANT CHANGE UP (ref 3.5–5.3)
RBC # BLD: 3.34 M/UL — LOW (ref 4.2–5.8)
RBC # FLD: 13.9 % — SIGNIFICANT CHANGE UP (ref 10.3–14.5)
SODIUM SERPL-SCNC: 137 MMOL/L — SIGNIFICANT CHANGE UP (ref 135–145)
TRIGL SERPL-MCNC: 56 MG/DL — SIGNIFICANT CHANGE UP
WBC # BLD: 4.27 K/UL — SIGNIFICANT CHANGE UP (ref 3.8–10.5)
WBC # FLD AUTO: 4.27 K/UL — SIGNIFICANT CHANGE UP (ref 3.8–10.5)

## 2024-04-05 PROCEDURE — 36569 INSJ PICC 5 YR+ W/O IMAGING: CPT

## 2024-04-05 PROCEDURE — 99233 SBSQ HOSP IP/OBS HIGH 50: CPT

## 2024-04-05 RX ORDER — ELECTROLYTE SOLUTION,INJ
1 VIAL (ML) INTRAVENOUS
Refills: 0 | Status: DISCONTINUED | OUTPATIENT
Start: 2024-04-05 | End: 2024-04-05

## 2024-04-05 RX ORDER — HYDROMORPHONE HYDROCHLORIDE 2 MG/ML
0.5 INJECTION INTRAMUSCULAR; INTRAVENOUS; SUBCUTANEOUS ONCE
Refills: 0 | Status: DISCONTINUED | OUTPATIENT
Start: 2024-04-05 | End: 2024-04-05

## 2024-04-05 RX ORDER — CHLORHEXIDINE GLUCONATE 213 G/1000ML
1 SOLUTION TOPICAL
Refills: 0 | Status: DISCONTINUED | OUTPATIENT
Start: 2024-04-05 | End: 2024-04-23

## 2024-04-05 RX ORDER — POTASSIUM CHLORIDE 20 MEQ
10 PACKET (EA) ORAL
Refills: 0 | Status: COMPLETED | OUTPATIENT
Start: 2024-04-05 | End: 2024-04-05

## 2024-04-05 RX ORDER — I.V. FAT EMULSION 20 G/100ML
0.76 EMULSION INTRAVENOUS
Qty: 50 | Refills: 0 | Status: DISCONTINUED | OUTPATIENT
Start: 2024-04-05 | End: 2024-04-05

## 2024-04-05 RX ORDER — MAGNESIUM SULFATE 500 MG/ML
1 VIAL (ML) INJECTION ONCE
Refills: 0 | Status: COMPLETED | OUTPATIENT
Start: 2024-04-05 | End: 2024-04-05

## 2024-04-05 RX ORDER — SODIUM CHLORIDE 9 MG/ML
10 INJECTION INTRAMUSCULAR; INTRAVENOUS; SUBCUTANEOUS
Refills: 0 | Status: DISCONTINUED | OUTPATIENT
Start: 2024-04-05 | End: 2024-04-23

## 2024-04-05 RX ORDER — LABETALOL HCL 100 MG
10 TABLET ORAL EVERY 6 HOURS
Refills: 0 | Status: DISCONTINUED | OUTPATIENT
Start: 2024-04-05 | End: 2024-04-10

## 2024-04-05 RX ADMIN — OXYCODONE HYDROCHLORIDE 5 MILLIGRAM(S): 5 TABLET ORAL at 00:16

## 2024-04-05 RX ADMIN — OXYCODONE HYDROCHLORIDE 10 MILLIGRAM(S): 5 TABLET ORAL at 16:30

## 2024-04-05 RX ADMIN — Medication 1000 MILLIGRAM(S): at 01:13

## 2024-04-05 RX ADMIN — OXYCODONE HYDROCHLORIDE 5 MILLIGRAM(S): 5 TABLET ORAL at 01:16

## 2024-04-05 RX ADMIN — GABAPENTIN 300 MILLIGRAM(S): 400 CAPSULE ORAL at 22:27

## 2024-04-05 RX ADMIN — METHADONE HYDROCHLORIDE 190 MILLIGRAM(S): 40 TABLET ORAL at 11:39

## 2024-04-05 RX ADMIN — OXYCODONE HYDROCHLORIDE 10 MILLIGRAM(S): 5 TABLET ORAL at 14:53

## 2024-04-05 RX ADMIN — Medication 10 MILLIGRAM(S): at 09:52

## 2024-04-05 RX ADMIN — Medication 100 MILLIEQUIVALENT(S): at 16:58

## 2024-04-05 RX ADMIN — GABAPENTIN 300 MILLIGRAM(S): 400 CAPSULE ORAL at 17:13

## 2024-04-05 RX ADMIN — Medication 10 MILLIGRAM(S): at 00:37

## 2024-04-05 RX ADMIN — OXYCODONE HYDROCHLORIDE 10 MILLIGRAM(S): 5 TABLET ORAL at 23:58

## 2024-04-05 RX ADMIN — Medication 1000 MILLIGRAM(S): at 05:57

## 2024-04-05 RX ADMIN — OXYCODONE HYDROCHLORIDE 10 MILLIGRAM(S): 5 TABLET ORAL at 07:27

## 2024-04-05 RX ADMIN — OXYCODONE HYDROCHLORIDE 10 MILLIGRAM(S): 5 TABLET ORAL at 22:58

## 2024-04-05 RX ADMIN — HYDROMORPHONE HYDROCHLORIDE 0.5 MILLIGRAM(S): 2 INJECTION INTRAMUSCULAR; INTRAVENOUS; SUBCUTANEOUS at 02:33

## 2024-04-05 RX ADMIN — Medication 100 GRAM(S): at 19:06

## 2024-04-05 RX ADMIN — Medication 1 EACH: at 17:15

## 2024-04-05 RX ADMIN — HEPARIN SODIUM 5000 UNIT(S): 5000 INJECTION INTRAVENOUS; SUBCUTANEOUS at 06:03

## 2024-04-05 RX ADMIN — Medication 1000 MILLIGRAM(S): at 11:38

## 2024-04-05 RX ADMIN — Medication 1000 MILLIGRAM(S): at 17:13

## 2024-04-05 RX ADMIN — GABAPENTIN 300 MILLIGRAM(S): 400 CAPSULE ORAL at 05:57

## 2024-04-05 RX ADMIN — Medication 10 MILLIGRAM(S): at 17:17

## 2024-04-05 RX ADMIN — Medication 10 MILLIGRAM(S): at 07:27

## 2024-04-05 RX ADMIN — HYDROMORPHONE HYDROCHLORIDE 0.5 MILLIGRAM(S): 2 INJECTION INTRAMUSCULAR; INTRAVENOUS; SUBCUTANEOUS at 02:18

## 2024-04-05 RX ADMIN — I.V. FAT EMULSION 20.83 GM/KG/DAY: 20 EMULSION INTRAVENOUS at 17:15

## 2024-04-05 RX ADMIN — Medication 100 MILLIEQUIVALENT(S): at 14:54

## 2024-04-05 RX ADMIN — HEPARIN SODIUM 5000 UNIT(S): 5000 INJECTION INTRAVENOUS; SUBCUTANEOUS at 17:12

## 2024-04-05 RX ADMIN — Medication 100 MILLIEQUIVALENT(S): at 11:37

## 2024-04-05 RX ADMIN — HEPARIN SODIUM 5000 UNIT(S): 5000 INJECTION INTRAVENOUS; SUBCUTANEOUS at 22:27

## 2024-04-05 RX ADMIN — Medication 1000 MILLIGRAM(S): at 00:13

## 2024-04-05 RX ADMIN — Medication 100 MILLIGRAM(S): at 17:14

## 2024-04-05 NOTE — PROGRESS NOTE ADULT - SUBJECTIVE AND OBJECTIVE BOX
Patient is a 68y old  Male who presents with a chief complaint of Abdominal Pain (05 Apr 2024 07:12)      INTERVAL HPI/OVERNIGHT EVENTS: continued to have pain -    MEDICATIONS  (STANDING):  acetaminophen     Tablet .. 1000 milliGRAM(s) Oral every 6 hours  dextrose 5% + sodium chloride 0.9%. 1000 milliLiter(s) (100 mL/Hr) IV Continuous <Continuous>  gabapentin 300 milliGRAM(s) Oral every 8 hours  heparin   Injectable 5000 Unit(s) SubCutaneous every 8 hours  influenza  Vaccine (HIGH DOSE) 0.7 milliLiter(s) IntraMuscular once  lipid, fat emulsion (Fish Oil and Plant Based) 20% Infusion 0.7633 Gm/kG/Day (20.83 mL/Hr) IV Continuous <Continuous>  magnesium sulfate  IVPB 1 Gram(s) IV Intermittent once  methadone  Concentrate 190 milliGRAM(s) Oral daily  Parenteral Nutrition - Adult 1 Each (63 mL/Hr) TPN Continuous <Continuous>  potassium chloride  10 mEq/100 mL IVPB 10 milliEquivalent(s) IV Intermittent every 1 hour  thiamine Injectable 100 milliGRAM(s) IV Push daily    MEDICATIONS  (PRN):  diazepam    Tablet 5 milliGRAM(s) Oral daily PRN muscle spasms  labetalol Injectable 10 milliGRAM(s) IV Push every 6 hours PRN Diastolic blood pressure > 100  naloxone Injectable 0.1 milliGRAM(s) IV Push every 3 minutes PRN For ANY of the following changes in patient status:  A. RR LESS THAN 10 breaths per minute, B. Oxygen saturation LESS THAN 90%, C. Sedation score of 6  ondansetron Injectable 4 milliGRAM(s) IV Push every 6 hours PRN Nausea and/or Vomiting  oxyCODONE    Solution 10 milliGRAM(s) Oral every 6 hours PRN Severe Pain (7 - 10)  oxyCODONE    Solution 5 milliGRAM(s) Oral every 6 hours PRN Moderate Pain (4 - 6)      __________________________________________________  REVIEW OF SYSTEMS:    CONSTITUTIONAL: No fever,   EYES: no acute visual disturbances  NECK: No pain or stiffness  RESPIRATORY: No cough; No shortness of breath  CARDIOVASCULAR: No chest pain, no palpitations  GASTROINTESTINAL: + pain   NEUROLOGICAL: No headache or numbness, no tremors  MUSCULOSKELETAL: No joint pain, no muscle pain  GENITOURINARY: no dysuria, no frequency, no hesitancy  PSYCHIATRY: no depression , no anxiety  ALL OTHER  ROS negative        Vital Signs Last 24 Hrs  T(C): 36.7 (05 Apr 2024 09:45), Max: 37.2 (04 Apr 2024 21:00)  T(F): 98 (05 Apr 2024 09:45), Max: 99 (04 Apr 2024 21:00)  HR: 70 (05 Apr 2024 09:45) (68 - 80)  BP: 154/96 (05 Apr 2024 10:54) (154/96 - 213/130)  BP(mean): 113 (04 Apr 2024 22:26) (113 - 146)  RR: 17 (05 Apr 2024 09:45) (16 - 18)  SpO2: 96% (05 Apr 2024 09:45) (96% - 97%)    Parameters below as of 05 Apr 2024 09:45  Patient On (Oxygen Delivery Method): room air        ________________________________________________  PHYSICAL EXAM:  GENERAL: NAD  HEENT: Normocephalic;  conjunctivae and sclerae clear; moist mucous membranes;   NECK : supple  CHEST/LUNG: Clear to auscultation bilaterally with good air entry   HEART: S1 S2  regular; no murmurs, gallops or rubs  ABDOMEN: Soft, TTP, + distended; incision clean and dry covered in binder   EXTREMITIES: no cyanosis; no edema; no calf tenderness  SKIN: warm and dry; no rash  NERVOUS SYSTEM:  Awake and alert; Oriented  to place, person and time ; no new deficits    _________________________________________________  LABS:                        10.8   4.27  )-----------( 109      ( 05 Apr 2024 06:29 )             31.9     04-05    137  |  106  |  6<L>  ----------------------------<  99  3.5   |  24  |  0.80    Ca    8.5      05 Apr 2024 06:29  Phos  3.5     04-05  Mg     1.7     04-05        Urinalysis Basic - ( 05 Apr 2024 06:29 )    Color: x / Appearance: x / SG: x / pH: x  Gluc: 99 mg/dL / Ketone: x  / Bili: x / Urobili: x   Blood: x / Protein: x / Nitrite: x   Leuk Esterase: x / RBC: x / WBC x   Sq Epi: x / Non Sq Epi: x / Bacteria: x      CAPILLARY BLOOD GLUCOSE            RADIOLOGY & ADDITIONAL TESTS:      Plan of care was discussed with patient and /or primary care giver; all questions and concerns were addressed and care was aligned with patient's wishes.

## 2024-04-05 NOTE — PROGRESS NOTE ADULT - SUBJECTIVE AND OBJECTIVE BOX
Overnight events: /110, asx, x2 labetolol, resolved, -N/-V        POD#3/30: exlap, R antoni, side to side stapled anastomosis, penrose x1 midline     SUBJECTIVE: Patient seen at bedside with chief resident. Patient reports minimal flatus. Denies any nausea. Endorses minimal ambulation.       MEDICATIONS  (STANDING):  acetaminophen     Tablet .. 1000 milliGRAM(s) Oral every 6 hours  bisacodyl Suppository 10 milliGRAM(s) Rectal once  dextrose 5% + sodium chloride 0.9%. 1000 milliLiter(s) (100 mL/Hr) IV Continuous <Continuous>  gabapentin 300 milliGRAM(s) Oral every 8 hours  heparin   Injectable 5000 Unit(s) SubCutaneous every 8 hours  influenza  Vaccine (HIGH DOSE) 0.7 milliLiter(s) IntraMuscular once  methadone  Concentrate 190 milliGRAM(s) Oral daily  thiamine Injectable 100 milliGRAM(s) IV Push daily    MEDICATIONS  (PRN):  diazepam    Tablet 5 milliGRAM(s) Oral daily PRN muscle spasms  labetalol Injectable 10 milliGRAM(s) IV Push every 6 hours PRN Diastolic blood pressure > 100  naloxone Injectable 0.1 milliGRAM(s) IV Push every 3 minutes PRN For ANY of the following changes in patient status:  A. RR LESS THAN 10 breaths per minute, B. Oxygen saturation LESS THAN 90%, C. Sedation score of 6  ondansetron Injectable 4 milliGRAM(s) IV Push every 6 hours PRN Nausea and/or Vomiting  oxyCODONE    Solution 10 milliGRAM(s) Oral every 6 hours PRN Severe Pain (7 - 10)  oxyCODONE    Solution 5 milliGRAM(s) Oral every 6 hours PRN Moderate Pain (4 - 6)      Vital Signs Last 24 Hrs  T(C): 37.1 (05 Apr 2024 04:10), Max: 37.2 (04 Apr 2024 21:00)  T(F): 98.8 (05 Apr 2024 04:10), Max: 99 (04 Apr 2024 21:00)  HR: 71 (05 Apr 2024 04:10) (59 - 80)  BP: 165/92 (05 Apr 2024 04:10) (157/86 - 213/130)  BP(mean): 113 (04 Apr 2024 22:26) (113 - 146)  RR: 18 (05 Apr 2024 04:10) (16 - 18)  SpO2: 96% (05 Apr 2024 04:10) (96% - 99%)    Parameters below as of 05 Apr 2024 04:10  Patient On (Oxygen Delivery Method): room air        Physical Exam:  General: NAD, resting comfortably in bed  Pulmonary: Nonlabored breathing, no respiratory distress  Cardiovascular: NSR  Abdominal: soft, mod distended, minimal tender, midline incision healing well  Extremities: WWP, normal strength  Neuro: A/O x 3, CNs II-XII grossly intact,     I&O's Summary    04 Apr 2024 07:01  -  05 Apr 2024 07:00  --------------------------------------------------------  IN: 1300 mL / OUT: 900 mL / NET: 400 mL        LABS:                        10.8   4.27  )-----------( 109      ( 05 Apr 2024 06:29 )             31.9     04-05    137  |  106  |  6<L>  ----------------------------<  99  3.5   |  24  |  0.80    Ca    8.5      05 Apr 2024 06:29  Phos  3.5     04-05  Mg     1.7     04-05    TPro  5.9<L>  /  Alb  2.3<L>  /  TBili  0.6  /  DBili  x   /  AST  32  /  ALT  16  /  AlkPhos  53  04-03      Urinalysis Basic - ( 05 Apr 2024 06:29 )    Color: x / Appearance: x / SG: x / pH: x  Gluc: 99 mg/dL / Ketone: x  / Bili: x / Urobili: x   Blood: x / Protein: x / Nitrite: x   Leuk Esterase: x / RBC: x / WBC x   Sq Epi: x / Non Sq Epi: x / Bacteria: x      CAPILLARY BLOOD GLUCOSE        LIVER FUNCTIONS - ( 03 Apr 2024 07:21 )  Alb: 2.3 g/dL / Pro: 5.9 g/dL / ALK PHOS: 53 U/L / ALT: 16 U/L / AST: 32 U/L / GGT: x             RADIOLOGY & ADDITIONAL STUDIES:

## 2024-04-05 NOTE — PROGRESS NOTE ADULT - ASSESSMENT
68y M with PMHx of methadone use (for unknown pain disorder) and PSHx of R inguinal hernia repair open (20+yrs ago) and R upper arm shrapnel removal (40yrs ago) presented to [facility] with 5d hx of worsening abdominal pain, 1 episode of bloody BM at onset of symptoms, and constipation. pt admitted to surgery for further management of cecal volvulous.       #cecal volvulus   #Small bowel obstruction   #post op state   #sepsis on admission   #Ileus  sirs 2/4  febrile 100.4 (rectal), tachycardic 104, on admission  s/p rocephin and flagyl 1 x dose and cefotetan 3 doses post op   CT A/P findings of Cecal volvulus resulting in a distal small bowel obstruction.  s/p exlap, R-hemicolectomy, side to side stapled anastamosis. postop abdominal XR shows dilated loops of bowel consistent with ileus  OOTB to chair   NPO - will get a picc and be started on TPN 4/5   Encourage ambulation ; Encourage incentive spirometry - patient reports issues using IS due to presence of TMJ and history of jaw surgery      #methadone dependance   restarted on home dose - and started on oxy for pain control   pain management per primary     #hypomagnesemia   mag <2 -- s/p replacement   replete as needed    #HCV  - is HCV positive (both RNA and antigen)  - CT scan showed small amount of perihepatic ascites, but no comment on cirrhosis.    - should have outpatient follow up    #thrombocytopenia   plts in 80's- 100s lateral   will ctm   dvt ppx per primary team.  Monitor for bleeding    #Severe protein calorie malnutrition  - appreciate input from nutrition  - plan as above     #Hypertension  - has been having intermittently elevated systolic blood pressures in the setting of pain.    - not on any medications outpatient  - asked team to confirm if cirrhosis present on CT, as this can impact type of regimen.  Given that has ileus at this time, avoiding PO.  Can give PRN labetalol IV for hypertension.    - if tolerating PO will start on nifedipine 30 qd     #acute blood loss anemia   Hgb downtrended from time of admission  Hemoglobin: 11.7 on admission   Hemoglobin: 10.8 today   Partially attributable to operative blood losses   continue to trend daily CBC, keep active type and screen      #dvt ppx sqh

## 2024-04-05 NOTE — CHART NOTE - NSCHARTNOTEFT_GEN_A_CORE
Admitting Diagnosis:   Patient is a 68y old  Male who presents with a chief complaint of Abdominal Pain (05 Apr 2024 07:12)    PAST MEDICAL & SURGICAL HISTORY:  Methadone use  H/O right inguinal hernia repair    Current Nutrition Order: Diet, NPO:   Except Medications (03-31-24 @ 17:53) [Active]    PO Intake: Good (%) [   ]  Fair (50-75%) [   ] Poor (<25%) [   ] NPO [X]     GI Issues: No issues with vomiting, diarrhea reported at time of visit. Endorses feeling nauseas at times. States that he feels constipated, however reports passing flatulence. Last BM noted on 3/26 per flow sheets. Pt is not currently on a bowel regimen.    Pain: no indication of pain at time of visit.     Skin Integrity: No Pressure Injuries per flow sheets. Noted with surgical incisions. Mendez Score: 20  Edema: No Edema per flow sheets.     Labs:   04-05    137  |  106  |  6<L>  ----------------------------<  99  3.5   |  24  |  0.80    Ca    8.5      05 Apr 2024 06:29  Phos  3.5     04-05  Mg     1.7     04-05    CAPILLARY BLOOD GLUCOSE    Medications:  MEDICATIONS  (STANDING):  acetaminophen     Tablet .. 1000 milliGRAM(s) Oral every 6 hours  dextrose 5% + sodium chloride 0.9%. 1000 milliLiter(s) (100 mL/Hr) IV Continuous <Continuous>  gabapentin 300 milliGRAM(s) Oral every 8 hours  heparin   Injectable 5000 Unit(s) SubCutaneous every 8 hours  influenza  Vaccine (HIGH DOSE) 0.7 milliLiter(s) IntraMuscular once  magnesium sulfate  IVPB 1 Gram(s) IV Intermittent once  methadone  Concentrate 190 milliGRAM(s) Oral daily  potassium chloride  10 mEq/100 mL IVPB 10 milliEquivalent(s) IV Intermittent every 1 hour  thiamine Injectable 100 milliGRAM(s) IV Push daily    MEDICATIONS  (PRN):  diazepam    Tablet 5 milliGRAM(s) Oral daily PRN muscle spasms  labetalol Injectable 10 milliGRAM(s) IV Push every 6 hours PRN Diastolic blood pressure > 100  naloxone Injectable 0.1 milliGRAM(s) IV Push every 3 minutes PRN For ANY of the following changes in patient status:  A. RR LESS THAN 10 breaths per minute, B. Oxygen saturation LESS THAN 90%, C. Sedation score of 6  ondansetron Injectable 4 milliGRAM(s) IV Push every 6 hours PRN Nausea and/or Vomiting  oxyCODONE    Solution 5 milliGRAM(s) Oral every 6 hours PRN Moderate Pain (4 - 6)  oxyCODONE    Solution 10 milliGRAM(s) Oral every 6 hours PRN Severe Pain (7 - 10)      Weight History:  3/29 140 pounds (dosing)    UBW: ~145 pounds (per pt) x 2 months   IBW: 154 pounds   %IBW: 90%     No updated weights in charts. RD to continue to monitor weight trends as available.    Estimated energy needs:   Kcal: 30-35kcal/kg = 8309-9416 kcal   Pro: 1.1-1.5g pro/kg = 69-95 g pro  Fluids: 30-35ml/kg = 1905-2222kcal   Based on Standards of Care pt within % IBW (90%) thus actual body weight (63.5kg) used for all calculations. Needs adjusted for advanced age and malnutrition.    Subjective: "68y M with PMHx of methadone use (for opioid use) and PSHx of R inguinal hernia repair open (20+yrs ago) and R upper arm shrapnel removal (40yrs ago) presented to [facility] with 5d hx of worsening abdominal pain, 1 episode of bloody BM at onset of symptoms, and constipation (last flatus and BM 4d ago). Upon presentation patient was febrile 100.4 (rectal), tachycardic 104, and hypertensive 157/104. 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 Patient was transferred to [facility] surgery for further management of cecal volvulous. Pt is now s/p exlap, R-hemicolectomy, side to side stapled anastamosis (3/30). Will give suppository, place PICC for TPN, obtain nutrition recs, will continue to ambulation."     Visited pt at bedside on 9WO for follow up and consult for TPN. At time of visit pt has not had anything to consume. States that he has been experiencing constipation, and discomfort in his abdomen region. Discussed with the pt about plan for TPN to meet his nutritionally needs. Verbalizes understanding. Nutritionally Pertinent Labs 4/5: BUN: (L), Medication reviewed. Pt is currently ordered for Dextrose 5% + NaCl .9%. Ordered for magnesium sulfate, potassium chloride IV and IV thiamin. See nutrition recommendations below.     Previous Nutrition Diagnosis: Severe Chronic Malnutrition RT inadequate PO intake in setting on physical demands AEB Severe muscle and fat depletions.    Active [X]  Resolved [   ]    Goal: Pt to meet >75% of estimated nutrition needs daily per course of hospitalization.     Recommendations:    Education:     Risk Level: High [X] Moderate [   ] Low [   ] Admitting Diagnosis:   Patient is a 68y old  Male who presents with a chief complaint of Abdominal Pain (05 Apr 2024 07:12)    PAST MEDICAL & SURGICAL HISTORY:  Methadone use  H/O right inguinal hernia repair    Current Nutrition Order: Diet, NPO:   Except Medications (03-31-24 @ 17:53) [Active]    PO Intake: Good (%) [   ]  Fair (50-75%) [   ] Poor (<25%) [   ] NPO [X]     GI Issues: No issues with vomiting, diarrhea reported at time of visit. Endorses feeling nauseas at times. States that he feels constipated, however reports passing flatulence. Last BM noted on 3/26 per flow sheets. Pt is not currently on a bowel regimen.    Pain: no indication of pain at time of visit.     Skin Integrity: No Pressure Injuries per flow sheets. Noted with surgical incisions. Mendez Score: 20  Edema: No Edema per flow sheets.     Labs:   04-05    137  |  106  |  6<L>  ----------------------------<  99  3.5   |  24  |  0.80    Ca    8.5      05 Apr 2024 06:29  Phos  3.5     04-05  Mg     1.7     04-05    CAPILLARY BLOOD GLUCOSE    Medications:  MEDICATIONS  (STANDING):  acetaminophen     Tablet .. 1000 milliGRAM(s) Oral every 6 hours  dextrose 5% + sodium chloride 0.9%. 1000 milliLiter(s) (100 mL/Hr) IV Continuous <Continuous>  gabapentin 300 milliGRAM(s) Oral every 8 hours  heparin   Injectable 5000 Unit(s) SubCutaneous every 8 hours  influenza  Vaccine (HIGH DOSE) 0.7 milliLiter(s) IntraMuscular once  magnesium sulfate  IVPB 1 Gram(s) IV Intermittent once  methadone  Concentrate 190 milliGRAM(s) Oral daily  potassium chloride  10 mEq/100 mL IVPB 10 milliEquivalent(s) IV Intermittent every 1 hour  thiamine Injectable 100 milliGRAM(s) IV Push daily    MEDICATIONS  (PRN):  diazepam    Tablet 5 milliGRAM(s) Oral daily PRN muscle spasms  labetalol Injectable 10 milliGRAM(s) IV Push every 6 hours PRN Diastolic blood pressure > 100  naloxone Injectable 0.1 milliGRAM(s) IV Push every 3 minutes PRN For ANY of the following changes in patient status:  A. RR LESS THAN 10 breaths per minute, B. Oxygen saturation LESS THAN 90%, C. Sedation score of 6  ondansetron Injectable 4 milliGRAM(s) IV Push every 6 hours PRN Nausea and/or Vomiting  oxyCODONE    Solution 5 milliGRAM(s) Oral every 6 hours PRN Moderate Pain (4 - 6)  oxyCODONE    Solution 10 milliGRAM(s) Oral every 6 hours PRN Severe Pain (7 - 10)      Weight History:  4/5 140.1 pounds (standing) (63.6 kg)  3/29 140 pounds (dosing)    UBW: ~145 pounds (per pt) x 2 months   IBW: 154 pounds   %IBW: 90%     No updated weights in charts. RD to continue to monitor weight trends as available.    Estimated energy needs:   Kcal: 30-35kcal/kg = 4785-8446 kcal   Pro: 1.1-1.5g pro/kg = 69-95 g pro  Fluids: 30-35ml/kg = 1905-2222kcal   Based on Standards of Care pt within % IBW (90%) thus actual body weight (63.5kg) used for all calculations. Needs adjusted for advanced age and malnutrition.    Subjective: "68y M with PMHx of methadone use (for opioid use) and PSHx of R inguinal hernia repair open (20+yrs ago) and R upper arm shrapnel removal (40yrs ago) presented to [facility] with 5d hx of worsening abdominal pain, 1 episode of bloody BM at onset of symptoms, and constipation (last flatus and BM 4d ago). Upon presentation patient was febrile 100.4 (rectal), tachycardic 104, and hypertensive 157/104. 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 Patient was transferred to [facility] surgery for further management of cecal volvulous. Pt is now s/p exlap, R-hemicolectomy, side to side stapled anastamosis (3/30). Will give suppository, place PICC for TPN, obtain nutrition recs, will continue to ambulation."     Visited pt at bedside on 9WO for follow up and consult for TPN. At time of visit pt has not had anything to consume. States that he has been experiencing constipation, and discomfort in his abdomen region. Discussed with the pt about plan for TPN to meet his nutritionally needs. Verbalizes understanding. Nutritionally Pertinent Labs 4/5: BUN: (L), Medication reviewed. Pt is currently ordered for Dextrose 5% + NaCl .9%. Ordered for magnesium sulfate, potassium chloride IV and IV thiamin. See nutrition recommendations below.     Previous Nutrition Diagnosis: Severe Chronic Malnutrition RT inadequate PO intake in setting on physical demands AEB Severe muscle and fat depletions.    Active [X]  Resolved [   ]    Goal: Pt to meet >75% of estimated nutrition needs daily per course of hospitalization.     Recommendations:  1. TPN via feasible route:  335g Dextrose, 90g AA, 50g 20% Lipids to provide in total  1999 Kcal, 90g protein, 3.6GIR of based on actual body weight (63.6kg), 1.4 grams protein/kg actual body weight  Recommend checking TG before starting TPN and then check TG weekly. Check Magnesium, Phosphorus, potassium daily and POC BG Q6hrs. Trend daily weights. Fluids and electrolytes per MD discretion. Start at 150g Dextrose on Day 1, 250g Dextrose on Day 2, and advance to goal of 335g Dextrose on Day 3.     Education: Discussed with the pt about alternate means of nutrition; TPN. Answered questions related to TPN.     Risk Level: High [X] Moderate [   ] Low [   ] Admitting Diagnosis:   Patient is a 68y old  Male who presents with a chief complaint of Abdominal Pain (05 Apr 2024 07:12)    PAST MEDICAL & SURGICAL HISTORY:  Methadone use  H/O right inguinal hernia repair    Current Nutrition Order: Diet, NPO:   Except Medications (03-31-24 @ 17:53) [Active]    PO Intake: Good (%) [   ]  Fair (50-75%) [   ] Poor (<25%) [   ] NPO [X]     GI Issues: No issues with vomiting, diarrhea reported at time of visit. Endorses feeling nauseas at times. States that he feels constipated, however reports passing flatulence. Last BM noted on 3/26 per flow sheets. Pt is not currently on a bowel regimen.    Pain: no indication of pain at time of visit.     Skin Integrity: No Pressure Injuries per flow sheets. Noted with surgical incisions. Mendez Score: 20  Edema: No Edema per flow sheets.     Labs:   04-05    137  |  106  |  6<L>  ----------------------------<  99  3.5   |  24  |  0.80    Ca    8.5      05 Apr 2024 06:29  Phos  3.5     04-05  Mg     1.7     04-05    CAPILLARY BLOOD GLUCOSE    Medications:  MEDICATIONS  (STANDING):  acetaminophen     Tablet .. 1000 milliGRAM(s) Oral every 6 hours  dextrose 5% + sodium chloride 0.9%. 1000 milliLiter(s) (100 mL/Hr) IV Continuous <Continuous>  gabapentin 300 milliGRAM(s) Oral every 8 hours  heparin   Injectable 5000 Unit(s) SubCutaneous every 8 hours  influenza  Vaccine (HIGH DOSE) 0.7 milliLiter(s) IntraMuscular once  magnesium sulfate  IVPB 1 Gram(s) IV Intermittent once  methadone  Concentrate 190 milliGRAM(s) Oral daily  potassium chloride  10 mEq/100 mL IVPB 10 milliEquivalent(s) IV Intermittent every 1 hour  thiamine Injectable 100 milliGRAM(s) IV Push daily    MEDICATIONS  (PRN):  diazepam    Tablet 5 milliGRAM(s) Oral daily PRN muscle spasms  labetalol Injectable 10 milliGRAM(s) IV Push every 6 hours PRN Diastolic blood pressure > 100  naloxone Injectable 0.1 milliGRAM(s) IV Push every 3 minutes PRN For ANY of the following changes in patient status:  A. RR LESS THAN 10 breaths per minute, B. Oxygen saturation LESS THAN 90%, C. Sedation score of 6  ondansetron Injectable 4 milliGRAM(s) IV Push every 6 hours PRN Nausea and/or Vomiting  oxyCODONE    Solution 5 milliGRAM(s) Oral every 6 hours PRN Moderate Pain (4 - 6)  oxyCODONE    Solution 10 milliGRAM(s) Oral every 6 hours PRN Severe Pain (7 - 10)      Weight History:  4/5 140.1 pounds (standing) (63.6 kg)  3/29 140 pounds (dosing)    UBW: ~145 pounds (per pt) x 2 months   IBW: 154 pounds   %IBW: 90%     No updated weights in charts. RD to continue to monitor weight trends as available.    Estimated energy needs:   Kcal: 30-35kcal/kg = 4723-3737 kcal   Pro: 1.1-1.5g pro/kg = 69-95 g pro  Fluids: 30-35ml/kg = 1905-2222kcal   Based on Standards of Care pt within % IBW (90%) thus actual body weight (63.5kg) used for all calculations. Needs adjusted for advanced age and malnutrition.    Subjective: "68y M with PMHx of methadone use (for opioid use) and PSHx of R inguinal hernia repair open (20+yrs ago) and R upper arm shrapnel removal (40yrs ago) presented to [facility] with 5d hx of worsening abdominal pain, 1 episode of bloody BM at onset of symptoms, and constipation (last flatus and BM 4d ago). Upon presentation patient was febrile 100.4 (rectal), tachycardic 104, and hypertensive 157/104. 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 Patient was transferred to [facility] surgery for further management of cecal volvulous. Pt is now s/p exlap, R-hemicolectomy, side to side stapled anastamosis (3/30). Will give suppository, place PICC for TPN, obtain nutrition recs, will continue to ambulation."     Visited pt at bedside on 9WO for follow up and consult for TPN. At time of visit pt has not had anything to consume. States that he has been experiencing constipation, and discomfort in his abdomen region. Discussed with the pt about plan for TPN to meet his nutritionally needs. Verbalizes understanding. Nutritionally Pertinent Labs 4/5: BUN: (L), Medication reviewed. Pt is currently ordered for Dextrose 5% + NaCl .9%. Ordered for magnesium sulfate, potassium chloride IV and IV thiamin. See nutrition recommendations below.     Previous Nutrition Diagnosis: Severe Chronic Malnutrition RT inadequate PO intake in setting on physical demands AEB Severe muscle and fat depletions.    Active [X]  Resolved [   ]    Goal: Pt to meet >75% of estimated nutrition needs daily per course of hospitalization.     Recommendations:  1. TPN via feasible route:  335g Dextrose, 90g AA, 50g 20% Lipids to provide in total  1999 Kcal, 90g protein, 3.6GIR of based on actual body weight (63.6kg), 1.4 grams protein/kg actual body weight  Recommend checking TG before starting TPN and then check TG weekly. Check Magnesium, Phosphorus, potassium daily and POC BG Q6hrs. Trend daily weights. Fluids and electrolytes per MD discretion. Start at 150g Dextrose on Day 1, 250g Dextrose on Day 2, and advance to goal of 335g Dextrose on Day 3.   2. 2. Lipid panel following first infusion & weekly thereafter  >>>hold if TG >400  3. Hydration per team  4. Daily BMP/Mg/Phos, fingersticks Q4-6hrs   - close monitoring of lytes & repletion outside bag prn  - risk for refeeding syndrome  5. Monitor chemistry, GI function, skin integrity   6. Pain regimen per team    Education: Discussed with the pt about alternate means of nutrition; TPN. Answered questions related to TPN. Pt receptive and verbalizes understanding.     Risk Level: High [X] Moderate [   ] Low [   ]

## 2024-04-05 NOTE — PROGRESS NOTE ADULT - ASSESSMENT
68y M with PMHx of methadone use (for opioid use) and PSHx of R inguinal hernia repair open (20+yrs ago) and R upper arm shrapnel removal (40yrs ago) presented to [facility] with 5d hx of worsening abdominal pain, 1 episode of bloody BM at onset of symptoms, and constipation (last flatus and BM 4d ago). Upon presentation patient was febrile 100.4 (rectal), tachycardic 104, and hypertensive 157/104. 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 Patient was transferred to [facility] surgery for further management of cecal volvulous. Pt is now s/p exlap, R-hemicolectomy, side to side stapled anastamosis (3/30). Will give suppository, place PICC for TPN, obtain nutrition recs, will continue to ambulation.      NPO/IVF  SQH/SCD/OOB  methadone  [facility]  dispo; [facility]

## 2024-04-06 PROCEDURE — 99233 SBSQ HOSP IP/OBS HIGH 50: CPT

## 2024-04-06 RX ORDER — ELECTROLYTE SOLUTION,INJ
1 VIAL (ML) INTRAVENOUS
Refills: 0 | Status: DISCONTINUED | OUTPATIENT
Start: 2024-04-06 | End: 2024-04-06

## 2024-04-06 RX ORDER — LIDOCAINE 4 G/100G
1 CREAM TOPICAL ONCE
Refills: 0 | Status: COMPLETED | OUTPATIENT
Start: 2024-04-06 | End: 2024-04-08

## 2024-04-06 RX ORDER — I.V. FAT EMULSION 20 G/100ML
0.79 EMULSION INTRAVENOUS
Qty: 50 | Refills: 0 | Status: DISCONTINUED | OUTPATIENT
Start: 2024-04-06 | End: 2024-04-06

## 2024-04-06 RX ADMIN — Medication 100 MILLIGRAM(S): at 11:15

## 2024-04-06 RX ADMIN — Medication 10 MILLIGRAM(S): at 09:51

## 2024-04-06 RX ADMIN — I.V. FAT EMULSION 20.8 GM/KG/DAY: 20 EMULSION INTRAVENOUS at 18:00

## 2024-04-06 RX ADMIN — GABAPENTIN 300 MILLIGRAM(S): 400 CAPSULE ORAL at 05:10

## 2024-04-06 RX ADMIN — OXYCODONE HYDROCHLORIDE 10 MILLIGRAM(S): 5 TABLET ORAL at 05:11

## 2024-04-06 RX ADMIN — Medication 1000 MILLIGRAM(S): at 23:49

## 2024-04-06 RX ADMIN — OXYCODONE HYDROCHLORIDE 10 MILLIGRAM(S): 5 TABLET ORAL at 06:11

## 2024-04-06 RX ADMIN — Medication 10 MILLIGRAM(S): at 02:24

## 2024-04-06 RX ADMIN — OXYCODONE HYDROCHLORIDE 10 MILLIGRAM(S): 5 TABLET ORAL at 21:21

## 2024-04-06 RX ADMIN — Medication 5 MILLIGRAM(S): at 02:07

## 2024-04-06 RX ADMIN — Medication 1 EACH: at 18:00

## 2024-04-06 RX ADMIN — GABAPENTIN 300 MILLIGRAM(S): 400 CAPSULE ORAL at 13:29

## 2024-04-06 RX ADMIN — OXYCODONE HYDROCHLORIDE 10 MILLIGRAM(S): 5 TABLET ORAL at 15:01

## 2024-04-06 RX ADMIN — HEPARIN SODIUM 5000 UNIT(S): 5000 INJECTION INTRAVENOUS; SUBCUTANEOUS at 13:29

## 2024-04-06 RX ADMIN — SODIUM CHLORIDE 100 MILLILITER(S): 9 INJECTION, SOLUTION INTRAVENOUS at 06:14

## 2024-04-06 RX ADMIN — OXYCODONE HYDROCHLORIDE 10 MILLIGRAM(S): 5 TABLET ORAL at 20:39

## 2024-04-06 RX ADMIN — CHLORHEXIDINE GLUCONATE 1 APPLICATION(S): 213 SOLUTION TOPICAL at 06:14

## 2024-04-06 RX ADMIN — HEPARIN SODIUM 5000 UNIT(S): 5000 INJECTION INTRAVENOUS; SUBCUTANEOUS at 05:10

## 2024-04-06 RX ADMIN — HEPARIN SODIUM 5000 UNIT(S): 5000 INJECTION INTRAVENOUS; SUBCUTANEOUS at 21:23

## 2024-04-06 RX ADMIN — OXYCODONE HYDROCHLORIDE 10 MILLIGRAM(S): 5 TABLET ORAL at 14:03

## 2024-04-06 RX ADMIN — METHADONE HYDROCHLORIDE 190 MILLIGRAM(S): 40 TABLET ORAL at 11:16

## 2024-04-06 NOTE — PROGRESS NOTE ADULT - SUBJECTIVE AND OBJECTIVE BOX
Overnight events:       POD#    SUBJECTIVE:      MEDICATIONS  (STANDING):  acetaminophen     Tablet .. 1000 milliGRAM(s) Oral every 6 hours  chlorhexidine 2% Cloths 1 Application(s) Topical <User Schedule>  dextrose 5% + sodium chloride 0.9%. 1000 milliLiter(s) (100 mL/Hr) IV Continuous <Continuous>  gabapentin 300 milliGRAM(s) Oral every 8 hours  heparin   Injectable 5000 Unit(s) SubCutaneous every 8 hours  influenza  Vaccine (HIGH DOSE) 0.7 milliLiter(s) IntraMuscular once  lipid, fat emulsion (Fish Oil and Plant Based) 20% Infusion 0.7633 Gm/kG/Day (20.83 mL/Hr) IV Continuous <Continuous>  methadone  Concentrate 190 milliGRAM(s) Oral daily  Parenteral Nutrition - Adult 1 Each (63 mL/Hr) TPN Continuous <Continuous>  thiamine Injectable 100 milliGRAM(s) IV Push daily    MEDICATIONS  (PRN):  diazepam    Tablet 5 milliGRAM(s) Oral daily PRN muscle spasms  labetalol Injectable 10 milliGRAM(s) IV Push every 6 hours PRN Diastolic blood pressure > 100  naloxone Injectable 0.1 milliGRAM(s) IV Push every 3 minutes PRN For ANY of the following changes in patient status:  A. RR LESS THAN 10 breaths per minute, B. Oxygen saturation LESS THAN 90%, C. Sedation score of 6  ondansetron Injectable 4 milliGRAM(s) IV Push every 6 hours PRN Nausea and/or Vomiting  oxyCODONE    Solution 10 milliGRAM(s) Oral every 6 hours PRN Severe Pain (7 - 10)  oxyCODONE    Solution 5 milliGRAM(s) Oral every 6 hours PRN Moderate Pain (4 - 6)  sodium chloride 0.9% lock flush 10 milliLiter(s) IV Push every 1 hour PRN Pre/post blood products, medications, blood draw, and to maintain line patency      Vital Signs Last 24 Hrs  T(C): 36.6 (06 Apr 2024 04:30), Max: 37.1 (05 Apr 2024 21:22)  T(F): 97.8 (06 Apr 2024 04:30), Max: 98.8 (05 Apr 2024 21:22)  HR: 66 (06 Apr 2024 04:30) (66 - 88)  BP: 148/96 (06 Apr 2024 04:30) (117/74 - 187/115)  BP(mean): --  RR: 18 (06 Apr 2024 04:30) (16 - 18)  SpO2: 97% (06 Apr 2024 04:30) (93% - 97%)    Parameters below as of 06 Apr 2024 04:30  Patient On (Oxygen Delivery Method): room air        Physical Exam:  General: NAD, resting comfortably in bed  Pulmonary: Nonlabored breathing, no respiratory distress  Cardiovascular: NSR  Abdominal: soft, NT/ND  Extremities: WWP, normal strength  Neuro: A/O x 3, CNs II-XII grossly intact, no focal deficits, normal motor/sensation  Pulses: palpable distal pulses    I&O's Summary    04 Apr 2024 07:01  -  05 Apr 2024 07:00  --------------------------------------------------------  IN: 1300 mL / OUT: 900 mL / NET: 400 mL    05 Apr 2024 07:01  -  06 Apr 2024 06:10  --------------------------------------------------------  IN: 3610.8 mL / OUT: 1450 mL / NET: 2160.8 mL        LABS:                        10.8   4.27  )-----------( 109      ( 05 Apr 2024 06:29 )             31.9     04-05    137  |  106  |  6<L>  ----------------------------<  99  3.5   |  24  |  0.80    Ca    8.5      05 Apr 2024 06:29  Phos  3.5     04-05  Mg     1.7     04-05        Urinalysis Basic - ( 05 Apr 2024 06:29 )    Color: x / Appearance: x / SG: x / pH: x  Gluc: 99 mg/dL / Ketone: x  / Bili: x / Urobili: x   Blood: x / Protein: x / Nitrite: x   Leuk Esterase: x / RBC: x / WBC x   Sq Epi: x / Non Sq Epi: x / Bacteria: x      CAPILLARY BLOOD GLUCOSE            RADIOLOGY & ADDITIONAL STUDIES:   Overnight events: No acute overnight events.       POD#3/30: exlap, R antoni, side to side stapled anastomosis    SUBJECTIVE: Patient seen at bedside with chief resident. Patient denies any nausea or vomiting. Endorses some flatus. Denies any BM.       MEDICATIONS  (STANDING):  acetaminophen     Tablet .. 1000 milliGRAM(s) Oral every 6 hours  chlorhexidine 2% Cloths 1 Application(s) Topical <User Schedule>  dextrose 5% + sodium chloride 0.9%. 1000 milliLiter(s) (100 mL/Hr) IV Continuous <Continuous>  gabapentin 300 milliGRAM(s) Oral every 8 hours  heparin   Injectable 5000 Unit(s) SubCutaneous every 8 hours  influenza  Vaccine (HIGH DOSE) 0.7 milliLiter(s) IntraMuscular once  lipid, fat emulsion (Fish Oil and Plant Based) 20% Infusion 0.7633 Gm/kG/Day (20.83 mL/Hr) IV Continuous <Continuous>  methadone  Concentrate 190 milliGRAM(s) Oral daily  Parenteral Nutrition - Adult 1 Each (63 mL/Hr) TPN Continuous <Continuous>  thiamine Injectable 100 milliGRAM(s) IV Push daily    MEDICATIONS  (PRN):  diazepam    Tablet 5 milliGRAM(s) Oral daily PRN muscle spasms  labetalol Injectable 10 milliGRAM(s) IV Push every 6 hours PRN Diastolic blood pressure > 100  naloxone Injectable 0.1 milliGRAM(s) IV Push every 3 minutes PRN For ANY of the following changes in patient status:  A. RR LESS THAN 10 breaths per minute, B. Oxygen saturation LESS THAN 90%, C. Sedation score of 6  ondansetron Injectable 4 milliGRAM(s) IV Push every 6 hours PRN Nausea and/or Vomiting  oxyCODONE    Solution 10 milliGRAM(s) Oral every 6 hours PRN Severe Pain (7 - 10)  oxyCODONE    Solution 5 milliGRAM(s) Oral every 6 hours PRN Moderate Pain (4 - 6)  sodium chloride 0.9% lock flush 10 milliLiter(s) IV Push every 1 hour PRN Pre/post blood products, medications, blood draw, and to maintain line patency      Vital Signs Last 24 Hrs  T(C): 36.6 (06 Apr 2024 04:30), Max: 37.1 (05 Apr 2024 21:22)  T(F): 97.8 (06 Apr 2024 04:30), Max: 98.8 (05 Apr 2024 21:22)  HR: 66 (06 Apr 2024 04:30) (66 - 88)  BP: 148/96 (06 Apr 2024 04:30) (117/74 - 187/115)  BP(mean): --  RR: 18 (06 Apr 2024 04:30) (16 - 18)  SpO2: 97% (06 Apr 2024 04:30) (93% - 97%)    Parameters below as of 06 Apr 2024 04:30  Patient On (Oxygen Delivery Method): room air        Physical Exam:  General: NAD, resting comfortably in bed  Pulmonary: Nonlabored breathing, no respiratory distress  Cardiovascular: NSR  Abdominal: soft, NT, mod distended, midline incision healing appropriately  Extremities: WWP, normal strength  Neuro: A/O x 3, CNs II-XII grossly intact,    I&O's Summary    04 Apr 2024 07:01  -  05 Apr 2024 07:00  --------------------------------------------------------  IN: 1300 mL / OUT: 900 mL / NET: 400 mL    05 Apr 2024 07:01  -  06 Apr 2024 06:10  --------------------------------------------------------  IN: 3610.8 mL / OUT: 1450 mL / NET: 2160.8 mL        LABS:                        10.8   4.27  )-----------( 109      ( 05 Apr 2024 06:29 )             31.9     04-05    137  |  106  |  6<L>  ----------------------------<  99  3.5   |  24  |  0.80    Ca    8.5      05 Apr 2024 06:29  Phos  3.5     04-05  Mg     1.7     04-05        Urinalysis Basic - ( 05 Apr 2024 06:29 )    Color: x / Appearance: x / SG: x / pH: x  Gluc: 99 mg/dL / Ketone: x  / Bili: x / Urobili: x   Blood: x / Protein: x / Nitrite: x   Leuk Esterase: x / RBC: x / WBC x   Sq Epi: x / Non Sq Epi: x / Bacteria: x      CAPILLARY BLOOD GLUCOSE            RADIOLOGY & ADDITIONAL STUDIES:

## 2024-04-06 NOTE — PROGRESS NOTE ADULT - SUBJECTIVE AND OBJECTIVE BOX
Patient is a 68y old  Male who presents with a chief complaint of Abdominal Pain (06 Apr 2024 06:10)      SUBJECTIVE / OVERNIGHT EVENTS: Patient seen and examined at bedside. No acute events overnight. Tolerating minimal water/ice chips and methadone pill. No BM/flatus. No nausea/vomiting    MEDICATIONS  (STANDING):  acetaminophen     Tablet .. 1000 milliGRAM(s) Oral every 6 hours  chlorhexidine 2% Cloths 1 Application(s) Topical <User Schedule>  dextrose 5% + sodium chloride 0.9%. 1000 milliLiter(s) (100 mL/Hr) IV Continuous <Continuous>  gabapentin 300 milliGRAM(s) Oral every 8 hours  heparin   Injectable 5000 Unit(s) SubCutaneous every 8 hours  influenza  Vaccine (HIGH DOSE) 0.7 milliLiter(s) IntraMuscular once  lipid, fat emulsion (Fish Oil and Plant Based) 20% Infusion 0.7874 Gm/kG/Day (20.8 mL/Hr) IV Continuous <Continuous>  methadone  Concentrate 190 milliGRAM(s) Oral daily  Parenteral Nutrition - Adult 1 Each (63 mL/Hr) TPN Continuous <Continuous>  Parenteral Nutrition - Adult 1 Each (63 mL/Hr) TPN Continuous <Continuous>    MEDICATIONS  (PRN):  diazepam    Tablet 5 milliGRAM(s) Oral daily PRN muscle spasms  labetalol Injectable 10 milliGRAM(s) IV Push every 6 hours PRN Diastolic blood pressure > 100  naloxone Injectable 0.1 milliGRAM(s) IV Push every 3 minutes PRN For ANY of the following changes in patient status:  A. RR LESS THAN 10 breaths per minute, B. Oxygen saturation LESS THAN 90%, C. Sedation score of 6  ondansetron Injectable 4 milliGRAM(s) IV Push every 6 hours PRN Nausea and/or Vomiting  oxyCODONE    Solution 5 milliGRAM(s) Oral every 6 hours PRN Moderate Pain (4 - 6)  oxyCODONE    Solution 10 milliGRAM(s) Oral every 6 hours PRN Severe Pain (7 - 10)  sodium chloride 0.9% lock flush 10 milliLiter(s) IV Push every 1 hour PRN Pre/post blood products, medications, blood draw, and to maintain line patency      CAPILLARY BLOOD GLUCOSE        I&O's Summary    05 Apr 2024 07:01  -  06 Apr 2024 07:00  --------------------------------------------------------  IN: 3773.8 mL / OUT: 1700 mL / NET: 2073.8 mL        PHYSICAL EXAM:  Vital Signs Last 24 Hrs  T(C): 36.9 (06 Apr 2024 08:20), Max: 37.1 (05 Apr 2024 21:22)  T(F): 98.4 (06 Apr 2024 08:20), Max: 98.8 (05 Apr 2024 21:22)  HR: 69 (06 Apr 2024 10:11) (66 - 88)  BP: 164/81 (06 Apr 2024 10:11) (117/74 - 187/115)  BP(mean): --  RR: 18 (06 Apr 2024 08:20) (16 - 18)  SpO2: 98% (06 Apr 2024 08:20) (93% - 98%)    Parameters below as of 06 Apr 2024 08:20  Patient On (Oxygen Delivery Method): room air        GEN: male in NAD, appears comfortable, no diaphoresis  EYES: No scleral injection, EOMI  ENTM: neck supple & symmetric without tracheal deviation, moist membranes, no gross hearing impairment, thyroid gland not enlarged  CV: +S1/S2, no m/r/g, no abdominal bruit, no LE edema  RESP: breathing comfortably, no respiratory accessory muscle use, CTAB, no w/r/r  GI: normoactive BS, soft, ND, no rebounding/guarding, no palpable masses; +tenderness midabdomen    LABS:                        10.8   4.27  )-----------( 109      ( 05 Apr 2024 06:29 )             31.9     04-05    137  |  106  |  6<L>  ----------------------------<  99  3.5   |  24  |  0.80    Ca    8.5      05 Apr 2024 06:29  Phos  3.5     04-05  Mg     1.7     04-05            Urinalysis Basic - ( 05 Apr 2024 06:29 )    Color: x / Appearance: x / SG: x / pH: x  Gluc: 99 mg/dL / Ketone: x  / Bili: x / Urobili: x   Blood: x / Protein: x / Nitrite: x   Leuk Esterase: x / RBC: x / WBC x   Sq Epi: x / Non Sq Epi: x / Bacteria: x          RADIOLOGY & ADDITIONAL TESTS:  Results Reviewed:   Imaging Personally Reviewed:  Electrocardiogram Personally Reviewed:    COORDINATION OF CARE:  Care Discussed with Consultants/Other Providers [Y/N]:  Prior or Outpatient Records Reviewed [Y/N]:

## 2024-04-06 NOTE — PROGRESS NOTE ADULT - ASSESSMENT
68y M with PMHx of methadone use (for unknown pain disorder) and PSHx of R inguinal hernia repair open (20+yrs ago) and R upper arm shrapnel removal (40yrs ago) presented to [facility] with 5d hx of worsening abdominal pain, 1 episode of bloody BM at onset of symptoms, and constipation. pt admitted to surgery for further management of cecal volvulous.       #cecal volvulus   #Small bowel obstruction   #post op state   #sepsis on admission   #Ileus  sirs 2/4  febrile 100.4 (rectal), tachycardic 104, on admission  s/p rocephin and flagyl 1 x dose and cefotetan 3 doses post op   CT A/P findings of Cecal volvulus resulting in a distal small bowel obstruction.  s/p exlap, R-hemicolectomy, side to side stapled anastamosis. postop abdominal XR shows dilated loops of bowel consistent with ileus  OOTB to chair   NPO  Encourage ambulation ; Encourage incentive spirometry - patient reports issues using IS due to presence of TMJ and history of jaw surgery  s/p PICC on 4/5 for TPN    #methadone dependance   restarted on home dose - and started on oxy for pain control   pain management per primary (likely will need to increase Oxy given significant tolerance)    #hypomagnesemia   mag <2 -- s/p replacement   replete as needed    #HCV  - is HCV positive (both RNA and antigen)  - CT scan showed small amount of perihepatic ascites, but no comment on cirrhosis.    - should have outpatient follow up    #thrombocytopenia   plts in 80's- 100s lateral   will ctm   dvt ppx per primary team.  Monitor for bleeding    #Severe protein calorie malnutrition  - appreciate input from nutrition  - plan as above     #Hypertension  - has been having intermittently elevated systolic blood pressures in the setting of pain.  - not on any medications outpatient  - if tolerating PO will start on nifedipine 30 qd     #acute blood loss anemia   Hgb downtrended from time of admission  Hemoglobin: 11.7 on admission   Partially attributable to operative blood losses   continue to trend daily CBC, keep active type and screen    #dvt ppx sqh

## 2024-04-06 NOTE — PROGRESS NOTE ADULT - ASSESSMENT
68y M with PMHx of methadone use (for opioid use) and PSHx of R inguinal hernia repair open (20+yrs ago) and R upper arm shrapnel removal (40yrs ago) presented to [facility] with 5d hx of worsening abdominal pain, 1 episode of bloody BM at onset of symptoms, and constipation (last flatus and BM 4d ago). Upon presentation patient was febrile 100.4 (rectal), tachycardic 104, and hypertensive 157/104. 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 Patient was transferred to [facility] surgery for further management of cecal volvulous. Pt is now s/p exlap, R-hemicolectomy, side to side stapled anastamosis (3/30).    NPO/IVF/TPN  SQH/SCD/OOB  methadone  [facility]  dispo; SHERINE

## 2024-04-07 LAB
GLUCOSE BLDC GLUCOMTR-MCNC: 109 MG/DL — HIGH (ref 70–99)
GLUCOSE BLDC GLUCOMTR-MCNC: 119 MG/DL — HIGH (ref 70–99)

## 2024-04-07 PROCEDURE — 99233 SBSQ HOSP IP/OBS HIGH 50: CPT

## 2024-04-07 RX ORDER — I.V. FAT EMULSION 20 G/100ML
0.79 EMULSION INTRAVENOUS
Qty: 50 | Refills: 0 | Status: DISCONTINUED | OUTPATIENT
Start: 2024-04-07 | End: 2024-04-07

## 2024-04-07 RX ORDER — ELECTROLYTE SOLUTION,INJ
1 VIAL (ML) INTRAVENOUS
Refills: 0 | Status: DISCONTINUED | OUTPATIENT
Start: 2024-04-07 | End: 2024-04-07

## 2024-04-07 RX ADMIN — OXYCODONE HYDROCHLORIDE 10 MILLIGRAM(S): 5 TABLET ORAL at 03:55

## 2024-04-07 RX ADMIN — Medication 1 EACH: at 18:27

## 2024-04-07 RX ADMIN — I.V. FAT EMULSION 20.8 GM/KG/DAY: 20 EMULSION INTRAVENOUS at 18:27

## 2024-04-07 RX ADMIN — GABAPENTIN 300 MILLIGRAM(S): 400 CAPSULE ORAL at 22:09

## 2024-04-07 RX ADMIN — HEPARIN SODIUM 5000 UNIT(S): 5000 INJECTION INTRAVENOUS; SUBCUTANEOUS at 22:09

## 2024-04-07 RX ADMIN — OXYCODONE HYDROCHLORIDE 10 MILLIGRAM(S): 5 TABLET ORAL at 14:43

## 2024-04-07 RX ADMIN — GABAPENTIN 300 MILLIGRAM(S): 400 CAPSULE ORAL at 13:28

## 2024-04-07 RX ADMIN — CHLORHEXIDINE GLUCONATE 1 APPLICATION(S): 213 SOLUTION TOPICAL at 05:44

## 2024-04-07 RX ADMIN — HEPARIN SODIUM 5000 UNIT(S): 5000 INJECTION INTRAVENOUS; SUBCUTANEOUS at 13:29

## 2024-04-07 RX ADMIN — OXYCODONE HYDROCHLORIDE 10 MILLIGRAM(S): 5 TABLET ORAL at 04:40

## 2024-04-07 RX ADMIN — Medication 10 MILLIGRAM(S): at 13:43

## 2024-04-07 RX ADMIN — METHADONE HYDROCHLORIDE 190 MILLIGRAM(S): 40 TABLET ORAL at 11:42

## 2024-04-07 RX ADMIN — OXYCODONE HYDROCHLORIDE 10 MILLIGRAM(S): 5 TABLET ORAL at 13:43

## 2024-04-07 RX ADMIN — HEPARIN SODIUM 5000 UNIT(S): 5000 INJECTION INTRAVENOUS; SUBCUTANEOUS at 05:25

## 2024-04-07 RX ADMIN — Medication 1000 MILLIGRAM(S): at 05:25

## 2024-04-07 NOTE — PROGRESS NOTE ADULT - ASSESSMENT
68y M with PMHx of methadone use (for unknown pain disorder) and PSHx of R inguinal hernia repair open (20+yrs ago) and R upper arm shrapnel removal (40yrs ago) presented to [facility] with 5d hx of worsening abdominal pain, 1 episode of bloody BM at onset of symptoms, and constipation. 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  s/p exlap, R-hemicolectomy, side to side stapled anastomosis postop abdominal XR shows dilated loops of bowel consistent with ileus  OOTB to chair   NPO  Encourage ambulation ; Encourage incentive spirometry - patient reports issues using IS due to presence of TMJ and history of jaw surgery  s/p PICC on 4/5 for TPN    #methadone dependance   restarted on home dose - and started on oxy for pain control   pain management per primary (likely will need to increase Oxy given significant tolerance)    #hypomagnesemia   replete as needed    #HCV  - is HCV positive (both RNA and antigen)  - CT scan showed small amount of perihepatic ascites, but no comment on cirrhosis.    - should have outpatient follow up    #thrombocytopenia   plts in 80's- 100s lateral   will ctm   dvt ppx per primary team.  Monitor for bleeding    #Severe protein calorie malnutrition  - appreciate input from nutrition  - plan as above     #Hypertension  - has been having intermittently elevated systolic blood pressures  - not on any medications outpatient  - if tolerating PO start NifedipineXL 30 mg daily    #acute blood loss anemia   Hgb downtrended from time of admission  Hemoglobin: 11.7 on admission   Partially attributable to operative blood losses   continue to trend daily CBC, keep active type and screen    #dvt ppx sqh

## 2024-04-07 NOTE — PROGRESS NOTE ADULT - SUBJECTIVE AND OBJECTIVE BOX
Patient is a 68y old  Male who presents with a chief complaint of Abdominal Pain (07 Apr 2024 07:57)      SUBJECTIVE / OVERNIGHT EVENTS: Patient seen and examined at bedside. No acute events overnight. Ambulating with walker. Tolerating some liquids and pills. +flatus, no BM. Tolerating TPN.    MEDICATIONS  (STANDING):  acetaminophen     Tablet .. 1000 milliGRAM(s) Oral every 6 hours  chlorhexidine 2% Cloths 1 Application(s) Topical <User Schedule>  gabapentin 300 milliGRAM(s) Oral every 8 hours  heparin   Injectable 5000 Unit(s) SubCutaneous every 8 hours  influenza  Vaccine (HIGH DOSE) 0.7 milliLiter(s) IntraMuscular once  lipid, fat emulsion (Fish Oil and Plant Based) 20% Infusion 0.7874 Gm/kG/Day (20.8 mL/Hr) IV Continuous <Continuous>  methadone  Concentrate 190 milliGRAM(s) Oral daily  Parenteral Nutrition - Adult 1 Each (63 mL/Hr) TPN Continuous <Continuous>  Parenteral Nutrition - Adult 1 Each (63 mL/Hr) TPN Continuous <Continuous>    MEDICATIONS  (PRN):  diazepam    Tablet 5 milliGRAM(s) Oral daily PRN muscle spasms  labetalol Injectable 10 milliGRAM(s) IV Push every 6 hours PRN Diastolic blood pressure > 100  lidocaine   4% Patch 1 Patch Transdermal once PRN back pain  naloxone Injectable 0.1 milliGRAM(s) IV Push every 3 minutes PRN For ANY of the following changes in patient status:  A. RR LESS THAN 10 breaths per minute, B. Oxygen saturation LESS THAN 90%, C. Sedation score of 6  ondansetron Injectable 4 milliGRAM(s) IV Push every 6 hours PRN Nausea and/or Vomiting  oxyCODONE    Solution 10 milliGRAM(s) Oral every 6 hours PRN Severe Pain (7 - 10)  oxyCODONE    Solution 5 milliGRAM(s) Oral every 6 hours PRN Moderate Pain (4 - 6)  sodium chloride 0.9% lock flush 10 milliLiter(s) IV Push every 1 hour PRN Pre/post blood products, medications, blood draw, and to maintain line patency      CAPILLARY BLOOD GLUCOSE      POCT Blood Glucose.: 109 mg/dL (07 Apr 2024 11:06)    I&O's Summary    06 Apr 2024 07:01  -  07 Apr 2024 07:00  --------------------------------------------------------  IN: 2999.8 mL / OUT: 4350 mL / NET: -1350.2 mL    07 Apr 2024 07:01  -  07 Apr 2024 11:26  --------------------------------------------------------  IN: 452 mL / OUT: 300 mL / NET: 152 mL        PHYSICAL EXAM:  Vital Signs Last 24 Hrs  T(C): 36.4 (07 Apr 2024 08:30), Max: 37.6 (07 Apr 2024 05:35)  T(F): 97.5 (07 Apr 2024 08:30), Max: 99.6 (07 Apr 2024 05:35)  HR: 73 (07 Apr 2024 08:30) (68 - 90)  BP: 158/88 (07 Apr 2024 08:30) (152/83 - 174/91)  BP(mean): --  RR: 18 (07 Apr 2024 08:30) (16 - 18)  SpO2: 98% (07 Apr 2024 08:30) (94% - 98%)    Parameters below as of 07 Apr 2024 08:30  Patient On (Oxygen Delivery Method): room air        GEN: male in NAD, appears comfortable, no diaphoresis  EYES: No scleral injection, EOMI  ENTM: neck supple & symmetric without tracheal deviation, moist membranes, no gross hearing impairment, thyroid gland not enlarged  CV: +S1/S2, no m/r/g, no abdominal bruit, no LE edema  RESP: breathing comfortably, no respiratory accessory muscle use, CTAB, no w/r/r  GI: normoactive BS, soft, NTND, no rebounding/guarding, no palpable masses    LABS:                      RADIOLOGY & ADDITIONAL TESTS:  Results Reviewed:   Imaging Personally Reviewed:  Electrocardiogram Personally Reviewed:    COORDINATION OF CARE:  Care Discussed with Consultants/Other Providers [Y/N]:  Prior or Outpatient Records Reviewed [Y/N]:

## 2024-04-07 NOTE — PROGRESS NOTE ADULT - SUBJECTIVE AND OBJECTIVE BOX
ON  due to pain, resolved with pain meds, +flatus    4/6: TPN ordered, ambulating in [address] x 2, labetatol x1 for SBP >170, SBP >100, +F    POST-OP DAY: X s/p      SUBJECTIVE: Patient seen and examined bedside by Surgical resident.    heparin   Injectable 5000 Unit(s) SubCutaneous every 8 hours  labetalol Injectable 10 milliGRAM(s) IV Push every 6 hours PRN    MEDICATIONS  (PRN):  diazepam    Tablet 5 milliGRAM(s) Oral daily PRN muscle spasms  labetalol Injectable 10 milliGRAM(s) IV Push every 6 hours PRN Diastolic blood pressure > 100  lidocaine   4% Patch 1 Patch Transdermal once PRN back pain  naloxone Injectable 0.1 milliGRAM(s) IV Push every 3 minutes PRN For ANY of the following changes in patient status:  A. RR LESS THAN 10 breaths per minute, B. Oxygen saturation LESS THAN 90%, C. Sedation score of 6  ondansetron Injectable 4 milliGRAM(s) IV Push every 6 hours PRN Nausea and/or Vomiting  oxyCODONE    Solution 10 milliGRAM(s) Oral every 6 hours PRN Severe Pain (7 - 10)  oxyCODONE    Solution 5 milliGRAM(s) Oral every 6 hours PRN Moderate Pain (4 - 6)  sodium chloride 0.9% lock flush 10 milliLiter(s) IV Push every 1 hour PRN Pre/post blood products, medications, blood draw, and to maintain line patency      I&O's Detail    06 Apr 2024 07:01  -  07 Apr 2024 07:00  --------------------------------------------------------  IN:    dextrose 5% + sodium chloride 0.9%: 1700 mL    Fat Emulsion (Fish Oil &amp; Plant Based) 20% Infusion: 228.8 mL    TPN (Total Parenteral Nutrition): 1071 mL  Total IN: 2999.8 mL    OUT:    Voided (mL): 4350 mL  Total OUT: 4350 mL    Total NET: -1350.2 mL          Vital Signs Last 24 Hrs  T(C): 37.6 (07 Apr 2024 05:35), Max: 37.6 (07 Apr 2024 05:35)  T(F): 99.6 (07 Apr 2024 05:35), Max: 99.6 (07 Apr 2024 05:35)  HR: 76 (07 Apr 2024 05:35) (68 - 90)  BP: 155/93 (07 Apr 2024 05:35) (152/83 - 174/91)  BP(mean): --  RR: 16 (07 Apr 2024 05:35) (16 - 18)  SpO2: 95% (07 Apr 2024 05:35) (94% - 98%)    Parameters below as of 07 Apr 2024 05:35  Patient On (Oxygen Delivery Method): room air        General: NAD, resting comfortably in bed  C/V: NSR  Pulm: Nonlabored breathing, no respiratory distress  Abd: soft, NT/ND  Extrem: WWP, no edema, SCDs in place    LABS:                RADIOLOGY & ADDITIONAL STUDIES:     ON  due to pain, resolved with pain meds, +flatus    4/6: TPN ordered, ambulating in [address] x 2, labetatol x1 for SBP >170, SBP >100, +F    POST-OP DAY: 8 s/p exlap, R-hemicolectomy, side to side stapled anastamosis (3/30).     SUBJECTIVE: Patient seen and examined bedside by Surgical resident. Resting comfortably in bed this am states that the pain is well controlled at this time, -n/v, +F/-bms, some OOBA yesterday but will get up and do more in the halls today.     heparin   Injectable 5000 Unit(s) SubCutaneous every 8 hours  labetalol Injectable 10 milliGRAM(s) IV Push every 6 hours PRN    MEDICATIONS  (PRN):  diazepam    Tablet 5 milliGRAM(s) Oral daily PRN muscle spasms  labetalol Injectable 10 milliGRAM(s) IV Push every 6 hours PRN Diastolic blood pressure > 100  lidocaine   4% Patch 1 Patch Transdermal once PRN back pain  naloxone Injectable 0.1 milliGRAM(s) IV Push every 3 minutes PRN For ANY of the following changes in patient status:  A. RR LESS THAN 10 breaths per minute, B. Oxygen saturation LESS THAN 90%, C. Sedation score of 6  ondansetron Injectable 4 milliGRAM(s) IV Push every 6 hours PRN Nausea and/or Vomiting  oxyCODONE    Solution 10 milliGRAM(s) Oral every 6 hours PRN Severe Pain (7 - 10)  oxyCODONE    Solution 5 milliGRAM(s) Oral every 6 hours PRN Moderate Pain (4 - 6)  sodium chloride 0.9% lock flush 10 milliLiter(s) IV Push every 1 hour PRN Pre/post blood products, medications, blood draw, and to maintain line patency      I&O's Detail    06 Apr 2024 07:01  -  07 Apr 2024 07:00  --------------------------------------------------------  IN:    dextrose 5% + sodium chloride 0.9%: 1700 mL    Fat Emulsion (Fish Oil &amp; Plant Based) 20% Infusion: 228.8 mL    TPN (Total Parenteral Nutrition): 1071 mL  Total IN: 2999.8 mL    OUT:    Voided (mL): 4350 mL  Total OUT: 4350 mL    Total NET: -1350.2 mL          Vital Signs Last 24 Hrs  T(C): 37.6 (07 Apr 2024 05:35), Max: 37.6 (07 Apr 2024 05:35)  T(F): 99.6 (07 Apr 2024 05:35), Max: 99.6 (07 Apr 2024 05:35)  HR: 76 (07 Apr 2024 05:35) (68 - 90)  BP: 155/93 (07 Apr 2024 05:35) (152/83 - 174/91)  BP(mean): --  RR: 16 (07 Apr 2024 05:35) (16 - 18)  SpO2: 95% (07 Apr 2024 05:35) (94% - 98%)    Parameters below as of 07 Apr 2024 05:35  Patient On (Oxygen Delivery Method): room air        Physical Exam:  General: NAD, resting comfortably in bed  Pulmonary: Nonlabored breathing, no respiratory distress  Cardiovascular: NSR  Abdominal: soft, NT, nondistended, midline incision healing appropriately, c/d/i  Extremities: WWP, normal strength  Neuro: A/O x 3, CNs II-XII grossly intact,    LABS:                RADIOLOGY & ADDITIONAL STUDIES:

## 2024-04-07 NOTE — PROGRESS NOTE ADULT - ASSESSMENT
68y M with PMHx of methadone use (for opioid use) and PSHx of R inguinal hernia repair open (20+yrs ago) and R upper arm shrapnel removal (40yrs ago) presented to [facility] with 5d hx of worsening abdominal pain, 1 episode of bloody BM at onset of symptoms, and constipation (last flatus and BM 4d ago). Upon presentation patient was febrile 100.4 (rectal), tachycardic 104, and hypertensive 157/104. 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 Patient was transferred to [facility] surgery for further management of cecal volvulous. Pt is now s/p exlap, R-hemicolectomy, side to side stapled anastamosis (3/30).    NPO/IVF  SQH/SCD/OOB  methadone  [facility]  dispo; SHERINE 68y M with PMHx of methadone use (for opioid use) and PSHx of R inguinal hernia repair open (20+yrs ago) and R upper arm shrapnel removal (40yrs ago) presented to [facility] with 5d hx of worsening abdominal pain, 1 episode of bloody BM at onset of symptoms, and constipation (last flatus and BM 4d ago). Upon presentation patient was febrile 100.4 (rectal), tachycardic 104, and hypertensive 157/104. 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 Patient was transferred to [facility] surgery for further management of cecal volvulous. Pt is now s/p exlap, R-hemicolectomy, side to side stapled anastamosis (3/30).    NPO/IVF/ TPN  SQH/SCD/OOB  methadone  [facility]  dispo; SHERINE

## 2024-04-08 LAB
ANION GAP SERPL CALC-SCNC: 6 MMOL/L — SIGNIFICANT CHANGE UP (ref 5–17)
BUN SERPL-MCNC: 11 MG/DL — SIGNIFICANT CHANGE UP (ref 7–23)
CALCIUM SERPL-MCNC: 8.9 MG/DL — SIGNIFICANT CHANGE UP (ref 8.4–10.5)
CHLORIDE SERPL-SCNC: 100 MMOL/L — SIGNIFICANT CHANGE UP (ref 96–108)
CHOLEST SERPL-MCNC: 77 MG/DL — SIGNIFICANT CHANGE UP
CO2 SERPL-SCNC: 32 MMOL/L — HIGH (ref 22–31)
CREAT SERPL-MCNC: 0.67 MG/DL — SIGNIFICANT CHANGE UP (ref 0.5–1.3)
EGFR: 100 ML/MIN/1.73M2 — SIGNIFICANT CHANGE UP
GLUCOSE BLDC GLUCOMTR-MCNC: 102 MG/DL — HIGH (ref 70–99)
GLUCOSE BLDC GLUCOMTR-MCNC: 132 MG/DL — HIGH (ref 70–99)
GLUCOSE BLDC GLUCOMTR-MCNC: 96 MG/DL — SIGNIFICANT CHANGE UP (ref 70–99)
GLUCOSE SERPL-MCNC: 95 MG/DL — SIGNIFICANT CHANGE UP (ref 70–99)
HCT VFR BLD CALC: 28.9 % — LOW (ref 39–50)
HDLC SERPL-MCNC: 44 MG/DL — SIGNIFICANT CHANGE UP
HGB BLD-MCNC: 10 G/DL — LOW (ref 13–17)
LIPID PNL WITH DIRECT LDL SERPL: 18 MG/DL — SIGNIFICANT CHANGE UP
MAGNESIUM SERPL-MCNC: 2 MG/DL — SIGNIFICANT CHANGE UP (ref 1.6–2.6)
MCHC RBC-ENTMCNC: 31.7 PG — SIGNIFICANT CHANGE UP (ref 27–34)
MCHC RBC-ENTMCNC: 34.6 GM/DL — SIGNIFICANT CHANGE UP (ref 32–36)
MCV RBC AUTO: 91.7 FL — SIGNIFICANT CHANGE UP (ref 80–100)
NON HDL CHOLESTEROL: 33 MG/DL — SIGNIFICANT CHANGE UP
NRBC # BLD: 0 /100 WBCS — SIGNIFICANT CHANGE UP (ref 0–0)
PHOSPHATE SERPL-MCNC: 3.1 MG/DL — SIGNIFICANT CHANGE UP (ref 2.5–4.5)
PLATELET # BLD AUTO: 120 K/UL — LOW (ref 150–400)
POTASSIUM SERPL-MCNC: 3.6 MMOL/L — SIGNIFICANT CHANGE UP (ref 3.5–5.3)
POTASSIUM SERPL-SCNC: 3.6 MMOL/L — SIGNIFICANT CHANGE UP (ref 3.5–5.3)
RBC # BLD: 3.15 M/UL — LOW (ref 4.2–5.8)
RBC # FLD: 14.8 % — HIGH (ref 10.3–14.5)
SODIUM SERPL-SCNC: 138 MMOL/L — SIGNIFICANT CHANGE UP (ref 135–145)
TRIGL SERPL-MCNC: 67 MG/DL — SIGNIFICANT CHANGE UP
WBC # BLD: 3.83 K/UL — SIGNIFICANT CHANGE UP (ref 3.8–10.5)
WBC # FLD AUTO: 3.83 K/UL — SIGNIFICANT CHANGE UP (ref 3.8–10.5)

## 2024-04-08 PROCEDURE — 74019 RADEX ABDOMEN 2 VIEWS: CPT | Mod: 26

## 2024-04-08 PROCEDURE — 99233 SBSQ HOSP IP/OBS HIGH 50: CPT

## 2024-04-08 RX ORDER — OXYCODONE HYDROCHLORIDE 5 MG/1
10 TABLET ORAL ONCE
Refills: 0 | Status: DISCONTINUED | OUTPATIENT
Start: 2024-04-08 | End: 2024-04-08

## 2024-04-08 RX ORDER — I.V. FAT EMULSION 20 G/100ML
0.8 EMULSION INTRAVENOUS
Qty: 50 | Refills: 0 | Status: DISCONTINUED | OUTPATIENT
Start: 2024-04-08 | End: 2024-04-08

## 2024-04-08 RX ORDER — ELECTROLYTE SOLUTION,INJ
1 VIAL (ML) INTRAVENOUS
Refills: 0 | Status: DISCONTINUED | OUTPATIENT
Start: 2024-04-08 | End: 2024-04-08

## 2024-04-08 RX ORDER — ACETAMINOPHEN 500 MG
1000 TABLET ORAL ONCE
Refills: 0 | Status: DISCONTINUED | OUTPATIENT
Start: 2024-04-08 | End: 2024-04-08

## 2024-04-08 RX ADMIN — Medication 1000 MILLIGRAM(S): at 05:27

## 2024-04-08 RX ADMIN — GABAPENTIN 300 MILLIGRAM(S): 400 CAPSULE ORAL at 13:34

## 2024-04-08 RX ADMIN — OXYCODONE HYDROCHLORIDE 10 MILLIGRAM(S): 5 TABLET ORAL at 00:52

## 2024-04-08 RX ADMIN — Medication 1000 MILLIGRAM(S): at 11:36

## 2024-04-08 RX ADMIN — LIDOCAINE 1 PATCH: 4 CREAM TOPICAL at 20:39

## 2024-04-08 RX ADMIN — HEPARIN SODIUM 5000 UNIT(S): 5000 INJECTION INTRAVENOUS; SUBCUTANEOUS at 05:27

## 2024-04-08 RX ADMIN — Medication 5 MILLIGRAM(S): at 11:35

## 2024-04-08 RX ADMIN — OXYCODONE HYDROCHLORIDE 10 MILLIGRAM(S): 5 TABLET ORAL at 00:12

## 2024-04-08 RX ADMIN — Medication 1000 MILLIGRAM(S): at 00:06

## 2024-04-08 RX ADMIN — Medication 10 MILLIGRAM(S): at 11:36

## 2024-04-08 RX ADMIN — GABAPENTIN 300 MILLIGRAM(S): 400 CAPSULE ORAL at 05:27

## 2024-04-08 RX ADMIN — METHADONE HYDROCHLORIDE 190 MILLIGRAM(S): 40 TABLET ORAL at 13:34

## 2024-04-08 RX ADMIN — Medication 1 EACH: at 18:35

## 2024-04-08 RX ADMIN — GABAPENTIN 300 MILLIGRAM(S): 400 CAPSULE ORAL at 21:57

## 2024-04-08 RX ADMIN — CHLORHEXIDINE GLUCONATE 1 APPLICATION(S): 213 SOLUTION TOPICAL at 05:37

## 2024-04-08 RX ADMIN — HEPARIN SODIUM 5000 UNIT(S): 5000 INJECTION INTRAVENOUS; SUBCUTANEOUS at 13:34

## 2024-04-08 RX ADMIN — I.V. FAT EMULSION 20.83 GM/KG/DAY: 20 EMULSION INTRAVENOUS at 18:36

## 2024-04-08 RX ADMIN — HEPARIN SODIUM 5000 UNIT(S): 5000 INJECTION INTRAVENOUS; SUBCUTANEOUS at 21:57

## 2024-04-08 NOTE — PROGRESS NOTE ADULT - SUBJECTIVE AND OBJECTIVE BOX
Patient is a 68y old  Male who presents with a chief complaint of Abdominal Pain (08 Apr 2024 06:14)      INTERVAL HPI/OVERNIGHT EVENTS: offers no new complaints; current symptoms resolving    MEDICATIONS  (STANDING):  acetaminophen     Tablet .. 1000 milliGRAM(s) Oral every 6 hours  chlorhexidine 2% Cloths 1 Application(s) Topical <User Schedule>  gabapentin 300 milliGRAM(s) Oral every 8 hours  heparin   Injectable 5000 Unit(s) SubCutaneous every 8 hours  influenza  Vaccine (HIGH DOSE) 0.7 milliLiter(s) IntraMuscular once  methadone  Concentrate 190 milliGRAM(s) Oral daily  Parenteral Nutrition - Adult 1 Each (63 mL/Hr) TPN Continuous <Continuous>    MEDICATIONS  (PRN):  labetalol Injectable 10 milliGRAM(s) IV Push every 6 hours PRN Diastolic blood pressure > 100  lidocaine   4% Patch 1 Patch Transdermal once PRN back pain  naloxone Injectable 0.1 milliGRAM(s) IV Push every 3 minutes PRN For ANY of the following changes in patient status:  A. RR LESS THAN 10 breaths per minute, B. Oxygen saturation LESS THAN 90%, C. Sedation score of 6  ondansetron Injectable 4 milliGRAM(s) IV Push every 6 hours PRN Nausea and/or Vomiting  sodium chloride 0.9% lock flush 10 milliLiter(s) IV Push every 1 hour PRN Pre/post blood products, medications, blood draw, and to maintain line patency      __________________________________________________  REVIEW OF SYSTEMS:    CONSTITUTIONAL: No fever,   EYES: no acute visual disturbances  NECK: No pain or stiffness  RESPIRATORY: No cough; No shortness of breath  CARDIOVASCULAR: No chest pain, no palpitations  GASTROINTESTINAL: + pain. No nausea or vomiting; No BM , + flatus   NEUROLOGICAL: No headache or numbness, no tremors  MUSCULOSKELETAL: No joint pain, no muscle pain  GENITOURINARY: no dysuria, no frequency, no hesitancy  PSYCHIATRY: no depression , no anxiety  ALL OTHER  ROS negative        Vital Signs Last 24 Hrs  T(C): 36.7 (08 Apr 2024 08:22), Max: 37 (08 Apr 2024 00:05)  T(F): 98 (08 Apr 2024 08:22), Max: 98.6 (08 Apr 2024 00:05)  HR: 78 (08 Apr 2024 08:22) (64 - 78)  BP: 139/94 (08 Apr 2024 08:22) (139/94 - 180/112)  BP(mean): --  RR: 19 (08 Apr 2024 08:22) (16 - 19)  SpO2: 97% (08 Apr 2024 08:22) (95% - 100%)    Parameters below as of 08 Apr 2024 08:22  Patient On (Oxygen Delivery Method): room air        ________________________________________________  PHYSICAL EXAM:  GENERAL: NAD  HEENT: Normocephalic;  conjunctivae and sclerae clear; moist mucous membranes;   NECK : supple  CHEST/LUNG: Clear to auscultation bilaterally with good air entry   HEART: S1 S2  regular; no murmurs, gallops or rubs  ABDOMEN: Soft, mildly tender , + distended; incision clean and dry covered in binder   EXTREMITIES: no cyanosis; no edema; no calf tenderness  SKIN: warm and dry; no rash  NERVOUS SYSTEM:  Awake and alert; Oriented  to place, person and time ; no new deficits    _________________________________________________  _________________________________________________  LABS:                        10.0   3.83  )-----------( 120      ( 08 Apr 2024 05:30 )             28.9     04-08    138  |  100  |  11  ----------------------------<  95  3.6   |  32<H>  |  0.67    Ca    8.9      08 Apr 2024 05:30  Phos  3.1     04-08  Mg     2.0     04-08        Urinalysis Basic - ( 08 Apr 2024 05:30 )    Color: x / Appearance: x / SG: x / pH: x  Gluc: 95 mg/dL / Ketone: x  / Bili: x / Urobili: x   Blood: x / Protein: x / Nitrite: x   Leuk Esterase: x / RBC: x / WBC x   Sq Epi: x / Non Sq Epi: x / Bacteria: x      CAPILLARY BLOOD GLUCOSE      POCT Blood Glucose.: 132 mg/dL (08 Apr 2024 07:31)        RADIOLOGY & ADDITIONAL TESTS:      Plan of care was discussed with patient and /or primary care giver; all questions and concerns were addressed and care was aligned with patient's wishes.

## 2024-04-08 NOTE — PROGRESS NOTE ADULT - ASSESSMENT
68y M with PMHx of methadone use (for unknown pain disorder) and PSHx of R inguinal hernia repair open (20+yrs ago) and R upper arm shrapnel removal (40yrs ago) presented to [facility] with 5d hx of worsening abdominal pain, 1 episode of bloody BM at onset of symptoms, and constipation. 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  s/p exlap, R-hemicolectomy, side to side stapled anastomosis postop abdominal XR shows dilated loops of bowel consistent with ileus  OOTB to chair   Encourage ambulation ; Encourage incentive spirometry - patient reports issues using IS due to presence of TMJ and history of jaw surgery  s/p PICC on 4/5  and started on TPN  pending abd xray -- diet will be advanced per primary recs     #methadone dependance   restarted on home dose - and started on oxy for pain control   pain management per primary (likely will need to increase Oxy given significant tolerance)    #hypomagnesemia   replete as needed    #HCV  - is HCV positive (both RNA and antigen)  - CT scan showed small amount of perihepatic ascites, but no comment on cirrhosis.    - should have outpatient follow up    #thrombocytopenia   plts in 80's- 120s lateral   will ctm   dvt ppx per primary team.  Monitor for bleeding    #Severe protein calorie malnutrition  - appreciate input from nutrition  - plan as above     #Hypertension  - has been having intermittently elevated systolic blood pressures  - not on any medications outpatient  - if tolerating PO start NifedipineXL 30 mg daily    #acute blood loss anemia   Hgb downtrended from time of admission  Hemoglobin: 11.7 on admission -- hgb 10 today   Partially attributable to operative blood losses   continue to trend daily CBC, keep active type and screen    #dvt ppx sqh

## 2024-04-08 NOTE — PROGRESS NOTE ADULT - SUBJECTIVE AND OBJECTIVE BOX
STATUS POST:  3/30: exlap, R antoni, side to side stapled anastomosis, penrose x1 midline , IVF     ON: SERENA, VS WNL, -N/-V, +F/+BM     SUBJECTIVE: Patient seen and examined bedside by chief resident. Tolerating diet, =-n/-v/-f/-bm. Denies sob/dizziness/cp    heparin   Injectable 5000 Unit(s) SubCutaneous every 8 hours  labetalol Injectable 10 milliGRAM(s) IV Push every 6 hours PRN      Vital Signs Last 24 Hrs  T(C): 36.7 (08 Apr 2024 05:30), Max: 37 (08 Apr 2024 00:05)  T(F): 98 (08 Apr 2024 05:30), Max: 98.6 (08 Apr 2024 00:05)  HR: 73 (08 Apr 2024 05:30) (64 - 75)  BP: 162/85 (08 Apr 2024 05:30) (144/84 - 180/112)  BP(mean): --  RR: 18 (08 Apr 2024 05:30) (16 - 18)  SpO2: 96% (08 Apr 2024 05:30) (95% - 100%)    Parameters below as of 08 Apr 2024 05:30  Patient On (Oxygen Delivery Method): room air      I&O's Detail    06 Apr 2024 07:01  -  07 Apr 2024 07:00  --------------------------------------------------------  IN:    dextrose 5% + sodium chloride 0.9%: 1700 mL    Fat Emulsion (Fish Oil &amp; Plant Based) 20% Infusion: 228.8 mL    TPN (Total Parenteral Nutrition): 1071 mL  Total IN: 2999.8 mL    OUT:    Voided (mL): 4350 mL  Total OUT: 4350 mL    Total NET: -1350.2 mL      07 Apr 2024 07:01  -  08 Apr 2024 06:14  --------------------------------------------------------  IN:    dextrose 5% + sodium chloride 0.9%: 200 mL    Fat Emulsion (Fish Oil &amp; Plant Based) 20% Infusion: 270.4 mL    TPN (Total Parenteral Nutrition): 1575 mL  Total IN: 2045.4 mL    OUT:    Voided (mL): 3050 mL  Total OUT: 3050 mL    Total NET: -1004.6 mL          General: NAD, resting comfortably in bed  C/V: NSR  Pulm: Nonlabored breathing, no respiratory distress  Abd: soft, NT/ND. No rebound or guarding  Incisions; c/d/i  Extrem: WWP, no edema, SCDs in place        LABS:                RADIOLOGY & ADDITIONAL STUDIES:   STATUS POST:  3/30: exlap, R antoni, side to side stapled anastomosis, penrose x1 midline , IVF     ON: SERENA, VS WNL, -N/-V, +F/+BM     SUBJECTIVE: Patient seen and examined bedside by chief resident. Tolerating diet, =-n/-v/-f/-bm. Denies sob/dizziness/cp    heparin   Injectable 5000 Unit(s) SubCutaneous every 8 hours  labetalol Injectable 10 milliGRAM(s) IV Push every 6 hours PRN      Vital Signs Last 24 Hrs  T(C): 36.7 (08 Apr 2024 05:30), Max: 37 (08 Apr 2024 00:05)  T(F): 98 (08 Apr 2024 05:30), Max: 98.6 (08 Apr 2024 00:05)  HR: 73 (08 Apr 2024 05:30) (64 - 75)  BP: 162/85 (08 Apr 2024 05:30) (144/84 - 180/112)  BP(mean): --  RR: 18 (08 Apr 2024 05:30) (16 - 18)  SpO2: 96% (08 Apr 2024 05:30) (95% - 100%)    Parameters below as of 08 Apr 2024 05:30  Patient On (Oxygen Delivery Method): room air      I&O's Detail    06 Apr 2024 07:01  -  07 Apr 2024 07:00  --------------------------------------------------------  IN:    dextrose 5% + sodium chloride 0.9%: 1700 mL    Fat Emulsion (Fish Oil &amp; Plant Based) 20% Infusion: 228.8 mL    TPN (Total Parenteral Nutrition): 1071 mL  Total IN: 2999.8 mL    OUT:    Voided (mL): 4350 mL  Total OUT: 4350 mL    Total NET: -1350.2 mL      07 Apr 2024 07:01  -  08 Apr 2024 06:14  --------------------------------------------------------  IN:    dextrose 5% + sodium chloride 0.9%: 200 mL    Fat Emulsion (Fish Oil &amp; Plant Based) 20% Infusion: 270.4 mL    TPN (Total Parenteral Nutrition): 1575 mL  Total IN: 2045.4 mL    OUT:    Voided (mL): 3050 mL  Total OUT: 3050 mL    Total NET: -1004.6 mL          General: NAD, resting comfortably in bed  C/V: NSR  Pulm: Nonlabored breathing, no respiratory distress  Abd: soft, NT/mildly distended. No rebound or guarding  Incisions; c/d/i  Extrem: WWP, no edema, SCDs in place        LABS:                RADIOLOGY & ADDITIONAL STUDIES:

## 2024-04-08 NOTE — PROGRESS NOTE ADULT - ASSESSMENT
68y M with PMHx of methadone use (for opioid use) and PSHx of R inguinal hernia repair open (20+yrs ago) and R upper arm shrapnel removal (40yrs ago) presented to [facility] with 5d hx of worsening abdominal pain, 1 episode of bloody BM at onset of symptoms, and constipation (last flatus and BM 4d ago). Upon presentation patient was febrile 100.4 (rectal), tachycardic 104, and hypertensive 157/104. 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 Patient was transferred to [facility] surgery for further management of cecal volvulous. Pt is now s/p exlap, R-hemicolectomy, side to side stapled anastamosis (3/30).     TPN/NPO/IVF  SQH/SCD/OOB  methadone  [facility]  dispo; SHERINE 68y M with PMHx of methadone use (for opioid use) and PSHx of R inguinal hernia repair open (20+yrs ago) and R upper arm shrapnel removal (40yrs ago) presented to [facility] with 5d hx of worsening abdominal pain, 1 episode of bloody BM at onset of symptoms, and constipation (last flatus and BM 4d ago). Upon presentation patient was febrile 100.4 (rectal), tachycardic 104, and hypertensive 157/104. 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 Patient was transferred to [facility] surgery for further management of cecal volvulous. Pt is now s/p exlap, R-hemicolectomy, side to side stapled anastomosis (3/30).     TPN/NPO w. meds /IVF  SQH/SCD/OOB  KUB pending, if OK can give sips  methadone  [facility]  dispo; SHERINE

## 2024-04-09 LAB
ANION GAP SERPL CALC-SCNC: 5 MMOL/L — SIGNIFICANT CHANGE UP (ref 5–17)
BUN SERPL-MCNC: 15 MG/DL — SIGNIFICANT CHANGE UP (ref 7–23)
CALCIUM SERPL-MCNC: 9 MG/DL — SIGNIFICANT CHANGE UP (ref 8.4–10.5)
CHLORIDE SERPL-SCNC: 97 MMOL/L — SIGNIFICANT CHANGE UP (ref 96–108)
CO2 SERPL-SCNC: 31 MMOL/L — SIGNIFICANT CHANGE UP (ref 22–31)
CREAT SERPL-MCNC: 0.69 MG/DL — SIGNIFICANT CHANGE UP (ref 0.5–1.3)
EGFR: 100 ML/MIN/1.73M2 — SIGNIFICANT CHANGE UP
GLUCOSE BLDC GLUCOMTR-MCNC: 120 MG/DL — HIGH (ref 70–99)
GLUCOSE BLDC GLUCOMTR-MCNC: 85 MG/DL — SIGNIFICANT CHANGE UP (ref 70–99)
GLUCOSE SERPL-MCNC: 103 MG/DL — HIGH (ref 70–99)
MAGNESIUM SERPL-MCNC: 2 MG/DL — SIGNIFICANT CHANGE UP (ref 1.6–2.6)
PHOSPHATE SERPL-MCNC: 3.2 MG/DL — SIGNIFICANT CHANGE UP (ref 2.5–4.5)
POTASSIUM SERPL-MCNC: 3.9 MMOL/L — SIGNIFICANT CHANGE UP (ref 3.5–5.3)
POTASSIUM SERPL-SCNC: 3.9 MMOL/L — SIGNIFICANT CHANGE UP (ref 3.5–5.3)
SODIUM SERPL-SCNC: 133 MMOL/L — LOW (ref 135–145)

## 2024-04-09 PROCEDURE — 99233 SBSQ HOSP IP/OBS HIGH 50: CPT

## 2024-04-09 RX ORDER — NIFEDIPINE 30 MG
30 TABLET, EXTENDED RELEASE 24 HR ORAL DAILY
Refills: 0 | Status: DISCONTINUED | OUTPATIENT
Start: 2024-04-09 | End: 2024-04-10

## 2024-04-09 RX ORDER — I.V. FAT EMULSION 20 G/100ML
0.8 EMULSION INTRAVENOUS
Qty: 50 | Refills: 0 | Status: DISCONTINUED | OUTPATIENT
Start: 2024-04-09 | End: 2024-04-09

## 2024-04-09 RX ORDER — METHADONE HYDROCHLORIDE 40 MG/1
190 TABLET ORAL DAILY
Refills: 0 | Status: DISCONTINUED | OUTPATIENT
Start: 2024-04-09 | End: 2024-04-16

## 2024-04-09 RX ORDER — ELECTROLYTE SOLUTION,INJ
1 VIAL (ML) INTRAVENOUS
Refills: 0 | Status: DISCONTINUED | OUTPATIENT
Start: 2024-04-09 | End: 2024-04-10

## 2024-04-09 RX ORDER — SODIUM CHLORIDE 9 MG/ML
1000 INJECTION, SOLUTION INTRAVENOUS
Refills: 0 | Status: DISCONTINUED | OUTPATIENT
Start: 2024-04-09 | End: 2024-04-09

## 2024-04-09 RX ADMIN — Medication 1000 MILLIGRAM(S): at 12:42

## 2024-04-09 RX ADMIN — Medication 30 MILLIGRAM(S): at 12:41

## 2024-04-09 RX ADMIN — OXYCODONE HYDROCHLORIDE 10 MILLIGRAM(S): 5 TABLET ORAL at 00:57

## 2024-04-09 RX ADMIN — Medication 1000 MILLIGRAM(S): at 17:31

## 2024-04-09 RX ADMIN — HEPARIN SODIUM 5000 UNIT(S): 5000 INJECTION INTRAVENOUS; SUBCUTANEOUS at 05:15

## 2024-04-09 RX ADMIN — OXYCODONE HYDROCHLORIDE 10 MILLIGRAM(S): 5 TABLET ORAL at 00:01

## 2024-04-09 RX ADMIN — Medication 10 MILLIGRAM(S): at 07:47

## 2024-04-09 RX ADMIN — LIDOCAINE 1 PATCH: 4 CREAM TOPICAL at 06:01

## 2024-04-09 RX ADMIN — GABAPENTIN 300 MILLIGRAM(S): 400 CAPSULE ORAL at 22:01

## 2024-04-09 RX ADMIN — Medication 1000 MILLIGRAM(S): at 05:15

## 2024-04-09 RX ADMIN — HEPARIN SODIUM 5000 UNIT(S): 5000 INJECTION INTRAVENOUS; SUBCUTANEOUS at 13:18

## 2024-04-09 RX ADMIN — GABAPENTIN 300 MILLIGRAM(S): 400 CAPSULE ORAL at 13:18

## 2024-04-09 RX ADMIN — I.V. FAT EMULSION 20.83 GM/KG/DAY: 20 EMULSION INTRAVENOUS at 17:49

## 2024-04-09 RX ADMIN — Medication 1000 MILLIGRAM(S): at 23:13

## 2024-04-09 RX ADMIN — GABAPENTIN 300 MILLIGRAM(S): 400 CAPSULE ORAL at 05:15

## 2024-04-09 RX ADMIN — METHADONE HYDROCHLORIDE 190 MILLIGRAM(S): 40 TABLET ORAL at 12:49

## 2024-04-09 RX ADMIN — HEPARIN SODIUM 5000 UNIT(S): 5000 INJECTION INTRAVENOUS; SUBCUTANEOUS at 22:01

## 2024-04-09 RX ADMIN — Medication 1 EACH: at 17:48

## 2024-04-09 RX ADMIN — Medication 1000 MILLIGRAM(S): at 00:02

## 2024-04-09 RX ADMIN — CHLORHEXIDINE GLUCONATE 1 APPLICATION(S): 213 SOLUTION TOPICAL at 05:43

## 2024-04-09 NOTE — PROGRESS NOTE ADULT - SUBJECTIVE AND OBJECTIVE BOX
Overnight events:       POD#    SUBJECTIVE:      MEDICATIONS  (STANDING):  acetaminophen     Tablet .. 1000 milliGRAM(s) Oral every 6 hours  chlorhexidine 2% Cloths 1 Application(s) Topical <User Schedule>  gabapentin 300 milliGRAM(s) Oral every 8 hours  heparin   Injectable 5000 Unit(s) SubCutaneous every 8 hours  influenza  Vaccine (HIGH DOSE) 0.7 milliLiter(s) IntraMuscular once  lipid, fat emulsion (Fish Oil and Plant Based) 20% Infusion 0.8 Gm/kG/Day (20.83 mL/Hr) IV Continuous <Continuous>  methadone  Concentrate 190 milliGRAM(s) Oral daily  Parenteral Nutrition - Adult 1 Each (63 mL/Hr) TPN Continuous <Continuous>    MEDICATIONS  (PRN):  labetalol Injectable 10 milliGRAM(s) IV Push every 6 hours PRN Diastolic blood pressure > 100  naloxone Injectable 0.1 milliGRAM(s) IV Push every 3 minutes PRN For ANY of the following changes in patient status:  A. RR LESS THAN 10 breaths per minute, B. Oxygen saturation LESS THAN 90%, C. Sedation score of 6  ondansetron Injectable 4 milliGRAM(s) IV Push every 6 hours PRN Nausea and/or Vomiting  sodium chloride 0.9% lock flush 10 milliLiter(s) IV Push every 1 hour PRN Pre/post blood products, medications, blood draw, and to maintain line patency      Vital Signs Last 24 Hrs  T(C): 36.7 (09 Apr 2024 04:33), Max: 36.8 (08 Apr 2024 20:25)  T(F): 98 (09 Apr 2024 04:33), Max: 98.3 (08 Apr 2024 20:25)  HR: 63 (09 Apr 2024 04:33) (63 - 86)  BP: 144/95 (09 Apr 2024 04:33) (139/94 - 169/96)  BP(mean): --  RR: 16 (09 Apr 2024 04:33) (16 - 20)  SpO2: 94% (09 Apr 2024 04:33) (94% - 98%)    Parameters below as of 09 Apr 2024 04:33  Patient On (Oxygen Delivery Method): room air        Physical Exam:  General: NAD, resting comfortably in bed  Pulmonary: Nonlabored breathing, no respiratory distress  Cardiovascular: NSR  Abdominal: soft, NT/ND  Extremities: WWP, normal strength  Neuro: A/O x 3, CNs II-XII grossly intact, no focal deficits, normal motor/sensation  Pulses: palpable distal pulses    I&O's Summary    07 Apr 2024 07:01  -  08 Apr 2024 07:00  --------------------------------------------------------  IN: 2045.4 mL / OUT: 3050 mL / NET: -1004.6 mL    08 Apr 2024 07:01  -  09 Apr 2024 06:13  --------------------------------------------------------  IN: 1718.6 mL / OUT: 750 mL / NET: 968.6 mL        LABS:                        10.0   3.83  )-----------( 120      ( 08 Apr 2024 05:30 )             28.9     04-08    138  |  100  |  11  ----------------------------<  95  3.6   |  32<H>  |  0.67    Ca    8.9      08 Apr 2024 05:30  Phos  3.1     04-08  Mg     2.0     04-08        Urinalysis Basic - ( 08 Apr 2024 05:30 )    Color: x / Appearance: x / SG: x / pH: x  Gluc: 95 mg/dL / Ketone: x  / Bili: x / Urobili: x   Blood: x / Protein: x / Nitrite: x   Leuk Esterase: x / RBC: x / WBC x   Sq Epi: x / Non Sq Epi: x / Bacteria: x      CAPILLARY BLOOD GLUCOSE      POCT Blood Glucose.: 102 mg/dL (08 Apr 2024 21:35)  POCT Blood Glucose.: 96 mg/dL (08 Apr 2024 17:14)  POCT Blood Glucose.: 132 mg/dL (08 Apr 2024 07:31)        RADIOLOGY & ADDITIONAL STUDIES:   Overnight events: No acute overnight events.       POD#3/30: exlap, R antoni, side to side stapled anastomosis, penrose x1 midline     SUBJECTIVE: Patient seen at bedside with chief resident. Patient reports flatus. Denies nausea. Endorses ambulation       MEDICATIONS  (STANDING):  acetaminophen     Tablet .. 1000 milliGRAM(s) Oral every 6 hours  chlorhexidine 2% Cloths 1 Application(s) Topical <User Schedule>  gabapentin 300 milliGRAM(s) Oral every 8 hours  heparin   Injectable 5000 Unit(s) SubCutaneous every 8 hours  influenza  Vaccine (HIGH DOSE) 0.7 milliLiter(s) IntraMuscular once  lipid, fat emulsion (Fish Oil and Plant Based) 20% Infusion 0.8 Gm/kG/Day (20.83 mL/Hr) IV Continuous <Continuous>  methadone  Concentrate 190 milliGRAM(s) Oral daily  Parenteral Nutrition - Adult 1 Each (63 mL/Hr) TPN Continuous <Continuous>    MEDICATIONS  (PRN):  labetalol Injectable 10 milliGRAM(s) IV Push every 6 hours PRN Diastolic blood pressure > 100  naloxone Injectable 0.1 milliGRAM(s) IV Push every 3 minutes PRN For ANY of the following changes in patient status:  A. RR LESS THAN 10 breaths per minute, B. Oxygen saturation LESS THAN 90%, C. Sedation score of 6  ondansetron Injectable 4 milliGRAM(s) IV Push every 6 hours PRN Nausea and/or Vomiting  sodium chloride 0.9% lock flush 10 milliLiter(s) IV Push every 1 hour PRN Pre/post blood products, medications, blood draw, and to maintain line patency      Vital Signs Last 24 Hrs  T(C): 36.7 (09 Apr 2024 04:33), Max: 36.8 (08 Apr 2024 20:25)  T(F): 98 (09 Apr 2024 04:33), Max: 98.3 (08 Apr 2024 20:25)  HR: 63 (09 Apr 2024 04:33) (63 - 86)  BP: 144/95 (09 Apr 2024 04:33) (139/94 - 169/96)  BP(mean): --  RR: 16 (09 Apr 2024 04:33) (16 - 20)  SpO2: 94% (09 Apr 2024 04:33) (94% - 98%)    Parameters below as of 09 Apr 2024 04:33  Patient On (Oxygen Delivery Method): room air        Physical Exam:  General: NAD, resting comfortably in bed  Pulmonary: Nonlabored breathing, no respiratory distress  Cardiovascular: NSR  Abdominal: soft, NT, mild distended, improvement from yesterday  Extremities: WWP, normal strength  Neuro: A/O x 3, CNs II-XII grossly intact    I&O's Summary    07 Apr 2024 07:01  -  08 Apr 2024 07:00  --------------------------------------------------------  IN: 2045.4 mL / OUT: 3050 mL / NET: -1004.6 mL    08 Apr 2024 07:01  -  09 Apr 2024 06:13  --------------------------------------------------------  IN: 1718.6 mL / OUT: 750 mL / NET: 968.6 mL        LABS:                        10.0   3.83  )-----------( 120      ( 08 Apr 2024 05:30 )             28.9     04-08    138  |  100  |  11  ----------------------------<  95  3.6   |  32<H>  |  0.67    Ca    8.9      08 Apr 2024 05:30  Phos  3.1     04-08  Mg     2.0     04-08        Urinalysis Basic - ( 08 Apr 2024 05:30 )    Color: x / Appearance: x / SG: x / pH: x  Gluc: 95 mg/dL / Ketone: x  / Bili: x / Urobili: x   Blood: x / Protein: x / Nitrite: x   Leuk Esterase: x / RBC: x / WBC x   Sq Epi: x / Non Sq Epi: x / Bacteria: x      CAPILLARY BLOOD GLUCOSE      POCT Blood Glucose.: 102 mg/dL (08 Apr 2024 21:35)  POCT Blood Glucose.: 96 mg/dL (08 Apr 2024 17:14)  POCT Blood Glucose.: 132 mg/dL (08 Apr 2024 07:31)        RADIOLOGY & ADDITIONAL STUDIES:

## 2024-04-09 NOTE — PROGRESS NOTE ADULT - SUBJECTIVE AND OBJECTIVE BOX
Patient is a 68y old  Male who presents with a chief complaint of Abdominal Pain (09 Apr 2024 06:12)      INTERVAL HPI/OVERNIGHT EVENTS: offers no new complaints; current symptoms improving pain is 7/10    MEDICATIONS  (STANDING):  acetaminophen     Tablet .. 1000 milliGRAM(s) Oral every 6 hours  chlorhexidine 2% Cloths 1 Application(s) Topical <User Schedule>  gabapentin 300 milliGRAM(s) Oral every 8 hours  heparin   Injectable 5000 Unit(s) SubCutaneous every 8 hours  influenza  Vaccine (HIGH DOSE) 0.7 milliLiter(s) IntraMuscular once  lipid, fat emulsion (Fish Oil and Plant Based) 20% Infusion 0.8 Gm/kG/Day (20.83 mL/Hr) IV Continuous <Continuous>  methadone  Concentrate 190 milliGRAM(s) Oral daily  NIFEdipine XL 30 milliGRAM(s) Oral daily  Parenteral Nutrition - Adult 1 Each (63 mL/Hr) TPN Continuous <Continuous>  Parenteral Nutrition - Adult 1 Each (63 mL/Hr) TPN Continuous <Continuous>    MEDICATIONS  (PRN):  labetalol Injectable 10 milliGRAM(s) IV Push every 6 hours PRN Diastolic blood pressure > 100  naloxone Injectable 0.1 milliGRAM(s) IV Push every 3 minutes PRN For ANY of the following changes in patient status:  A. RR LESS THAN 10 breaths per minute, B. Oxygen saturation LESS THAN 90%, C. Sedation score of 6  ondansetron Injectable 4 milliGRAM(s) IV Push every 6 hours PRN Nausea and/or Vomiting  sodium chloride 0.9% lock flush 10 milliLiter(s) IV Push every 1 hour PRN Pre/post blood products, medications, blood draw, and to maintain line patency      __________________________________________________  REVIEW OF SYSTEMS:    CONSTITUTIONAL: No fever,   EYES: no acute visual disturbances  NECK: No pain or stiffness  RESPIRATORY: No cough; No shortness of breath  CARDIOVASCULAR: No chest pain, no palpitations  GASTROINTESTINAL: +pain. No nausea or vomiting; No diarrhea   NEUROLOGICAL: No headache or numbness, no tremors  MUSCULOSKELETAL: + back joint pain, no muscle pain  GENITOURINARY: no dysuria, no frequency, no hesitancy  PSYCHIATRY: no depression , no anxiety  ALL OTHER  ROS negative        Vital Signs Last 24 Hrs  T(C): 37 (09 Apr 2024 09:18), Max: 37 (09 Apr 2024 09:18)  T(F): 98.6 (09 Apr 2024 09:18), Max: 98.6 (09 Apr 2024 09:18)  HR: 78 (09 Apr 2024 09:18) (63 - 86)  BP: 152/89 (09 Apr 2024 09:18) (144/95 - 169/96)  BP(mean): --  RR: 18 (09 Apr 2024 09:18) (16 - 20)  SpO2: 98% (09 Apr 2024 09:18) (94% - 98%)    Parameters below as of 09 Apr 2024 09:18  Patient On (Oxygen Delivery Method): room air        ________________________________________________  PHYSICAL EXAM:  GENERAL: NAD  HEENT: Normocephalic;  conjunctivae and sclerae clear; moist mucous membranes;   NECK : supple  CHEST/LUNG: Clear to auscultation bilaterally with good air entry   HEART: S1 S2  regular; no murmurs, gallops or rubs  ABDOMEN: Soft, mildly tender, non distended; incision clean and dry covered in binder   EXTREMITIES: no cyanosis; no edema; no calf tenderness  SKIN: warm and dry; no rash  NERVOUS SYSTEM:  Awake and alert; Oriented  to place, person and time ; no new deficits      _________________________________________________  LABS:                        10.0   3.83  )-----------( 120      ( 08 Apr 2024 05:30 )             28.9     04-09    133<L>  |  97  |  15  ----------------------------<  103<H>  3.9   |  31  |  0.69    Ca    9.0      09 Apr 2024 05:30  Phos  3.2     04-09  Mg     2.0     04-09        Urinalysis Basic - ( 09 Apr 2024 05:30 )    Color: x / Appearance: x / SG: x / pH: x  Gluc: 103 mg/dL / Ketone: x  / Bili: x / Urobili: x   Blood: x / Protein: x / Nitrite: x   Leuk Esterase: x / RBC: x / WBC x   Sq Epi: x / Non Sq Epi: x / Bacteria: x      CAPILLARY BLOOD GLUCOSE      POCT Blood Glucose.: 120 mg/dL (09 Apr 2024 07:32)  POCT Blood Glucose.: 102 mg/dL (08 Apr 2024 21:35)  POCT Blood Glucose.: 96 mg/dL (08 Apr 2024 17:14)        RADIOLOGY & ADDITIONAL TESTS:      Plan of care was discussed with patient and /or primary care giver; all questions and concerns were addressed and care was aligned with patient's wishes.

## 2024-04-09 NOTE — PROGRESS NOTE ADULT - ASSESSMENT
68y M with PMHx of methadone use (for unknown pain disorder) and PSHx of R inguinal hernia repair open (20+yrs ago) and R upper arm shrapnel removal (40yrs ago) presented to [facility] with 5d hx of worsening abdominal pain, 1 episode of bloody BM at onset of symptoms, and constipation. 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  s/p exlap, R-hemicolectomy, side to side stapled anastomosis postop abdominal XR shows dilated loops of bowel consistent with ileus  OOTB to chair   Encourage ambulation ; Encourage incentive spirometry - patient reports issues using IS due to presence of TMJ and history of jaw surgery  s/p PICC on 4/5  and started on TPN  s/p abd xray 4/8 which shows improvement -- started on ice chips   diet will be advanced per primary recs     #methadone dependance   restarted on home dose - and started on oxy for pain control   pain management per primary (likely will need to increase Oxy given significant tolerance)    #hypomagnesemia   replete as needed    #HCV  is HCV positive (both RNA and antigen)  CT scan showed small amount of perihepatic ascites, but no comment on cirrhosis.    should have outpatient follow up    #thrombocytopenia   plts in 80's- 120s lateral   will ctm   dvt ppx per primary team.  Monitor for bleeding    #Severe protein calorie malnutrition  - appreciate input from nutrition  - plan as above     #Hypertension  - has been having intermittently elevated systolic blood pressures  - not on any medications outpatient  - started Nifedipine XL 30 mg daily 4/9    #acute blood loss anemia   Hgb downtrended from time of admission  Hemoglobin: 11.7 on admission -- hgb 10  4/9  Partially attributable to operative blood losses   continue to trend daily CBC, keep active type and screen    #dvt ppx sqh

## 2024-04-10 LAB — GLUCOSE BLDC GLUCOMTR-MCNC: 107 MG/DL — HIGH (ref 70–99)

## 2024-04-10 PROCEDURE — 99233 SBSQ HOSP IP/OBS HIGH 50: CPT

## 2024-04-10 RX ORDER — ELECTROLYTE SOLUTION,INJ
1 VIAL (ML) INTRAVENOUS
Refills: 0 | Status: DISCONTINUED | OUTPATIENT
Start: 2024-04-10 | End: 2024-04-10

## 2024-04-10 RX ORDER — NIFEDIPINE 30 MG
30 TABLET, EXTENDED RELEASE 24 HR ORAL DAILY
Refills: 0 | Status: DISCONTINUED | OUTPATIENT
Start: 2024-04-11 | End: 2024-04-19

## 2024-04-10 RX ORDER — I.V. FAT EMULSION 20 G/100ML
0.79 EMULSION INTRAVENOUS
Qty: 50 | Refills: 0 | Status: DISCONTINUED | OUTPATIENT
Start: 2024-04-10 | End: 2024-04-10

## 2024-04-10 RX ADMIN — Medication 1000 MILLIGRAM(S): at 11:55

## 2024-04-10 RX ADMIN — I.V. FAT EMULSION 20.8 GM/KG/DAY: 20 EMULSION INTRAVENOUS at 19:45

## 2024-04-10 RX ADMIN — GABAPENTIN 300 MILLIGRAM(S): 400 CAPSULE ORAL at 13:20

## 2024-04-10 RX ADMIN — HEPARIN SODIUM 5000 UNIT(S): 5000 INJECTION INTRAVENOUS; SUBCUTANEOUS at 13:20

## 2024-04-10 RX ADMIN — Medication 1000 MILLIGRAM(S): at 17:47

## 2024-04-10 RX ADMIN — HEPARIN SODIUM 5000 UNIT(S): 5000 INJECTION INTRAVENOUS; SUBCUTANEOUS at 21:35

## 2024-04-10 RX ADMIN — Medication 1 EACH: at 19:45

## 2024-04-10 RX ADMIN — METHADONE HYDROCHLORIDE 190 MILLIGRAM(S): 40 TABLET ORAL at 12:49

## 2024-04-10 RX ADMIN — Medication 30 MILLIGRAM(S): at 05:12

## 2024-04-10 RX ADMIN — Medication 1000 MILLIGRAM(S): at 05:12

## 2024-04-10 RX ADMIN — HEPARIN SODIUM 5000 UNIT(S): 5000 INJECTION INTRAVENOUS; SUBCUTANEOUS at 05:12

## 2024-04-10 RX ADMIN — CHLORHEXIDINE GLUCONATE 1 APPLICATION(S): 213 SOLUTION TOPICAL at 05:48

## 2024-04-10 RX ADMIN — Medication 1000 MILLIGRAM(S): at 23:54

## 2024-04-10 RX ADMIN — Medication 1000 MILLIGRAM(S): at 12:45

## 2024-04-10 RX ADMIN — GABAPENTIN 300 MILLIGRAM(S): 400 CAPSULE ORAL at 05:13

## 2024-04-10 RX ADMIN — Medication 1000 MILLIGRAM(S): at 00:10

## 2024-04-10 RX ADMIN — GABAPENTIN 300 MILLIGRAM(S): 400 CAPSULE ORAL at 21:35

## 2024-04-10 RX ADMIN — Medication 1000 MILLIGRAM(S): at 18:14

## 2024-04-10 RX ADMIN — Medication 1 EACH: at 10:05

## 2024-04-10 NOTE — PROGRESS NOTE ADULT - SUBJECTIVE AND OBJECTIVE BOX
Patient is a 68y old  Male who presents with a chief complaint of Abdominal Pain (10 Apr 2024 06:59)      INTERVAL HPI/OVERNIGHT EVENTS: offers no new complaints; current symptoms resolving    MEDICATIONS  (STANDING):  acetaminophen     Tablet .. 1000 milliGRAM(s) Oral every 6 hours  chlorhexidine 2% Cloths 1 Application(s) Topical <User Schedule>  gabapentin 300 milliGRAM(s) Oral every 8 hours  heparin   Injectable 5000 Unit(s) SubCutaneous every 8 hours  influenza  Vaccine (HIGH DOSE) 0.7 milliLiter(s) IntraMuscular once  lipid, fat emulsion (Fish Oil and Plant Based) 20% Infusion 0.7874 Gm/kG/Day (20.8 mL/Hr) IV Continuous <Continuous>  methadone  Concentrate 190 milliGRAM(s) Oral daily  Parenteral Nutrition - Adult 1 Each (63 mL/Hr) TPN Continuous <Continuous>  Parenteral Nutrition - Adult 1 Each (63 mL/Hr) TPN Continuous <Continuous>    MEDICATIONS  (PRN):  naloxone Injectable 0.1 milliGRAM(s) IV Push every 3 minutes PRN For ANY of the following changes in patient status:  A. RR LESS THAN 10 breaths per minute, B. Oxygen saturation LESS THAN 90%, C. Sedation score of 6  ondansetron Injectable 4 milliGRAM(s) IV Push every 6 hours PRN Nausea and/or Vomiting  sodium chloride 0.9% lock flush 10 milliLiter(s) IV Push every 1 hour PRN Pre/post blood products, medications, blood draw, and to maintain line patency      __________________________________________________  REVIEW OF SYSTEMS:    CONSTITUTIONAL: No fever,   EYES: no acute visual disturbances  NECK: No pain or stiffness  RESPIRATORY: No cough; No shortness of breath  CARDIOVASCULAR: No chest pain, no palpitations  GASTROINTESTINAL: + pain. No nausea or vomiting; No diarrhea   NEUROLOGICAL: No headache or numbness, no tremors  MUSCULOSKELETAL: No joint pain, no muscle pain  GENITOURINARY: no dysuria, no frequency, no hesitancy  PSYCHIATRY: no depression , no anxiety  ALL OTHER  ROS negative        Vital Signs Last 24 Hrs  T(C): 37.1 (10 Apr 2024 04:46), Max: 37.1 (10 Apr 2024 04:46)  T(F): 98.7 (10 Apr 2024 04:46), Max: 98.7 (10 Apr 2024 04:46)  HR: 72 (10 Apr 2024 04:46) (66 - 79)  BP: 110/78 (10 Apr 2024 04:46) (108/60 - 119/80)  BP(mean): --  RR: 18 (10 Apr 2024 04:46) (16 - 18)  SpO2: 95% (10 Apr 2024 04:46) (95% - 97%)    Parameters below as of 10 Apr 2024 04:46  Patient On (Oxygen Delivery Method): room air        ________________________________________________  PHYSICAL EXAM:  GENERAL: NAD  HEENT: Normocephalic;  conjunctivae and sclerae clear; moist mucous membranes;   NECK : supple  CHEST/LUNG: Clear to auscultation bilaterally with good air entry   HEART: S1 S2  regular; no murmurs, gallops or rubs  ABDOMEN: Soft, mildly tender, non distended; incision clean and dry covered in binder   EXTREMITIES: no cyanosis; no edema; no calf tenderness  SKIN: warm and dry; no rash  NERVOUS SYSTEM:  Awake and alert; Oriented  to place, person and time ; no new deficits      _________________________________________________  LABS:    04-09    133<L>  |  97  |  15  ----------------------------<  103<H>  3.9   |  31  |  0.69    Ca    9.0      09 Apr 2024 05:30  Phos  3.2     04-09  Mg     2.0     04-09        Urinalysis Basic - ( 09 Apr 2024 05:30 )    Color: x / Appearance: x / SG: x / pH: x  Gluc: 103 mg/dL / Ketone: x  / Bili: x / Urobili: x   Blood: x / Protein: x / Nitrite: x   Leuk Esterase: x / RBC: x / WBC x   Sq Epi: x / Non Sq Epi: x / Bacteria: x      CAPILLARY BLOOD GLUCOSE            RADIOLOGY & ADDITIONAL TESTS:      Plan of care was discussed with patient and /or primary care giver; all questions and concerns were addressed and care was aligned with patient's wishes.

## 2024-04-10 NOTE — PROGRESS NOTE ADULT - ASSESSMENT
68y M with PMHx of methadone use (for unknown pain disorder) and PSHx of R inguinal hernia repair open (20+yrs ago) and R upper arm shrapnel removal (40yrs ago) presented to [facility] with 5d hx of worsening abdominal pain, 1 episode of bloody BM at onset of symptoms, and constipation. 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  s/p exlap, R-hemicolectomy, side to side stapled anastomosis postop abdominal XR shows dilated loops of bowel consistent with ileus  OOTB to chair   Encourage ambulation ; Encourage incentive spirometry - patient reports issues using IS due to presence of TMJ and history of jaw surgery  s/p PICC on 4/5  and started on TPN  s/p abd xray 4/8 which shows improvement -- started on ice chips - will be on clears today   diet will be advanced per primary recs     #methadone dependance   restarted on home dose - and started on oxy for pain control   pain management per primary (likely will need to increase Oxy given significant tolerance)    #hypomagnesemia   replete as needed    #HCV  is HCV positive (both RNA and antigen)  CT scan showed small amount of perihepatic ascites, but no comment on cirrhosis.    should have outpatient follow up    #thrombocytopenia   plts in 80's- 120s lateral   will ctm   dvt ppx per primary team.  Monitor for bleeding    #Severe protein calorie malnutrition  - appreciate input from nutrition  - plan as above     #Hypertension  - has been having intermittently elevated systolic blood pressures  - not on any medications outpatient  - started Nifedipine XL 30 mg daily 4/9-- monitor BP if persistently  < 120 -- can dc medication     #acute blood loss anemia   Hgb downtrended from time of admission  Hemoglobin: 11.7 on admission -- hgb 10  4/9  Partially attributable to operative blood losses   continue to trend daily CBC, keep active type and screen    #dvt ppx sqh

## 2024-04-10 NOTE — PROGRESS NOTE ADULT - SUBJECTIVE AND OBJECTIVE BOX
INTERVAL HPI/OVERNIGHT EVENTS: SERENA, VS WNL     STATUS POST:  3/30: exlap, R antoni, side to side stapled anastomosis, penrose x1 midline , IVF 2L, , Cefotetan     POST OPERATIVE DAY #: 11    SUBJECTIVE: Pt seen and examined at bedside this am by surgery team. No acute complaints. +F. Tolerating sips/chips. pain well controlled. Denies f/n/v/cp/sob.    MEDICATIONS  (STANDING):  acetaminophen     Tablet .. 1000 milliGRAM(s) Oral every 6 hours  chlorhexidine 2% Cloths 1 Application(s) Topical <User Schedule>  gabapentin 300 milliGRAM(s) Oral every 8 hours  heparin   Injectable 5000 Unit(s) SubCutaneous every 8 hours  influenza  Vaccine (HIGH DOSE) 0.7 milliLiter(s) IntraMuscular once  lipid, fat emulsion (Fish Oil and Plant Based) 20% Infusion 0.8 Gm/kG/Day (20.83 mL/Hr) IV Continuous <Continuous>  methadone  Concentrate 190 milliGRAM(s) Oral daily  NIFEdipine XL 30 milliGRAM(s) Oral daily  Parenteral Nutrition - Adult 1 Each (63 mL/Hr) TPN Continuous <Continuous>    MEDICATIONS  (PRN):  labetalol Injectable 10 milliGRAM(s) IV Push every 6 hours PRN Diastolic blood pressure > 100  naloxone Injectable 0.1 milliGRAM(s) IV Push every 3 minutes PRN For ANY of the following changes in patient status:  A. RR LESS THAN 10 breaths per minute, B. Oxygen saturation LESS THAN 90%, C. Sedation score of 6  ondansetron Injectable 4 milliGRAM(s) IV Push every 6 hours PRN Nausea and/or Vomiting  sodium chloride 0.9% lock flush 10 milliLiter(s) IV Push every 1 hour PRN Pre/post blood products, medications, blood draw, and to maintain line patency      Vital Signs Last 24 Hrs  T(C): 37.1 (10 Apr 2024 04:46), Max: 37.1 (10 Apr 2024 04:46)  T(F): 98.7 (10 Apr 2024 04:46), Max: 98.7 (10 Apr 2024 04:46)  HR: 72 (10 Apr 2024 04:46) (66 - 80)  BP: 110/78 (10 Apr 2024 04:46) (108/60 - 166/95)  BP(mean): --  RR: 18 (10 Apr 2024 04:46) (16 - 18)  SpO2: 95% (10 Apr 2024 04:46) (95% - 98%)    Parameters below as of 10 Apr 2024 04:46  Patient On (Oxygen Delivery Method): room air    PHYSICAL EXAM:    Constitutional: A&Ox3, NAD    Respiratory: non labored breathing, no respiratory distress    Cardiovascular: NSR, RRR    Gastrointestinal: abdominal binder in place, abdomen soft, mildly distended, appropriately ttp to midline, staples intact. No R/G.    Extremities: wwp, no calf tenderness or edema. SCDs in place       I&O's Detail    08 Apr 2024 07:01  -  09 Apr 2024 07:00  --------------------------------------------------------  IN:    Fat Emulsion (Fish Oil &amp; Plant Based) 20% Infusion: 259.6 mL    Oral Fluid: 10 mL    TPN (Total Parenteral Nutrition): 1449 mL  Total IN: 1718.6 mL    OUT:    Voided (mL): 750 mL  Total OUT: 750 mL    Total NET: 968.6 mL      09 Apr 2024 07:01  -  10 Apr 2024 06:59  --------------------------------------------------------  IN:    Fat Emulsion (Fish Oil &amp; Plant Based) 20% Infusion: 145.6 mL    Oral Fluid: 100 mL    TPN (Total Parenteral Nutrition): 1134 mL  Total IN: 1379.6 mL    OUT:    Voided (mL): 860 mL  Total OUT: 860 mL    Total NET: 519.6 mL          LABS:    04-09    133<L>  |  97  |  15  ----------------------------<  103<H>  3.9   |  31  |  0.69    Ca    9.0      09 Apr 2024 05:30  Phos  3.2     04-09  Mg     2.0     04-09        Urinalysis Basic - ( 09 Apr 2024 05:30 )    Color: x / Appearance: x / SG: x / pH: x  Gluc: 103 mg/dL / Ketone: x  / Bili: x / Urobili: x   Blood: x / Protein: x / Nitrite: x   Leuk Esterase: x / RBC: x / WBC x   Sq Epi: x / Non Sq Epi: x / Bacteria: x        RADIOLOGY & ADDITIONAL STUDIES:

## 2024-04-10 NOTE — PROGRESS NOTE ADULT - ASSESSMENT
68y M with PMHx of methadone use (for opioid use), HCV (shown on labs) and PSHx of R inguinal hernia repair open (20+yrs ago) and R upper arm shrapnel removal (40yrs ago) presented to [facility] with 5d hx of worsening abdominal pain, 1 episode of bloody BM at onset of symptoms, and constipation (last flatus and BM 4d ago). Upon presentation patient was febrile 100.4 (rectal), tachycardic 104, and hypertensive 157/104. 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 Patient was transferred to [facility] surgery for further management of cecal volvulous. Pt is now s/p exlap, R-hemicolectomy, side to side stapled anastamosis (3/30).    TPN/NPO w/ sips/chips, IVF  SQH/SCD  OOBA/IS  methadone  AM BMPs qod  Dispo: [facility]

## 2024-04-11 LAB
ANION GAP SERPL CALC-SCNC: 7 MMOL/L — SIGNIFICANT CHANGE UP (ref 5–17)
BUN SERPL-MCNC: 20 MG/DL — SIGNIFICANT CHANGE UP (ref 7–23)
CALCIUM SERPL-MCNC: 8.8 MG/DL — SIGNIFICANT CHANGE UP (ref 8.4–10.5)
CHLORIDE SERPL-SCNC: 99 MMOL/L — SIGNIFICANT CHANGE UP (ref 96–108)
CO2 SERPL-SCNC: 29 MMOL/L — SIGNIFICANT CHANGE UP (ref 22–31)
CREAT SERPL-MCNC: 0.77 MG/DL — SIGNIFICANT CHANGE UP (ref 0.5–1.3)
EGFR: 96 ML/MIN/1.73M2 — SIGNIFICANT CHANGE UP
GLUCOSE BLDC GLUCOMTR-MCNC: 149 MG/DL — HIGH (ref 70–99)
GLUCOSE SERPL-MCNC: 113 MG/DL — HIGH (ref 70–99)
MAGNESIUM SERPL-MCNC: 2 MG/DL — SIGNIFICANT CHANGE UP (ref 1.6–2.6)
PHOSPHATE SERPL-MCNC: 3.7 MG/DL — SIGNIFICANT CHANGE UP (ref 2.5–4.5)
POTASSIUM SERPL-MCNC: 4.7 MMOL/L — SIGNIFICANT CHANGE UP (ref 3.5–5.3)
POTASSIUM SERPL-SCNC: 4.7 MMOL/L — SIGNIFICANT CHANGE UP (ref 3.5–5.3)
SODIUM SERPL-SCNC: 135 MMOL/L — SIGNIFICANT CHANGE UP (ref 135–145)

## 2024-04-11 PROCEDURE — 99233 SBSQ HOSP IP/OBS HIGH 50: CPT

## 2024-04-11 PROCEDURE — 74019 RADEX ABDOMEN 2 VIEWS: CPT | Mod: 26

## 2024-04-11 RX ORDER — ELECTROLYTE SOLUTION,INJ
1 VIAL (ML) INTRAVENOUS
Refills: 0 | Status: DISCONTINUED | OUTPATIENT
Start: 2024-04-11 | End: 2024-04-11

## 2024-04-11 RX ORDER — DIATRIZOATE MEGLUMINE 180 MG/ML
50 INJECTION, SOLUTION INTRAVESICAL ONCE
Refills: 0 | Status: COMPLETED | OUTPATIENT
Start: 2024-04-11 | End: 2024-04-11

## 2024-04-11 RX ORDER — I.V. FAT EMULSION 20 G/100ML
0.79 EMULSION INTRAVENOUS
Qty: 50 | Refills: 0 | Status: DISCONTINUED | OUTPATIENT
Start: 2024-04-11 | End: 2024-04-11

## 2024-04-11 RX ADMIN — I.V. FAT EMULSION 20.8 GM/KG/DAY: 20 EMULSION INTRAVENOUS at 18:06

## 2024-04-11 RX ADMIN — GABAPENTIN 300 MILLIGRAM(S): 400 CAPSULE ORAL at 22:58

## 2024-04-11 RX ADMIN — GABAPENTIN 300 MILLIGRAM(S): 400 CAPSULE ORAL at 05:33

## 2024-04-11 RX ADMIN — CHLORHEXIDINE GLUCONATE 1 APPLICATION(S): 213 SOLUTION TOPICAL at 05:41

## 2024-04-11 RX ADMIN — ONDANSETRON 4 MILLIGRAM(S): 8 TABLET, FILM COATED ORAL at 14:10

## 2024-04-11 RX ADMIN — Medication 30 MILLIGRAM(S): at 05:33

## 2024-04-11 RX ADMIN — HEPARIN SODIUM 5000 UNIT(S): 5000 INJECTION INTRAVENOUS; SUBCUTANEOUS at 14:37

## 2024-04-11 RX ADMIN — METHADONE HYDROCHLORIDE 190 MILLIGRAM(S): 40 TABLET ORAL at 11:56

## 2024-04-11 RX ADMIN — HEPARIN SODIUM 5000 UNIT(S): 5000 INJECTION INTRAVENOUS; SUBCUTANEOUS at 05:32

## 2024-04-11 RX ADMIN — HEPARIN SODIUM 5000 UNIT(S): 5000 INJECTION INTRAVENOUS; SUBCUTANEOUS at 22:58

## 2024-04-11 RX ADMIN — DIATRIZOATE MEGLUMINE 50 MILLILITER(S): 180 INJECTION, SOLUTION INTRAVESICAL at 09:16

## 2024-04-11 RX ADMIN — Medication 1 EACH: at 18:06

## 2024-04-11 NOTE — PROGRESS NOTE ADULT - SUBJECTIVE AND OBJECTIVE BOX
Patient is a 68y old  Male who presents with a chief complaint of Abdominal Pain (11 Apr 2024 06:07)      INTERVAL HPI/OVERNIGHT EVENTS: offers no new complaints;     MEDICATIONS  (STANDING):  acetaminophen     Tablet .. 1000 milliGRAM(s) Oral every 6 hours  chlorhexidine 2% Cloths 1 Application(s) Topical <User Schedule>  gabapentin 300 milliGRAM(s) Oral every 8 hours  heparin   Injectable 5000 Unit(s) SubCutaneous every 8 hours  influenza  Vaccine (HIGH DOSE) 0.7 milliLiter(s) IntraMuscular once  lipid, fat emulsion (Fish Oil and Plant Based) 20% Infusion 0.7874 Gm/kG/Day (20.8 mL/Hr) IV Continuous <Continuous>  methadone  Concentrate 190 milliGRAM(s) Oral daily  NIFEdipine XL 30 milliGRAM(s) Oral daily  Parenteral Nutrition - Adult 1 Each (63 mL/Hr) TPN Continuous <Continuous>  Parenteral Nutrition - Adult 1 Each (63 mL/Hr) TPN Continuous <Continuous>    MEDICATIONS  (PRN):  naloxone Injectable 0.1 milliGRAM(s) IV Push every 3 minutes PRN For ANY of the following changes in patient status:  A. RR LESS THAN 10 breaths per minute, B. Oxygen saturation LESS THAN 90%, C. Sedation score of 6  ondansetron Injectable 4 milliGRAM(s) IV Push every 6 hours PRN Nausea and/or Vomiting  sodium chloride 0.9% lock flush 10 milliLiter(s) IV Push every 1 hour PRN Pre/post blood products, medications, blood draw, and to maintain line patency      __________________________________________________  REVIEW OF SYSTEMS:    CONSTITUTIONAL: No fever,   EYES: no acute visual disturbances  NECK: No pain or stiffness  RESPIRATORY: No cough; No shortness of breath  CARDIOVASCULAR: No chest pain, no palpitations  GASTROINTESTINAL: + discomfort + flatus   NEUROLOGICAL: No headache or numbness, no tremors  MUSCULOSKELETAL: No joint pain, no muscle pain  GENITOURINARY: no dysuria, no frequency, no hesitancy  PSYCHIATRY: no depression , no anxiety  ALL OTHER  ROS negative        Vital Signs Last 24 Hrs  T(C): 36.7 (11 Apr 2024 13:05), Max: 37.1 (11 Apr 2024 08:42)  T(F): 98 (11 Apr 2024 13:05), Max: 98.7 (11 Apr 2024 08:42)  HR: 81 (11 Apr 2024 14:22) (72 - 96)  BP: 138/82 (11 Apr 2024 14:22) (105/77 - 144/81)  BP(mean): --  RR: 17 (11 Apr 2024 14:22) (17 - 19)  SpO2: 94% (11 Apr 2024 14:22) (94% - 100%)    Parameters below as of 11 Apr 2024 14:22  Patient On (Oxygen Delivery Method): room air        ________________________________________________  PHYSICAL EXAM:  GENERAL: NAD  HEENT: Normocephalic;  conjunctivae and sclerae clear; moist mucous membranes;   NECK : supple  CHEST/LUNG: Clear to auscultation bilaterally with good air entry   HEART: S1 S2  regular; no murmurs, gallops or rubs  ABDOMEN: Soft, + pain, + distended; decreased sounds present  EXTREMITIES: no cyanosis; no edema; no calf tenderness  SKIN: warm and dry; no rash  NERVOUS SYSTEM:  Awake and alert; Oriented  to place, person and time ; no new deficits    _________________________________________________  LABS:    04-11    135  |  99  |  20  ----------------------------<  113<H>  4.7   |  29  |  0.77    Ca    8.8      11 Apr 2024 05:30  Phos  3.7     04-11  Mg     2.0     04-11        Urinalysis Basic - ( 11 Apr 2024 05:30 )    Color: x / Appearance: x / SG: x / pH: x  Gluc: 113 mg/dL / Ketone: x  / Bili: x / Urobili: x   Blood: x / Protein: x / Nitrite: x   Leuk Esterase: x / RBC: x / WBC x   Sq Epi: x / Non Sq Epi: x / Bacteria: x      CAPILLARY BLOOD GLUCOSE      POCT Blood Glucose.: 107 mg/dL (10 Apr 2024 17:43)        RADIOLOGY & ADDITIONAL TESTS:      Plan of care was discussed with patient and /or primary care giver; all questions and concerns were addressed and care was aligned with patient's wishes.

## 2024-04-11 NOTE — PROGRESS NOTE ADULT - SUBJECTIVE AND OBJECTIVE BOX
POST-OP DAY: X s/p      SUBJECTIVE: Patient seen and examined bedside by chief resident.    heparin   Injectable 5000 Unit(s) SubCutaneous every 8 hours  NIFEdipine XL 30 milliGRAM(s) Oral daily    MEDICATIONS  (PRN):  naloxone Injectable 0.1 milliGRAM(s) IV Push every 3 minutes PRN For ANY of the following changes in patient status:  A. RR LESS THAN 10 breaths per minute, B. Oxygen saturation LESS THAN 90%, C. Sedation score of 6  ondansetron Injectable 4 milliGRAM(s) IV Push every 6 hours PRN Nausea and/or Vomiting  sodium chloride 0.9% lock flush 10 milliLiter(s) IV Push every 1 hour PRN Pre/post blood products, medications, blood draw, and to maintain line patency      I&O's Detail    09 Apr 2024 07:01  -  10 Apr 2024 07:00  --------------------------------------------------------  IN:    Fat Emulsion (Fish Oil &amp; Plant Based) 20% Infusion: 145.6 mL    Oral Fluid: 100 mL    TPN (Total Parenteral Nutrition): 1134 mL  Total IN: 1379.6 mL    OUT:    Voided (mL): 860 mL  Total OUT: 860 mL    Total NET: 519.6 mL      10 Apr 2024 07:01  -  11 Apr 2024 06:08  --------------------------------------------------------  IN:    Fat Emulsion (Fish Oil &amp; Plant Based) 20% Infusion: 187.2 mL    TPN (Total Parenteral Nutrition): 1102 mL  Total IN: 1289.2 mL    OUT:    Voided (mL): 1700 mL  Total OUT: 1700 mL    Total NET: -410.8 mL          Vital Signs Last 24 Hrs  T(C): 36.4 (10 Apr 2024 23:51), Max: 36.9 (10 Apr 2024 17:40)  T(F): 97.6 (10 Apr 2024 23:51), Max: 98.5 (10 Apr 2024 17:40)  HR: 84 (10 Apr 2024 23:51) (72 - 96)  BP: 123/69 (10 Apr 2024 23:51) (105/77 - 144/81)  BP(mean): --  RR: 18 (10 Apr 2024 23:51) (18 - 19)  SpO2: 94% (10 Apr 2024 23:51) (94% - 100%)    Parameters below as of 10 Apr 2024 23:51  Patient On (Oxygen Delivery Method): room air        General: NAD, resting comfortably in bed  C/V: NSR  Pulm: Nonlabored breathing, no respiratory distress  Abd: soft, NT/ND  Extrem: WWP, no edema, SCDs in place    LABS:                RADIOLOGY & ADDITIONAL STUDIES:     POST-OP DAY: s/p exlap, R-hemicolectomy, side to side stapled anastamosis (3/30)     SUBJECTIVE: Patient seen and examined bedside by chief resident resting comfortably in bed. Patient has been tolerating small amounts of clear liquid diet. He is ambulating and voiding adequately. Patient admits to frequent hiccups and nausea with oral tylenol. He endorses passing flatus and denies BMs. Denies vomiting, chest pain, SOB, headache, dizziness.     heparin   Injectable 5000 Unit(s) SubCutaneous every 8 hours  NIFEdipine XL 30 milliGRAM(s) Oral daily    MEDICATIONS  (PRN):  naloxone Injectable 0.1 milliGRAM(s) IV Push every 3 minutes PRN For ANY of the following changes in patient status:  A. RR LESS THAN 10 breaths per minute, B. Oxygen saturation LESS THAN 90%, C. Sedation score of 6  ondansetron Injectable 4 milliGRAM(s) IV Push every 6 hours PRN Nausea and/or Vomiting  sodium chloride 0.9% lock flush 10 milliLiter(s) IV Push every 1 hour PRN Pre/post blood products, medications, blood draw, and to maintain line patency      I&O's Detail    09 Apr 2024 07:01  -  10 Apr 2024 07:00  --------------------------------------------------------  IN:    Fat Emulsion (Fish Oil &amp; Plant Based) 20% Infusion: 145.6 mL    Oral Fluid: 100 mL    TPN (Total Parenteral Nutrition): 1134 mL  Total IN: 1379.6 mL    OUT:    Voided (mL): 860 mL  Total OUT: 860 mL    Total NET: 519.6 mL      10 Apr 2024 07:01  -  11 Apr 2024 06:08  --------------------------------------------------------  IN:    Fat Emulsion (Fish Oil &amp; Plant Based) 20% Infusion: 187.2 mL    TPN (Total Parenteral Nutrition): 1102 mL  Total IN: 1289.2 mL    OUT:    Voided (mL): 1700 mL  Total OUT: 1700 mL    Total NET: -410.8 mL          Vital Signs Last 24 Hrs  T(C): 36.4 (10 Apr 2024 23:51), Max: 36.9 (10 Apr 2024 17:40)  T(F): 97.6 (10 Apr 2024 23:51), Max: 98.5 (10 Apr 2024 17:40)  HR: 84 (10 Apr 2024 23:51) (72 - 96)  BP: 123/69 (10 Apr 2024 23:51) (105/77 - 144/81)  BP(mean): --  RR: 18 (10 Apr 2024 23:51) (18 - 19)  SpO2: 94% (10 Apr 2024 23:51) (94% - 100%)    Parameters below as of 10 Apr 2024 23:51  Patient On (Oxygen Delivery Method): room air        General: NAD, resting comfortably in bed  Pulm: Nonlabored breathing, no respiratory distress  Abd: soft, non-tender, (+) distended. Incisions healing well  Extrem: WWP, no edema, SCDs in place    LABS:                RADIOLOGY & ADDITIONAL STUDIES:     POST-OP DAY: s/p exlap, R-hemicolectomy, side to side stapled anastamosis (3/30)     SUBJECTIVE: Patient seen and examined bedside by chief resident resting comfortably in bed. Patient has been tolerating small amounts of clear liquid diet. He is ambulating and voiding adequately. Patient admits to frequent hiccups and nausea with oral tylenol. He endorses passing flatus and denies BMs. Denies vomiting, chest pain, SOB, headache, dizziness.     heparin   Injectable 5000 Unit(s) SubCutaneous every 8 hours  NIFEdipine XL 30 milliGRAM(s) Oral daily    MEDICATIONS  (PRN):  naloxone Injectable 0.1 milliGRAM(s) IV Push every 3 minutes PRN For ANY of the following changes in patient status:  A. RR LESS THAN 10 breaths per minute, B. Oxygen saturation LESS THAN 90%, C. Sedation score of 6  ondansetron Injectable 4 milliGRAM(s) IV Push every 6 hours PRN Nausea and/or Vomiting  sodium chloride 0.9% lock flush 10 milliLiter(s) IV Push every 1 hour PRN Pre/post blood products, medications, blood draw, and to maintain line patency      I&O's Detail    09 Apr 2024 07:01  -  10 Apr 2024 07:00  --------------------------------------------------------  IN:    Fat Emulsion (Fish Oil &amp; Plant Based) 20% Infusion: 145.6 mL    Oral Fluid: 100 mL    TPN (Total Parenteral Nutrition): 1134 mL  Total IN: 1379.6 mL    OUT:    Voided (mL): 860 mL  Total OUT: 860 mL    Total NET: 519.6 mL      10 Apr 2024 07:01  -  11 Apr 2024 06:08  --------------------------------------------------------  IN:    Fat Emulsion (Fish Oil &amp; Plant Based) 20% Infusion: 187.2 mL    TPN (Total Parenteral Nutrition): 1102 mL  Total IN: 1289.2 mL    OUT:    Voided (mL): 1700 mL  Total OUT: 1700 mL    Total NET: -410.8 mL          Vital Signs Last 24 Hrs  T(C): 36.4 (10 Apr 2024 23:51), Max: 36.9 (10 Apr 2024 17:40)  T(F): 97.6 (10 Apr 2024 23:51), Max: 98.5 (10 Apr 2024 17:40)  HR: 84 (10 Apr 2024 23:51) (72 - 96)  BP: 123/69 (10 Apr 2024 23:51) (105/77 - 144/81)  BP(mean): --  RR: 18 (10 Apr 2024 23:51) (18 - 19)  SpO2: 94% (10 Apr 2024 23:51) (94% - 100%)    Parameters below as of 10 Apr 2024 23:51  Patient On (Oxygen Delivery Method): room air        General: NAD, resting comfortably in bed  Pulm: Nonlabored breathing, no respiratory distress  Abd: soft, non-tender, (+) distended.  Midline incisions healing well, abdominal binder in place  Extrem: WWP, no edema, SCDs in place    LABS:                RADIOLOGY & ADDITIONAL STUDIES:

## 2024-04-11 NOTE — CHART NOTE - NSCHARTNOTEFT_GEN_A_CORE
Admitting Diagnosis:   Patient is a 68y old  Male who presents with a chief complaint of Abdominal Pain (11 Apr 2024 15:04)    PAST MEDICAL & SURGICAL HISTORY:  Methadone use  H/O right inguinal hernia repair    Current Nutrition Order: Diet, Clear Liquid (04-10-24 @ 08:57) [Active]  Parenteral Nutrition - Adult 1 Each (63 mL/Hr) TPN Continuous <Continuous>, 04-11-24 @ 17:00, 17:00, Stop order after: 1 Days  Parenteral Nutrition - Adult 1 Each (63 mL/Hr) TPN Continuous <Continuous>, 04-10-24 @ 17:00, 17:00, Stop order after: 1 Days    Current TPN Regimen Provides 335g Dextrose, 90g AA, 50g 20% Lipids to provide in total  1999 Kcal, 90g protein, 3.6GIR of based on actual body weight (63.6kg), 1.4 grams protein/kg actual body weight    PO Intake: Good (%) [   ]  Fair (50-75%) [   ] Poor (<25%) [   ] TPN w/ Clear Liquid Diet [X]     GI Issues: No issues with nausea, vomiting, diarrhea, reported at time of visit. Pt states that he is experiencing constipation. Last BM noted on 4/11 per flow sheets. Pt is not currently on a bowel regimen.    Pain: No indication of pain at time of visit.     Skin Integrity: No Pressure Injuries per flow sheets. Mendze Score: 19   Edema: No Edema per flow sheets.     Labs:   04-11    135  |  99  |  20  ----------------------------<  113<H>  4.7   |  29  |  0.77    Ca    8.8      11 Apr 2024 05:30  Phos  3.7     04-11  Mg     2.0     04-11    CAPILLARY BLOOD GLUCOSE    POCT Blood Glucose.: 107 mg/dL (10 Apr 2024 17:43)    Medications:  MEDICATIONS  (STANDING):  acetaminophen     Tablet .. 1000 milliGRAM(s) Oral every 6 hours  chlorhexidine 2% Cloths 1 Application(s) Topical <User Schedule>  gabapentin 300 milliGRAM(s) Oral every 8 hours  heparin   Injectable 5000 Unit(s) SubCutaneous every 8 hours  influenza  Vaccine (HIGH DOSE) 0.7 milliLiter(s) IntraMuscular once  lipid, fat emulsion (Fish Oil and Plant Based) 20% Infusion 0.7874 Gm/kG/Day (20.8 mL/Hr) IV Continuous <Continuous>  methadone  Concentrate 190 milliGRAM(s) Oral daily  NIFEdipine XL 30 milliGRAM(s) Oral daily  Parenteral Nutrition - Adult 1 Each (63 mL/Hr) TPN Continuous <Continuous>  Parenteral Nutrition - Adult 1 Each (63 mL/Hr) TPN Continuous <Continuous>    MEDICATIONS  (PRN):  naloxone Injectable 0.1 milliGRAM(s) IV Push every 3 minutes PRN For ANY of the following changes in patient status:  A. RR LESS THAN 10 breaths per minute, B. Oxygen saturation LESS THAN 90%, C. Sedation score of 6  ondansetron Injectable 4 milliGRAM(s) IV Push every 6 hours PRN Nausea and/or Vomiting  sodium chloride 0.9% lock flush 10 milliLiter(s) IV Push every 1 hour PRN Pre/post blood products, medications, blood draw, and to maintain line patency    Weight History:  4/5 140.1 pounds (standing) (63.6 kg)  3/29 140 pounds (dosing)    UBW: ~145 pounds (per pt) x 2 months   IBW: 154 pounds   %IBW: 90%     No updated weights in charts. Recommend obtaining daily/weekly weights. RD to continue to monitor weight trends as available.    Estimated energy needs:   Kcal: 30-35kcal/kg = 4209-8469 kcal   Pro: 1.1-1.5g pro/kg = 69-95 g pro  Fluids: 30-35ml/kg = 1905-2222kcal   Based on Standards of Care pt within % IBW (90%) thus actual body weight (63.5kg) used for all calculations. Needs adjusted for advanced age and malnutrition.    Subjective:   "68y M with PMHx of methadone use (for unknown pain disorder) and PSHx of R inguinal hernia repair open (20+yrs ago) and R upper arm shrapnel removal (40yrs ago) presented to [facility] with 5d hx of worsening abdominal pain, 1 episode of bloody BM at onset of symptoms, and constipation. pt admitted to surgery for further management of cecal volvulus"    Visited pt at bedside on 9WO for follow up. Pt is continues to receive TPN and diet was advanced to Clear liquids. Pt consuming ice chips at time of visit, had not yet started to consume clears. States that he continues to experience constipation. BM noted 4/11 per flow sheets. Meds reviewed. Nutritionally Pertinent Labs 4/11 Gluc: 113 (H). See Nutrition Recommendations below.     Previous Nutrition Diagnosis: Severe Chronic Malnutrition RT inadequate PO intake in setting on physical demands AEB Severe muscle and fat depletions.    Active [ X ]  Resolved [   ]    Goal: Pt to meet >75% of estimated nutrition needs daily per course of hospitalization.     Recommendations:  1. c/w TPN via PICC as primary means to nutrition- Recommend 335g Dextrose, 90g AA, 50g 20% Lipids to provide in total 1999 Kcal, 90g protein, 3.6GIR of based on actual body weight (63.6kg), 1.4 grams protein/kg actual body weight  - fluid & lytes per team  - MVI, thiamine in bag  2. PO diet per MD discretion  - c/w clear liquid diet as tolerated, recommend ensure clear daily [provides 240 kcals, 8g protein each]  - antiemetic prn to promote tolerance  3. Weekly lipid panel while on TPN,  - hold lipid if TG >400  4. Daily BMP/Mg/Phos, fingersticks Q4-6hrs  - monitor lytes closely & replete outside TPN bag prn  5. Can initiate weaning from TPN when PO intake meeting >75% estimated needs  - consider 3 day calorie count to assess  6. Pain regimen per team     Education: Encourage PO intake, to slowly consuming clears as tolerated. Answered questions related to nutrition. Pt receptive and verbalizes understanding.     Risk Level: High [X] Moderate [   ] Low [   ]

## 2024-04-11 NOTE — PROGRESS NOTE ADULT - ASSESSMENT
68y M with PMHx of methadone use (for unknown pain disorder) and PSHx of R inguinal hernia repair open (20+yrs ago) and R upper arm shrapnel removal (40yrs ago) presented to [facility] with 5d hx of worsening abdominal pain, 1 episode of bloody BM at onset of symptoms, and constipation. 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  s/p exlap, R-hemicolectomy, side to side stapled anastomosis postop abdominal XR shows dilated loops of bowel consistent with ileus  OOTB to chair   Encourage ambulation ; Encourage incentive spirometry - patient reports issues using IS due to presence of TMJ and history of jaw surgery  s/p PICC on 4/5  and started on TPN  s/p abd xray 4/8 which shows improvement -- started on ice chips - pending GG challenge today   diet will be advanced per primary recs     #methadone dependance   restarted on home dose - and started on oxy for pain control   pain management per primary (likely will need to increase Oxy given significant tolerance)    #hypomagnesemia   replete as needed    #HCV  is HCV positive (both RNA and antigen)  CT scan showed small amount of perihepatic ascites, but no comment on cirrhosis.    should have outpatient follow up    #thrombocytopenia   plts in 80's- 120s lateral   will ctm   dvt ppx per primary team.  Monitor for bleeding    #Severe protein calorie malnutrition  - appreciate input from nutrition  - plan as above     #Hypertension  - has been having intermittently elevated systolic blood pressures  - not on any medications outpatient  - started Nifedipine XL 30 mg daily 4/9-- monitor BP if persistently  < 120 -- can dc medication     #acute blood loss anemia   Hgb downtrended from time of admission  Hemoglobin: 11.7 on admission -- hgb 10  4/9  Partially attributable to operative blood losses   continue to trend daily CBC, keep active type and screen    #dvt ppx sqh

## 2024-04-11 NOTE — PROGRESS NOTE ADULT - ASSESSMENT
68y M with PMHx of methadone use (for opioid use), HCV (shown on labs) and PSHx of R inguinal hernia repair open (20+yrs ago) and R upper arm shrapnel removal (40yrs ago) presented to [facility] with 5d hx of worsening abdominal pain, 1 episode of bloody BM at onset of symptoms, and constipation (last flatus and BM 4d ago). Upon presentation patient was febrile 100.4 (rectal), tachycardic 104, and hypertensive 157/104. 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 Patient was transferred to [facility] surgery for further management of cecal volvulous. Pt is now s/p exlap, R-hemicolectomy, side to side stapled anastamosis (3/30).    CLD  TPN  SQH/SCD  OOBA/IS  methadone  AM BMPs qod  Dispo: [facility]

## 2024-04-12 LAB
GLUCOSE BLDC GLUCOMTR-MCNC: 164 MG/DL — HIGH (ref 70–99)
GLUCOSE BLDC GLUCOMTR-MCNC: 88 MG/DL — SIGNIFICANT CHANGE UP (ref 70–99)

## 2024-04-12 PROCEDURE — 99233 SBSQ HOSP IP/OBS HIGH 50: CPT

## 2024-04-12 RX ORDER — I.V. FAT EMULSION 20 G/100ML
0.79 EMULSION INTRAVENOUS
Qty: 50 | Refills: 0 | Status: DISCONTINUED | OUTPATIENT
Start: 2024-04-12 | End: 2024-04-12

## 2024-04-12 RX ORDER — ELECTROLYTE SOLUTION,INJ
1 VIAL (ML) INTRAVENOUS
Refills: 0 | Status: DISCONTINUED | OUTPATIENT
Start: 2024-04-12 | End: 2024-04-12

## 2024-04-12 RX ORDER — MINERAL OIL
133 OIL (ML) MISCELLANEOUS ONCE
Refills: 0 | Status: DISCONTINUED | OUTPATIENT
Start: 2024-04-12 | End: 2024-04-12

## 2024-04-12 RX ADMIN — I.V. FAT EMULSION 20.8 GM/KG/DAY: 20 EMULSION INTRAVENOUS at 17:59

## 2024-04-12 RX ADMIN — Medication 30 MILLIGRAM(S): at 05:44

## 2024-04-12 RX ADMIN — GABAPENTIN 300 MILLIGRAM(S): 400 CAPSULE ORAL at 21:28

## 2024-04-12 RX ADMIN — GABAPENTIN 300 MILLIGRAM(S): 400 CAPSULE ORAL at 14:15

## 2024-04-12 RX ADMIN — METHADONE HYDROCHLORIDE 190 MILLIGRAM(S): 40 TABLET ORAL at 11:37

## 2024-04-12 RX ADMIN — CHLORHEXIDINE GLUCONATE 1 APPLICATION(S): 213 SOLUTION TOPICAL at 05:45

## 2024-04-12 RX ADMIN — HEPARIN SODIUM 5000 UNIT(S): 5000 INJECTION INTRAVENOUS; SUBCUTANEOUS at 21:28

## 2024-04-12 RX ADMIN — Medication 1 ENEMA: at 09:19

## 2024-04-12 RX ADMIN — HEPARIN SODIUM 5000 UNIT(S): 5000 INJECTION INTRAVENOUS; SUBCUTANEOUS at 05:44

## 2024-04-12 RX ADMIN — Medication 1 EACH: at 17:59

## 2024-04-12 RX ADMIN — HEPARIN SODIUM 5000 UNIT(S): 5000 INJECTION INTRAVENOUS; SUBCUTANEOUS at 14:15

## 2024-04-12 NOTE — PROGRESS NOTE ADULT - SUBJECTIVE AND OBJECTIVE BOX
Patient is a 68y old  Male who presents with a chief complaint of Abdominal Pain (12 Apr 2024 06:18)      INTERVAL HPI/OVERNIGHT EVENTS: offers no new complaints; current symptoms resolving    MEDICATIONS  (STANDING):  acetaminophen     Tablet .. 1000 milliGRAM(s) Oral every 6 hours  chlorhexidine 2% Cloths 1 Application(s) Topical <User Schedule>  gabapentin 300 milliGRAM(s) Oral every 8 hours  heparin   Injectable 5000 Unit(s) SubCutaneous every 8 hours  influenza  Vaccine (HIGH DOSE) 0.7 milliLiter(s) IntraMuscular once  lipid, fat emulsion (Fish Oil and Plant Based) 20% Infusion 0.7874 Gm/kG/Day (20.8 mL/Hr) IV Continuous <Continuous>  methadone  Concentrate 190 milliGRAM(s) Oral daily  NIFEdipine XL 30 milliGRAM(s) Oral daily  Parenteral Nutrition - Adult 1 Each (63 mL/Hr) TPN Continuous <Continuous>  Parenteral Nutrition - Adult 1 Each (63 mL/Hr) TPN Continuous <Continuous>    MEDICATIONS  (PRN):  naloxone Injectable 0.1 milliGRAM(s) IV Push every 3 minutes PRN For ANY of the following changes in patient status:  A. RR LESS THAN 10 breaths per minute, B. Oxygen saturation LESS THAN 90%, C. Sedation score of 6  ondansetron Injectable 4 milliGRAM(s) IV Push every 6 hours PRN Nausea and/or Vomiting  sodium chloride 0.9% lock flush 10 milliLiter(s) IV Push every 1 hour PRN Pre/post blood products, medications, blood draw, and to maintain line patency      __________________________________________________  REVIEW OF SYSTEMS:    CONSTITUTIONAL: No fever,   EYES: no acute visual disturbances  NECK: No pain or stiffness  RESPIRATORY: No cough; No shortness of breath  CARDIOVASCULAR: No chest pain, no palpitations  GASTROINTESTINAL: No pain. No nausea or vomiting; No diarrhea   NEUROLOGICAL: No headache or numbness, no tremors  MUSCULOSKELETAL: No joint pain, no muscle pain  GENITOURINARY: no dysuria, no frequency, no hesitancy  PSYCHIATRY: no depression , no anxiety  ALL OTHER  ROS negative        Vital Signs Last 24 Hrs  T(C): 36.9 (12 Apr 2024 08:25), Max: 37.2 (12 Apr 2024 05:31)  T(F): 98.5 (12 Apr 2024 08:25), Max: 98.9 (12 Apr 2024 05:31)  HR: 82 (12 Apr 2024 08:25) (80 - 84)  BP: 145/75 (12 Apr 2024 08:25) (135/77 - 155/84)  BP(mean): --  RR: 18 (12 Apr 2024 08:25) (17 - 18)  SpO2: 96% (12 Apr 2024 08:25) (96% - 100%)    Parameters below as of 12 Apr 2024 08:25  Patient On (Oxygen Delivery Method): room air        ________________________________________________  PHYSICAL EXAM:  GENERAL: NAD  HEENT: Normocephalic;  conjunctivae and sclerae clear; moist mucous membranes;   NECK : supple  CHEST/LUNG: Clear to auscultation bilaterally with good air entry   HEART: S1 S2  regular; no murmurs, gallops or rubs  ABDOMEN: Soft, Nontender, Nondistended; Bowel sounds present  EXTREMITIES: no cyanosis; no edema; no calf tenderness  SKIN: warm and dry; no rash  NERVOUS SYSTEM:  Awake and alert; Oriented  to place, person and time ; no new deficits    _________________________________________________  LABS:    04-11    135  |  99  |  20  ----------------------------<  113<H>  4.7   |  29  |  0.77    Ca    8.8      11 Apr 2024 05:30  Phos  3.7     04-11  Mg     2.0     04-11        Urinalysis Basic - ( 11 Apr 2024 05:30 )    Color: x / Appearance: x / SG: x / pH: x  Gluc: 113 mg/dL / Ketone: x  / Bili: x / Urobili: x   Blood: x / Protein: x / Nitrite: x   Leuk Esterase: x / RBC: x / WBC x   Sq Epi: x / Non Sq Epi: x / Bacteria: x      CAPILLARY BLOOD GLUCOSE      POCT Blood Glucose.: 164 mg/dL (12 Apr 2024 11:36)  POCT Blood Glucose.: 149 mg/dL (11 Apr 2024 18:01)        RADIOLOGY & ADDITIONAL TESTS:      Plan of care was discussed with patient and /or primary care giver; all questions and concerns were addressed and care was aligned with patient's wishes.

## 2024-04-12 NOTE — PROGRESS NOTE ADULT - SUBJECTIVE AND OBJECTIVE BOX
Overnight events:       POD#    SUBJECTIVE:      MEDICATIONS  (STANDING):  acetaminophen     Tablet .. 1000 milliGRAM(s) Oral every 6 hours  chlorhexidine 2% Cloths 1 Application(s) Topical <User Schedule>  gabapentin 300 milliGRAM(s) Oral every 8 hours  heparin   Injectable 5000 Unit(s) SubCutaneous every 8 hours  influenza  Vaccine (HIGH DOSE) 0.7 milliLiter(s) IntraMuscular once  lipid, fat emulsion (Fish Oil and Plant Based) 20% Infusion 0.7874 Gm/kG/Day (20.8 mL/Hr) IV Continuous <Continuous>  methadone  Concentrate 190 milliGRAM(s) Oral daily  NIFEdipine XL 30 milliGRAM(s) Oral daily  Parenteral Nutrition - Adult 1 Each (63 mL/Hr) TPN Continuous <Continuous>    MEDICATIONS  (PRN):  naloxone Injectable 0.1 milliGRAM(s) IV Push every 3 minutes PRN For ANY of the following changes in patient status:  A. RR LESS THAN 10 breaths per minute, B. Oxygen saturation LESS THAN 90%, C. Sedation score of 6  ondansetron Injectable 4 milliGRAM(s) IV Push every 6 hours PRN Nausea and/or Vomiting  sodium chloride 0.9% lock flush 10 milliLiter(s) IV Push every 1 hour PRN Pre/post blood products, medications, blood draw, and to maintain line patency      Vital Signs Last 24 Hrs  T(C): 37.2 (12 Apr 2024 05:31), Max: 37.2 (12 Apr 2024 05:31)  T(F): 98.9 (12 Apr 2024 05:31), Max: 98.9 (12 Apr 2024 05:31)  HR: 84 (12 Apr 2024 05:31) (80 - 86)  BP: 152/76 (12 Apr 2024 05:31) (135/77 - 155/84)  BP(mean): --  RR: 18 (12 Apr 2024 05:31) (17 - 18)  SpO2: 100% (12 Apr 2024 05:31) (94% - 100%)    Parameters below as of 12 Apr 2024 05:31  Patient On (Oxygen Delivery Method): room air        Physical Exam:  General: NAD, resting comfortably in bed  Pulmonary: Nonlabored breathing, no respiratory distress  Cardiovascular: NSR  Abdominal: soft, NT/ND  Extremities: WWP, normal strength  Neuro: A/O x 3, CNs II-XII grossly intact, no focal deficits, normal motor/sensation  Pulses: palpable distal pulses    I&O's Summary    10 Apr 2024 07:01  -  11 Apr 2024 07:00  --------------------------------------------------------  IN: 1289.2 mL / OUT: 1700 mL / NET: -410.8 mL    11 Apr 2024 07:01  -  12 Apr 2024 06:18  --------------------------------------------------------  IN: 1593.4 mL / OUT: 1200 mL / NET: 393.4 mL        LABS:    04-11    135  |  99  |  20  ----------------------------<  113<H>  4.7   |  29  |  0.77    Ca    8.8      11 Apr 2024 05:30  Phos  3.7     04-11  Mg     2.0     04-11        Urinalysis Basic - ( 11 Apr 2024 05:30 )    Color: x / Appearance: x / SG: x / pH: x  Gluc: 113 mg/dL / Ketone: x  / Bili: x / Urobili: x   Blood: x / Protein: x / Nitrite: x   Leuk Esterase: x / RBC: x / WBC x   Sq Epi: x / Non Sq Epi: x / Bacteria: x      CAPILLARY BLOOD GLUCOSE      POCT Blood Glucose.: 149 mg/dL (11 Apr 2024 18:01)        RADIOLOGY & ADDITIONAL STUDIES:   Overnight events:  alex, vs/uop WNL, x2 missed voids, +F/-BM,         POD#: 3/30: exlap, R antoni, side to side stapled anastomosis, penrose x1 midline     SUBJECTIVE: Patient at bedside, endorses no improvement, Patient only passing gas, no bowel movements, no nausea or further episodes of vomits. Denies sob/dizziness/cp/ha      MEDICATIONS  (STANDING):  acetaminophen     Tablet .. 1000 milliGRAM(s) Oral every 6 hours  chlorhexidine 2% Cloths 1 Application(s) Topical <User Schedule>  gabapentin 300 milliGRAM(s) Oral every 8 hours  heparin   Injectable 5000 Unit(s) SubCutaneous every 8 hours  influenza  Vaccine (HIGH DOSE) 0.7 milliLiter(s) IntraMuscular once  lipid, fat emulsion (Fish Oil and Plant Based) 20% Infusion 0.7874 Gm/kG/Day (20.8 mL/Hr) IV Continuous <Continuous>  methadone  Concentrate 190 milliGRAM(s) Oral daily  NIFEdipine XL 30 milliGRAM(s) Oral daily  Parenteral Nutrition - Adult 1 Each (63 mL/Hr) TPN Continuous <Continuous>    MEDICATIONS  (PRN):  naloxone Injectable 0.1 milliGRAM(s) IV Push every 3 minutes PRN For ANY of the following changes in patient status:  A. RR LESS THAN 10 breaths per minute, B. Oxygen saturation LESS THAN 90%, C. Sedation score of 6  ondansetron Injectable 4 milliGRAM(s) IV Push every 6 hours PRN Nausea and/or Vomiting  sodium chloride 0.9% lock flush 10 milliLiter(s) IV Push every 1 hour PRN Pre/post blood products, medications, blood draw, and to maintain line patency      Vital Signs Last 24 Hrs  T(C): 37.2 (12 Apr 2024 05:31), Max: 37.2 (12 Apr 2024 05:31)  T(F): 98.9 (12 Apr 2024 05:31), Max: 98.9 (12 Apr 2024 05:31)  HR: 84 (12 Apr 2024 05:31) (80 - 86)  BP: 152/76 (12 Apr 2024 05:31) (135/77 - 155/84)  BP(mean): --  RR: 18 (12 Apr 2024 05:31) (17 - 18)  SpO2: 100% (12 Apr 2024 05:31) (94% - 100%)    Parameters below as of 12 Apr 2024 05:31  Patient On (Oxygen Delivery Method): room air        Physical Exam:  General: NAD, resting comfortably in bed  Pulmonary: Nonlabored breathing, no respiratory distress  Cardiovascular: NSR  Abdominal: soft, moderately distended, nt, no rebound or guarding  Incisions; c/d/i, staples present  Extremities: WWP, normal strength  Neuro: A/O x 3, CNs II-XII grossly intact, no focal deficits, normal motor/sensation  Pulses: palpable distal pulses    I&O's Summary    10 Apr 2024 07:01  -  11 Apr 2024 07:00  --------------------------------------------------------  IN: 1289.2 mL / OUT: 1700 mL / NET: -410.8 mL    11 Apr 2024 07:01  -  12 Apr 2024 06:18  --------------------------------------------------------  IN: 1593.4 mL / OUT: 1200 mL / NET: 393.4 mL        LABS:    04-11    135  |  99  |  20  ----------------------------<  113<H>  4.7   |  29  |  0.77    Ca    8.8      11 Apr 2024 05:30  Phos  3.7     04-11  Mg     2.0     04-11        Urinalysis Basic - ( 11 Apr 2024 05:30 )    Color: x / Appearance: x / SG: x / pH: x  Gluc: 113 mg/dL / Ketone: x  / Bili: x / Urobili: x   Blood: x / Protein: x / Nitrite: x   Leuk Esterase: x / RBC: x / WBC x   Sq Epi: x / Non Sq Epi: x / Bacteria: x      CAPILLARY BLOOD GLUCOSE      POCT Blood Glucose.: 149 mg/dL (11 Apr 2024 18:01)        RADIOLOGY & ADDITIONAL STUDIES:

## 2024-04-12 NOTE — PROGRESS NOTE ADULT - ASSESSMENT
68y M with PMHx of methadone use (for opioid use), HCV (shown on labs) and PSHx of R inguinal hernia repair open (20+yrs ago) and R upper arm shrapnel removal (40yrs ago) presented to [facility] with 5d hx of worsening abdominal pain, 1 episode of bloody BM at onset of symptoms, and constipation (last flatus and BM 4d ago). Upon presentation patient was febrile 100.4 (rectal), tachycardic 104, and hypertensive 157/104. 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 Patient was transferred to [facility] surgery for further management of cecal volvulous. Pt is now s/p exlap, R-hemicolectomy, side to side stapled anastamosis (3/30).    CLD  TPN  SQH/SCD  OOBA/IS  methadone  AM BMPs qod  Dispo: [facility]ERINE has auth for [facility] til 4/23  68y M with PMHx of methadone use (for opioid use), HCV (shown on labs) and PSHx of R inguinal hernia repair open (20+yrs ago) and R upper arm shrapnel removal (40yrs ago) presented to [facility] with 5d hx of worsening abdominal pain, 1 episode of bloody BM at onset of symptoms, and constipation (last flatus and BM 4d ago). Upon presentation patient was febrile 100.4 (rectal), tachycardic 104, and hypertensive 157/104. 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 Patient was transferred to [facility] surgery for further management of cecal volvulous. Pt is now s/p exlap, R-hemicolectomy, side to side stapled anastamosis (3/30).    CLD  TPN  SQH/SCD  OOBA/IS  methadone  enema  AM BMPs qod  Dispo: [facility]ERINE has auth for [facility] til 4/23  68y M with PMHx of methadone use (for opioid use), HCV (shown on labs) and PSHx of R inguinal hernia repair open (20+yrs ago) and R upper arm shrapnel removal (40yrs ago) presented to [facility] with 5d hx of worsening abdominal pain, 1 episode of bloody BM at onset of symptoms, and constipation (last flatus and BM 4d ago). Upon presentation patient was febrile 100.4 (rectal), tachycardic 104, and hypertensive 157/104. 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 Patient was transferred to [facility] surgery for further management of cecal volvulous. Pt is now s/p exlap, R-hemicolectomy, side to side stapled anastamosis (3/30).    CLD  TPN  SQH/SCD  OOBA/IS  methadone  enema  ngt if vomits  AM BMPs qod  Dispo: SHERINE has auth for [facility] til 4/23

## 2024-04-12 NOTE — PROGRESS NOTE ADULT - ASSESSMENT
68y M with PMHx of methadone use (for unknown pain disorder) and PSHx of R inguinal hernia repair open (20+yrs ago) and R upper arm shrapnel removal (40yrs ago) presented to [facility] with 5d hx of worsening abdominal pain, 1 episode of bloody BM at onset of symptoms, and constipation. Pt admitted to surgery for further management of cecal volvulus     #cecal volvulus   #Small bowel obstruction   #post op state   #sepsis on admission   #Ileus  sirs 2/4  febrile 100.4 (rectal), tachycardic 104, on admission  s/p rocephin and flagyl 1 x dose and cefotetan 3 doses post op   CT A/P findings of Cecal volvulus resulting in a distal small bowel obstruction.  s/p exlap, R-hemicolectomy, side to side stapled anastomosis postop abdominal XR shows dilated loops of bowel consistent with ileus  OOTB to chair   Encourage ambulation ; Encourage incentive spirometry - patient reports issues using IS due to presence of TMJ and history of jaw surgery  s/p PICC on 4/5  and started on TPN  s/p abd xray 4/8 which shows improvement -- started on ice chips -CLD   diet will be advanced per primary recs     #methadone dependance   restarted on home dose - and started on oxy for pain control   pain management per primary     #hypomagnesemia   replete as needed    #HCV  is HCV positive (both RNA and antigen)  CT scan showed small amount of perihepatic ascites, but no comment on cirrhosis.    should have outpatient follow up    #thrombocytopenia   plts in 80's- 120s lateral   will ctm   dvt ppx per primary team.  Monitor for bleeding    #Severe protein calorie malnutrition  - appreciate input from nutrition  - plan as above     #Hypertension  - has been having intermittently elevated systolic blood pressures  - not on any medications outpatient  - started Nifedipine XL 30 mg daily 4/9-- monitor BP if persistently  < 120 -- can dc medication     #acute blood loss anemia   Hgb downtrended from time of admission  Hemoglobin: 11.7 on admission -- hgb 10  4/9  Partially attributable to operative blood losses   continue to trend daily CBC, keep active type and screen    #dvt ppx sqh   #dispo has auth till 4/23

## 2024-04-13 LAB
ANION GAP SERPL CALC-SCNC: 8 MMOL/L — SIGNIFICANT CHANGE UP (ref 5–17)
BUN SERPL-MCNC: 16 MG/DL — SIGNIFICANT CHANGE UP (ref 7–23)
CALCIUM SERPL-MCNC: 8.8 MG/DL — SIGNIFICANT CHANGE UP (ref 8.4–10.5)
CHLORIDE SERPL-SCNC: 96 MMOL/L — SIGNIFICANT CHANGE UP (ref 96–108)
CO2 SERPL-SCNC: 27 MMOL/L — SIGNIFICANT CHANGE UP (ref 22–31)
CREAT SERPL-MCNC: 0.74 MG/DL — SIGNIFICANT CHANGE UP (ref 0.5–1.3)
EGFR: 97 ML/MIN/1.73M2 — SIGNIFICANT CHANGE UP
GLUCOSE BLDC GLUCOMTR-MCNC: 118 MG/DL — HIGH (ref 70–99)
GLUCOSE BLDC GLUCOMTR-MCNC: 135 MG/DL — HIGH (ref 70–99)
GLUCOSE BLDC GLUCOMTR-MCNC: 382 MG/DL — HIGH (ref 70–99)
GLUCOSE BLDC GLUCOMTR-MCNC: 88 MG/DL — SIGNIFICANT CHANGE UP (ref 70–99)
GLUCOSE SERPL-MCNC: 128 MG/DL — HIGH (ref 70–99)
MAGNESIUM SERPL-MCNC: 1.9 MG/DL — SIGNIFICANT CHANGE UP (ref 1.6–2.6)
PHOSPHATE SERPL-MCNC: 3.6 MG/DL — SIGNIFICANT CHANGE UP (ref 2.5–4.5)
POTASSIUM SERPL-MCNC: 4.4 MMOL/L — SIGNIFICANT CHANGE UP (ref 3.5–5.3)
POTASSIUM SERPL-SCNC: 4.4 MMOL/L — SIGNIFICANT CHANGE UP (ref 3.5–5.3)
SODIUM SERPL-SCNC: 131 MMOL/L — LOW (ref 135–145)

## 2024-04-13 PROCEDURE — 99233 SBSQ HOSP IP/OBS HIGH 50: CPT

## 2024-04-13 RX ORDER — ELECTROLYTE SOLUTION,INJ
1 VIAL (ML) INTRAVENOUS
Refills: 0 | Status: DISCONTINUED | OUTPATIENT
Start: 2024-04-13 | End: 2024-04-13

## 2024-04-13 RX ORDER — I.V. FAT EMULSION 20 G/100ML
0.79 EMULSION INTRAVENOUS
Qty: 50 | Refills: 0 | Status: DISCONTINUED | OUTPATIENT
Start: 2024-04-13 | End: 2024-04-13

## 2024-04-13 RX ADMIN — HEPARIN SODIUM 5000 UNIT(S): 5000 INJECTION INTRAVENOUS; SUBCUTANEOUS at 22:54

## 2024-04-13 RX ADMIN — GABAPENTIN 300 MILLIGRAM(S): 400 CAPSULE ORAL at 22:54

## 2024-04-13 RX ADMIN — I.V. FAT EMULSION 20.83 GM/KG/DAY: 20 EMULSION INTRAVENOUS at 18:54

## 2024-04-13 RX ADMIN — Medication 1 EACH: at 18:53

## 2024-04-13 RX ADMIN — METHADONE HYDROCHLORIDE 190 MILLIGRAM(S): 40 TABLET ORAL at 12:17

## 2024-04-13 RX ADMIN — GABAPENTIN 300 MILLIGRAM(S): 400 CAPSULE ORAL at 13:58

## 2024-04-13 RX ADMIN — Medication 30 MILLIGRAM(S): at 05:09

## 2024-04-13 RX ADMIN — HEPARIN SODIUM 5000 UNIT(S): 5000 INJECTION INTRAVENOUS; SUBCUTANEOUS at 13:58

## 2024-04-13 RX ADMIN — Medication 1000 MILLIGRAM(S): at 12:17

## 2024-04-13 RX ADMIN — Medication 1000 MILLIGRAM(S): at 23:26

## 2024-04-13 RX ADMIN — GABAPENTIN 300 MILLIGRAM(S): 400 CAPSULE ORAL at 05:09

## 2024-04-13 RX ADMIN — Medication 1000 MILLIGRAM(S): at 17:25

## 2024-04-13 RX ADMIN — CHLORHEXIDINE GLUCONATE 1 APPLICATION(S): 213 SOLUTION TOPICAL at 05:09

## 2024-04-13 RX ADMIN — HEPARIN SODIUM 5000 UNIT(S): 5000 INJECTION INTRAVENOUS; SUBCUTANEOUS at 05:09

## 2024-04-13 NOTE — PROGRESS NOTE ADULT - ASSESSMENT
68y M with PMHx of methadone use (for opioid use), HCV (shown on labs) and PSHx of R inguinal hernia repair open (20+yrs ago) and R upper arm shrapnel removal (40yrs ago) presented to [facility] with 5d hx of worsening abdominal pain, 1 episode of bloody BM at onset of symptoms, and constipation (last flatus and BM 4d ago). Upon presentation patient was febrile 100.4 (rectal), tachycardic 104, and hypertensive 157/104. 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 Patient was transferred to [facility] surgery for further management of cecal volvulous. Pt is now s/p exlap, R-hemicolectomy, side to side stapled anastamosis (3/30).    CLD/TPN  SQH/SCD  OOBA/IS  methadone  AM BMPs qod  Dispo: SHERINE has auth for [facility] til 4/23

## 2024-04-13 NOTE — PROGRESS NOTE ADULT - SUBJECTIVE AND OBJECTIVE BOX
Patient is a 68y old  Male who presents with a chief complaint of Abdominal Pain (13 Apr 2024 06:22)      INTERVAL HPI/OVERNIGHT EVENTS: offers no new complaints; current symptoms resolving    MEDICATIONS  (STANDING):  acetaminophen     Tablet .. 1000 milliGRAM(s) Oral every 6 hours  chlorhexidine 2% Cloths 1 Application(s) Topical <User Schedule>  gabapentin 300 milliGRAM(s) Oral every 8 hours  heparin   Injectable 5000 Unit(s) SubCutaneous every 8 hours  influenza  Vaccine (HIGH DOSE) 0.7 milliLiter(s) IntraMuscular once  lipid, fat emulsion (Fish Oil and Plant Based) 20% Infusion 0.7874 Gm/kG/Day (20.83 mL/Hr) IV Continuous <Continuous>  methadone  Concentrate 190 milliGRAM(s) Oral daily  NIFEdipine XL 30 milliGRAM(s) Oral daily  Parenteral Nutrition - Adult 1 Each (63 mL/Hr) TPN Continuous <Continuous>  Parenteral Nutrition - Adult 1 Each (63 mL/Hr) TPN Continuous <Continuous>    MEDICATIONS  (PRN):  naloxone Injectable 0.1 milliGRAM(s) IV Push every 3 minutes PRN For ANY of the following changes in patient status:  A. RR LESS THAN 10 breaths per minute, B. Oxygen saturation LESS THAN 90%, C. Sedation score of 6  ondansetron Injectable 4 milliGRAM(s) IV Push every 6 hours PRN Nausea and/or Vomiting  sodium chloride 0.9% lock flush 10 milliLiter(s) IV Push every 1 hour PRN Pre/post blood products, medications, blood draw, and to maintain line patency      __________________________________________________  REVIEW OF SYSTEMS:    CONSTITUTIONAL: No fever,   EYES: no acute visual disturbances  NECK: No pain or stiffness  RESPIRATORY: No cough; No shortness of breath  CARDIOVASCULAR: No chest pain, no palpitations  GASTROINTESTINAL: No pain. No nausea or vomiting; No diarrhea   NEUROLOGICAL: No headache or numbness, no tremors  MUSCULOSKELETAL: No joint pain, no muscle pain  GENITOURINARY: no dysuria, no frequency, no hesitancy  PSYCHIATRY: no depression , no anxiety  ALL OTHER  ROS negative        Vital Signs Last 24 Hrs  T(C): 36.4 (13 Apr 2024 08:38), Max: 36.7 (12 Apr 2024 17:33)  T(F): 97.6 (13 Apr 2024 08:38), Max: 98 (12 Apr 2024 17:33)  HR: 68 (13 Apr 2024 10:01) (68 - 80)  BP: 106/64 (13 Apr 2024 10:01) (106/58 - 149/74)  BP(mean): 104 (13 Apr 2024 04:47) (104 - 113)  RR: 18 (13 Apr 2024 10:01) (16 - 18)  SpO2: 98% (13 Apr 2024 10:01) (94% - 100%)    Parameters below as of 13 Apr 2024 10:01  Patient On (Oxygen Delivery Method): room air        ________________________________________________  PHYSICAL EXAM:  GENERAL: NAD  HEENT: Normocephalic;  conjunctivae and sclerae clear; moist mucous membranes;   NECK : supple  CHEST/LUNG: Clear to auscultation bilaterally with good air entry   HEART: S1 S2  regular; no murmurs, gallops or rubs  ABDOMEN: Soft, Nontender, Nondistended; Bowel sounds present, in binder   EXTREMITIES: no cyanosis; no edema; no calf tenderness  SKIN: warm and dry; no rash  NERVOUS SYSTEM:  Awake and alert; Oriented  to place, person and time ; no new deficits    _________________________________________________  LABS:    04-13    131<L>  |  96  |  16  ----------------------------<  128<H>  4.4   |  27  |  0.74    Ca    8.8      13 Apr 2024 05:30  Phos  3.6     04-13  Mg     1.9     04-13        Urinalysis Basic - ( 13 Apr 2024 05:30 )    Color: x / Appearance: x / SG: x / pH: x  Gluc: 128 mg/dL / Ketone: x  / Bili: x / Urobili: x   Blood: x / Protein: x / Nitrite: x   Leuk Esterase: x / RBC: x / WBC x   Sq Epi: x / Non Sq Epi: x / Bacteria: x      CAPILLARY BLOOD GLUCOSE      POCT Blood Glucose.: 118 mg/dL (13 Apr 2024 11:42)  POCT Blood Glucose.: 135 mg/dL (13 Apr 2024 06:26)  POCT Blood Glucose.: 88 mg/dL (12 Apr 2024 17:31)        RADIOLOGY & ADDITIONAL TESTS:      Plan of care was discussed with patient and /or primary care giver; all questions and concerns were addressed and care was aligned with patient's wishes.

## 2024-04-13 NOTE — PROGRESS NOTE ADULT - ASSESSMENT
68y M with PMHx of methadone use (for unknown pain disorder) and PSHx of R inguinal hernia repair open (20+yrs ago) and R upper arm shrapnel removal (40yrs ago) presented to [facility] with 5d hx of worsening abdominal pain, 1 episode of bloody BM at onset of symptoms, and constipation. Pt admitted to surgery for further management of cecal volvulus     #cecal volvulus   #Small bowel obstruction   #post op state   #sepsis on admission   #Ileus  sirs 2/4  febrile 100.4 (rectal), tachycardic 104, on admission  s/p rocephin and flagyl 1 x dose and cefotetan 3 doses post op   CT A/P findings of Cecal volvulus resulting in a distal small bowel obstruction.  s/p exlap, R-hemicolectomy, side to side stapled anastomosis postop abdominal XR shows dilated loops of bowel consistent with ileus  OOTB to chair   Encourage ambulation ; Encourage incentive spirometry - patient reports issues using IS due to presence of TMJ and history of jaw surgery  s/p PICC on 4/5  and started on TPN  s/p abd xray 4/8 which shows improvement -- started on ice chips -CLD   diet will be advanced per primary recs     #methadone dependance   restarted on home dose - and started on oxy for pain control   pain management per primary     #hypomagnesemia   replete as needed    #HCV  is HCV positive (both RNA and antigen)  CT scan showed small amount of perihepatic ascites, but no comment on cirrhosis.    should have outpatient follow up    #thrombocytopenia   plts in 80's- 120s lateral   will ctm   dvt ppx per primary team.  Monitor for bleeding   please repeat labs     #Severe protein calorie malnutrition  - appreciate input from nutrition  - plan as above     #Hypertension  - has been having intermittently elevated systolic blood pressures  - not on any medications outpatient  - started Nifedipine XL 30 mg daily 4/9-- monitor BP if persistently  < 120 -- can dc medication     #acute blood loss anemia   Hgb downtrended from time of admission  Hemoglobin: 11.7 on admission -- hgb 10  4/9-- recommend repeating labs biweekly while inpt   Partially attributable to operative blood losses   continue to trend daily CBC, keep active type and screen    #dvt ppx sqh   #dispo has auth till 4/23

## 2024-04-13 NOTE — PROGRESS NOTE ADULT - SUBJECTIVE AND OBJECTIVE BOX
Overnight events:       POD#    SUBJECTIVE:      MEDICATIONS  (STANDING):  acetaminophen     Tablet .. 1000 milliGRAM(s) Oral every 6 hours  chlorhexidine 2% Cloths 1 Application(s) Topical <User Schedule>  gabapentin 300 milliGRAM(s) Oral every 8 hours  heparin   Injectable 5000 Unit(s) SubCutaneous every 8 hours  influenza  Vaccine (HIGH DOSE) 0.7 milliLiter(s) IntraMuscular once  lipid, fat emulsion (Fish Oil and Plant Based) 20% Infusion 0.7874 Gm/kG/Day (20.8 mL/Hr) IV Continuous <Continuous>  methadone  Concentrate 190 milliGRAM(s) Oral daily  NIFEdipine XL 30 milliGRAM(s) Oral daily  Parenteral Nutrition - Adult 1 Each (63 mL/Hr) TPN Continuous <Continuous>    MEDICATIONS  (PRN):  naloxone Injectable 0.1 milliGRAM(s) IV Push every 3 minutes PRN For ANY of the following changes in patient status:  A. RR LESS THAN 10 breaths per minute, B. Oxygen saturation LESS THAN 90%, C. Sedation score of 6  ondansetron Injectable 4 milliGRAM(s) IV Push every 6 hours PRN Nausea and/or Vomiting  sodium chloride 0.9% lock flush 10 milliLiter(s) IV Push every 1 hour PRN Pre/post blood products, medications, blood draw, and to maintain line patency      Vital Signs Last 24 Hrs  T(C): 36.6 (13 Apr 2024 04:47), Max: 36.9 (12 Apr 2024 08:25)  T(F): 97.9 (13 Apr 2024 04:47), Max: 98.5 (12 Apr 2024 08:25)  HR: 72 (13 Apr 2024 04:47) (68 - 82)  BP: 149/74 (13 Apr 2024 04:47) (127/98 - 149/74)  BP(mean): 104 (13 Apr 2024 04:47) (104 - 113)  RR: 18 (13 Apr 2024 04:47) (16 - 18)  SpO2: 100% (13 Apr 2024 04:47) (94% - 100%)    Parameters below as of 13 Apr 2024 04:47  Patient On (Oxygen Delivery Method): room air        Physical Exam:  General: NAD, resting comfortably in bed  Pulmonary: Nonlabored breathing, no respiratory distress  Cardiovascular: NSR  Abdominal: soft, NT/ND  Extremities: WWP, normal strength  Neuro: A/O x 3, CNs II-XII grossly intact, no focal deficits, normal motor/sensation  Pulses: palpable distal pulses    I&O's Summary    11 Apr 2024 07:01  -  12 Apr 2024 07:00  --------------------------------------------------------  IN: 1656.4 mL / OUT: 1200 mL / NET: 456.4 mL    12 Apr 2024 07:01  -  13 Apr 2024 06:23  --------------------------------------------------------  IN: 671.6 mL / OUT: 1400 mL / NET: -728.4 mL        LABS:              CAPILLARY BLOOD GLUCOSE      POCT Blood Glucose.: 88 mg/dL (12 Apr 2024 17:31)  POCT Blood Glucose.: 164 mg/dL (12 Apr 2024 11:36)        RADIOLOGY & ADDITIONAL STUDIES:   Overnight events: No acute overnight events.       POD# 14  exlap, R-hemicolectomy    SUBJECTIVE: Patient seen at bedside with chief resident. Patient denies any nausea or vomiting. Reports denies further bowel movements.      MEDICATIONS  (STANDING):  acetaminophen     Tablet .. 1000 milliGRAM(s) Oral every 6 hours  chlorhexidine 2% Cloths 1 Application(s) Topical <User Schedule>  gabapentin 300 milliGRAM(s) Oral every 8 hours  heparin   Injectable 5000 Unit(s) SubCutaneous every 8 hours  influenza  Vaccine (HIGH DOSE) 0.7 milliLiter(s) IntraMuscular once  lipid, fat emulsion (Fish Oil and Plant Based) 20% Infusion 0.7874 Gm/kG/Day (20.8 mL/Hr) IV Continuous <Continuous>  methadone  Concentrate 190 milliGRAM(s) Oral daily  NIFEdipine XL 30 milliGRAM(s) Oral daily  Parenteral Nutrition - Adult 1 Each (63 mL/Hr) TPN Continuous <Continuous>    MEDICATIONS  (PRN):  naloxone Injectable 0.1 milliGRAM(s) IV Push every 3 minutes PRN For ANY of the following changes in patient status:  A. RR LESS THAN 10 breaths per minute, B. Oxygen saturation LESS THAN 90%, C. Sedation score of 6  ondansetron Injectable 4 milliGRAM(s) IV Push every 6 hours PRN Nausea and/or Vomiting  sodium chloride 0.9% lock flush 10 milliLiter(s) IV Push every 1 hour PRN Pre/post blood products, medications, blood draw, and to maintain line patency      Vital Signs Last 24 Hrs  T(C): 36.6 (13 Apr 2024 04:47), Max: 36.9 (12 Apr 2024 08:25)  T(F): 97.9 (13 Apr 2024 04:47), Max: 98.5 (12 Apr 2024 08:25)  HR: 72 (13 Apr 2024 04:47) (68 - 82)  BP: 149/74 (13 Apr 2024 04:47) (127/98 - 149/74)  BP(mean): 104 (13 Apr 2024 04:47) (104 - 113)  RR: 18 (13 Apr 2024 04:47) (16 - 18)  SpO2: 100% (13 Apr 2024 04:47) (94% - 100%)    Parameters below as of 13 Apr 2024 04:47  Patient On (Oxygen Delivery Method): room air        Physical Exam:  General: NAD, resting comfortably in bed  Pulmonary: Nonlabored breathing, no respiratory distress  Cardiovascular: NSR  Abdominal: soft, mildly distended, improvement form yesterday, Appropriate incisional tenderness   Extremities: WWP, normal strength  Neuro: A/O x 3, CNs II-XII grossly intact,    I&O's Summary    11 Apr 2024 07:01  -  12 Apr 2024 07:00  --------------------------------------------------------  IN: 1656.4 mL / OUT: 1200 mL / NET: 456.4 mL    12 Apr 2024 07:01  -  13 Apr 2024 06:23  --------------------------------------------------------  IN: 671.6 mL / OUT: 1400 mL / NET: -728.4 mL        LABS:              CAPILLARY BLOOD GLUCOSE      POCT Blood Glucose.: 88 mg/dL (12 Apr 2024 17:31)  POCT Blood Glucose.: 164 mg/dL (12 Apr 2024 11:36)        RADIOLOGY & ADDITIONAL STUDIES:

## 2024-04-14 LAB
GLUCOSE BLDC GLUCOMTR-MCNC: 100 MG/DL — HIGH (ref 70–99)
GLUCOSE BLDC GLUCOMTR-MCNC: 122 MG/DL — HIGH (ref 70–99)
GLUCOSE BLDC GLUCOMTR-MCNC: 126 MG/DL — HIGH (ref 70–99)

## 2024-04-14 PROCEDURE — 99233 SBSQ HOSP IP/OBS HIGH 50: CPT

## 2024-04-14 RX ORDER — MAGNESIUM SULFATE 500 MG/ML
1 VIAL (ML) INJECTION ONCE
Refills: 0 | Status: DISCONTINUED | OUTPATIENT
Start: 2024-04-14 | End: 2024-04-14

## 2024-04-14 RX ORDER — I.V. FAT EMULSION 20 G/100ML
0.79 EMULSION INTRAVENOUS
Qty: 50 | Refills: 0 | Status: DISCONTINUED | OUTPATIENT
Start: 2024-04-14 | End: 2024-04-14

## 2024-04-14 RX ORDER — ACETAMINOPHEN 500 MG
1000 TABLET ORAL ONCE
Refills: 0 | Status: COMPLETED | OUTPATIENT
Start: 2024-04-14 | End: 2024-04-14

## 2024-04-14 RX ORDER — ELECTROLYTE SOLUTION,INJ
1 VIAL (ML) INTRAVENOUS
Refills: 0 | Status: DISCONTINUED | OUTPATIENT
Start: 2024-04-14 | End: 2024-04-14

## 2024-04-14 RX ADMIN — GABAPENTIN 300 MILLIGRAM(S): 400 CAPSULE ORAL at 06:09

## 2024-04-14 RX ADMIN — HEPARIN SODIUM 5000 UNIT(S): 5000 INJECTION INTRAVENOUS; SUBCUTANEOUS at 13:45

## 2024-04-14 RX ADMIN — Medication 1000 MILLIGRAM(S): at 00:00

## 2024-04-14 RX ADMIN — GABAPENTIN 300 MILLIGRAM(S): 400 CAPSULE ORAL at 21:58

## 2024-04-14 RX ADMIN — I.V. FAT EMULSION 20.83 GM/KG/DAY: 20 EMULSION INTRAVENOUS at 18:33

## 2024-04-14 RX ADMIN — Medication 1000 MILLIGRAM(S): at 17:12

## 2024-04-14 RX ADMIN — CHLORHEXIDINE GLUCONATE 1 APPLICATION(S): 213 SOLUTION TOPICAL at 06:09

## 2024-04-14 RX ADMIN — Medication 400 MILLIGRAM(S): at 16:12

## 2024-04-14 RX ADMIN — Medication 1000 MILLIGRAM(S): at 21:58

## 2024-04-14 RX ADMIN — HEPARIN SODIUM 5000 UNIT(S): 5000 INJECTION INTRAVENOUS; SUBCUTANEOUS at 21:59

## 2024-04-14 RX ADMIN — Medication 1 EACH: at 18:33

## 2024-04-14 RX ADMIN — ONDANSETRON 4 MILLIGRAM(S): 8 TABLET, FILM COATED ORAL at 12:07

## 2024-04-14 RX ADMIN — Medication 30 MILLIGRAM(S): at 06:09

## 2024-04-14 RX ADMIN — GABAPENTIN 300 MILLIGRAM(S): 400 CAPSULE ORAL at 13:45

## 2024-04-14 RX ADMIN — Medication 1000 MILLIGRAM(S): at 06:40

## 2024-04-14 RX ADMIN — Medication 1000 MILLIGRAM(S): at 22:30

## 2024-04-14 RX ADMIN — Medication 1000 MILLIGRAM(S): at 06:09

## 2024-04-14 RX ADMIN — HEPARIN SODIUM 5000 UNIT(S): 5000 INJECTION INTRAVENOUS; SUBCUTANEOUS at 06:09

## 2024-04-14 RX ADMIN — METHADONE HYDROCHLORIDE 190 MILLIGRAM(S): 40 TABLET ORAL at 12:07

## 2024-04-14 NOTE — PROGRESS NOTE ADULT - SUBJECTIVE AND OBJECTIVE BOX
Overnight events:       POD#    SUBJECTIVE:      MEDICATIONS  (STANDING):  acetaminophen     Tablet .. 1000 milliGRAM(s) Oral every 6 hours  chlorhexidine 2% Cloths 1 Application(s) Topical <User Schedule>  gabapentin 300 milliGRAM(s) Oral every 8 hours  heparin   Injectable 5000 Unit(s) SubCutaneous every 8 hours  influenza  Vaccine (HIGH DOSE) 0.7 milliLiter(s) IntraMuscular once  lipid, fat emulsion (Fish Oil and Plant Based) 20% Infusion 0.7874 Gm/kG/Day (20.83 mL/Hr) IV Continuous <Continuous>  methadone  Concentrate 190 milliGRAM(s) Oral daily  NIFEdipine XL 30 milliGRAM(s) Oral daily  Parenteral Nutrition - Adult 1 Each (63 mL/Hr) TPN Continuous <Continuous>    MEDICATIONS  (PRN):  naloxone Injectable 0.1 milliGRAM(s) IV Push every 3 minutes PRN For ANY of the following changes in patient status:  A. RR LESS THAN 10 breaths per minute, B. Oxygen saturation LESS THAN 90%, C. Sedation score of 6  ondansetron Injectable 4 milliGRAM(s) IV Push every 6 hours PRN Nausea and/or Vomiting  sodium chloride 0.9% lock flush 10 milliLiter(s) IV Push every 1 hour PRN Pre/post blood products, medications, blood draw, and to maintain line patency      Vital Signs Last 24 Hrs  T(C): 36.4 (13 Apr 2024 08:38), Max: 36.4 (13 Apr 2024 08:38)  T(F): 97.6 (13 Apr 2024 08:38), Max: 97.6 (13 Apr 2024 08:38)  HR: 84 (14 Apr 2024 05:00) (68 - 86)  BP: 154/77 (14 Apr 2024 05:00) (106/58 - 154/77)  BP(mean): 106 (14 Apr 2024 05:00) (105 - 107)  RR: 18 (14 Apr 2024 05:00) (18 - 18)  SpO2: 96% (14 Apr 2024 05:00) (96% - 100%)    Parameters below as of 14 Apr 2024 05:00  Patient On (Oxygen Delivery Method): room air        Physical Exam:  General: NAD, resting comfortably in bed  Pulmonary: Nonlabored breathing, no respiratory distress  Cardiovascular: NSR  Abdominal: soft, NT/ND  Extremities: WWP, normal strength  Neuro: A/O x 3, CNs II-XII grossly intact, no focal deficits, normal motor/sensation  Pulses: palpable distal pulses    I&O's Summary    12 Apr 2024 07:01  -  13 Apr 2024 07:00  --------------------------------------------------------  IN: 671.6 mL / OUT: 1400 mL / NET: -728.4 mL    13 Apr 2024 07:01  -  14 Apr 2024 06:54  --------------------------------------------------------  IN: 1164 mL / OUT: 1050 mL / NET: 114 mL        LABS:    04-13    131<L>  |  96  |  16  ----------------------------<  128<H>  4.4   |  27  |  0.74    Ca    8.8      13 Apr 2024 05:30  Phos  3.6     04-13  Mg     1.9     04-13        Urinalysis Basic - ( 13 Apr 2024 05:30 )    Color: x / Appearance: x / SG: x / pH: x  Gluc: 128 mg/dL / Ketone: x  / Bili: x / Urobili: x   Blood: x / Protein: x / Nitrite: x   Leuk Esterase: x / RBC: x / WBC x   Sq Epi: x / Non Sq Epi: x / Bacteria: x      CAPILLARY BLOOD GLUCOSE      POCT Blood Glucose.: 382 mg/dL (13 Apr 2024 21:09)  POCT Blood Glucose.: 88 mg/dL (13 Apr 2024 17:38)  POCT Blood Glucose.: 118 mg/dL (13 Apr 2024 11:42)        RADIOLOGY & ADDITIONAL STUDIES:   Overnight events: No acute overnight events.       POD# 3/30: exlap, R antoni, side to side stapled anastomosis, penrose x1 midline , IVF 2L, , Cefotetan     SUBJECTIVE: Patient seen at bedside with chief resident. Patient denies nausea or vomiting. Patient denies BM, but endorses mild flatus.       MEDICATIONS  (STANDING):  acetaminophen     Tablet .. 1000 milliGRAM(s) Oral every 6 hours  chlorhexidine 2% Cloths 1 Application(s) Topical <User Schedule>  gabapentin 300 milliGRAM(s) Oral every 8 hours  heparin   Injectable 5000 Unit(s) SubCutaneous every 8 hours  influenza  Vaccine (HIGH DOSE) 0.7 milliLiter(s) IntraMuscular once  lipid, fat emulsion (Fish Oil and Plant Based) 20% Infusion 0.7874 Gm/kG/Day (20.83 mL/Hr) IV Continuous <Continuous>  methadone  Concentrate 190 milliGRAM(s) Oral daily  NIFEdipine XL 30 milliGRAM(s) Oral daily  Parenteral Nutrition - Adult 1 Each (63 mL/Hr) TPN Continuous <Continuous>    MEDICATIONS  (PRN):  naloxone Injectable 0.1 milliGRAM(s) IV Push every 3 minutes PRN For ANY of the following changes in patient status:  A. RR LESS THAN 10 breaths per minute, B. Oxygen saturation LESS THAN 90%, C. Sedation score of 6  ondansetron Injectable 4 milliGRAM(s) IV Push every 6 hours PRN Nausea and/or Vomiting  sodium chloride 0.9% lock flush 10 milliLiter(s) IV Push every 1 hour PRN Pre/post blood products, medications, blood draw, and to maintain line patency      Vital Signs Last 24 Hrs  T(C): 36.4 (13 Apr 2024 08:38), Max: 36.4 (13 Apr 2024 08:38)  T(F): 97.6 (13 Apr 2024 08:38), Max: 97.6 (13 Apr 2024 08:38)  HR: 84 (14 Apr 2024 05:00) (68 - 86)  BP: 154/77 (14 Apr 2024 05:00) (106/58 - 154/77)  BP(mean): 106 (14 Apr 2024 05:00) (105 - 107)  RR: 18 (14 Apr 2024 05:00) (18 - 18)  SpO2: 96% (14 Apr 2024 05:00) (96% - 100%)    Parameters below as of 14 Apr 2024 05:00  Patient On (Oxygen Delivery Method): room air        Physical Exam:  General: NAD, resting comfortably in bed  Pulmonary: Nonlabored breathing, no respiratory distress  Cardiovascular: NSR  Abdominal: soft, mildly distended, but improving. Incision clean and dry   Extremities: WWP, normal strength  Neuro: A/O x 3, CNs II-XII grossly intact,     I&O's Summary    12 Apr 2024 07:01  -  13 Apr 2024 07:00  --------------------------------------------------------  IN: 671.6 mL / OUT: 1400 mL / NET: -728.4 mL    13 Apr 2024 07:01  -  14 Apr 2024 06:54  --------------------------------------------------------  IN: 1164 mL / OUT: 1050 mL / NET: 114 mL        LABS:    04-13    131<L>  |  96  |  16  ----------------------------<  128<H>  4.4   |  27  |  0.74    Ca    8.8      13 Apr 2024 05:30  Phos  3.6     04-13  Mg     1.9     04-13        Urinalysis Basic - ( 13 Apr 2024 05:30 )    Color: x / Appearance: x / SG: x / pH: x  Gluc: 128 mg/dL / Ketone: x  / Bili: x / Urobili: x   Blood: x / Protein: x / Nitrite: x   Leuk Esterase: x / RBC: x / WBC x   Sq Epi: x / Non Sq Epi: x / Bacteria: x      CAPILLARY BLOOD GLUCOSE      POCT Blood Glucose.: 382 mg/dL (13 Apr 2024 21:09)  POCT Blood Glucose.: 88 mg/dL (13 Apr 2024 17:38)  POCT Blood Glucose.: 118 mg/dL (13 Apr 2024 11:42)        RADIOLOGY & ADDITIONAL STUDIES:

## 2024-04-14 NOTE — PROGRESS NOTE ADULT - ASSESSMENT
68y M with PMHx of methadone use (for opioid use), HCV (shown on labs) and PSHx of R inguinal hernia repair open (20+yrs ago) and R upper arm shrapnel removal (40yrs ago) presented to [facility] with 5d hx of worsening abdominal pain, 1 episode of bloody BM at onset of symptoms, and constipation (last flatus and BM 4d ago). Upon presentation patient was febrile 100.4 (rectal), tachycardic 104, and hypertensive 157/104. 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 Patient was transferred to [facility] surgery for further management of cecal volvulous. Pt is now s/p exlap, R-hemicolectomy, side to side stapled anastamosis (3/30).    CLD/TPN  SQH/SCD  OOBA/IS  methadone  AM BMPs qod  Dispo: SHERINE has auth for [facility] til 4/23  68y M with PMHx of methadone use (for opioid use), HCV (shown on labs) and PSHx of R inguinal hernia repair open (20+yrs ago) and R upper arm shrapnel removal (40yrs ago) presented to [facility] with 5d hx of worsening abdominal pain, 1 episode of bloody BM at onset of symptoms, and constipation (last flatus and BM 4d ago). Upon presentation patient was febrile 100.4 (rectal), tachycardic 104, and hypertensive 157/104. 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 Patient was transferred to [facility] surgery for further management of cecal volvulous. Pt is now s/p exlap, R-hemicolectomy, side to side stapled anastamosis (3/30).    CLD/TPN  SQH/SCD  OOBA/IS  methadone  AM BMPs qod  Dispo: SHERINE has auth for [facility] til 4/23     Plans to be discussed with attending and chief resident for finalization.

## 2024-04-14 NOTE — PROGRESS NOTE ADULT - ASSESSMENT
68y M with PMHx of methadone use (for unknown pain disorder) and PSHx of R inguinal hernia repair open (20+yrs ago) and R upper arm shrapnel removal (40yrs ago) presented to [facility] with 5d hx of worsening abdominal pain, 1 episode of bloody BM at onset of symptoms, and constipation. Pt admitted to surgery for further management of cecal volvulus     #cecal volvulus   #Small bowel obstruction   #post op state   #sepsis on admission   #Ileus  sirs 2/4  febrile 100.4 (rectal), tachycardic 104, on admission  s/p rocephin and flagyl 1 x dose and cefotetan 3 doses post op   CT A/P findings of Cecal volvulus resulting in a distal small bowel obstruction.  s/p exlap, R-hemicolectomy, side to side stapled anastomosis postop abdominal XR shows dilated loops of bowel consistent with ileus  OOTB to chair   Encourage ambulation ; Encourage incentive spirometry - patient reports issues using IS due to presence of TMJ and history of jaw surgery  s/p PICC on 4/5  and started on TPN  s/p abd xray 4/8 which shows improvement -- started on ice chips -CLD   diet will be advanced per primary recs     #methadone dependance   restarted on home dose - and started on oxy for pain control   pain management per primary     #hypomagnesemia   replete as needed    #HCV  is HCV positive (both RNA and antigen)  CT scan showed small amount of perihepatic ascites, but no comment on cirrhosis.    should have outpatient follow up    #thrombocytopenia   plts in 80's- 120s lateral   will ctm   dvt ppx per primary team.  Monitor for bleeding   please repeat labs     #Severe protein calorie malnutrition  - appreciate input from nutrition  - plan as above     #Hypertension  - has been having intermittently elevated systolic blood pressures  - not on any medications outpatient  - started Nifedipine XL 30 mg daily 4/9-- monitor BP if persistently  < 120 -- can dc medication     #acute blood loss anemia   Hgb downtrended from time of admission  Hemoglobin: 11.7 on admission -- hgb 10  4/9-- recommend repeating labs biweekly while inpt   Partially attributable to operative blood losses   continue to trend daily CBC, keep active type and screen    #dvt ppx sqh   #dispo has auth till 4/23

## 2024-04-14 NOTE — PROGRESS NOTE ADULT - SUBJECTIVE AND OBJECTIVE BOX
Patient is a 68y old  Male who presents with a chief complaint of Abdominal Pain (14 Apr 2024 06:54)      INTERVAL HPI/OVERNIGHT EVENTS: offers no new complaints; current symptoms resolving    MEDICATIONS  (STANDING):  acetaminophen     Tablet .. 1000 milliGRAM(s) Oral every 6 hours  chlorhexidine 2% Cloths 1 Application(s) Topical <User Schedule>  gabapentin 300 milliGRAM(s) Oral every 8 hours  heparin   Injectable 5000 Unit(s) SubCutaneous every 8 hours  influenza  Vaccine (HIGH DOSE) 0.7 milliLiter(s) IntraMuscular once  lipid, fat emulsion (Fish Oil and Plant Based) 20% Infusion 0.7874 Gm/kG/Day (20.83 mL/Hr) IV Continuous <Continuous>  methadone  Concentrate 190 milliGRAM(s) Oral daily  NIFEdipine XL 30 milliGRAM(s) Oral daily  Parenteral Nutrition - Adult 1 Each (63 mL/Hr) TPN Continuous <Continuous>  Parenteral Nutrition - Adult 1 Each (63 mL/Hr) TPN Continuous <Continuous>    MEDICATIONS  (PRN):  naloxone Injectable 0.1 milliGRAM(s) IV Push every 3 minutes PRN For ANY of the following changes in patient status:  A. RR LESS THAN 10 breaths per minute, B. Oxygen saturation LESS THAN 90%, C. Sedation score of 6  ondansetron Injectable 4 milliGRAM(s) IV Push every 6 hours PRN Nausea and/or Vomiting  sodium chloride 0.9% lock flush 10 milliLiter(s) IV Push every 1 hour PRN Pre/post blood products, medications, blood draw, and to maintain line patency      __________________________________________________  REVIEW OF SYSTEMS:    CONSTITUTIONAL: No fever,   EYES: no acute visual disturbances  NECK: No pain or stiffness  RESPIRATORY: No cough; No shortness of breath  CARDIOVASCULAR: No chest pain, no palpitations  GASTROINTESTINAL: + soreness   NEUROLOGICAL: No headache or numbness, no tremors  MUSCULOSKELETAL: No joint pain, no muscle pain  GENITOURINARY: no dysuria, no frequency, no hesitancy  PSYCHIATRY: no depression , no anxiety  ALL OTHER  ROS negative        Vital Signs Last 24 Hrs  T(C): 36.7 (14 Apr 2024 09:16), Max: 36.7 (14 Apr 2024 09:16)  T(F): 98.1 (14 Apr 2024 09:16), Max: 98.1 (14 Apr 2024 09:16)  HR: 80 (14 Apr 2024 09:16) (77 - 86)  BP: 152/84 (14 Apr 2024 09:16) (119/65 - 154/77)  BP(mean): 106 (14 Apr 2024 05:00) (105 - 107)  RR: 18 (14 Apr 2024 09:16) (18 - 18)  SpO2: 100% (14 Apr 2024 09:16) (96% - 100%)    Parameters below as of 14 Apr 2024 09:16  Patient On (Oxygen Delivery Method): room air        ________________________________________________  PHYSICAL EXAM:  GENERAL: NAD  HEENT: Normocephalic;  conjunctivae and sclerae clear; moist mucous membranes;   NECK : supple  CHEST/LUNG: Clear to auscultation bilaterally with good air entry   HEART: S1 S2  regular; no murmurs, gallops or rubs  ABDOMEN: Soft, mildly tender, +distended; Bowel sounds present  EXTREMITIES: no cyanosis; no edema; no calf tenderness  SKIN: warm and dry; no rash  NERVOUS SYSTEM:  Awake and alert; Oriented  to place, person and time ; no new deficits    _________________________________________________  LABS:    04-13    131<L>  |  96  |  16  ----------------------------<  128<H>  4.4   |  27  |  0.74    Ca    8.8      13 Apr 2024 05:30  Phos  3.6     04-13  Mg     1.9     04-13        Urinalysis Basic - ( 13 Apr 2024 05:30 )    Color: x / Appearance: x / SG: x / pH: x  Gluc: 128 mg/dL / Ketone: x  / Bili: x / Urobili: x   Blood: x / Protein: x / Nitrite: x   Leuk Esterase: x / RBC: x / WBC x   Sq Epi: x / Non Sq Epi: x / Bacteria: x      CAPILLARY BLOOD GLUCOSE      POCT Blood Glucose.: 122 mg/dL (14 Apr 2024 07:50)  POCT Blood Glucose.: 382 mg/dL (13 Apr 2024 21:09)  POCT Blood Glucose.: 88 mg/dL (13 Apr 2024 17:38)  POCT Blood Glucose.: 118 mg/dL (13 Apr 2024 11:42)        RADIOLOGY & ADDITIONAL TESTS:      Plan of care was discussed with patient and /or primary care giver; all questions and concerns were addressed and care was aligned with patient's wishes.

## 2024-04-15 LAB
ANION GAP SERPL CALC-SCNC: 4 MMOL/L — LOW (ref 5–17)
BILIRUB DIRECT SERPL-MCNC: 0.2 MG/DL — SIGNIFICANT CHANGE UP (ref 0–0.3)
BILIRUB INDIRECT FLD-MCNC: 0.3 MG/DL — SIGNIFICANT CHANGE UP (ref 0.2–1)
BILIRUB SERPL-MCNC: 0.6 MG/DL — SIGNIFICANT CHANGE UP (ref 0.2–1.2)
BUN SERPL-MCNC: 14 MG/DL — SIGNIFICANT CHANGE UP (ref 7–23)
CALCIUM SERPL-MCNC: 9 MG/DL — SIGNIFICANT CHANGE UP (ref 8.4–10.5)
CHLORIDE SERPL-SCNC: 97 MMOL/L — SIGNIFICANT CHANGE UP (ref 96–108)
CO2 SERPL-SCNC: 30 MMOL/L — SIGNIFICANT CHANGE UP (ref 22–31)
CREAT SERPL-MCNC: 0.78 MG/DL — SIGNIFICANT CHANGE UP (ref 0.5–1.3)
EGFR: 96 ML/MIN/1.73M2 — SIGNIFICANT CHANGE UP
GLUCOSE BLDC GLUCOMTR-MCNC: 138 MG/DL — HIGH (ref 70–99)
GLUCOSE SERPL-MCNC: 101 MG/DL — HIGH (ref 70–99)
MAGNESIUM SERPL-MCNC: 2 MG/DL — SIGNIFICANT CHANGE UP (ref 1.6–2.6)
PHOSPHATE SERPL-MCNC: 3.6 MG/DL — SIGNIFICANT CHANGE UP (ref 2.5–4.5)
POTASSIUM SERPL-MCNC: 4.9 MMOL/L — SIGNIFICANT CHANGE UP (ref 3.5–5.3)
POTASSIUM SERPL-SCNC: 4.9 MMOL/L — SIGNIFICANT CHANGE UP (ref 3.5–5.3)
SODIUM SERPL-SCNC: 131 MMOL/L — LOW (ref 135–145)
TRIGL SERPL-MCNC: 59 MG/DL — SIGNIFICANT CHANGE UP

## 2024-04-15 PROCEDURE — 99233 SBSQ HOSP IP/OBS HIGH 50: CPT

## 2024-04-15 RX ORDER — I.V. FAT EMULSION 20 G/100ML
0.79 EMULSION INTRAVENOUS
Qty: 50 | Refills: 0 | Status: DISCONTINUED | OUTPATIENT
Start: 2024-04-15 | End: 2024-04-15

## 2024-04-15 RX ORDER — ELECTROLYTE SOLUTION,INJ
1 VIAL (ML) INTRAVENOUS
Refills: 0 | Status: DISCONTINUED | OUTPATIENT
Start: 2024-04-15 | End: 2024-04-15

## 2024-04-15 RX ADMIN — METHADONE HYDROCHLORIDE 190 MILLIGRAM(S): 40 TABLET ORAL at 12:28

## 2024-04-15 RX ADMIN — SODIUM CHLORIDE 10 MILLILITER(S): 9 INJECTION INTRAMUSCULAR; INTRAVENOUS; SUBCUTANEOUS at 18:56

## 2024-04-15 RX ADMIN — Medication 1000 MILLIGRAM(S): at 12:28

## 2024-04-15 RX ADMIN — Medication 1000 MILLIGRAM(S): at 05:57

## 2024-04-15 RX ADMIN — CHLORHEXIDINE GLUCONATE 1 APPLICATION(S): 213 SOLUTION TOPICAL at 05:52

## 2024-04-15 RX ADMIN — HEPARIN SODIUM 5000 UNIT(S): 5000 INJECTION INTRAVENOUS; SUBCUTANEOUS at 05:57

## 2024-04-15 RX ADMIN — Medication 1000 MILLIGRAM(S): at 06:30

## 2024-04-15 RX ADMIN — Medication 1 EACH: at 18:56

## 2024-04-15 RX ADMIN — GABAPENTIN 300 MILLIGRAM(S): 400 CAPSULE ORAL at 22:25

## 2024-04-15 RX ADMIN — HEPARIN SODIUM 5000 UNIT(S): 5000 INJECTION INTRAVENOUS; SUBCUTANEOUS at 14:30

## 2024-04-15 RX ADMIN — Medication 30 MILLIGRAM(S): at 05:57

## 2024-04-15 RX ADMIN — GABAPENTIN 300 MILLIGRAM(S): 400 CAPSULE ORAL at 15:36

## 2024-04-15 RX ADMIN — HEPARIN SODIUM 5000 UNIT(S): 5000 INJECTION INTRAVENOUS; SUBCUTANEOUS at 22:25

## 2024-04-15 RX ADMIN — I.V. FAT EMULSION 20.8 GM/KG/DAY: 20 EMULSION INTRAVENOUS at 18:56

## 2024-04-15 RX ADMIN — GABAPENTIN 300 MILLIGRAM(S): 400 CAPSULE ORAL at 05:57

## 2024-04-15 NOTE — PROGRESS NOTE ADULT - ASSESSMENT
68y M with PMHx of methadone use (for unknown pain disorder) and PSHx of R inguinal hernia repair open (20+yrs ago) and R upper arm shrapnel removal (40yrs ago) presented to [facility] with 5d hx of worsening abdominal pain, 1 episode of bloody BM at onset of symptoms, and constipation. Pt admitted to surgery for further management of cecal volvulus     #cecal volvulus   #Small bowel obstruction   #post op state   #sepsis on admission   #Ileus  sirs 2/4  febrile 100.4 (rectal), tachycardic 104, on admission  s/p rocephin and flagyl 1 x dose and cefotetan 3 doses post op   CT A/P findings of Cecal volvulus resulting in a distal small bowel obstruction.  s/p exlap, R-hemicolectomy, side to side stapled anastomosis postop abdominal XR shows dilated loops of bowel consistent with ileus  OOTB to chair   Encourage ambulation ; Encourage incentive spirometry - patient reports issues using IS due to presence of TMJ and history of jaw surgery  s/p PICC on 4/5  and started on TPN  s/p abd xray 4/8 which shows improvement -- started on CLD   diet will be advanced per primary recs     #methadone dependance   restarted on home dose - and started on oxy for pain control   pain management per primary     #hypomagnesemia   replete as needed    #HCV  is HCV positive (both RNA and antigen)  CT scan showed small amount of perihepatic ascites, but no comment on cirrhosis.    should have outpatient follow up    #thrombocytopenia   plts in 80's- 120s lateral   will ctm   dvt ppx per primary team.  Monitor for bleeding   please repeat labs     #Severe protein calorie malnutrition  - appreciate input from nutrition  - plan as above     #Hypertension  - has been having intermittently elevated systolic blood pressures  - not on any medications outpatient  - started Nifedipine XL 30 mg daily 4/9-- monitor BP if persistently  < 120 -- can dc medication     #acute blood loss anemia   Hgb downtrended from time of admission  Hemoglobin: 11.7 on admission -- hgb 10  4/9-- recommend repeating labs biweekly while inpt   Partially attributable to operative blood losses   continue to trend daily CBC, keep active type and screen    #dvt ppx sqh   #dispo has auth till 4/23

## 2024-04-15 NOTE — PROGRESS NOTE ADULT - SUBJECTIVE AND OBJECTIVE BOX
Patient is a 68y old  Male who presents with a chief complaint of Abdominal Pain (15 Apr 2024 06:46)      INTERVAL HPI/OVERNIGHT EVENTS: offers no new complaints; current symptoms resolving    MEDICATIONS  (STANDING):  acetaminophen     Tablet .. 1000 milliGRAM(s) Oral every 6 hours  chlorhexidine 2% Cloths 1 Application(s) Topical <User Schedule>  gabapentin 300 milliGRAM(s) Oral every 8 hours  heparin   Injectable 5000 Unit(s) SubCutaneous every 8 hours  influenza  Vaccine (HIGH DOSE) 0.7 milliLiter(s) IntraMuscular once  lipid, fat emulsion (Fish Oil and Plant Based) 20% Infusion 0.7874 Gm/kG/Day (20.8 mL/Hr) IV Continuous <Continuous>  methadone  Concentrate 190 milliGRAM(s) Oral daily  NIFEdipine XL 30 milliGRAM(s) Oral daily  Parenteral Nutrition - Adult 1 Each (63 mL/Hr) TPN Continuous <Continuous>  Parenteral Nutrition - Adult 1 Each (63 mL/Hr) TPN Continuous <Continuous>    MEDICATIONS  (PRN):  naloxone Injectable 0.1 milliGRAM(s) IV Push every 3 minutes PRN For ANY of the following changes in patient status:  A. RR LESS THAN 10 breaths per minute, B. Oxygen saturation LESS THAN 90%, C. Sedation score of 6  ondansetron Injectable 4 milliGRAM(s) IV Push every 6 hours PRN Nausea and/or Vomiting  sodium chloride 0.9% lock flush 10 milliLiter(s) IV Push every 1 hour PRN Pre/post blood products, medications, blood draw, and to maintain line patency      __________________________________________________  REVIEW OF SYSTEMS:    CONSTITUTIONAL: No fever,   EYES: no acute visual disturbances  NECK: No pain or stiffness  RESPIRATORY: No cough; No shortness of breath  CARDIOVASCULAR: No chest pain, no palpitations  GASTROINTESTINAL: + abdominal discomfort, No diarrhea   NEUROLOGICAL: No headache or numbness, no tremors  MUSCULOSKELETAL: No joint pain, no muscle pain  GENITOURINARY: no dysuria, no frequency, no hesitancy  PSYCHIATRY: no depression , no anxiety  ALL OTHER  ROS negative        Vital Signs Last 24 Hrs  T(C): 36.8 (15 Apr 2024 08:55), Max: 37.2 (14 Apr 2024 17:05)  T(F): 98.2 (15 Apr 2024 08:55), Max: 98.9 (14 Apr 2024 17:05)  HR: 89 (15 Apr 2024 08:55) (71 - 89)  BP: 145/87 (15 Apr 2024 08:55) (144/78 - 173/86)  BP(mean): 116 (15 Apr 2024 05:00) (104 - 117)  RR: 18 (15 Apr 2024 08:55) (18 - 18)  SpO2: 96% (15 Apr 2024 08:55) (94% - 98%)    Parameters below as of 15 Apr 2024 08:55  Patient On (Oxygen Delivery Method): room air        ________________________________________________  PHYSICAL EXAM:  GENERAL: NAD  HEENT: Normocephalic;  conjunctivae and sclerae clear; moist mucous membranes;   NECK : supple  CHEST/LUNG: Clear to auscultation bilaterally with good air entry   HEART: S1 S2  regular; no murmurs, gallops or rubs  ABDOMEN: Soft, mildly tender, +distended in binder ; Bowel sounds present  EXTREMITIES: no cyanosis; no edema; no calf tenderness  SKIN: warm and dry; no rash  NERVOUS SYSTEM:  Awake and alert; Oriented  to place, person and time ; no new deficits      _________________________________________________  LABS:    04-15    131<L>  |  97  |  14  ----------------------------<  101<H>  4.9   |  30  |  0.78    Ca    9.0      15 Apr 2024 05:30  Phos  3.6     04-15  Mg     2.0     04-15    TPro  x   /  Alb  x   /  TBili  0.6  /  DBili  0.2  /  AST  x   /  ALT  x   /  AlkPhos  x   04-15      Urinalysis Basic - ( 15 Apr 2024 05:30 )    Color: x / Appearance: x / SG: x / pH: x  Gluc: 101 mg/dL / Ketone: x  / Bili: x / Urobili: x   Blood: x / Protein: x / Nitrite: x   Leuk Esterase: x / RBC: x / WBC x   Sq Epi: x / Non Sq Epi: x / Bacteria: x      CAPILLARY BLOOD GLUCOSE      POCT Blood Glucose.: 138 mg/dL (15 Apr 2024 07:37)  POCT Blood Glucose.: 100 mg/dL (14 Apr 2024 17:54)  POCT Blood Glucose.: 126 mg/dL (14 Apr 2024 12:04)        RADIOLOGY & ADDITIONAL TESTS:      Plan of care was discussed with patient and /or primary care giver; all questions and concerns were addressed and care was aligned with patient's wishes.

## 2024-04-15 NOTE — PROGRESS NOTE ADULT - SUBJECTIVE AND OBJECTIVE BOX
Surgery Note    Pt comfortable without complaints, pain controlled. Passed gas.  Denies CP, SOB, N/V, numbness/tingling     Vital Signs Last 24 Hrs  T(C): 37.1 (04-15-24 @ 05:00), Max: 37.1 (04-15-24 @ 05:00)  T(F): 98.7 (04-15-24 @ 05:00), Max: 98.7 (04-15-24 @ 05:00)  HR: 71 (04-15-24 @ 05:00) (71 - 78)  BP: 158/95 (04-15-24 @ 05:00) (144/78 - 158/95)  BP(mean): 116 (04-15-24 @ 05:00) (104 - 116)  RR: 18 (04-15-24 @ 05:00) (18 - 18)  SpO2: 94% (04-15-24 @ 05:00) (94% - 94%)  I&O's Summary    13 Apr 2024 07:01  -  14 Apr 2024 07:00  --------------------------------------------------------  IN: 1164 mL / OUT: 1050 mL / NET: 114 mL    14 Apr 2024 07:01  -  15 Apr 2024 06:46  --------------------------------------------------------  IN: 1248 mL / OUT: 2160 mL / NET: -912 mL        General: Pt Alert and oriented, NAD  Abdomen mildly distended and nontender to palpation  Abdomen incision c/d/wnl        A/P  68y M with PMHx of methadone use (for opioid use), HCV (shown on labs) and PSHx of R inguinal hernia repair open (20+yrs ago) and R upper arm shrapnel removal (40yrs ago) presented to [facility] with 5d hx of worsening abdominal pain, 1 episode of bloody BM at onset of symptoms, and constipation (last flatus and BM 4d ago). Upon presentation patient was febrile 100.4 (rectal), tachycardic 104, and hypertensive 157/104. 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 Patient was transferred to [facility] surgery for further management of cecal volvulous. Pt is now s/p exlap, R-hemicolectomy, side to side stapled anastamosis (3/30).    CLD/TPN  SQH/SCD  OOBA/IS  methadone  AM BMPs qod  Dispo: SHERINE has auth for [facility] til 4/23     Plans to be discussed with attending and chief resident for finalization.

## 2024-04-16 PROCEDURE — 99233 SBSQ HOSP IP/OBS HIGH 50: CPT

## 2024-04-16 RX ORDER — I.V. FAT EMULSION 20 G/100ML
0.79 EMULSION INTRAVENOUS
Qty: 50 | Refills: 0 | Status: DISCONTINUED | OUTPATIENT
Start: 2024-04-16 | End: 2024-04-16

## 2024-04-16 RX ORDER — ELECTROLYTE SOLUTION,INJ
1 VIAL (ML) INTRAVENOUS
Refills: 0 | Status: DISCONTINUED | OUTPATIENT
Start: 2024-04-16 | End: 2024-04-16

## 2024-04-16 RX ORDER — METHADONE HYDROCHLORIDE 40 MG/1
190 TABLET ORAL EVERY 24 HOURS
Refills: 0 | Status: DISCONTINUED | OUTPATIENT
Start: 2024-04-17 | End: 2024-04-23

## 2024-04-16 RX ADMIN — Medication 1 EACH: at 18:00

## 2024-04-16 RX ADMIN — CHLORHEXIDINE GLUCONATE 1 APPLICATION(S): 213 SOLUTION TOPICAL at 05:42

## 2024-04-16 RX ADMIN — GABAPENTIN 300 MILLIGRAM(S): 400 CAPSULE ORAL at 23:15

## 2024-04-16 RX ADMIN — Medication 1000 MILLIGRAM(S): at 05:39

## 2024-04-16 RX ADMIN — HEPARIN SODIUM 5000 UNIT(S): 5000 INJECTION INTRAVENOUS; SUBCUTANEOUS at 13:42

## 2024-04-16 RX ADMIN — I.V. FAT EMULSION 20.8 GM/KG/DAY: 20 EMULSION INTRAVENOUS at 18:00

## 2024-04-16 RX ADMIN — Medication 1000 MILLIGRAM(S): at 00:20

## 2024-04-16 RX ADMIN — Medication 1000 MILLIGRAM(S): at 00:55

## 2024-04-16 RX ADMIN — GABAPENTIN 300 MILLIGRAM(S): 400 CAPSULE ORAL at 13:42

## 2024-04-16 RX ADMIN — HEPARIN SODIUM 5000 UNIT(S): 5000 INJECTION INTRAVENOUS; SUBCUTANEOUS at 05:39

## 2024-04-16 RX ADMIN — METHADONE HYDROCHLORIDE 190 MILLIGRAM(S): 40 TABLET ORAL at 12:27

## 2024-04-16 RX ADMIN — Medication 1000 MILLIGRAM(S): at 18:00

## 2024-04-16 RX ADMIN — Medication 1000 MILLIGRAM(S): at 23:15

## 2024-04-16 RX ADMIN — Medication 30 MILLIGRAM(S): at 05:42

## 2024-04-16 RX ADMIN — HEPARIN SODIUM 5000 UNIT(S): 5000 INJECTION INTRAVENOUS; SUBCUTANEOUS at 23:15

## 2024-04-16 RX ADMIN — GABAPENTIN 300 MILLIGRAM(S): 400 CAPSULE ORAL at 05:39

## 2024-04-16 RX ADMIN — Medication 1000 MILLIGRAM(S): at 12:26

## 2024-04-16 NOTE — PROGRESS NOTE ADULT - ASSESSMENT
68y M with PMHx of methadone use (for unknown pain disorder) and PSHx of R inguinal hernia repair open (20+yrs ago) and R upper arm shrapnel removal (40yrs ago) presented to [facility] with 5d hx of worsening abdominal pain, 1 episode of bloody BM at onset of symptoms, and constipation. Pt admitted to surgery for further management of cecal volvulus     #cecal volvulus   #Small bowel obstruction   #post op state   #sepsis on admission   #Ileus  sirs 2/4  febrile 100.4 (rectal), tachycardic 104, on admission  s/p rocephin and flagyl 1 x dose and cefotetan 3 doses post op   CT A/P findings of Cecal volvulus resulting in a distal small bowel obstruction.  s/p exlap, R-hemicolectomy, side to side stapled anastomosis postop abdominal XR shows dilated loops of bowel consistent with ileus  OOTB to chair   Encourage ambulation ; Encourage incentive spirometry - patient reports issues using IS due to presence of TMJ and history of jaw surgery  s/p PICC on 4/5  and started on TPN  s/p abd xray 4/8 which shows improvement -- started on CLD + continued on TPN   diet will be advanced per primary recs     #methadone dependance   restarted on home dose -   pain management per primary     #hypomagnesemia   replete as needed    #HCV  is HCV positive (both RNA and antigen)  CT scan showed small amount of perihepatic ascites, but no comment on cirrhosis.    should have outpatient follow up    #thrombocytopenia   plts in 80's- 120s lateral   will ctm   dvt ppx per primary team.  Monitor for bleeding   recommend weekly labs while in patient     #Severe protein calorie malnutrition  - appreciate input from nutrition  - plan as above     #Hypertension  - has been having intermittently elevated systolic blood pressures  - not on any medications outpatient  - started Nifedipine XL 30 mg daily 4/9-- monitor BP if persistently  < 120 -- can dc medication     #acute blood loss anemia   Hgb downtrended from time of admission  Hemoglobin: 11.7 on admission -- hgb 10  4/9-- recommend repeating labs per primary team   Partially attributable to operative blood losses   continue to trend daily CBC, keep active type and screen    #dvt ppx sqh   #dispo has auth till 4/23

## 2024-04-16 NOTE — PROGRESS NOTE ADULT - ASSESSMENT
68y M with PMHx of methadone use (for opioid use), HCV (shown on labs) and PSHx of R inguinal hernia repair open (20+yrs ago) and R upper arm shrapnel removal (40yrs ago) presented to [facility] with 5d hx of worsening abdominal pain, 1 episode of bloody BM at onset of symptoms, and constipation (last flatus and BM 4d ago). Upon presentation patient was febrile 100.4 (rectal), tachycardic 104, and hypertensive 157/104. 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 Patient was transferred to [facility] surgery for further management of cecal volvulous. Pt is now s/p exlap, R-hemicolectomy, side to side stapled anastamosis (3/30).    CLD/TPN  SQH/SCD  OOBA/IS  methadone  AM BMPs qod  Dispo: [facility] has auth for [facility] til 4/23

## 2024-04-16 NOTE — CHART NOTE - NSCHARTNOTEFT_GEN_A_CORE
Admitting Diagnosis:   Patient is a 68y old  Male who presents with a chief complaint of Abdominal Pain (16 Apr 2024 06:11)    PAST MEDICAL & SURGICAL HISTORY:  Methadone use  H/O right inguinal hernia repair    Current Nutrition Order: Diet, Clear Liquid (04-10-24 @ 08:57) [Active]    Parenteral Nutrition - Adult 1 Each (63 mL/Hr) TPN Continuous <Continuous>, 04-16-24 @ 17:00, 17:00, Stop order after: 1 Days  Parenteral Nutrition - Adult 1 Each (63 mL/Hr) TPN Continuous <Continuous>, 04-15-24 @ 17:00, 17:00, Stop order after: 1 Days    Current TPN Regimen Provides 335g Dextrose, 90g AA, 50g 20% Lipids to provide in total  1999 Kcal, 90g protein, 3.6GIR of based on actual body weight (63.6kg), 1.4 grams protein/kg actual body weight    PO Intake: Good (%) [   ]  Fair (50-75%) [X] Poor (<25%) [   ]     GI Issues: No issues with nausea, vomiting, diarrhea, reported at time of visit. States that he feels constipated, but is able to pass gas. Last BM noted on 4/12 per flow sheet . Pt is not currently on a bowel regimen.     Pain: States that he feels pain intermittently.     Skin Integrity:  Noted with Surgical incision. No Pressure Injuries per flow sheets. Mendez Score: 19  Edema: No Edema per flow sheets.     Labs:   04-15    131<L>  |  97  |  14  ----------------------------<  101<H>  4.9   |  30  |  0.78    Ca    9.0      15 Apr 2024 05:30  Phos  3.6     04-15  Mg     2.0     04-15    TPro  x   /  Alb  x   /  TBili  0.6  /  DBili  0.2  /  AST  x   /  ALT  x   /  AlkPhos  x   04-15    CAPILLARY BLOOD GLUCOSE    Medications:  MEDICATIONS  (STANDING):  acetaminophen     Tablet .. 1000 milliGRAM(s) Oral every 6 hours  chlorhexidine 2% Cloths 1 Application(s) Topical <User Schedule>  gabapentin 300 milliGRAM(s) Oral every 8 hours  heparin   Injectable 5000 Unit(s) SubCutaneous every 8 hours  influenza  Vaccine (HIGH DOSE) 0.7 milliLiter(s) IntraMuscular once  lipid, fat emulsion (Fish Oil and Plant Based) 20% Infusion 0.7874 Gm/kG/Day (20.8 mL/Hr) IV Continuous <Continuous>  methadone  Concentrate 190 milliGRAM(s) Oral daily  NIFEdipine XL 30 milliGRAM(s) Oral daily  Parenteral Nutrition - Adult 1 Each (63 mL/Hr) TPN Continuous <Continuous>  Parenteral Nutrition - Adult 1 Each (63 mL/Hr) TPN Continuous <Continuous>    MEDICATIONS  (PRN):  naloxone Injectable 0.1 milliGRAM(s) IV Push every 3 minutes PRN For ANY of the following changes in patient status:  A. RR LESS THAN 10 breaths per minute, B. Oxygen saturation LESS THAN 90%, C. Sedation score of 6  ondansetron Injectable 4 milliGRAM(s) IV Push every 6 hours PRN Nausea and/or Vomiting  sodium chloride 0.9% lock flush 10 milliLiter(s) IV Push every 1 hour PRN Pre/post blood products, medications, blood draw, and to maintain line patency    Weight History:  4/5 140.1 pounds (standing) (63.6 kg)  3/29 140 pounds (dosing)    UBW: ~145 pounds (per pt) x 2 months   IBW: 154 pounds   %IBW: 90%     No updated weights in charts. Recommend obtaining daily/weekly weights. RD to continue to monitor weight trends as available.    Estimated energy needs:   Kcal: 30-35kcal/kg = 6564-4473 kcal   Pro: 1.1-1.5g pro/kg = 69-95 g pro  Fluids: 30-35ml/kg = 1905-2222kcal   Based on Standards of Care pt within % IBW (90%) thus actual body weight (63.5kg) used for all calculations. Needs adjusted for advanced age and malnutrition.    Subjective:   "68y M with PMHx of methadone use (for opioid use), HCV (shown on labs) and PSHx of R inguinal hernia repair open (20+yrs ago) and R upper arm shrapnel removal (40yrs ago) presented to [facility] with 5d hx of worsening abdominal pain, 1 episode of bloody BM at onset of symptoms, and constipation (last flatus and BM 4d ago). Upon presentation patient was febrile 100.4 (rectal), tachycardic 104, and hypertensive 157/104. 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 Patient was transferred to [facility] surgery for further management of cecal volvulous. Pt is now s/p exlap, R-hemicolectomy, side to side stapled anastamosis (3/30)."    Visited pt at bedside on 9WO for follow up. Pt is continues to receive TPN and Clear liquids. RD observed pt consuming ~50% of clear liquids. Pt states that he is able to tolerate the liquids with no feelings of nausea/vomiting. Continues to endorse constipation, last BM noted 4/12 per flow sheets. Meds Reviewed. Nutritionally Pertinent Labs 4/15 Na: 131 (L), Glucose: 101 (H), POCT ranges 100-138 past 24 hours.  See Nutrition Recommendations below.     Previous Nutrition Diagnosis: Severe Chronic Malnutrition RT inadequate PO intake in setting on physical demands AEB Severe muscle and fat depletions.    Active [ X ]  Resolved [   ]    Goal: Pt to meet >75% of estimated nutrition needs daily per course of hospitalization    Recommendations:  1. c/w TPN via PICC as primary means to nutrition- Recommend 335g Dextrose, 90g AA, 50g 20% Lipids to provide in total 1999 Kcal, 90g protein, 3.6GIR of based on actual body weight (63.6kg), 1.4 grams protein/kg actual body weight  - fluid & lytes per team  - MVI, thiamine in bag  2. PO diet per MD discretion  - c/w clear liquid diet as tolerated, recommend ensure clear daily [provides 240 kcals, 8g protein each]  - advance to low fiber diet as tolerated; provide nourishments and/or oral nutrition supplements per pt preference  3. Weekly lipid panel while on TPN,  - hold lipid if TG >400  4. Daily BMP/Mg/Phos, fingersticks Q4-6hrs  - monitor lytes closely & replete outside TPN bag prn  5. Can initiate weaning from TPN when PO intake meeting >75% estimated needs  - consider 3 day calorie count to assess  6. Pain regimen per team    Education: Discussed with pt diet advancement as tolerated. When medically feasible educated/discussed the importance to prioritize protein at meal times.     Risk Level: High [ X ] Moderate [   ] Low [   ]

## 2024-04-16 NOTE — PROGRESS NOTE ADULT - SUBJECTIVE AND OBJECTIVE BOX
Overnight events:       POD#    SUBJECTIVE:      MEDICATIONS  (STANDING):  acetaminophen     Tablet .. 1000 milliGRAM(s) Oral every 6 hours  chlorhexidine 2% Cloths 1 Application(s) Topical <User Schedule>  gabapentin 300 milliGRAM(s) Oral every 8 hours  heparin   Injectable 5000 Unit(s) SubCutaneous every 8 hours  influenza  Vaccine (HIGH DOSE) 0.7 milliLiter(s) IntraMuscular once  lipid, fat emulsion (Fish Oil and Plant Based) 20% Infusion 0.7874 Gm/kG/Day (20.8 mL/Hr) IV Continuous <Continuous>  methadone  Concentrate 190 milliGRAM(s) Oral daily  NIFEdipine XL 30 milliGRAM(s) Oral daily  Parenteral Nutrition - Adult 1 Each (63 mL/Hr) TPN Continuous <Continuous>    MEDICATIONS  (PRN):  naloxone Injectable 0.1 milliGRAM(s) IV Push every 3 minutes PRN For ANY of the following changes in patient status:  A. RR LESS THAN 10 breaths per minute, B. Oxygen saturation LESS THAN 90%, C. Sedation score of 6  ondansetron Injectable 4 milliGRAM(s) IV Push every 6 hours PRN Nausea and/or Vomiting  sodium chloride 0.9% lock flush 10 milliLiter(s) IV Push every 1 hour PRN Pre/post blood products, medications, blood draw, and to maintain line patency      Vital Signs Last 24 Hrs  T(C): 36.8 (16 Apr 2024 05:35), Max: 37.1 (15 Apr 2024 20:50)  T(F): 98.2 (16 Apr 2024 05:35), Max: 98.8 (15 Apr 2024 20:50)  HR: 73 (16 Apr 2024 05:35) (73 - 89)  BP: 120/68 (16 Apr 2024 05:35) (120/68 - 156/81)  BP(mean): --  RR: 17 (16 Apr 2024 05:35) (17 - 19)  SpO2: 96% (16 Apr 2024 05:35) (93% - 97%)    Parameters below as of 16 Apr 2024 05:35  Patient On (Oxygen Delivery Method): room air        Physical Exam:  General: NAD, resting comfortably in bed  Pulmonary: Nonlabored breathing, no respiratory distress  Cardiovascular: NSR  Abdominal: soft, NT/ND  Extremities: WWP, normal strength  Neuro: A/O x 3, CNs II-XII grossly intact, no focal deficits, normal motor/sensation  Pulses: palpable distal pulses    I&O's Summary    14 Apr 2024 07:01  -  15 Apr 2024 07:00  --------------------------------------------------------  IN: 1248 mL / OUT: 2160 mL / NET: -912 mL    15 Apr 2024 07:01  -  16 Apr 2024 06:11  --------------------------------------------------------  IN: 1710.8 mL / OUT: 2425 mL / NET: -714.2 mL        LABS:    04-15    131<L>  |  97  |  14  ----------------------------<  101<H>  4.9   |  30  |  0.78    Ca    9.0      15 Apr 2024 05:30  Phos  3.6     04-15  Mg     2.0     04-15    TPro  x   /  Alb  x   /  TBili  0.6  /  DBili  0.2  /  AST  x   /  ALT  x   /  AlkPhos  x   04-15      Urinalysis Basic - ( 15 Apr 2024 05:30 )    Color: x / Appearance: x / SG: x / pH: x  Gluc: 101 mg/dL / Ketone: x  / Bili: x / Urobili: x   Blood: x / Protein: x / Nitrite: x   Leuk Esterase: x / RBC: x / WBC x   Sq Epi: x / Non Sq Epi: x / Bacteria: x      CAPILLARY BLOOD GLUCOSE      POCT Blood Glucose.: 138 mg/dL (15 Apr 2024 07:37)        RADIOLOGY & ADDITIONAL STUDIES:   Overnight events: SERENA      POD#  3/30: exlap, R antoni, side to side stapled anastomosis, penrose x1 midline , IVF 2L, , Cefotetan     SUBJECTIVE: Patient was examined bedside with chief resident and resting comfortably in bed. Patient endorses intermittent nausea and pain. Denies vomiting or bowel movement. Patient is ambulating well, voiding adequately and tolerating clear liquid diet.      MEDICATIONS  (STANDING):  acetaminophen     Tablet .. 1000 milliGRAM(s) Oral every 6 hours  chlorhexidine 2% Cloths 1 Application(s) Topical <User Schedule>  gabapentin 300 milliGRAM(s) Oral every 8 hours  heparin   Injectable 5000 Unit(s) SubCutaneous every 8 hours  influenza  Vaccine (HIGH DOSE) 0.7 milliLiter(s) IntraMuscular once  lipid, fat emulsion (Fish Oil and Plant Based) 20% Infusion 0.7874 Gm/kG/Day (20.8 mL/Hr) IV Continuous <Continuous>  methadone  Concentrate 190 milliGRAM(s) Oral daily  NIFEdipine XL 30 milliGRAM(s) Oral daily  Parenteral Nutrition - Adult 1 Each (63 mL/Hr) TPN Continuous <Continuous>    MEDICATIONS  (PRN):  naloxone Injectable 0.1 milliGRAM(s) IV Push every 3 minutes PRN For ANY of the following changes in patient status:  A. RR LESS THAN 10 breaths per minute, B. Oxygen saturation LESS THAN 90%, C. Sedation score of 6  ondansetron Injectable 4 milliGRAM(s) IV Push every 6 hours PRN Nausea and/or Vomiting  sodium chloride 0.9% lock flush 10 milliLiter(s) IV Push every 1 hour PRN Pre/post blood products, medications, blood draw, and to maintain line patency      Vital Signs Last 24 Hrs  T(C): 36.8 (16 Apr 2024 05:35), Max: 37.1 (15 Apr 2024 20:50)  T(F): 98.2 (16 Apr 2024 05:35), Max: 98.8 (15 Apr 2024 20:50)  HR: 73 (16 Apr 2024 05:35) (73 - 89)  BP: 120/68 (16 Apr 2024 05:35) (120/68 - 156/81)  BP(mean): --  RR: 17 (16 Apr 2024 05:35) (17 - 19)  SpO2: 96% (16 Apr 2024 05:35) (93% - 97%)    Parameters below as of 16 Apr 2024 05:35  Patient On (Oxygen Delivery Method): room air        Physical Exam:  General: NAD, resting comfortably in bed  Pulmonary: Nonlabored breathing, no respiratory distress  Abdominal: soft, non-tender, non distended. Incisions healing well.   Extremities: WWP, normal strength  Neuro: A/O x 3, CNs II-XII grossly intact, no focal deficits, normal motor/sensation  Pulses: palpable distal pulses    I&O's Summary    14 Apr 2024 07:01  -  15 Apr 2024 07:00  --------------------------------------------------------  IN: 1248 mL / OUT: 2160 mL / NET: -912 mL    15 Apr 2024 07:01  -  16 Apr 2024 06:11  --------------------------------------------------------  IN: 1710.8 mL / OUT: 2425 mL / NET: -714.2 mL        LABS:    04-15    131<L>  |  97  |  14  ----------------------------<  101<H>  4.9   |  30  |  0.78    Ca    9.0      15 Apr 2024 05:30  Phos  3.6     04-15  Mg     2.0     04-15    TPro  x   /  Alb  x   /  TBili  0.6  /  DBili  0.2  /  AST  x   /  ALT  x   /  AlkPhos  x   04-15      Urinalysis Basic - ( 15 Apr 2024 05:30 )    Color: x / Appearance: x / SG: x / pH: x  Gluc: 101 mg/dL / Ketone: x  / Bili: x / Urobili: x   Blood: x / Protein: x / Nitrite: x   Leuk Esterase: x / RBC: x / WBC x   Sq Epi: x / Non Sq Epi: x / Bacteria: x      CAPILLARY BLOOD GLUCOSE      POCT Blood Glucose.: 138 mg/dL (15 Apr 2024 07:37)        RADIOLOGY & ADDITIONAL STUDIES:   Overnight events: SERENA      POD#  3/30: exlap, R antoni, side to side stapled anastomosis, penrose x1 midline , IVF 2L, , Cefotetan     SUBJECTIVE: Patient was examined bedside with chief resident and resting comfortably in bed. Patient endorses intermittent nausea and pain. Denies vomiting or bowel movement, chills, chest pain, SOB, HA, dizziness. Patient is ambulating well, voiding adequately and tolerating clear liquid diet.      MEDICATIONS  (STANDING):  acetaminophen     Tablet .. 1000 milliGRAM(s) Oral every 6 hours  chlorhexidine 2% Cloths 1 Application(s) Topical <User Schedule>  gabapentin 300 milliGRAM(s) Oral every 8 hours  heparin   Injectable 5000 Unit(s) SubCutaneous every 8 hours  influenza  Vaccine (HIGH DOSE) 0.7 milliLiter(s) IntraMuscular once  lipid, fat emulsion (Fish Oil and Plant Based) 20% Infusion 0.7874 Gm/kG/Day (20.8 mL/Hr) IV Continuous <Continuous>  methadone  Concentrate 190 milliGRAM(s) Oral daily  NIFEdipine XL 30 milliGRAM(s) Oral daily  Parenteral Nutrition - Adult 1 Each (63 mL/Hr) TPN Continuous <Continuous>    MEDICATIONS  (PRN):  naloxone Injectable 0.1 milliGRAM(s) IV Push every 3 minutes PRN For ANY of the following changes in patient status:  A. RR LESS THAN 10 breaths per minute, B. Oxygen saturation LESS THAN 90%, C. Sedation score of 6  ondansetron Injectable 4 milliGRAM(s) IV Push every 6 hours PRN Nausea and/or Vomiting  sodium chloride 0.9% lock flush 10 milliLiter(s) IV Push every 1 hour PRN Pre/post blood products, medications, blood draw, and to maintain line patency      Vital Signs Last 24 Hrs  T(C): 36.8 (16 Apr 2024 05:35), Max: 37.1 (15 Apr 2024 20:50)  T(F): 98.2 (16 Apr 2024 05:35), Max: 98.8 (15 Apr 2024 20:50)  HR: 73 (16 Apr 2024 05:35) (73 - 89)  BP: 120/68 (16 Apr 2024 05:35) (120/68 - 156/81)  BP(mean): --  RR: 17 (16 Apr 2024 05:35) (17 - 19)  SpO2: 96% (16 Apr 2024 05:35) (93% - 97%)    Parameters below as of 16 Apr 2024 05:35  Patient On (Oxygen Delivery Method): room air        Physical Exam:  General: NAD, resting comfortably in bed  Pulmonary: Nonlabored breathing, no respiratory distress  Abdominal: soft, non-tender, non distended. Incisions healing well.   Extremities: WWP, normal strength  Neuro: A/O x 3, CNs II-XII grossly intact, no focal deficits, normal motor/sensation  Pulses: palpable distal pulses    I&O's Summary    14 Apr 2024 07:01  -  15 Apr 2024 07:00  --------------------------------------------------------  IN: 1248 mL / OUT: 2160 mL / NET: -912 mL    15 Apr 2024 07:01  -  16 Apr 2024 06:11  --------------------------------------------------------  IN: 1710.8 mL / OUT: 2425 mL / NET: -714.2 mL        LABS:    04-15    131<L>  |  97  |  14  ----------------------------<  101<H>  4.9   |  30  |  0.78    Ca    9.0      15 Apr 2024 05:30  Phos  3.6     04-15  Mg     2.0     04-15    TPro  x   /  Alb  x   /  TBili  0.6  /  DBili  0.2  /  AST  x   /  ALT  x   /  AlkPhos  x   04-15      Urinalysis Basic - ( 15 Apr 2024 05:30 )    Color: x / Appearance: x / SG: x / pH: x  Gluc: 101 mg/dL / Ketone: x  / Bili: x / Urobili: x   Blood: x / Protein: x / Nitrite: x   Leuk Esterase: x / RBC: x / WBC x   Sq Epi: x / Non Sq Epi: x / Bacteria: x      CAPILLARY BLOOD GLUCOSE      POCT Blood Glucose.: 138 mg/dL (15 Apr 2024 07:37)        RADIOLOGY & ADDITIONAL STUDIES:

## 2024-04-16 NOTE — PROGRESS NOTE ADULT - SUBJECTIVE AND OBJECTIVE BOX
Patient is a 68y old  Male who presents with a chief complaint of Abdominal Pain (16 Apr 2024 06:11)      INTERVAL HPI/OVERNIGHT EVENTS: offers no new complaints; current symptoms staying the same with abdominal soreness 7-8/10     MEDICATIONS  (STANDING):  acetaminophen     Tablet .. 1000 milliGRAM(s) Oral every 6 hours  chlorhexidine 2% Cloths 1 Application(s) Topical <User Schedule>  gabapentin 300 milliGRAM(s) Oral every 8 hours  heparin   Injectable 5000 Unit(s) SubCutaneous every 8 hours  influenza  Vaccine (HIGH DOSE) 0.7 milliLiter(s) IntraMuscular once  lipid, fat emulsion (Fish Oil and Plant Based) 20% Infusion 0.7874 Gm/kG/Day (20.8 mL/Hr) IV Continuous <Continuous>  methadone  Concentrate 190 milliGRAM(s) Oral daily  NIFEdipine XL 30 milliGRAM(s) Oral daily  Parenteral Nutrition - Adult 1 Each (63 mL/Hr) TPN Continuous <Continuous>  Parenteral Nutrition - Adult 1 Each (63 mL/Hr) TPN Continuous <Continuous>    MEDICATIONS  (PRN):  naloxone Injectable 0.1 milliGRAM(s) IV Push every 3 minutes PRN For ANY of the following changes in patient status:  A. RR LESS THAN 10 breaths per minute, B. Oxygen saturation LESS THAN 90%, C. Sedation score of 6  ondansetron Injectable 4 milliGRAM(s) IV Push every 6 hours PRN Nausea and/or Vomiting  sodium chloride 0.9% lock flush 10 milliLiter(s) IV Push every 1 hour PRN Pre/post blood products, medications, blood draw, and to maintain line patency      __________________________________________________  REVIEW OF SYSTEMS:    CONSTITUTIONAL: No fever,   EYES: no acute visual disturbances  NECK: No pain or stiffness  RESPIRATORY: No cough; No shortness of breath  CARDIOVASCULAR: No chest pain, no palpitations  GASTROINTESTINAL: + 7/10 pain. No nausea or vomiting; No diarrhea   NEUROLOGICAL: No headache or numbness, no tremors  MUSCULOSKELETAL: No joint pain, no muscle pain  GENITOURINARY: no dysuria, no frequency, no hesitancy  PSYCHIATRY: no depression , no anxiety  ALL OTHER  ROS negative        Vital Signs Last 24 Hrs  T(C): 36.9 (16 Apr 2024 08:35), Max: 37.1 (15 Apr 2024 20:50)  T(F): 98.5 (16 Apr 2024 08:35), Max: 98.8 (15 Apr 2024 20:50)  HR: 75 (16 Apr 2024 08:35) (73 - 89)  BP: 138/84 (16 Apr 2024 08:35) (120/68 - 156/81)  BP(mean): --  RR: 16 (16 Apr 2024 08:35) (16 - 19)  SpO2: 100% (16 Apr 2024 08:35) (93% - 100%)    Parameters below as of 16 Apr 2024 08:35  Patient On (Oxygen Delivery Method): room air        ________________________________________________  PHYSICAL EXAM:  GENERAL: NAD  HEENT: Normocephalic;  conjunctivae and sclerae clear; moist mucous membranes;   NECK : supple  CHEST/LUNG: Clear to auscultation bilaterally with good air entry   HEART: S1 S2  regular; no murmurs, gallops or rubs  ABDOMEN: Soft, Nontender, Nondistended; incision c/d/i - in binder Bowel sounds present  EXTREMITIES: no cyanosis; no edema; no calf tenderness  SKIN: warm and dry; no rash  NERVOUS SYSTEM:  Awake and alert; Oriented  to place, person and time ; no new deficits    _________________________________________________  LABS:    04-15    131<L>  |  97  |  14  ----------------------------<  101<H>  4.9   |  30  |  0.78    Ca    9.0      15 Apr 2024 05:30  Phos  3.6     04-15  Mg     2.0     04-15    TPro  x   /  Alb  x   /  TBili  0.6  /  DBili  0.2  /  AST  x   /  ALT  x   /  AlkPhos  x   04-15      Urinalysis Basic - ( 15 Apr 2024 05:30 )    Color: x / Appearance: x / SG: x / pH: x  Gluc: 101 mg/dL / Ketone: x  / Bili: x / Urobili: x   Blood: x / Protein: x / Nitrite: x   Leuk Esterase: x / RBC: x / WBC x   Sq Epi: x / Non Sq Epi: x / Bacteria: x      CAPILLARY BLOOD GLUCOSE            RADIOLOGY & ADDITIONAL TESTS:      Plan of care was discussed with patient and /or primary care giver; all questions and concerns were addressed and care was aligned with patient's wishes.

## 2024-04-17 LAB
ANION GAP SERPL CALC-SCNC: 6 MMOL/L — SIGNIFICANT CHANGE UP (ref 5–17)
BUN SERPL-MCNC: 14 MG/DL — SIGNIFICANT CHANGE UP (ref 7–23)
CALCIUM SERPL-MCNC: 8.9 MG/DL — SIGNIFICANT CHANGE UP (ref 8.4–10.5)
CHLORIDE SERPL-SCNC: 98 MMOL/L — SIGNIFICANT CHANGE UP (ref 96–108)
CO2 SERPL-SCNC: 28 MMOL/L — SIGNIFICANT CHANGE UP (ref 22–31)
CREAT SERPL-MCNC: 0.74 MG/DL — SIGNIFICANT CHANGE UP (ref 0.5–1.3)
EGFR: 97 ML/MIN/1.73M2 — SIGNIFICANT CHANGE UP
GLUCOSE SERPL-MCNC: 115 MG/DL — HIGH (ref 70–99)
MAGNESIUM SERPL-MCNC: 2 MG/DL — SIGNIFICANT CHANGE UP (ref 1.6–2.6)
PHOSPHATE SERPL-MCNC: 3.6 MG/DL — SIGNIFICANT CHANGE UP (ref 2.5–4.5)
POTASSIUM SERPL-MCNC: 4.5 MMOL/L — SIGNIFICANT CHANGE UP (ref 3.5–5.3)
POTASSIUM SERPL-SCNC: 4.5 MMOL/L — SIGNIFICANT CHANGE UP (ref 3.5–5.3)
SODIUM SERPL-SCNC: 132 MMOL/L — LOW (ref 135–145)

## 2024-04-17 PROCEDURE — 99233 SBSQ HOSP IP/OBS HIGH 50: CPT

## 2024-04-17 RX ORDER — I.V. FAT EMULSION 20 G/100ML
0.79 EMULSION INTRAVENOUS
Qty: 50 | Refills: 0 | Status: DISCONTINUED | OUTPATIENT
Start: 2024-04-17 | End: 2024-04-17

## 2024-04-17 RX ORDER — ELECTROLYTE SOLUTION,INJ
1 VIAL (ML) INTRAVENOUS
Refills: 0 | Status: DISCONTINUED | OUTPATIENT
Start: 2024-04-17 | End: 2024-04-17

## 2024-04-17 RX ADMIN — Medication 1000 MILLIGRAM(S): at 17:40

## 2024-04-17 RX ADMIN — GABAPENTIN 300 MILLIGRAM(S): 400 CAPSULE ORAL at 22:44

## 2024-04-17 RX ADMIN — HEPARIN SODIUM 5000 UNIT(S): 5000 INJECTION INTRAVENOUS; SUBCUTANEOUS at 05:29

## 2024-04-17 RX ADMIN — GABAPENTIN 300 MILLIGRAM(S): 400 CAPSULE ORAL at 05:29

## 2024-04-17 RX ADMIN — Medication 1000 MILLIGRAM(S): at 05:29

## 2024-04-17 RX ADMIN — I.V. FAT EMULSION 20.8 GM/KG/DAY: 20 EMULSION INTRAVENOUS at 17:40

## 2024-04-17 RX ADMIN — HEPARIN SODIUM 5000 UNIT(S): 5000 INJECTION INTRAVENOUS; SUBCUTANEOUS at 13:00

## 2024-04-17 RX ADMIN — METHADONE HYDROCHLORIDE 190 MILLIGRAM(S): 40 TABLET ORAL at 12:01

## 2024-04-17 RX ADMIN — CHLORHEXIDINE GLUCONATE 1 APPLICATION(S): 213 SOLUTION TOPICAL at 05:29

## 2024-04-17 RX ADMIN — Medication 30 MILLIGRAM(S): at 05:29

## 2024-04-17 RX ADMIN — Medication 1000 MILLIGRAM(S): at 12:01

## 2024-04-17 RX ADMIN — Medication 1 EACH: at 17:40

## 2024-04-17 RX ADMIN — Medication 1000 MILLIGRAM(S): at 22:42

## 2024-04-17 RX ADMIN — HEPARIN SODIUM 5000 UNIT(S): 5000 INJECTION INTRAVENOUS; SUBCUTANEOUS at 22:42

## 2024-04-17 RX ADMIN — GABAPENTIN 300 MILLIGRAM(S): 400 CAPSULE ORAL at 13:00

## 2024-04-17 NOTE — PROGRESS NOTE ADULT - ASSESSMENT
68y M with PMHx of methadone use (for unknown pain disorder) and PSHx of R inguinal hernia repair open (20+yrs ago) and R upper arm shrapnel removal (40yrs ago) presented to [facility] with 5d hx of worsening abdominal pain, 1 episode of bloody BM at onset of symptoms, and constipation. Pt admitted to surgery for further management of cecal volvulus     #cecal volvulus   #Small bowel obstruction   #post op state   #sepsis on admission   s/p exlap, R-hemicolectomy, side to side stapled anastomosis postop abdominal XR shows dilated loops of bowel consistent with ileus  s/p PICC on 4/5  and started on TPN  s/p abd xray 4/8 which shows improvement  Diet advancement per primary team     #methadone dependance   Restarted on home dose, monitor QTc, maintain mag 2  Patient denies pain, pain management per primary team     #hypomagnesemia   Resolved     #HCV  is HCV positive (both RNA and antigen)  CT scan showed small amount of perihepatic ascites, but no comment on cirrhosis.    should have outpatient follow up    #thrombocytopenia   Monitor PLT     #Severe protein calorie malnutrition  - appreciate input from nutrition  - plan as above     #Hypertension  - has been having intermittently elevated systolic blood pressures  - started Nifedipine XL 30 mg daily 4/9-- monitor BP if persistently  < 120 -- can dc medication     #acute blood loss anemia   Hgb downtrended from time of admission  Partially attributable to operative blood losses   Hemoglobin: 11.7 on admission -- hgb 10  4/9-- recommend repeating labs biweekly while inpt     #dvt ppx: per primary team, monitor PLT   Discussed with primary team

## 2024-04-17 NOTE — PROGRESS NOTE ADULT - ASSESSMENT
68y M with PMHx of methadone use (for opioid use), HCV (shown on labs) and PSHx of R inguinal hernia repair open (20+yrs ago) and R upper arm shrapnel removal (40yrs ago) presented to [facility] with 5d hx of worsening abdominal pain, 1 episode of bloody BM at onset of symptoms, and constipation (last flatus and BM 4d ago). Upon presentation patient was febrile 100.4 (rectal), tachycardic 104, and hypertensive 157/104. 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 Patient was transferred to [facility] surgery for further management of cecal volvulous. Pt is now s/p exlap, R-hemicolectomy, side to side stapled anastamosis (3/30).    CLD/TPN  SQH/SCD  OOBA/IS  methadone  AM BMPs M/W/F  Dispo: SHERINE has auth for [facility] til 4/23

## 2024-04-17 NOTE — PROGRESS NOTE ADULT - SUBJECTIVE AND OBJECTIVE BOX
INTERVAL EVENTS:    SUBJECTIVE:  Patient was seen and examined at bedside. Reports feeling overall improved, appetite improving, no abdominal pain, no obstipation. No other complaints or events reported.    Review of systems: No fever, chills, dizziness, HA, Changes in vision, CP, dyspnea, nausea or vomiting, dysuria, changes in bowel movements, LE edema. Rest of 12 point Review of systems negative unless otherwise documented elsewhere in note.     Diet, Pureed (04-17-24 @ 08:13) [Active]      MEDICATIONS:  MEDICATIONS  (STANDING):  acetaminophen     Tablet .. 1000 milliGRAM(s) Oral every 6 hours  chlorhexidine 2% Cloths 1 Application(s) Topical <User Schedule>  gabapentin 300 milliGRAM(s) Oral every 8 hours  heparin   Injectable 5000 Unit(s) SubCutaneous every 8 hours  influenza  Vaccine (HIGH DOSE) 0.7 milliLiter(s) IntraMuscular once  lipid, fat emulsion (Fish Oil and Plant Based) 20% Infusion 0.7874 Gm/kG/Day (20.8 mL/Hr) IV Continuous <Continuous>  methadone  Concentrate 190 milliGRAM(s) Oral every 24 hours  NIFEdipine XL 30 milliGRAM(s) Oral daily  Parenteral Nutrition - Adult 1 Each (63 mL/Hr) TPN Continuous <Continuous>  Parenteral Nutrition - Adult 1 Each (63 mL/Hr) TPN Continuous <Continuous>    MEDICATIONS  (PRN):  naloxone Injectable 0.1 milliGRAM(s) IV Push every 3 minutes PRN For ANY of the following changes in patient status:  A. RR LESS THAN 10 breaths per minute, B. Oxygen saturation LESS THAN 90%, C. Sedation score of 6  ondansetron Injectable 4 milliGRAM(s) IV Push every 6 hours PRN Nausea and/or Vomiting  sodium chloride 0.9% lock flush 10 milliLiter(s) IV Push every 1 hour PRN Pre/post blood products, medications, blood draw, and to maintain line patency      Allergies    No Known Allergies    Intolerances        OBJECTIVE:  Vital Signs Last 24 Hrs  T(C): 36.9 (17 Apr 2024 13:24), Max: 37.3 (16 Apr 2024 20:15)  T(F): 98.5 (17 Apr 2024 13:24), Max: 99.2 (16 Apr 2024 20:15)  HR: 76 (17 Apr 2024 13:24) (76 - 95)  BP: 108/66 (17 Apr 2024 13:24) (97/59 - 168/98)  BP(mean): 99 (17 Apr 2024 05:00) (99 - 110)  RR: 17 (17 Apr 2024 13:24) (17 - 19)  SpO2: 96% (17 Apr 2024 13:24) (95% - 99%)    Parameters below as of 17 Apr 2024 13:24  Patient On (Oxygen Delivery Method): room air      I&O's Summary    16 Apr 2024 07:01  -  17 Apr 2024 07:00  --------------------------------------------------------  IN: 1456 mL / OUT: 1350 mL / NET: 106 mL    17 Apr 2024 07:01  -  17 Apr 2024 13:50  --------------------------------------------------------  IN: 665 mL / OUT: 500 mL / NET: 165 mL        PHYSICAL EXAM:  General: AOX3, NAD, sitting in bed, speaking in full sentences, no labored breathing on RA  HEENT: AT/NC, no facial asymmetry  Lungs: no labored breathing, no wheezes  Heart: regular   Abdomen: soft, mild tenderness  Extremities: warm, no edema, no tenderness, no focal deficit. Getting TPN    LABS:    04-17    132<L>  |  98  |  14  ----------------------------<  115<H>  4.5   |  28  |  0.74    Ca    8.9      17 Apr 2024 05:30  Phos  3.6     04-17  Mg     2.0     04-17          CAPILLARY BLOOD GLUCOSE        Urinalysis Basic - ( 17 Apr 2024 05:30 )    Color: x / Appearance: x / SG: x / pH: x  Gluc: 115 mg/dL / Ketone: x  / Bili: x / Urobili: x   Blood: x / Protein: x / Nitrite: x   Leuk Esterase: x / RBC: x / WBC x   Sq Epi: x / Non Sq Epi: x / Bacteria: x        MICRODATA:      RADIOLOGY/OTHER STUDIES:

## 2024-04-17 NOTE — PROGRESS NOTE ADULT - SUBJECTIVE AND OBJECTIVE BOX
STATUS POST: s/p exlap, R-hemicolectomy, side to side stapled anastamosis     POST OPERATIVE DAY #: 18    SUBJECTIVE: Pt seen and examined by chief resident. Pt is doing well, having bowel movements.     Vital Signs Last 24 Hrs  T(C): 36.9 (17 Apr 2024 05:00), Max: 37.3 (16 Apr 2024 20:15)  T(F): 98.5 (17 Apr 2024 05:00), Max: 99.2 (16 Apr 2024 20:15)  HR: 84 (17 Apr 2024 05:00) (75 - 95)  BP: 134/81 (17 Apr 2024 05:00) (124/68 - 168/98)  BP(mean): 99 (17 Apr 2024 05:00) (99 - 110)  RR: 18 (17 Apr 2024 05:00) (16 - 19)  SpO2: 97% (17 Apr 2024 05:00) (96% - 100%)    Parameters below as of 17 Apr 2024 05:00  Patient On (Oxygen Delivery Method): room air        I&O's Summary    16 Apr 2024 07:01  -  17 Apr 2024 07:00  --------------------------------------------------------  IN: 1456 mL / OUT: 1350 mL / NET: 106 mL        Physical Exam:  General Appearance: Appears well, NAD  Pulmonary: Nonlabored breathing, no respiratory distress  Abdomen: Soft, mildly distended, nontender, midline incision   Extremities: WWP, SCD's in place     LABS:    04-17    132<L>  |  98  |  14  ----------------------------<  115<H>  4.5   |  28  |  0.74    Ca    8.9      17 Apr 2024 05:30  Phos  3.6     04-17  Mg     2.0     04-17        Urinalysis Basic - ( 17 Apr 2024 05:30 )    Color: x / Appearance: x / SG: x / pH: x  Gluc: 115 mg/dL / Ketone: x  / Bili: x / Urobili: x   Blood: x / Protein: x / Nitrite: x   Leuk Esterase: x / RBC: x / WBC x   Sq Epi: x / Non Sq Epi: x / Bacteria: x

## 2024-04-18 PROCEDURE — 99233 SBSQ HOSP IP/OBS HIGH 50: CPT

## 2024-04-18 RX ORDER — ELECTROLYTE SOLUTION,INJ
1 VIAL (ML) INTRAVENOUS
Refills: 0 | Status: DISCONTINUED | OUTPATIENT
Start: 2024-04-18 | End: 2024-04-18

## 2024-04-18 RX ORDER — I.V. FAT EMULSION 20 G/100ML
0.79 EMULSION INTRAVENOUS
Qty: 50 | Refills: 0 | Status: DISCONTINUED | OUTPATIENT
Start: 2024-04-18 | End: 2024-04-18

## 2024-04-18 RX ORDER — POLYETHYLENE GLYCOL 3350 17 G/17G
17 POWDER, FOR SOLUTION ORAL DAILY
Refills: 0 | Status: DISCONTINUED | OUTPATIENT
Start: 2024-04-18 | End: 2024-04-23

## 2024-04-18 RX ADMIN — Medication 1000 MILLIGRAM(S): at 17:39

## 2024-04-18 RX ADMIN — Medication 30 MILLIGRAM(S): at 05:46

## 2024-04-18 RX ADMIN — HEPARIN SODIUM 5000 UNIT(S): 5000 INJECTION INTRAVENOUS; SUBCUTANEOUS at 23:39

## 2024-04-18 RX ADMIN — HEPARIN SODIUM 5000 UNIT(S): 5000 INJECTION INTRAVENOUS; SUBCUTANEOUS at 14:13

## 2024-04-18 RX ADMIN — I.V. FAT EMULSION 20.83 GM/KG/DAY: 20 EMULSION INTRAVENOUS at 17:39

## 2024-04-18 RX ADMIN — HEPARIN SODIUM 5000 UNIT(S): 5000 INJECTION INTRAVENOUS; SUBCUTANEOUS at 05:45

## 2024-04-18 RX ADMIN — Medication 1 EACH: at 17:39

## 2024-04-18 RX ADMIN — CHLORHEXIDINE GLUCONATE 1 APPLICATION(S): 213 SOLUTION TOPICAL at 07:21

## 2024-04-18 RX ADMIN — GABAPENTIN 300 MILLIGRAM(S): 400 CAPSULE ORAL at 23:39

## 2024-04-18 RX ADMIN — POLYETHYLENE GLYCOL 3350 17 GRAM(S): 17 POWDER, FOR SOLUTION ORAL at 10:29

## 2024-04-18 RX ADMIN — GABAPENTIN 300 MILLIGRAM(S): 400 CAPSULE ORAL at 05:45

## 2024-04-18 RX ADMIN — GABAPENTIN 300 MILLIGRAM(S): 400 CAPSULE ORAL at 14:13

## 2024-04-18 NOTE — PROGRESS NOTE ADULT - SUBJECTIVE AND OBJECTIVE BOX
STATUS POST: exlap, R-hemicolectomy, side to side stapled anastamosis  POST OPERATIVE DAY #: 19    SUBJECTIVE: Pt seen and examined by chief resident. Pt is doing well, resting comfortably on bed. Had 2 BMs yesterday, passing gas. Eating small amounts, feels worried about eating too much. -N/V .  No complaints at this time.    Vital Signs Last 24 Hrs  T(C): 36.9 (18 Apr 2024 05:03), Max: 37 (17 Apr 2024 17:02)  T(F): 98.4 (18 Apr 2024 05:03), Max: 98.6 (17 Apr 2024 17:02)  HR: 80 (18 Apr 2024 05:03) (76 - 91)  BP: 146/85 (18 Apr 2024 05:03) (97/59 - 146/85)  BP(mean): --  RR: 16 (18 Apr 2024 05:03) (16 - 17)  SpO2: 97% (18 Apr 2024 05:03) (95% - 97%)    Parameters below as of 18 Apr 2024 05:03  Patient On (Oxygen Delivery Method): room air        I&O's Summary    16 Apr 2024 07:01  -  17 Apr 2024 07:00  --------------------------------------------------------  IN: 1456 mL / OUT: 1350 mL / NET: 106 mL    17 Apr 2024 07:01  -  18 Apr 2024 06:59  --------------------------------------------------------  IN: 1894.4 mL / OUT: 1120 mL / NET: 774.4 mL        Physical Exam:  General Appearance: Appears well, NAD  Pulmonary: Nonlabored breathing, no respiratory distress  Abdomen: Softer, moderately distended, nontender midline incision clean and dry and intact  Extremities: WWP, SCD's in place     LABS:    04-17    132<L>  |  98  |  14  ----------------------------<  115<H>  4.5   |  28  |  0.74    Ca    8.9      17 Apr 2024 05:30  Phos  3.6     04-17  Mg     2.0     04-17        Urinalysis Basic - ( 17 Apr 2024 05:30 )    Color: x / Appearance: x / SG: x / pH: x  Gluc: 115 mg/dL / Ketone: x  / Bili: x / Urobili: x   Blood: x / Protein: x / Nitrite: x   Leuk Esterase: x / RBC: x / WBC x   Sq Epi: x / Non Sq Epi: x / Bacteria: x

## 2024-04-18 NOTE — PROGRESS NOTE ADULT - ASSESSMENT
68y M with PMHx of methadone use (for unknown pain disorder) and PSHx of R inguinal hernia repair open (20+yrs ago) and R upper arm shrapnel removal (40yrs ago) presented to [facility] with 5d hx of worsening abdominal pain, 1 episode of bloody BM at onset of symptoms, and constipation. Pt admitted to surgery for further management of cecal volvulus     #cecal volvulus   #Small bowel obstruction   #post op state   #sepsis on admission   s/p exlap, R-hemicolectomy, side to side stapled anastomosis postop abdominal XR shows dilated loops of bowel consistent with ileus  s/p PICC on 4/5  and started on TPN  s/p abd xray 4/8 which shows improvement    Diet advancement per primary team - now with puree and tpn     #methadone dependance   Restarted on home dose, monitor QTc, maintain mag 2  Patient denies pain, pain management per primary team     #hypomagnesemia   Resolved     #HCV  is HCV positive (both RNA and antigen)  CT scan showed small amount of perihepatic ascites, but no comment on cirrhosis.    should have outpatient follow up    #thrombocytopenia   Monitor PLT     #Severe protein calorie malnutrition  - appreciate input from nutrition  - plan as above     #Hypertension  - has been having intermittently elevated systolic blood pressures  - started Nifedipine XL 30 mg daily 4/9-- monitor BP if persistently  < 120 -- can dc medication     #acute blood loss anemia   Hgb downtrended from time of admission  Partially attributable to operative blood losses   Hemoglobin: 11.7 on admission -- hgb 10  4/9-- recommend repeating labs  weekly while inpt     #dvt ppx: per primary team, monitor PLT   Discussed with primary team

## 2024-04-18 NOTE — PROGRESS NOTE ADULT - ASSESSMENT
68y M with PMHx of methadone use (for opioid use), HCV (shown on labs) and PSHx of R inguinal hernia repair open (20+yrs ago) and R upper arm shrapnel removal (40yrs ago) presented to [facility] with 5d hx of worsening abdominal pain, 1 episode of bloody BM at onset of symptoms, and constipation (last flatus and BM 4d ago). Upon presentation patient was febrile 100.4 (rectal), tachycardic 104, and hypertensive 157/104. 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 Patient was transferred to [facility] surgery for further management of cecal volvulous. Pt is now s/p exlap, R-hemicolectomy, side to side stapled anastamosis (3/30).    Puree/TPN  Calorie count to try and wean TPN  SQH/SCD  OOBA/IS  methadone  AM BMPs M/W/F  Dispo: SHERINE has auth for [facility] til 4/23

## 2024-04-18 NOTE — CHART NOTE - NSCHARTNOTEFT_GEN_A_CORE
Nutrition Consult for Calorie Count initiation    3-day Calorie count ordered by medical team (4/17/24-4/19/24); RD has provided calorie count sheets for this pt on pt's unit. RD to follow up to assess pt's calorie count. RD to remain available prn.    Hiram Boo, ALPHONSO Nutrition Consult for Calorie Count initiation    3-day Calorie count ordered by medical team (4/17/24-4/19/24); RD has provided calorie count sheets for this pt on pt's unit. RD to follow up to assess pt's calorie count. RD to remain available prn.

## 2024-04-18 NOTE — PROGRESS NOTE ADULT - SUBJECTIVE AND OBJECTIVE BOX
Patient is a 68y old  Male who presents with a chief complaint of Abdominal Pain (18 Apr 2024 06:59)      INTERVAL HPI/OVERNIGHT EVENTS: offers no new complaints; current symptoms resolving    MEDICATIONS  (STANDING):  acetaminophen     Tablet .. 1000 milliGRAM(s) Oral every 6 hours  chlorhexidine 2% Cloths 1 Application(s) Topical <User Schedule>  gabapentin 300 milliGRAM(s) Oral every 8 hours  heparin   Injectable 5000 Unit(s) SubCutaneous every 8 hours  influenza  Vaccine (HIGH DOSE) 0.7 milliLiter(s) IntraMuscular once  lipid, fat emulsion (Fish Oil and Plant Based) 20% Infusion 0.7874 Gm/kG/Day (20.83 mL/Hr) IV Continuous <Continuous>  methadone  Concentrate 190 milliGRAM(s) Oral every 24 hours  NIFEdipine XL 30 milliGRAM(s) Oral daily  Parenteral Nutrition - Adult 1 Each (63 mL/Hr) TPN Continuous <Continuous>  Parenteral Nutrition - Adult 1 Each (63 mL/Hr) TPN Continuous <Continuous>  polyethylene glycol 3350 17 Gram(s) Oral daily    MEDICATIONS  (PRN):  naloxone Injectable 0.1 milliGRAM(s) IV Push every 3 minutes PRN For ANY of the following changes in patient status:  A. RR LESS THAN 10 breaths per minute, B. Oxygen saturation LESS THAN 90%, C. Sedation score of 6  ondansetron Injectable 4 milliGRAM(s) IV Push every 6 hours PRN Nausea and/or Vomiting  sodium chloride 0.9% lock flush 10 milliLiter(s) IV Push every 1 hour PRN Pre/post blood products, medications, blood draw, and to maintain line patency      __________________________________________________  REVIEW OF SYSTEMS:    CONSTITUTIONAL: No fever,   EYES: no acute visual disturbances  NECK: No pain or stiffness  RESPIRATORY: No cough; No shortness of breath  CARDIOVASCULAR: No chest pain, no palpitations  GASTROINTESTINAL: + pain. No nausea or vomiting;    NEUROLOGICAL: No headache or numbness, no tremors  MUSCULOSKELETAL: No joint pain, no muscle pain  GENITOURINARY: no dysuria, no frequency, no hesitancy  PSYCHIATRY: no depression , no anxiety  ALL OTHER  ROS negative        Vital Signs Last 24 Hrs  T(C): 36.9 (18 Apr 2024 08:30), Max: 37 (17 Apr 2024 17:02)  T(F): 98.4 (18 Apr 2024 08:30), Max: 98.6 (17 Apr 2024 17:02)  HR: 77 (18 Apr 2024 08:30) (76 - 91)  BP: 142/82 (18 Apr 2024 08:30) (108/66 - 146/85)  BP(mean): --  RR: 17 (18 Apr 2024 08:30) (16 - 17)  SpO2: 97% (18 Apr 2024 08:30) (96% - 97%)    Parameters below as of 18 Apr 2024 08:30  Patient On (Oxygen Delivery Method): room air        ________________________________________________  PHYSICAL EXAM:  GENERAL: NAD  HEENT: Normocephalic;  conjunctivae and sclerae clear; moist mucous membranes;   NECK : supple  CHEST/LUNG: Clear to auscultation bilaterally with good air entry   HEART: S1 S2  regular; no murmurs, gallops or rubs  ABDOMEN: Soft, Nontender, Nondistended; staples removed, surgical scar healing well, Bowel sounds present  EXTREMITIES: no cyanosis; no edema; no calf tenderness  SKIN: warm and dry; no rash  NERVOUS SYSTEM:  Awake and alert; Oriented  to place, person and time ; no new deficits    _________________________________________________  LABS:    04-17    132<L>  |  98  |  14  ----------------------------<  115<H>  4.5   |  28  |  0.74    Ca    8.9      17 Apr 2024 05:30  Phos  3.6     04-17  Mg     2.0     04-17        Urinalysis Basic - ( 17 Apr 2024 05:30 )    Color: x / Appearance: x / SG: x / pH: x  Gluc: 115 mg/dL / Ketone: x  / Bili: x / Urobili: x   Blood: x / Protein: x / Nitrite: x   Leuk Esterase: x / RBC: x / WBC x   Sq Epi: x / Non Sq Epi: x / Bacteria: x      CAPILLARY BLOOD GLUCOSE            RADIOLOGY & ADDITIONAL TESTS:      Plan of care was discussed with patient and /or primary care giver; all questions and concerns were addressed and care was aligned with patient's wishes.

## 2024-04-19 LAB
ANION GAP SERPL CALC-SCNC: 5 MMOL/L — SIGNIFICANT CHANGE UP (ref 5–17)
BUN SERPL-MCNC: 19 MG/DL — SIGNIFICANT CHANGE UP (ref 7–23)
CALCIUM SERPL-MCNC: 8.8 MG/DL — SIGNIFICANT CHANGE UP (ref 8.4–10.5)
CHLORIDE SERPL-SCNC: 98 MMOL/L — SIGNIFICANT CHANGE UP (ref 96–108)
CO2 SERPL-SCNC: 31 MMOL/L — SIGNIFICANT CHANGE UP (ref 22–31)
CREAT SERPL-MCNC: 0.69 MG/DL — SIGNIFICANT CHANGE UP (ref 0.5–1.3)
EGFR: 100 ML/MIN/1.73M2 — SIGNIFICANT CHANGE UP
GLUCOSE SERPL-MCNC: 122 MG/DL — HIGH (ref 70–99)
MAGNESIUM SERPL-MCNC: 1.8 MG/DL — SIGNIFICANT CHANGE UP (ref 1.6–2.6)
PHOSPHATE SERPL-MCNC: 3.6 MG/DL — SIGNIFICANT CHANGE UP (ref 2.5–4.5)
POTASSIUM SERPL-MCNC: 3.8 MMOL/L — SIGNIFICANT CHANGE UP (ref 3.5–5.3)
POTASSIUM SERPL-SCNC: 3.8 MMOL/L — SIGNIFICANT CHANGE UP (ref 3.5–5.3)
SODIUM SERPL-SCNC: 134 MMOL/L — LOW (ref 135–145)

## 2024-04-19 PROCEDURE — 99233 SBSQ HOSP IP/OBS HIGH 50: CPT

## 2024-04-19 RX ORDER — I.V. FAT EMULSION 20 G/100ML
0.8 EMULSION INTRAVENOUS
Qty: 50 | Refills: 0 | Status: DISCONTINUED | OUTPATIENT
Start: 2024-04-19 | End: 2024-04-19

## 2024-04-19 RX ORDER — AMLODIPINE BESYLATE 2.5 MG/1
5 TABLET ORAL EVERY 24 HOURS
Refills: 0 | Status: DISCONTINUED | OUTPATIENT
Start: 2024-04-19 | End: 2024-04-23

## 2024-04-19 RX ORDER — ELECTROLYTE SOLUTION,INJ
1 VIAL (ML) INTRAVENOUS
Refills: 0 | Status: DISCONTINUED | OUTPATIENT
Start: 2024-04-19 | End: 2024-04-19

## 2024-04-19 RX ADMIN — POLYETHYLENE GLYCOL 3350 17 GRAM(S): 17 POWDER, FOR SOLUTION ORAL at 12:39

## 2024-04-19 RX ADMIN — HEPARIN SODIUM 5000 UNIT(S): 5000 INJECTION INTRAVENOUS; SUBCUTANEOUS at 14:08

## 2024-04-19 RX ADMIN — Medication 1000 MILLIGRAM(S): at 19:19

## 2024-04-19 RX ADMIN — HEPARIN SODIUM 5000 UNIT(S): 5000 INJECTION INTRAVENOUS; SUBCUTANEOUS at 07:47

## 2024-04-19 RX ADMIN — GABAPENTIN 300 MILLIGRAM(S): 400 CAPSULE ORAL at 22:14

## 2024-04-19 RX ADMIN — METHADONE HYDROCHLORIDE 190 MILLIGRAM(S): 40 TABLET ORAL at 12:39

## 2024-04-19 RX ADMIN — HEPARIN SODIUM 5000 UNIT(S): 5000 INJECTION INTRAVENOUS; SUBCUTANEOUS at 22:14

## 2024-04-19 RX ADMIN — CHLORHEXIDINE GLUCONATE 1 APPLICATION(S): 213 SOLUTION TOPICAL at 07:47

## 2024-04-19 RX ADMIN — I.V. FAT EMULSION 20.83 GM/KG/DAY: 20 EMULSION INTRAVENOUS at 18:56

## 2024-04-19 RX ADMIN — Medication 1 EACH: at 18:54

## 2024-04-19 RX ADMIN — GABAPENTIN 300 MILLIGRAM(S): 400 CAPSULE ORAL at 14:08

## 2024-04-19 RX ADMIN — AMLODIPINE BESYLATE 5 MILLIGRAM(S): 2.5 TABLET ORAL at 07:48

## 2024-04-19 RX ADMIN — GABAPENTIN 300 MILLIGRAM(S): 400 CAPSULE ORAL at 07:47

## 2024-04-19 RX ADMIN — Medication 1000 MILLIGRAM(S): at 00:30

## 2024-04-19 RX ADMIN — Medication 1000 MILLIGRAM(S): at 12:39

## 2024-04-19 NOTE — PROGRESS NOTE ADULT - SUBJECTIVE AND OBJECTIVE BOX
Patient is a 68y old  Male who presents with a chief complaint of Abdominal Pain (19 Apr 2024 06:13)      INTERVAL HPI/OVERNIGHT EVENTS: offers no new complaints; current symptoms resolving    MEDICATIONS  (STANDING):  acetaminophen     Tablet .. 1000 milliGRAM(s) Oral every 6 hours  amLODIPine   Tablet 5 milliGRAM(s) Oral every 24 hours  chlorhexidine 2% Cloths 1 Application(s) Topical <User Schedule>  gabapentin 300 milliGRAM(s) Oral every 8 hours  heparin   Injectable 5000 Unit(s) SubCutaneous every 8 hours  influenza  Vaccine (HIGH DOSE) 0.7 milliLiter(s) IntraMuscular once  lipid, fat emulsion (Fish Oil and Plant Based) 20% Infusion 0.8 Gm/kG/Day (21.2 mL/Hr) IV Continuous <Continuous>  methadone  Concentrate 190 milliGRAM(s) Oral every 24 hours  Parenteral Nutrition - Adult 1 Each (63 mL/Hr) TPN Continuous <Continuous>  Parenteral Nutrition - Adult 1 Each (63 mL/Hr) TPN Continuous <Continuous>  polyethylene glycol 3350 17 Gram(s) Oral daily    MEDICATIONS  (PRN):  naloxone Injectable 0.1 milliGRAM(s) IV Push every 3 minutes PRN For ANY of the following changes in patient status:  A. RR LESS THAN 10 breaths per minute, B. Oxygen saturation LESS THAN 90%, C. Sedation score of 6  ondansetron Injectable 4 milliGRAM(s) IV Push every 6 hours PRN Nausea and/or Vomiting  sodium chloride 0.9% lock flush 10 milliLiter(s) IV Push every 1 hour PRN Pre/post blood products, medications, blood draw, and to maintain line patency      __________________________________________________  REVIEW OF SYSTEMS:    CONSTITUTIONAL: No fever,   EYES: no acute visual disturbances  NECK: No pain or stiffness  RESPIRATORY: No cough; No shortness of breath  CARDIOVASCULAR: No chest pain, no palpitations  GASTROINTESTINAL: + soreness   NEUROLOGICAL: No headache or numbness, no tremors  MUSCULOSKELETAL: No joint pain, no muscle pain  GENITOURINARY: no dysuria, no frequency, no hesitancy  PSYCHIATRY: no depression , no anxiety  ALL OTHER  ROS negative        Vital Signs Last 24 Hrs  T(C): 36.9 (19 Apr 2024 07:57), Max: 37.2 (18 Apr 2024 16:55)  T(F): 98.5 (19 Apr 2024 07:57), Max: 98.9 (18 Apr 2024 16:55)  HR: 87 (19 Apr 2024 07:57) (76 - 90)  BP: 117/79 (19 Apr 2024 07:57) (116/73 - 135/83)  BP(mean): --  RR: 18 (19 Apr 2024 07:57) (16 - 18)  SpO2: 97% (19 Apr 2024 07:57) (94% - 97%)    Parameters below as of 19 Apr 2024 07:57  Patient On (Oxygen Delivery Method): room air        ________________________________________________  PHYSICAL EXAM:  GENERAL: NAD  HEENT: Normocephalic;  conjunctivae and sclerae clear; moist mucous membranes;   NECK : supple  CHEST/LUNG: Clear to auscultation bilaterally with good air entry   HEART: S1 S2  regular; no murmurs, gallops or rubs  ABDOMEN: Soft, mildly ttp, + distended; Bowel sounds present  EXTREMITIES: no cyanosis; no edema; no calf tenderness  SKIN: warm and dry; no rash  NERVOUS SYSTEM:  Awake and alert; Oriented  to place, person and time     _________________________________________________  LABS:    04-19    134<L>  |  98  |  19  ----------------------------<  122<H>  3.8   |  31  |  0.69    Ca    8.8      19 Apr 2024 07:15  Phos  3.6     04-19  Mg     1.8     04-19        Urinalysis Basic - ( 19 Apr 2024 07:15 )    Color: x / Appearance: x / SG: x / pH: x  Gluc: 122 mg/dL / Ketone: x  / Bili: x / Urobili: x   Blood: x / Protein: x / Nitrite: x   Leuk Esterase: x / RBC: x / WBC x   Sq Epi: x / Non Sq Epi: x / Bacteria: x      CAPILLARY BLOOD GLUCOSE            RADIOLOGY & ADDITIONAL TESTS:      Plan of care was discussed with patient and /or primary care giver; all questions and concerns were addressed and care was aligned with patient's wishes.

## 2024-04-19 NOTE — CHART NOTE - NSCHARTNOTEFT_GEN_A_CORE
Admitting Diagnosis:   Patient is a 68y old  Male who presents with a chief complaint of Abdominal Pain (19 Apr 2024 11:49)    PAST MEDICAL & SURGICAL HISTORY:  Methadone use  H/O right inguinal hernia repair    Current Nutrition Order: Diet, Pureed:   Supplement Feeding Modality:  Oral  Ensure Plus High Protein Cans or Servings Per Day:  3       Frequency:  Daily (04-17-24 @ 16:54) [Active]    Parenteral Nutrition - Adult 1 Each (63 mL/Hr) TPN Continuous <Continuous>, 04-19-24 @ 17:00, 17:00, Stop order after: 1 Days  Parenteral Nutrition - Adult 1 Each (63 mL/Hr) TPN Continuous <Continuous>, 04-18-24 @ 17:00, 17:00, Stop order after: 1 Days    Current TPN Regimen Provides 335g Dextrose, 90g AA, 50g 20% Lipids to provide in total  1999 Kcal, 90g protein, 3.6GIR of based on actual body weight (63.6kg), 1.4 grams protein/kg actual body weight    PO Intake: Good (%) [   ]  Fair (50-75%) [  ] Poor (<25%) [ X ]    GI Issues: No issues with vomiting, diarrhea, constipation reported at time of visit. States that he feels nauseous at times. Last BM noted on 4/19 per pt. Pt is currently on a bowel regimen: miralax     Pain: Endorses feeling pain in the abdomen.     Skin Integrity: No Pressure Injuries per flow sheets. Mendez Score 19   Edema: No Edema per flow sheets.    Labs:   04-19    134<L>  |  98  |  19  ----------------------------<  122<H>  3.8   |  31  |  0.69    Ca    8.8      19 Apr 2024 07:15  Phos  3.6     04-19  Mg     1.8     04-19    CAPILLARY BLOOD GLUCOSE    Medications:  MEDICATIONS  (STANDING):  acetaminophen     Tablet .. 1000 milliGRAM(s) Oral every 6 hours  amLODIPine   Tablet 5 milliGRAM(s) Oral every 24 hours  chlorhexidine 2% Cloths 1 Application(s) Topical <User Schedule>  gabapentin 300 milliGRAM(s) Oral every 8 hours  heparin   Injectable 5000 Unit(s) SubCutaneous every 8 hours  influenza  Vaccine (HIGH DOSE) 0.7 milliLiter(s) IntraMuscular once  lipid, fat emulsion (Fish Oil and Plant Based) 20% Infusion 0.8 Gm/kG/Day (21.2 mL/Hr) IV Continuous <Continuous>  methadone  Concentrate 190 milliGRAM(s) Oral every 24 hours  Parenteral Nutrition - Adult 1 Each (63 mL/Hr) TPN Continuous <Continuous>  Parenteral Nutrition - Adult 1 Each (63 mL/Hr) TPN Continuous <Continuous>  polyethylene glycol 3350 17 Gram(s) Oral daily    MEDICATIONS  (PRN):  naloxone Injectable 0.1 milliGRAM(s) IV Push every 3 minutes PRN For ANY of the following changes in patient status:  A. RR LESS THAN 10 breaths per minute, B. Oxygen saturation LESS THAN 90%, C. Sedation score of 6  ondansetron Injectable 4 milliGRAM(s) IV Push every 6 hours PRN Nausea and/or Vomiting  sodium chloride 0.9% lock flush 10 milliLiter(s) IV Push every 1 hour PRN Pre/post blood products, medications, blood draw, and to maintain line patency      Weight:  4/5 140.1 pounds (standing) (63.6 kg)  3/29 140 pounds (dosing)    UBW: ~145 pounds (per pt) x 2 months   IBW: 154 pounds   %IBW: 90%     No updated weights in charts. Recommend obtaining daily/weekly weights. RD to continue to monitor weight trends as available.    Estimated energy needs:   Kcal: 30-35kcal/kg = 1570-8657 kcal   Pro: 1.1-1.5g pro/kg = 69-95 g pro  Fluids: 30-35ml/kg = 1905-2222kcal   Based on Standards of Care pt within % IBW (90%) thus actual body weight (63.5kg) used for all calculations. Needs adjusted for advanced age and malnutrition.    Subjective:   68y M with PMHx of methadone use (for opioid use), HCV (shown on labs) and PSHx of R inguinal hernia repair open (20+yrs ago) and R upper arm shrapnel removal (40yrs ago) presented to [facility] with 5d hx of worsening abdominal pain, 1 episode of bloody BM at onset of symptoms, and constipation (last flatus and BM 4d ago). Upon presentation patient was febrile 100.4 (rectal), tachycardic 104, and hypertensive 157/104. 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 Patient was transferred to [facility] surgery for further management of cecal volvulous. Pt is now s/p exlap, R-hemicolectomy, side to side stapled anastamosis (3/30)."    Visited pt at bedside on 9UR for follow up. Pt currently on Pureed Diet and TPN. Calorie count has been initiated, documenting intake to wean off TPN. States that he is trying to consume more food, however states that he feels pain/discomfort in his abdomen area which is effecting his intake. Rd encouraged PO intake, observed pt consuming yogurt at bedside with no issues. Pt drinking ensures. Endorses feeling nausea at times, intermittently. Meds reviewed. Nutritionally Pertinent Labs 4/19 Na: 134 (L), Gluc: 122 (H). See Nutrition Recommendations below.     Previous Nutrition Diagnosis: Severe Chronic Malnutrition RT inadequate PO intake in setting on physical demands AEB Severe muscle and fat depletions.    Active [ X ]  Resolved [   ]    Goal: Pt to meet >75% of estimated nutrition needs daily per course of hospitalization.     Recommendations:  1. c/w TPN via PICC as primary means to nutrition- Recommend 335g Dextrose, 90g AA, 50g 20% Lipids to provide in total 1999 Kcal, 90g protein, 3.6GIR of based on actual body weight (63.6kg), 1.4 grams protein/kg actual body weight  - fluid & lytes per team  - MVI, thiamine in bag  2. c/w Pureed Diet and monitor intake via Calorie count   >>>Ordered for Ensure High Protein 3x/day (350 kcal, 20 g protein in each) to optimize intake.   3. Weekly lipid panel while on TPN,  - hold lipid if TG >400  4. Daily BMP/Mg/Phos, fingersticks Q4-6hrs  - monitor lytes closely & replete outside TPN bag prn  5. Pain regimen per team  6. RDN will continue to monitor, reassess, and intervene as appropriate.     Education: Discussed with the patient on nutrient dense options on the menu. Encourage PO intake as able. Pt agreeable and verbalizes understanding.     Risk Level: High [X] Moderate [   ] Low [   ]

## 2024-04-19 NOTE — PROGRESS NOTE ADULT - ASSESSMENT
68y M with PMHx of methadone use (for unknown pain disorder) and PSHx of R inguinal hernia repair open (20+yrs ago) and R upper arm shrapnel removal (40yrs ago) presented to [facility] with 5d hx of worsening abdominal pain, 1 episode of bloody BM at onset of symptoms, and constipation. Pt admitted to surgery for further management of cecal volvulus     #cecal volvulus   #Small bowel obstruction   #post op state   #sepsis on admission   s/p exlap, R-hemicolectomy, side to side stapled anastomosis postop abdominal XR shows dilated loops of bowel consistent with ileus  s/p PICC on 4/5  and started on TPN  s/p abd xray 4/8 which shows improvement    Diet advancement per primary team - now with puree and tpn     #methadone dependance   Restarted on home dose, monitor QTc, maintain mag 2  Patient denies pain, pain management per primary team     #hypomagnesemia   Resolved     #HCV  is HCV positive (both RNA and antigen)  CT scan showed small amount of perihepatic ascites, but no comment on cirrhosis.    should have outpatient follow up    #thrombocytopenia   Monitor PLTs     #Severe protein calorie malnutrition  - appreciate input from nutrition  - plan as above     #Hypertension  - has been having intermittently elevated systolic blood pressures  - started Nifedipine XL 30 mg daily 4/9-- monitor BP if persistently  < 120 -- can dc medication     #acute blood loss anemia   Hgb downtrended from time of admission  Partially attributable to operative blood losses   Hemoglobin: 11.7 on admission -- hgb 10  4/9-- recommend repeating labs  weekly while inpt     #dvt ppx: per primary team, monitor PLT   Discussed with primary team

## 2024-04-20 LAB
CHOLEST SERPL-MCNC: 80 MG/DL — SIGNIFICANT CHANGE UP
HDLC SERPL-MCNC: 40 MG/DL — LOW
LIPID PNL WITH DIRECT LDL SERPL: 29 MG/DL — SIGNIFICANT CHANGE UP
NON HDL CHOLESTEROL: 40 MG/DL — SIGNIFICANT CHANGE UP
PREALB SERPL-MCNC: 9 MG/DL — LOW (ref 20–40)
TRIGL SERPL-MCNC: 36 MG/DL — SIGNIFICANT CHANGE UP

## 2024-04-20 PROCEDURE — 99233 SBSQ HOSP IP/OBS HIGH 50: CPT

## 2024-04-20 RX ORDER — ELECTROLYTE SOLUTION,INJ
1 VIAL (ML) INTRAVENOUS
Refills: 0 | Status: DISCONTINUED | OUTPATIENT
Start: 2024-04-20 | End: 2024-04-20

## 2024-04-20 RX ORDER — I.V. FAT EMULSION 20 G/100ML
0.79 EMULSION INTRAVENOUS
Qty: 50 | Refills: 0 | Status: DISCONTINUED | OUTPATIENT
Start: 2024-04-20 | End: 2024-04-20

## 2024-04-20 RX ADMIN — GABAPENTIN 300 MILLIGRAM(S): 400 CAPSULE ORAL at 06:03

## 2024-04-20 RX ADMIN — HEPARIN SODIUM 5000 UNIT(S): 5000 INJECTION INTRAVENOUS; SUBCUTANEOUS at 21:49

## 2024-04-20 RX ADMIN — METHADONE HYDROCHLORIDE 190 MILLIGRAM(S): 40 TABLET ORAL at 12:54

## 2024-04-20 RX ADMIN — Medication 1000 MILLIGRAM(S): at 18:05

## 2024-04-20 RX ADMIN — Medication 1 EACH: at 17:58

## 2024-04-20 RX ADMIN — I.V. FAT EMULSION 20.8 GM/KG/DAY: 20 EMULSION INTRAVENOUS at 17:58

## 2024-04-20 RX ADMIN — POLYETHYLENE GLYCOL 3350 17 GRAM(S): 17 POWDER, FOR SOLUTION ORAL at 12:54

## 2024-04-20 RX ADMIN — GABAPENTIN 300 MILLIGRAM(S): 400 CAPSULE ORAL at 13:49

## 2024-04-20 RX ADMIN — CHLORHEXIDINE GLUCONATE 1 APPLICATION(S): 213 SOLUTION TOPICAL at 06:07

## 2024-04-20 RX ADMIN — HEPARIN SODIUM 5000 UNIT(S): 5000 INJECTION INTRAVENOUS; SUBCUTANEOUS at 13:49

## 2024-04-20 RX ADMIN — Medication 1000 MILLIGRAM(S): at 00:20

## 2024-04-20 RX ADMIN — Medication 1000 MILLIGRAM(S): at 06:03

## 2024-04-20 RX ADMIN — AMLODIPINE BESYLATE 5 MILLIGRAM(S): 2.5 TABLET ORAL at 06:09

## 2024-04-20 RX ADMIN — Medication 1000 MILLIGRAM(S): at 23:45

## 2024-04-20 RX ADMIN — Medication 1000 MILLIGRAM(S): at 13:00

## 2024-04-20 RX ADMIN — HEPARIN SODIUM 5000 UNIT(S): 5000 INJECTION INTRAVENOUS; SUBCUTANEOUS at 06:02

## 2024-04-20 RX ADMIN — GABAPENTIN 300 MILLIGRAM(S): 400 CAPSULE ORAL at 21:50

## 2024-04-20 NOTE — PROGRESS NOTE ADULT - SUBJECTIVE AND OBJECTIVE BOX
Patient is a 68y old  Male who presents with a chief complaint of Abdominal Pain (20 Apr 2024 07:36)      INTERVAL HPI/OVERNIGHT EVENTS: offers no new complaints; current symptoms resolving    MEDICATIONS  (STANDING):  acetaminophen     Tablet .. 1000 milliGRAM(s) Oral every 6 hours  amLODIPine   Tablet 5 milliGRAM(s) Oral every 24 hours  chlorhexidine 2% Cloths 1 Application(s) Topical <User Schedule>  gabapentin 300 milliGRAM(s) Oral every 8 hours  heparin   Injectable 5000 Unit(s) SubCutaneous every 8 hours  influenza  Vaccine (HIGH DOSE) 0.7 milliLiter(s) IntraMuscular once  lipid, fat emulsion (Fish Oil and Plant Based) 20% Infusion 0.7874 Gm/kG/Day (20.8 mL/Hr) IV Continuous <Continuous>  methadone  Concentrate 190 milliGRAM(s) Oral every 24 hours  Parenteral Nutrition - Adult 1 Each (31 mL/Hr) TPN Continuous <Continuous>  Parenteral Nutrition - Adult 1 Each (63 mL/Hr) TPN Continuous <Continuous>  polyethylene glycol 3350 17 Gram(s) Oral daily    MEDICATIONS  (PRN):  naloxone Injectable 0.1 milliGRAM(s) IV Push every 3 minutes PRN For ANY of the following changes in patient status:  A. RR LESS THAN 10 breaths per minute, B. Oxygen saturation LESS THAN 90%, C. Sedation score of 6  ondansetron Injectable 4 milliGRAM(s) IV Push every 6 hours PRN Nausea and/or Vomiting  sodium chloride 0.9% lock flush 10 milliLiter(s) IV Push every 1 hour PRN Pre/post blood products, medications, blood draw, and to maintain line patency      __________________________________________________  REVIEW OF SYSTEMS:    CONSTITUTIONAL: No fever,   EYES: no acute visual disturbances  NECK: No pain or stiffness  RESPIRATORY: No cough; No shortness of breath  CARDIOVASCULAR: No chest pain, no palpitations  GASTROINTESTINAL: + soreness in abdomen. No nausea or vomiting; No diarrhea   NEUROLOGICAL: No headache or numbness, no tremors  MUSCULOSKELETAL: No joint pain, no muscle pain  GENITOURINARY: no dysuria, no frequency, no hesitancy  PSYCHIATRY: no depression , no anxiety  ALL OTHER  ROS negative        Vital Signs Last 24 Hrs  T(C): 37.1 (20 Apr 2024 16:33), Max: 37.1 (20 Apr 2024 16:33)  T(F): 98.7 (20 Apr 2024 16:33), Max: 98.7 (20 Apr 2024 16:33)  HR: 79 (20 Apr 2024 16:33) (75 - 87)  BP: 125/70 (20 Apr 2024 16:33) (125/70 - 153/88)  BP(mean): --  RR: 17 (20 Apr 2024 16:33) (17 - 18)  SpO2: 98% (20 Apr 2024 16:33) (97% - 98%)    Parameters below as of 20 Apr 2024 16:33  Patient On (Oxygen Delivery Method): room air        ________________________________________________  PHYSICAL EXAM:  GENERAL: NAD  HEENT: Normocephalic;  conjunctivae and sclerae clear; moist mucous membranes;   NECK : supple  CHEST/LUNG: Clear to auscultation bilaterally with good air entry   HEART: S1 S2  regular; no murmurs, gallops or rubs  ABDOMEN: Soft, + discomfort, Nondistended; Bowel sounds present  EXTREMITIES: no cyanosis; no edema; no calf tenderness  SKIN: warm and dry; no rash  NERVOUS SYSTEM:  Awake and alert; Oriented  to place, person and time ; no new deficits    _________________________________________________  LABS:    04-19    134<L>  |  98  |  19  ----------------------------<  122<H>  3.8   |  31  |  0.69    Ca    8.8      19 Apr 2024 07:15  Phos  3.6     04-19  Mg     1.8     04-19        Urinalysis Basic - ( 19 Apr 2024 07:15 )    Color: x / Appearance: x / SG: x / pH: x  Gluc: 122 mg/dL / Ketone: x  / Bili: x / Urobili: x   Blood: x / Protein: x / Nitrite: x   Leuk Esterase: x / RBC: x / WBC x   Sq Epi: x / Non Sq Epi: x / Bacteria: x      CAPILLARY BLOOD GLUCOSE            RADIOLOGY & ADDITIONAL TESTS:      Plan of care was discussed with patient and /or primary care giver; all questions and concerns were addressed and care was aligned with patient's wishes.

## 2024-04-20 NOTE — CHART NOTE - NSCHARTNOTESELECT_GEN_ALL_CORE
Nutrition Services

## 2024-04-20 NOTE — CHART NOTE - NSCHARTNOTEFT_GEN_A_CORE
3 Day Calorie Count  (4/17/24-4/19/24):  Day 1: 275 calories, 11g protein  Meeting ~14% estimated energy and ~16% estimated protein needs based on the low end of pt's energy and protein needs  Day 2: 372.5 calories, 20.5g protein  Meeting ~20% estimated energy and ~30% estimated protein needs based on the low end of pt's energy and protein needs  Day 3: 1130.5 calories, 50.2g protein  Meeting ~59% estimated energy and 73% estimated protein needs based on the low end of pt's energy and protein needs    CALORIE COUNT ASSESSMENT: Based on the 3 day calorie count completed by nursing for this patient, patient has met, on average, ~31% of estimated energy needs and 40% estimated protein needs over the past 3 days, based on the low end of the range of patient's energy and protein needs. This is not sufficiently meeting patient's nutrient demands. Recommend that patient continue with alternate means of nutrition via TPN, consider additional calorie count if medical team would like to measure patient's PO intakes over the next 3 days or in the future thereafter. RD has communicated recommendations with medical team. RD to remain available.     Nutrition Recommendations:   1. continue with TPN via PICC as primary means to nutrition- Recommend 335g Dextrose, 90g Amino Acids, 50g 20% Lipids to provide in total 1999 Kcal, 90g protein, 3.6 GIR of based on actual body weight (63.6kg), 1.4 grams protein/kg actual body weight  - fluid & electrolytes per team  - MVI, thiamine in bag  2. continue with Pureed Diet and monitor intakes  >>>continue oral nutrition supplement Ensure High Protein 3x/day (350 kcal, 20 g protein in each) to optimize intakes   3. Weekly lipid panel while on TPN,  - hold lipid if TG >400  4. Daily BMP/Mg/Phos, fingersticks Q4-6hrs  - monitor electrolytes closely & replete outside TPN bag prn  5. Pain regimen per team  6. RDN will continue to monitor, reassess, and intervene as appropriate.    Salud Snyder, FRIDA, CDN, ext [phone] 3 Day Calorie Count  (4/17/24-4/19/24):  Day 1: 275 calories, 11g protein  Meeting ~14% estimated energy and ~16% estimated protein needs based on the low end of pt's energy and protein needs  Day 2: 372.5 calories, 20.5g protein  Meeting ~20% estimated energy and ~30% estimated protein needs based on the low end of pt's energy and protein needs  Day 3: 1130.5 calories, 50.2g protein  Meeting ~59% estimated energy and 73% estimated protein needs based on the low end of pt's energy and protein needs    CALORIE COUNT ASSESSMENT: Based on the 3 day calorie count completed by nursing for this patient, patient has met, on average, ~31% of estimated energy needs and 40% estimated protein needs over the past 3 days, based on the low end of the range of patient's energy and protein needs. This is not sufficiently meeting patient's nutrient demands. RD spoke to medical team about this; according to yesterday's (Day 3) calorie count information and today's information per medical team, pt's intakes appear to be improving overall. RD recommends that patient continue with alternate means of nutrition via TPN; medical team plans to decrease the TPN to 1/2 rate if PO intakes continue to improve. RD to remain available and continue to follow up. Please see below.     Nutrition Recommendations:   1. continue with TPN via PICC as primary means to nutrition- Recommend 335g Dextrose, 90g Amino Acids, 50g 20% Lipids to provide in total 1999 Kcal, 90g protein, 3.6 GIR of based on actual body weight (63.6kg), 1.4 grams protein/kg actual body weight  - fluid & electrolytes per team  - MVI, thiamine in bag  2. continue with Pureed Diet and monitor intakes  >>>continue oral nutrition supplement Ensure High Protein 3x/day (350 kcal, 20 g protein in each) to optimize intakes  **IF pt's PO intakes improve above 50% consistently via meals, nourishments and/or oral nutrition supplements, consider weaning TPN**  3. Weekly lipid panel while on TPN,  - hold lipid if TG >400  4. Daily BMP/Mg/Phos, fingersticks Q4-6hrs  - monitor electrolytes closely & replete outside TPN bag prn  5. Pain regimen per team  6. RDN will continue to monitor, reassess, and intervene as appropriate.    Salud Snyder RDN, CDN, ext [phone]

## 2024-04-20 NOTE — PROGRESS NOTE ADULT - SUBJECTIVE AND OBJECTIVE BOX
ON: SERENA, VS WNL, calorie count improved, +N/-V/+F  4/19: +f/+bm loose, nifedipine switched to amlodipine 2/2 cant crush, tpn ordered    POST-OP DAY: X s/p      SUBJECTIVE: Patient seen and examined bedside by Surgical resident.    amLODIPine   Tablet 5 milliGRAM(s) Oral every 24 hours  heparin   Injectable 5000 Unit(s) SubCutaneous every 8 hours    MEDICATIONS  (PRN):  naloxone Injectable 0.1 milliGRAM(s) IV Push every 3 minutes PRN For ANY of the following changes in patient status:  A. RR LESS THAN 10 breaths per minute, B. Oxygen saturation LESS THAN 90%, C. Sedation score of 6  ondansetron Injectable 4 milliGRAM(s) IV Push every 6 hours PRN Nausea and/or Vomiting  sodium chloride 0.9% lock flush 10 milliLiter(s) IV Push every 1 hour PRN Pre/post blood products, medications, blood draw, and to maintain line patency      I&O's Detail    19 Apr 2024 07:01  -  20 Apr 2024 07:00  --------------------------------------------------------  IN:    Oral Fluid: 850 mL  Total IN: 850 mL    OUT:    Voided (mL): 1750 mL  Total OUT: 1750 mL    Total NET: -900 mL          Vital Signs Last 24 Hrs  T(C): 36.9 (20 Apr 2024 05:12), Max: 37.2 (19 Apr 2024 13:30)  T(F): 98.4 (20 Apr 2024 05:12), Max: 99 (19 Apr 2024 16:55)  HR: 87 (20 Apr 2024 05:12) (79 - 89)  BP: 153/88 (20 Apr 2024 05:12) (117/79 - 153/88)  BP(mean): --  RR: 18 (20 Apr 2024 05:12) (17 - 18)  SpO2: 97% (20 Apr 2024 05:12) (97% - 99%)    Parameters below as of 20 Apr 2024 05:12  Patient On (Oxygen Delivery Method): room air        General: NAD, resting comfortably in bed  C/V: NSR  Pulm: Nonlabored breathing, no respiratory distress  Abd: soft, NT/ND  Extrem: WWP, no edema, SCDs in place    LABS:    04-19    134<L>  |  98  |  19  ----------------------------<  122<H>  3.8   |  31  |  0.69    Ca    8.8      19 Apr 2024 07:15  Phos  3.6     04-19  Mg     1.8     04-19        Urinalysis Basic - ( 19 Apr 2024 07:15 )    Color: x / Appearance: x / SG: x / pH: x  Gluc: 122 mg/dL / Ketone: x  / Bili: x / Urobili: x   Blood: x / Protein: x / Nitrite: x   Leuk Esterase: x / RBC: x / WBC x   Sq Epi: x / Non Sq Epi: x / Bacteria: x        RADIOLOGY & ADDITIONAL STUDIES:     ON: SERENA, VS WNL, calorie count improved, +N/-V/+F  4/19: +f/+bm loose, nifedipine switched to amlodipine 2/2 cant crush, tpn ordered    POST-OP DAY:  s/p exlap, R-hemicolectomy, side to side stapled anastamosis (3/30).     SUBJECTIVE: Patient seen and examined bedside by Surgical resident. resting comfortably in bed this am, states that he is eating better today and finished about 75% of the dinner tray last night.     amLODIPine   Tablet 5 milliGRAM(s) Oral every 24 hours  heparin   Injectable 5000 Unit(s) SubCutaneous every 8 hours    MEDICATIONS  (PRN):  naloxone Injectable 0.1 milliGRAM(s) IV Push every 3 minutes PRN For ANY of the following changes in patient status:  A. RR LESS THAN 10 breaths per minute, B. Oxygen saturation LESS THAN 90%, C. Sedation score of 6  ondansetron Injectable 4 milliGRAM(s) IV Push every 6 hours PRN Nausea and/or Vomiting  sodium chloride 0.9% lock flush 10 milliLiter(s) IV Push every 1 hour PRN Pre/post blood products, medications, blood draw, and to maintain line patency      I&O's Detail    19 Apr 2024 07:01  -  20 Apr 2024 07:00  --------------------------------------------------------  IN:    Oral Fluid: 850 mL  Total IN: 850 mL    OUT:    Voided (mL): 1750 mL  Total OUT: 1750 mL    Total NET: -900 mL          Vital Signs Last 24 Hrs  T(C): 36.9 (20 Apr 2024 05:12), Max: 37.2 (19 Apr 2024 13:30)  T(F): 98.4 (20 Apr 2024 05:12), Max: 99 (19 Apr 2024 16:55)  HR: 87 (20 Apr 2024 05:12) (79 - 89)  BP: 153/88 (20 Apr 2024 05:12) (117/79 - 153/88)  BP(mean): --  RR: 18 (20 Apr 2024 05:12) (17 - 18)  SpO2: 97% (20 Apr 2024 05:12) (97% - 99%)    Parameters below as of 20 Apr 2024 05:12  Patient On (Oxygen Delivery Method): room air        General: NAD, resting comfortably in bed  C/V: NSR  Pulm: Nonlabored breathing, no respiratory distress  Abd: soft, NT/ND, incisions c/d/i  Extrem: WWP, no edema, SCDs in place    LABS:    04-19    134<L>  |  98  |  19  ----------------------------<  122<H>  3.8   |  31  |  0.69    Ca    8.8      19 Apr 2024 07:15  Phos  3.6     04-19  Mg     1.8     04-19        Urinalysis Basic - ( 19 Apr 2024 07:15 )    Color: x / Appearance: x / SG: x / pH: x  Gluc: 122 mg/dL / Ketone: x  / Bili: x / Urobili: x   Blood: x / Protein: x / Nitrite: x   Leuk Esterase: x / RBC: x / WBC x   Sq Epi: x / Non Sq Epi: x / Bacteria: x        RADIOLOGY & ADDITIONAL STUDIES:

## 2024-04-20 NOTE — PROGRESS NOTE ADULT - ASSESSMENT
68y M with PMHx of methadone use (for opioid use), HCV (shown on labs) and PSHx of R inguinal hernia repair open (20+yrs ago) and R upper arm shrapnel removal (40yrs ago) presented to [facility] with 5d hx of worsening abdominal pain, 1 episode of bloody BM at onset of symptoms, and constipation (last flatus and BM 4d ago). Upon presentation patient was febrile 100.4 (rectal), tachycardic 104, and hypertensive 157/104. 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 Patient was transferred to [facility] surgery for further management of cecal volvulous. Pt is now s/p exlap, R-hemicolectomy, side to side stapled anastamosis (3/30). 68y M with PMHx of methadone use (for opioid use), HCV (shown on labs) and PSHx of R inguinal hernia repair open (20+yrs ago) and R upper arm shrapnel removal (40yrs ago) presented to [facility] with 5d hx of worsening abdominal pain, 1 episode of bloody BM at onset of symptoms, and constipation (last flatus and BM 4d ago). Upon presentation patient was febrile 100.4 (rectal), tachycardic 104, and hypertensive 157/104. 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 Patient was transferred to [facility] surgery for further management of cecal volvulous. Pt is now s/p exlap, R-hemicolectomy, side to side stapled anastamosis (3/30).    Shari elizalde ensures/TPN  SQH/SCD  OOBA/IS  Miralax daily  methadone  AM BMPs M/W/F  Dispo: [facility]INE has auth for [facility] til 4/23

## 2024-04-21 PROCEDURE — 99233 SBSQ HOSP IP/OBS HIGH 50: CPT

## 2024-04-21 RX ORDER — I.V. FAT EMULSION 20 G/100ML
0.79 EMULSION INTRAVENOUS
Qty: 50 | Refills: 0 | Status: DISCONTINUED | OUTPATIENT
Start: 2024-04-21 | End: 2024-04-21

## 2024-04-21 RX ORDER — ELECTROLYTE SOLUTION,INJ
1 VIAL (ML) INTRAVENOUS
Refills: 0 | Status: DISCONTINUED | OUTPATIENT
Start: 2024-04-21 | End: 2024-04-21

## 2024-04-21 RX ADMIN — Medication 1 EACH: at 17:22

## 2024-04-21 RX ADMIN — GABAPENTIN 300 MILLIGRAM(S): 400 CAPSULE ORAL at 22:05

## 2024-04-21 RX ADMIN — HEPARIN SODIUM 5000 UNIT(S): 5000 INJECTION INTRAVENOUS; SUBCUTANEOUS at 05:31

## 2024-04-21 RX ADMIN — GABAPENTIN 300 MILLIGRAM(S): 400 CAPSULE ORAL at 13:52

## 2024-04-21 RX ADMIN — HEPARIN SODIUM 5000 UNIT(S): 5000 INJECTION INTRAVENOUS; SUBCUTANEOUS at 22:41

## 2024-04-21 RX ADMIN — Medication 1000 MILLIGRAM(S): at 22:18

## 2024-04-21 RX ADMIN — I.V. FAT EMULSION 20.8 GM/KG/DAY: 20 EMULSION INTRAVENOUS at 17:23

## 2024-04-21 RX ADMIN — Medication 1000 MILLIGRAM(S): at 17:22

## 2024-04-21 RX ADMIN — METHADONE HYDROCHLORIDE 190 MILLIGRAM(S): 40 TABLET ORAL at 11:44

## 2024-04-21 RX ADMIN — HEPARIN SODIUM 5000 UNIT(S): 5000 INJECTION INTRAVENOUS; SUBCUTANEOUS at 13:52

## 2024-04-21 RX ADMIN — AMLODIPINE BESYLATE 5 MILLIGRAM(S): 2.5 TABLET ORAL at 06:05

## 2024-04-21 RX ADMIN — Medication 1000 MILLIGRAM(S): at 11:44

## 2024-04-21 RX ADMIN — GABAPENTIN 300 MILLIGRAM(S): 400 CAPSULE ORAL at 05:32

## 2024-04-21 RX ADMIN — Medication 1000 MILLIGRAM(S): at 05:32

## 2024-04-21 RX ADMIN — CHLORHEXIDINE GLUCONATE 1 APPLICATION(S): 213 SOLUTION TOPICAL at 05:36

## 2024-04-21 NOTE — PROGRESS NOTE ADULT - ASSESSMENT
68y M with PMHx of methadone use (for unknown pain disorder) and PSHx of R inguinal hernia repair open (20+yrs ago) and R upper arm shrapnel removal (40yrs ago) presented to [facility] with 5d hx of worsening abdominal pain, 1 episode of bloody BM at onset of symptoms, and constipation. Pt admitted to surgery for further management of cecal volvulus     #cecal volvulus   #Small bowel obstruction   #post op state   #sepsis on admission   s/p exlap, R-hemicolectomy, side to side stapled anastomosis postop abdominal XR shows dilated loops of bowel consistent with ileus  s/p PICC on 4/5  and started on TPN  s/p abd xray 4/8 which shows improvement    Diet advancement per primary team - now with puree and tpn     #methadone dependance   Restarted on home dose, monitor QTc, maintain mag 2  Patient denies pain, pain management per primary team     #hypomagnesemia   Resolved     #HCV  is HCV positive (both RNA and antigen)  CT scan showed small amount of perihepatic ascites, but no comment on cirrhosis.    should have outpatient follow up    #thrombocytopenia   Monitor PLTs     #Severe protein calorie malnutrition  - appreciate input from nutrition  - plan as above     #Hypertension  - has been having intermittently elevated systolic blood pressures  - was started on amlodipine o this admission     #acute blood loss anemia   Hgb downtrended from time of admission  Partially attributable to operative blood losses   Hemoglobin: 11.7 on admission -- hgb 10  4/9-- recommend repeating labs  weekly while inpt     #dvt ppx: per primary team, monitor PLT   Discussed with primary team

## 2024-04-21 NOTE — PROGRESS NOTE ADULT - SUBJECTIVE AND OBJECTIVE BOX
Surgery Note    Pt comfortable without complaints, pain controlled. States hes feeling better, ambulating and eating well.  Denies CP, SOB, N/V, numbness/tingling     Vital Signs Last 24 Hrs  T(C): 36.7 (04-21-24 @ 08:55), Max: 36.7 (04-21-24 @ 05:24)  T(F): 98.1 (04-21-24 @ 08:55), Max: 98.1 (04-21-24 @ 08:55)  HR: 78 (04-21-24 @ 08:55) (78 - 86)  BP: 138/76 (04-21-24 @ 08:55) (138/76 - 142/90)  BP(mean): --  RR: 18 (04-21-24 @ 08:55) (18 - 18)  SpO2: 98% (04-21-24 @ 08:55) (97% - 98%)  I&O's Summary    20 Apr 2024 07:01  -  21 Apr 2024 07:00  --------------------------------------------------------  IN: 1839 mL / OUT: 1525 mL / NET: 314 mL        General: Pt Alert and oriented, NAD  Abd ND/NTTP                A/P:  68y M with PMHx of methadone use (for opioid use), HCV (shown on labs) and PSHx of R inguinal hernia repair open (20+yrs ago) and R upper arm shrapnel removal (40yrs ago) presented to [facility] with 5d hx of worsening abdominal pain, 1 episode of bloody BM at onset of symptoms, and constipation (last flatus and BM 4d ago). Upon presentation patient was febrile 100.4 (rectal), tachycardic 104, and hypertensive 157/104. 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 Patient was transferred to [facility] surgery for further management of cecal volvulous. Pt is now s/p exlap, R-hemicolectomy, side to side stapled anastamosis (3/30).    Half TPN  SQH/SCD  OOBA/IS  Miralax daily  methadone  AM BMPs M/W/F  Dispo: SHERINE has auth for [facility] 4/23

## 2024-04-21 NOTE — PROGRESS NOTE ADULT - SUBJECTIVE AND OBJECTIVE BOX
Patient is a 68y old  Male who presents with a chief complaint of Abdominal Pain (21 Apr 2024 09:26)      INTERVAL HPI/OVERNIGHT EVENTS: offers no new complaints; current symptoms resolving    MEDICATIONS  (STANDING):  acetaminophen     Tablet .. 1000 milliGRAM(s) Oral every 6 hours  amLODIPine   Tablet 5 milliGRAM(s) Oral every 24 hours  chlorhexidine 2% Cloths 1 Application(s) Topical <User Schedule>  gabapentin 300 milliGRAM(s) Oral every 8 hours  heparin   Injectable 5000 Unit(s) SubCutaneous every 8 hours  influenza  Vaccine (HIGH DOSE) 0.7 milliLiter(s) IntraMuscular once  lipid, fat emulsion (Fish Oil and Plant Based) 20% Infusion 0.7874 Gm/kG/Day (20.8 mL/Hr) IV Continuous <Continuous>  methadone  Concentrate 190 milliGRAM(s) Oral every 24 hours  Parenteral Nutrition - Adult 1 Each (16 mL/Hr) TPN Continuous <Continuous>  Parenteral Nutrition - Adult 1 Each (31 mL/Hr) TPN Continuous <Continuous>  polyethylene glycol 3350 17 Gram(s) Oral daily    MEDICATIONS  (PRN):  naloxone Injectable 0.1 milliGRAM(s) IV Push every 3 minutes PRN For ANY of the following changes in patient status:  A. RR LESS THAN 10 breaths per minute, B. Oxygen saturation LESS THAN 90%, C. Sedation score of 6  ondansetron Injectable 4 milliGRAM(s) IV Push every 6 hours PRN Nausea and/or Vomiting  sodium chloride 0.9% lock flush 10 milliLiter(s) IV Push every 1 hour PRN Pre/post blood products, medications, blood draw, and to maintain line patency      __________________________________________________  REVIEW OF SYSTEMS:    CONSTITUTIONAL: No fever,   EYES: no acute visual disturbances  NECK: No pain or stiffness  RESPIRATORY: No cough; No shortness of breath  CARDIOVASCULAR: No chest pain, no palpitations  GASTROINTESTINAL: + mild pain. No nausea or vomiting; No diarrhea   NEUROLOGICAL: No headache or numbness, no tremors  MUSCULOSKELETAL: no joint pain, no muscle pain  GENITOURINARY: no dysuria, no frequency, no hesitancy  PSYCHIATRY: no depression , no anxiety  ALL OTHER  ROS negative        Vital Signs Last 24 Hrs  T(C): 36.7 (21 Apr 2024 08:55), Max: 37.1 (20 Apr 2024 16:33)  T(F): 98.1 (21 Apr 2024 08:55), Max: 98.7 (20 Apr 2024 16:33)  HR: 78 (21 Apr 2024 08:55) (78 - 86)  BP: 138/76 (21 Apr 2024 08:55) (125/70 - 145/84)  BP(mean): --  RR: 18 (21 Apr 2024 08:55) (17 - 18)  SpO2: 98% (21 Apr 2024 08:55) (97% - 98%)    Parameters below as of 21 Apr 2024 08:55  Patient On (Oxygen Delivery Method): room air        ________________________________________________  PHYSICAL EXAM:  GENERAL: NAD  HEENT: Normocephalic;  conjunctivae and sclerae clear; moist mucous membranes;   NECK : supple  CHEST/LUNG: Clear to auscultation bilaterally with good air entry   HEART: S1 S2  regular; no murmurs, gallops or rubs  ABDOMEN: Soft, + discomfort, Nondistended; Bowel sounds present  EXTREMITIES: no cyanosis; no edema; no calf tenderness  SKIN: warm and dry; no rash  NERVOUS SYSTEM:  Awake and alert; Oriented  to place, person and time ; no new deficits    _________________________________________________  LABS:              CAPILLARY BLOOD GLUCOSE            RADIOLOGY & ADDITIONAL TESTS:      Plan of care was discussed with patient and /or primary care giver; all questions and concerns were addressed and care was aligned with patient's wishes.

## 2024-04-22 LAB
ANION GAP SERPL CALC-SCNC: 7 MMOL/L — SIGNIFICANT CHANGE UP (ref 5–17)
BUN SERPL-MCNC: 16 MG/DL — SIGNIFICANT CHANGE UP (ref 7–23)
CALCIUM SERPL-MCNC: 9.3 MG/DL — SIGNIFICANT CHANGE UP (ref 8.4–10.5)
CHLORIDE SERPL-SCNC: 97 MMOL/L — SIGNIFICANT CHANGE UP (ref 96–108)
CO2 SERPL-SCNC: 30 MMOL/L — SIGNIFICANT CHANGE UP (ref 22–31)
CREAT SERPL-MCNC: 0.74 MG/DL — SIGNIFICANT CHANGE UP (ref 0.5–1.3)
EGFR: 97 ML/MIN/1.73M2 — SIGNIFICANT CHANGE UP
GLUCOSE SERPL-MCNC: 113 MG/DL — HIGH (ref 70–99)
MAGNESIUM SERPL-MCNC: 1.7 MG/DL — SIGNIFICANT CHANGE UP (ref 1.6–2.6)
PHOSPHATE SERPL-MCNC: 4.8 MG/DL — HIGH (ref 2.5–4.5)
POTASSIUM SERPL-MCNC: 4.3 MMOL/L — SIGNIFICANT CHANGE UP (ref 3.5–5.3)
POTASSIUM SERPL-SCNC: 4.3 MMOL/L — SIGNIFICANT CHANGE UP (ref 3.5–5.3)
SODIUM SERPL-SCNC: 134 MMOL/L — LOW (ref 135–145)

## 2024-04-22 PROCEDURE — 99233 SBSQ HOSP IP/OBS HIGH 50: CPT

## 2024-04-22 RX ORDER — I.V. FAT EMULSION 20 G/100ML
0.79 EMULSION INTRAVENOUS
Qty: 50 | Refills: 0 | Status: DISCONTINUED | OUTPATIENT
Start: 2024-04-22 | End: 2024-04-22

## 2024-04-22 RX ORDER — LIDOCAINE 4 G/100G
1 CREAM TOPICAL ONCE
Refills: 0 | Status: COMPLETED | OUTPATIENT
Start: 2024-04-22 | End: 2024-04-22

## 2024-04-22 RX ORDER — ELECTROLYTE SOLUTION,INJ
1 VIAL (ML) INTRAVENOUS
Refills: 0 | Status: DISCONTINUED | OUTPATIENT
Start: 2024-04-22 | End: 2024-04-22

## 2024-04-22 RX ADMIN — GABAPENTIN 300 MILLIGRAM(S): 400 CAPSULE ORAL at 06:22

## 2024-04-22 RX ADMIN — METHADONE HYDROCHLORIDE 190 MILLIGRAM(S): 40 TABLET ORAL at 12:00

## 2024-04-22 RX ADMIN — LIDOCAINE 1 PATCH: 4 CREAM TOPICAL at 23:49

## 2024-04-22 RX ADMIN — Medication 1 EACH: at 17:54

## 2024-04-22 RX ADMIN — GABAPENTIN 300 MILLIGRAM(S): 400 CAPSULE ORAL at 22:46

## 2024-04-22 RX ADMIN — Medication 1000 MILLIGRAM(S): at 06:22

## 2024-04-22 RX ADMIN — Medication 1000 MILLIGRAM(S): at 17:41

## 2024-04-22 RX ADMIN — HEPARIN SODIUM 5000 UNIT(S): 5000 INJECTION INTRAVENOUS; SUBCUTANEOUS at 22:46

## 2024-04-22 RX ADMIN — HEPARIN SODIUM 5000 UNIT(S): 5000 INJECTION INTRAVENOUS; SUBCUTANEOUS at 06:23

## 2024-04-22 RX ADMIN — LIDOCAINE 1 PATCH: 4 CREAM TOPICAL at 11:57

## 2024-04-22 RX ADMIN — AMLODIPINE BESYLATE 5 MILLIGRAM(S): 2.5 TABLET ORAL at 07:39

## 2024-04-22 RX ADMIN — POLYETHYLENE GLYCOL 3350 17 GRAM(S): 17 POWDER, FOR SOLUTION ORAL at 11:57

## 2024-04-22 RX ADMIN — GABAPENTIN 300 MILLIGRAM(S): 400 CAPSULE ORAL at 13:41

## 2024-04-22 RX ADMIN — CHLORHEXIDINE GLUCONATE 1 APPLICATION(S): 213 SOLUTION TOPICAL at 06:44

## 2024-04-22 RX ADMIN — Medication 1000 MILLIGRAM(S): at 11:58

## 2024-04-22 RX ADMIN — I.V. FAT EMULSION 20.8 GM/KG/DAY: 20 EMULSION INTRAVENOUS at 17:54

## 2024-04-22 RX ADMIN — HEPARIN SODIUM 5000 UNIT(S): 5000 INJECTION INTRAVENOUS; SUBCUTANEOUS at 13:41

## 2024-04-22 RX ADMIN — Medication 1000 MILLIGRAM(S): at 23:11

## 2024-04-22 NOTE — PROGRESS NOTE ADULT - SUBJECTIVE AND OBJECTIVE BOX
Patient is a 68y old  Male who presents with a chief complaint of Abdominal Pain (21 Apr 2024 13:01)      INTERVAL HPI/OVERNIGHT EVENTS: offers no new complaints; current symptoms resolving    MEDICATIONS  (STANDING):  acetaminophen     Tablet .. 1000 milliGRAM(s) Oral every 6 hours  amLODIPine   Tablet 5 milliGRAM(s) Oral every 24 hours  chlorhexidine 2% Cloths 1 Application(s) Topical <User Schedule>  gabapentin 300 milliGRAM(s) Oral every 8 hours  heparin   Injectable 5000 Unit(s) SubCutaneous every 8 hours  influenza  Vaccine (HIGH DOSE) 0.7 milliLiter(s) IntraMuscular once  lipid, fat emulsion (Fish Oil and Plant Based) 20% Infusion 0.7874 Gm/kG/Day (20.8 mL/Hr) IV Continuous <Continuous>  methadone  Concentrate 190 milliGRAM(s) Oral every 24 hours  Parenteral Nutrition - Adult 1 Each (16 mL/Hr) TPN Continuous <Continuous>  Parenteral Nutrition - Adult 1 Each (16 mL/Hr) TPN Continuous <Continuous>  polyethylene glycol 3350 17 Gram(s) Oral daily    MEDICATIONS  (PRN):  naloxone Injectable 0.1 milliGRAM(s) IV Push every 3 minutes PRN For ANY of the following changes in patient status:  A. RR LESS THAN 10 breaths per minute, B. Oxygen saturation LESS THAN 90%, C. Sedation score of 6  ondansetron Injectable 4 milliGRAM(s) IV Push every 6 hours PRN Nausea and/or Vomiting  sodium chloride 0.9% lock flush 10 milliLiter(s) IV Push every 1 hour PRN Pre/post blood products, medications, blood draw, and to maintain line patency      __________________________________________________  REVIEW OF SYSTEMS:    CONSTITUTIONAL: No fever,   EYES: no acute visual disturbances  NECK: No pain or stiffness  RESPIRATORY: No cough; No shortness of breath  CARDIOVASCULAR: No chest pain, no palpitations  GASTROINTESTINAL: No pain. No nausea or vomiting; No diarrhea   NEUROLOGICAL: No headache or numbness, no tremors  MUSCULOSKELETAL: No joint pain, no muscle pain  GENITOURINARY: no dysuria, no frequency, no hesitancy  PSYCHIATRY: no depression , no anxiety  ALL OTHER  ROS negative        Vital Signs Last 24 Hrs  T(C): 36.7 (22 Apr 2024 09:10), Max: 36.9 (21 Apr 2024 23:42)  T(F): 98 (22 Apr 2024 09:10), Max: 98.4 (21 Apr 2024 23:42)  HR: 61 (22 Apr 2024 09:10) (61 - 82)  BP: 130/81 (22 Apr 2024 09:10) (121/80 - 152/89)  BP(mean): --  RR: 18 (22 Apr 2024 09:10) (17 - 18)  SpO2: 96% (22 Apr 2024 09:10) (95% - 98%)    Parameters below as of 22 Apr 2024 09:10  Patient On (Oxygen Delivery Method): room air        ________________________________________________  PHYSICAL EXAM:  GENERAL: NAD  HEENT: Normocephalic;  conjunctivae and sclerae clear; moist mucous membranes;   NECK : supple  CHEST/LUNG: Clear to auscultation bilaterally with good air entry   HEART: S1 S2  regular; no murmurs, gallops or rubs  ABDOMEN: Soft, Nontender, Nondistended; Bowel sounds present  EXTREMITIES: no cyanosis; no edema; no calf tenderness  SKIN: warm and dry; no rash  NERVOUS SYSTEM:  Awake and alert; Oriented  to place, person and time ; no new deficits    _________________________________________________  LABS:    04-22    134<L>  |  97  |  16  ----------------------------<  113<H>  4.3   |  30  |  0.74    Ca    9.3      22 Apr 2024 05:30  Phos  4.8     04-22  Mg     1.7     04-22        Urinalysis Basic - ( 22 Apr 2024 05:30 )    Color: x / Appearance: x / SG: x / pH: x  Gluc: 113 mg/dL / Ketone: x  / Bili: x / Urobili: x   Blood: x / Protein: x / Nitrite: x   Leuk Esterase: x / RBC: x / WBC x   Sq Epi: x / Non Sq Epi: x / Bacteria: x      CAPILLARY BLOOD GLUCOSE            RADIOLOGY & ADDITIONAL TESTS:      Plan of care was discussed with patient and /or primary care giver; all questions and concerns were addressed and care was aligned with patient's wishes.       Patient is a 68y old  Male who presents with a chief complaint of Abdominal Pain (21 Apr 2024 13:01)      INTERVAL HPI/OVERNIGHT EVENTS: offers no new complaints; current symptoms resolving    MEDICATIONS  (STANDING):  acetaminophen     Tablet .. 1000 milliGRAM(s) Oral every 6 hours  amLODIPine   Tablet 5 milliGRAM(s) Oral every 24 hours  chlorhexidine 2% Cloths 1 Application(s) Topical <User Schedule>  gabapentin 300 milliGRAM(s) Oral every 8 hours  heparin   Injectable 5000 Unit(s) SubCutaneous every 8 hours  influenza  Vaccine (HIGH DOSE) 0.7 milliLiter(s) IntraMuscular once  lipid, fat emulsion (Fish Oil and Plant Based) 20% Infusion 0.7874 Gm/kG/Day (20.8 mL/Hr) IV Continuous <Continuous>  methadone  Concentrate 190 milliGRAM(s) Oral every 24 hours  Parenteral Nutrition - Adult 1 Each (16 mL/Hr) TPN Continuous <Continuous>  Parenteral Nutrition - Adult 1 Each (16 mL/Hr) TPN Continuous <Continuous>  polyethylene glycol 3350 17 Gram(s) Oral daily    MEDICATIONS  (PRN):  naloxone Injectable 0.1 milliGRAM(s) IV Push every 3 minutes PRN For ANY of the following changes in patient status:  A. RR LESS THAN 10 breaths per minute, B. Oxygen saturation LESS THAN 90%, C. Sedation score of 6  ondansetron Injectable 4 milliGRAM(s) IV Push every 6 hours PRN Nausea and/or Vomiting  sodium chloride 0.9% lock flush 10 milliLiter(s) IV Push every 1 hour PRN Pre/post blood products, medications, blood draw, and to maintain line patency      __________________________________________________  REVIEW OF SYSTEMS:    CONSTITUTIONAL: No fever,   EYES: no acute visual disturbances  NECK: No pain or stiffness  RESPIRATORY: No cough; No shortness of breath  CARDIOVASCULAR: No chest pain, no palpitations  GASTROINTESTINAL: mild pain. No nausea or vomiting; No diarrhea   NEUROLOGICAL: No headache or numbness, no tremors  MUSCULOSKELETAL: No joint pain, no muscle pain  GENITOURINARY: no dysuria, no frequency, no hesitancy  PSYCHIATRY: no depression , no anxiety  ALL OTHER  ROS negative        Vital Signs Last 24 Hrs  T(C): 36.7 (22 Apr 2024 09:10), Max: 36.9 (21 Apr 2024 23:42)  T(F): 98 (22 Apr 2024 09:10), Max: 98.4 (21 Apr 2024 23:42)  HR: 61 (22 Apr 2024 09:10) (61 - 82)  BP: 130/81 (22 Apr 2024 09:10) (121/80 - 152/89)  BP(mean): --  RR: 18 (22 Apr 2024 09:10) (17 - 18)  SpO2: 96% (22 Apr 2024 09:10) (95% - 98%)    Parameters below as of 22 Apr 2024 09:10  Patient On (Oxygen Delivery Method): room air        ________________________________________________  PHYSICAL EXAM:  GENERAL: NAD  HEENT: Normocephalic;  conjunctivae and sclerae clear; moist mucous membranes;   NECK : supple  CHEST/LUNG: Clear to auscultation bilaterally with good air entry   HEART: S1 S2  regular; no murmurs, gallops or rubs  ABDOMEN: Soft, mildly sore on palpation, , Nondistended; Bowel sounds present  EXTREMITIES: no cyanosis; no edema; no calf tenderness  SKIN: warm and dry; no rash  NERVOUS SYSTEM:  Awake and alert; Oriented  to place, person and time ; no new deficits    _________________________________________________  LABS:    04-22    134<L>  |  97  |  16  ----------------------------<  113<H>  4.3   |  30  |  0.74    Ca    9.3      22 Apr 2024 05:30  Phos  4.8     04-22  Mg     1.7     04-22        Urinalysis Basic - ( 22 Apr 2024 05:30 )    Color: x / Appearance: x / SG: x / pH: x  Gluc: 113 mg/dL / Ketone: x  / Bili: x / Urobili: x   Blood: x / Protein: x / Nitrite: x   Leuk Esterase: x / RBC: x / WBC x   Sq Epi: x / Non Sq Epi: x / Bacteria: x      CAPILLARY BLOOD GLUCOSE            RADIOLOGY & ADDITIONAL TESTS:      Plan of care was discussed with patient and /or primary care giver; all questions and concerns were addressed and care was aligned with patient's wishes.

## 2024-04-22 NOTE — PROGRESS NOTE ADULT - ASSESSMENT
68y M with PMHx of methadone use (for unknown pain disorder) and PSHx of R inguinal hernia repair open (20+yrs ago) and R upper arm shrapnel removal (40yrs ago) presented to [facility] with 5d hx of worsening abdominal pain, 1 episode of bloody BM at onset of symptoms, and constipation. Pt admitted to surgery for further management of cecal volvulus     #cecal volvulus   #Small bowel obstruction   #post op state   #sepsis on admission   s/p exlap, R-hemicolectomy, side to side stapled anastomosis postop abdominal XR shows dilated loops of bowel consistent with ileus  s/p PICC on 4/5  and started on TPN  s/p abd xray 4/8 which shows improvement    Diet advancement per primary team - now with puree and tpn     #methadone dependance   Restarted on home dose, monitor QTc, maintain mag 2  Patient denies pain, pain management per primary team     #hypomagnesemia   Resolved     #HCV  is HCV positive (both RNA and antigen)  CT scan showed small amount of perihepatic ascites, but no comment on cirrhosis.    should have outpatient follow up    #thrombocytopenia   Monitor PLTs     #Severe protein calorie malnutrition  - appreciate input from nutrition  - plan as above     #Hypertension  - has been having intermittently elevated systolic blood pressures  - was started on amlodipine o this admission     #acute blood loss anemia   Hgb downtrended from time of admission  Partially attributable to operative blood losses   Hemoglobin: 11.7 on admission -- hgb 10  4/9-- recommend repeating labs  weekly while inpt     #dvt ppx: per primary team, monitor PLT   Discussed with primary team

## 2024-04-23 ENCOUNTER — TRANSCRIPTION ENCOUNTER (OUTPATIENT)
Age: 69
End: 2024-04-23

## 2024-04-23 VITALS
TEMPERATURE: 98 F | DIASTOLIC BLOOD PRESSURE: 70 MMHG | SYSTOLIC BLOOD PRESSURE: 130 MMHG | OXYGEN SATURATION: 98 % | RESPIRATION RATE: 17 BRPM | HEART RATE: 77 BPM

## 2024-04-23 PROCEDURE — 86803 HEPATITIS C AB TEST: CPT

## 2024-04-23 PROCEDURE — 87086 URINE CULTURE/COLONY COUNT: CPT

## 2024-04-23 PROCEDURE — 71046 X-RAY EXAM CHEST 2 VIEWS: CPT

## 2024-04-23 PROCEDURE — 74019 RADEX ABDOMEN 2 VIEWS: CPT

## 2024-04-23 PROCEDURE — 86900 BLOOD TYPING SEROLOGIC ABO: CPT

## 2024-04-23 PROCEDURE — 85730 THROMBOPLASTIN TIME PARTIAL: CPT

## 2024-04-23 PROCEDURE — 84484 ASSAY OF TROPONIN QUANT: CPT

## 2024-04-23 PROCEDURE — C9399: CPT

## 2024-04-23 PROCEDURE — 93306 TTE W/DOPPLER COMPLETE: CPT

## 2024-04-23 PROCEDURE — 82947 ASSAY GLUCOSE BLOOD QUANT: CPT

## 2024-04-23 PROCEDURE — 97530 THERAPEUTIC ACTIVITIES: CPT

## 2024-04-23 PROCEDURE — 84100 ASSAY OF PHOSPHORUS: CPT

## 2024-04-23 PROCEDURE — 99233 SBSQ HOSP IP/OBS HIGH 50: CPT

## 2024-04-23 PROCEDURE — 85018 HEMOGLOBIN: CPT

## 2024-04-23 PROCEDURE — 83036 HEMOGLOBIN GLYCOSYLATED A1C: CPT

## 2024-04-23 PROCEDURE — C1889: CPT

## 2024-04-23 PROCEDURE — 84295 ASSAY OF SERUM SODIUM: CPT

## 2024-04-23 PROCEDURE — 36573 INSJ PICC RS&I 5 YR+: CPT

## 2024-04-23 PROCEDURE — 87521 HEPATITIS C PROBE&RVRS TRNSC: CPT

## 2024-04-23 PROCEDURE — 84134 ASSAY OF PREALBUMIN: CPT

## 2024-04-23 PROCEDURE — 86901 BLOOD TYPING SEROLOGIC RH(D): CPT

## 2024-04-23 PROCEDURE — 82962 GLUCOSE BLOOD TEST: CPT

## 2024-04-23 PROCEDURE — 99291 CRITICAL CARE FIRST HOUR: CPT

## 2024-04-23 PROCEDURE — 85610 PROTHROMBIN TIME: CPT

## 2024-04-23 PROCEDURE — 88307 TISSUE EXAM BY PATHOLOGIST: CPT

## 2024-04-23 PROCEDURE — 96374 THER/PROPH/DIAG INJ IV PUSH: CPT

## 2024-04-23 PROCEDURE — 97535 SELF CARE MNGMENT TRAINING: CPT

## 2024-04-23 PROCEDURE — 80061 LIPID PANEL: CPT

## 2024-04-23 PROCEDURE — 97162 PT EVAL MOD COMPLEX 30 MIN: CPT

## 2024-04-23 PROCEDURE — 80048 BASIC METABOLIC PNL TOTAL CA: CPT

## 2024-04-23 PROCEDURE — 97110 THERAPEUTIC EXERCISES: CPT

## 2024-04-23 PROCEDURE — 80053 COMPREHEN METABOLIC PANEL: CPT

## 2024-04-23 PROCEDURE — 83735 ASSAY OF MAGNESIUM: CPT

## 2024-04-23 PROCEDURE — 87040 BLOOD CULTURE FOR BACTERIA: CPT

## 2024-04-23 PROCEDURE — 71045 X-RAY EXAM CHEST 1 VIEW: CPT

## 2024-04-23 PROCEDURE — 85025 COMPLETE CBC W/AUTO DIFF WBC: CPT

## 2024-04-23 PROCEDURE — 93005 ELECTROCARDIOGRAM TRACING: CPT

## 2024-04-23 PROCEDURE — 86923 COMPATIBILITY TEST ELECTRIC: CPT

## 2024-04-23 PROCEDURE — 80076 HEPATIC FUNCTION PANEL: CPT

## 2024-04-23 PROCEDURE — 97165 OT EVAL LOW COMPLEX 30 MIN: CPT

## 2024-04-23 PROCEDURE — 86850 RBC ANTIBODY SCREEN: CPT

## 2024-04-23 PROCEDURE — 83605 ASSAY OF LACTIC ACID: CPT

## 2024-04-23 PROCEDURE — 82247 BILIRUBIN TOTAL: CPT

## 2024-04-23 PROCEDURE — 96376 TX/PRO/DX INJ SAME DRUG ADON: CPT

## 2024-04-23 PROCEDURE — 36415 COLL VENOUS BLD VENIPUNCTURE: CPT

## 2024-04-23 PROCEDURE — 84478 ASSAY OF TRIGLYCERIDES: CPT

## 2024-04-23 PROCEDURE — 86703 HIV-1/HIV-2 1 RESULT ANTBDY: CPT

## 2024-04-23 PROCEDURE — 84132 ASSAY OF SERUM POTASSIUM: CPT

## 2024-04-23 PROCEDURE — 96375 TX/PRO/DX INJ NEW DRUG ADDON: CPT

## 2024-04-23 PROCEDURE — 85027 COMPLETE CBC AUTOMATED: CPT

## 2024-04-23 PROCEDURE — 81001 URINALYSIS AUTO W/SCOPE: CPT

## 2024-04-23 PROCEDURE — 87637 SARSCOV2&INF A&B&RSV AMP PRB: CPT

## 2024-04-23 PROCEDURE — 82248 BILIRUBIN DIRECT: CPT

## 2024-04-23 PROCEDURE — 80307 DRUG TEST PRSMV CHEM ANLYZR: CPT

## 2024-04-23 PROCEDURE — 97116 GAIT TRAINING THERAPY: CPT

## 2024-04-23 PROCEDURE — 82330 ASSAY OF CALCIUM: CPT

## 2024-04-23 PROCEDURE — 74177 CT ABD & PELVIS W/CONTRAST: CPT | Mod: MC

## 2024-04-23 RX ORDER — AMLODIPINE BESYLATE 2.5 MG/1
1 TABLET ORAL
Qty: 0 | Refills: 0 | DISCHARGE
Start: 2024-04-23

## 2024-04-23 RX ORDER — GABAPENTIN 400 MG/1
1 CAPSULE ORAL
Qty: 0 | Refills: 0 | DISCHARGE
Start: 2024-04-23

## 2024-04-23 RX ORDER — AMLODIPINE BESYLATE 2.5 MG/1
1 TABLET ORAL
Qty: 7 | Refills: 0
Start: 2024-04-23 | End: 2024-04-29

## 2024-04-23 RX ORDER — METHADONE HYDROCHLORIDE 40 MG/1
19 TABLET ORAL
Qty: 0 | Refills: 0 | DISCHARGE
Start: 2024-04-23

## 2024-04-23 RX ORDER — GABAPENTIN 400 MG/1
1 CAPSULE ORAL
Qty: 15 | Refills: 0
Start: 2024-04-23 | End: 2024-04-27

## 2024-04-23 RX ADMIN — Medication 1000 MILLIGRAM(S): at 06:09

## 2024-04-23 RX ADMIN — AMLODIPINE BESYLATE 5 MILLIGRAM(S): 2.5 TABLET ORAL at 06:58

## 2024-04-23 RX ADMIN — Medication 1000 MILLIGRAM(S): at 12:20

## 2024-04-23 RX ADMIN — METHADONE HYDROCHLORIDE 190 MILLIGRAM(S): 40 TABLET ORAL at 12:21

## 2024-04-23 RX ADMIN — CHLORHEXIDINE GLUCONATE 1 APPLICATION(S): 213 SOLUTION TOPICAL at 06:05

## 2024-04-23 RX ADMIN — HEPARIN SODIUM 5000 UNIT(S): 5000 INJECTION INTRAVENOUS; SUBCUTANEOUS at 06:09

## 2024-04-23 RX ADMIN — GABAPENTIN 300 MILLIGRAM(S): 400 CAPSULE ORAL at 06:10

## 2024-04-23 NOTE — DISCHARGE NOTE NURSING/CASE MANAGEMENT/SOCIAL WORK - PATIENT PORTAL LINK FT
You can access the FollowMyHealth Patient Portal offered by [facility] by registering at the following website: [email or web address]. By joining [facility]’s FollowMyHealth portal, you will also be able to view your health information using other applications (apps) compatible with our system.

## 2024-04-23 NOTE — PROGRESS NOTE ADULT - SUBJECTIVE AND OBJECTIVE BOX
Patient is a 68y old  Male who presents with a chief complaint of Abdominal Pain (23 Apr 2024 07:35)      INTERVAL HPI/OVERNIGHT EVENTS: offers no new complaints; current symptoms resolving    MEDICATIONS  (STANDING):  acetaminophen     Tablet .. 1000 milliGRAM(s) Oral every 6 hours  amLODIPine   Tablet 5 milliGRAM(s) Oral every 24 hours  chlorhexidine 2% Cloths 1 Application(s) Topical <User Schedule>  gabapentin 300 milliGRAM(s) Oral every 8 hours  heparin   Injectable 5000 Unit(s) SubCutaneous every 8 hours  influenza  Vaccine (HIGH DOSE) 0.7 milliLiter(s) IntraMuscular once  methadone  Concentrate 190 milliGRAM(s) Oral every 24 hours  Parenteral Nutrition - Adult 1 Each (16 mL/Hr) TPN Continuous <Continuous>  polyethylene glycol 3350 17 Gram(s) Oral daily    MEDICATIONS  (PRN):  naloxone Injectable 0.1 milliGRAM(s) IV Push every 3 minutes PRN For ANY of the following changes in patient status:  A. RR LESS THAN 10 breaths per minute, B. Oxygen saturation LESS THAN 90%, C. Sedation score of 6  ondansetron Injectable 4 milliGRAM(s) IV Push every 6 hours PRN Nausea and/or Vomiting  sodium chloride 0.9% lock flush 10 milliLiter(s) IV Push every 1 hour PRN Pre/post blood products, medications, blood draw, and to maintain line patency      __________________________________________________  REVIEW OF SYSTEMS:    CONSTITUTIONAL: No fever,   EYES: no acute visual disturbances  NECK: No pain or stiffness  RESPIRATORY: No cough; No shortness of breath  CARDIOVASCULAR: No chest pain, no palpitations  GASTROINTESTINAL: mild pain. No nausea or vomiting; No diarrhea   NEUROLOGICAL: No headache or numbness, no tremors  MUSCULOSKELETAL: No joint pain, no muscle pain  GENITOURINARY: no dysuria, no frequency, no hesitancy  PSYCHIATRY: no depression , no anxiety  ALL OTHER  ROS negative        Vital Signs Last 24 Hrs  T(C): 36.7 (23 Apr 2024 09:15), Max: 37.3 (23 Apr 2024 00:00)  T(F): 98.1 (23 Apr 2024 09:15), Max: 99.1 (23 Apr 2024 00:00)  HR: 78 (23 Apr 2024 09:15) (75 - 85)  BP: 148/88 (23 Apr 2024 09:15) (115/65 - 149/81)  BP(mean): --  RR: 16 (23 Apr 2024 09:15) (16 - 18)  SpO2: 98% (23 Apr 2024 09:15) (97% - 98%)    Parameters below as of 23 Apr 2024 09:15  Patient On (Oxygen Delivery Method): room air        ________________________________________________  PHYSICAL EXAM:  GENERAL: NAD  HEENT: Normocephalic;  conjunctivae and sclerae clear; moist mucous membranes;   NECK : supple  CHEST/LUNG: Clear to auscultation bilaterally with good air entry   HEART: S1 S2  regular; no murmurs, gallops or rubs  ABDOMEN: Soft, mildly tender , Nondistended; Bowel sounds present  EXTREMITIES: no cyanosis; no edema; no calf tenderness  SKIN: warm and dry; no rash  NERVOUS SYSTEM:  Awake and alert; Oriented  to place, person and time ; no new deficits    _________________________________________________  LABS:    04-22    134<L>  |  97  |  16  ----------------------------<  113<H>  4.3   |  30  |  0.74    Ca    9.3      22 Apr 2024 05:30  Phos  4.8     04-22  Mg     1.7     04-22        Urinalysis Basic - ( 22 Apr 2024 05:30 )    Color: x / Appearance: x / SG: x / pH: x  Gluc: 113 mg/dL / Ketone: x  / Bili: x / Urobili: x   Blood: x / Protein: x / Nitrite: x   Leuk Esterase: x / RBC: x / WBC x   Sq Epi: x / Non Sq Epi: x / Bacteria: x      CAPILLARY BLOOD GLUCOSE            RADIOLOGY & ADDITIONAL TESTS:      Plan of care was discussed with patient and /or primary care giver; all questions and concerns were addressed and care was aligned with patient's wishes.

## 2024-04-23 NOTE — DISCHARGE NOTE NURSING/CASE MANAGEMENT/SOCIAL WORK - NSDCPEFALRISK_GEN_ALL_CORE
For information on Fall & Injury Prevention, visit: [email or web address] OR  [email or web address] OR  https://www.cdc.gov/steadi/patient.html

## 2024-04-23 NOTE — PROGRESS NOTE ADULT - ASSESSMENT
68y M with PMHx of methadone use (for unknown pain disorder) and PSHx of R inguinal hernia repair open (20+yrs ago) and R upper arm shrapnel removal (40yrs ago) presented to [facility] with 5d hx of worsening abdominal pain, 1 episode of bloody BM at onset of symptoms, and constipation. Pt admitted to surgery for further management of cecal volvulus     #cecal volvulus   #Small bowel obstruction   #post op state   #sepsis on admission   s/p exlap, R-hemicolectomy, side to side stapled anastomosis postop abdominal XR shows dilated loops of bowel consistent with ileus  s/p PICC on 4/5  and started on TPN  s/p abd xray 4/8 which shows improvement    Diet advancement per primary team - now with regular diet   picc line to be removed today     #methadone dependance   Restarted on home dose, monitor QTc, maintain mag 2  Patient denies pain, pain management per primary team     #hypomagnesemia   Resolved     #HCV  is HCV positive (both RNA and antigen)  CT scan showed small amount of perihepatic ascites, but no comment on cirrhosis.    should have outpatient follow up    #thrombocytopenia   Monitor PLTs     #Severe protein calorie malnutrition  - appreciate input from nutrition  - plan as above     #Hypertension  - has been having intermittently elevated systolic blood pressures  - was started on amlodipine o this admission     #acute blood loss anemia   Hgb downtrended from time of admission  Partially attributable to operative blood losses   Hemoglobin: 11.7 on admission -- hgb 10  4/9-- recommend repeating labs  weekly while inpt     #dvt ppx: per primary team, monitor PLT   Discussed with primary team

## 2024-04-23 NOTE — PROGRESS NOTE ADULT - ASSESSMENT
68y M with PMHx of methadone use (for opioid use), HCV (shown on labs) and PSHx of R inguinal hernia repair open (20+yrs ago) and R upper arm shrapnel removal (40yrs ago) presented to [facility] with 5d hx of worsening abdominal pain, 1 episode of bloody BM at onset of symptoms, and constipation (last flatus and BM 4d ago). Upon presentation patient was febrile 100.4 (rectal), tachycardic 104, and hypertensive 157/104. 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 Patient was transferred to [facility] surgery for further management of cecal volvulous. Pt is now s/p exlap, R-hemicolectomy, side to side stapled anastamosis (3/30).    Reg w. ensures  DC PICC and TPN  SQH/SCD  OOBA/IS  Miralax daily  methadone  AM BMPs M/W/F  Dispo: SHERINE has auth for [facility] til 4/23

## 2024-04-23 NOTE — PROGRESS NOTE ADULT - REASON FOR ADMISSION
Abdominal Pain

## 2024-04-23 NOTE — PROGRESS NOTE ADULT - PROVIDER SPECIALTY LIST ADULT
Hospitalist
Internal Medicine
Surgery
Hospitalist
Internal Medicine
Surgery
Internal Medicine
Internal Medicine
Surgery
Internal Medicine
Surgery
Internal Medicine
Surgery

## 2024-04-23 NOTE — DISCHARGE NOTE NURSING/CASE MANAGEMENT/SOCIAL WORK - NSDCFUADDAPPT_GEN_ALL_CORE_FT
Please follow up with Dr. Rodriguez in one week; you may call the office to make an appointment at your earliest convenience.

## 2024-04-23 NOTE — PROGRESS NOTE ADULT - NUTRITIONAL ASSESSMENT
This patient has been assessed with a concern for Malnutrition and has been determined to have a diagnosis/diagnoses of Severe protein-calorie malnutrition.    This patient is being managed with:   Diet NPO-  Except Medications  Entered: Mar 31 2024  5:53PM  
This patient has been assessed with a concern for Malnutrition and has been determined to have a diagnosis/diagnoses of Severe protein-calorie malnutrition.    This patient is being managed with:   Parenteral Nutrition - Adult-  Entered: Apr 17 2024  5:00PM    lipid fat emulsion (Fish Oil and Plant Based) 20% Infusion-[Known as SMOFLIPID 20% Infusion]  50 Gram(s) in IV Solution 250 milliLiter(s) infuse at 20.8 mL/Hr  Dose Rate: 0.7874 Gm/kG/Day Infuse Over: 12 Hours; Stop After 12 Hours  Administration Instructions: Use 1.2 micron in-line filter  Entered: Apr 17 2024  5:00AM    lipid fat emulsion (Fish Oil and Plant Based) 20% Infusion-[Known as SMOFLIPID 20% Infusion]  50 Gram(s) in IV Solution 250 milliLiter(s) infuse at 20.8 mL/Hr  Dose Rate: 0.7874 Gm/kG/Day Infuse Over: 12 Hours; Stop After 12 Hours  Administration Instructions: Use 1.2 micron in-line filter  Entered: Apr 16 2024  5:00PM    Parenteral Nutrition - Adult-  Entered: Apr 16 2024  5:00PM    Diet Clear Liquid-  Entered: Apr 10 2024  8:57AM  
This patient has been assessed with a concern for Malnutrition and has been determined to have a diagnosis/diagnoses of Severe protein-calorie malnutrition.    This patient is being managed with:   Parenteral Nutrition - Adult-  Entered: Apr 5 2024  5:00PM    lipid fat emulsion (Fish Oil and Plant Based) 20% Infusion-[Known as SMOFLIPID 20% Infusion]  50 Gram(s) in IV Solution 250 milliLiter(s) infuse at 20.83 mL/Hr  Dose Rate: 0.7633 Gm/kG/Day Infuse Over: 12 Hours; Stop After 12 Hours  Administration Instructions: Use 1.2 micron in-line filter  Entered: Apr 5 2024  5:00PM    Diet NPO-  Except Medications  Entered: Mar 31 2024  5:53PM  
This patient has been assessed with a concern for Malnutrition and has been determined to have a diagnosis/diagnoses of Severe protein-calorie malnutrition.    This patient is being managed with:   Parenteral Nutrition - Adult-  Entered: Apr 5 2024  5:00PM    lipid fat emulsion (Fish Oil and Plant Based) 20% Infusion-[Known as SMOFLIPID 20% Infusion]  50 Gram(s) in IV Solution 250 milliLiter(s) infuse at 20.83 mL/Hr  Dose Rate: 0.7633 Gm/kG/Day Infuse Over: 12 Hours; Stop After 12 Hours  Administration Instructions: Use 1.2 micron in-line filter  Entered: Apr 5 2024  5:00PM    Diet NPO-  Except Medications  Entered: Mar 31 2024  5:53PM  
This patient has been assessed with a concern for Malnutrition and has been determined to have a diagnosis/diagnoses of Severe protein-calorie malnutrition.    This patient is being managed with:   Parenteral Nutrition - Adult-  Entered: Apr 9 2024  5:00PM    lipid fat emulsion (Fish Oil and Plant Based) 20% Infusion-[Known as SMOFLIPID 20% Infusion]  50 Gram(s) in IV Solution 250 milliLiter(s) infuse at 20.83 mL/Hr  Dose Rate: 0.8 Gm/kG/Day Infuse Over: 12 Hours; Stop After 12 Hours  Administration Instructions: Use 1.2 micron in-line filter  Entered: Apr 9 2024  5:00PM    Diet NPO-  Except Medications  With Ice Chips/Sips of Water  Entered: Apr 8 2024  7:00AM  
This patient has been assessed with a concern for Malnutrition and has been determined to have a diagnosis/diagnoses of Severe protein-calorie malnutrition.    This patient is being managed with:   lipid fat emulsion (Fish Oil and Plant Based) 20% Infusion-[Known as SMOFLIPID 20% Infusion]  50 Gram(s) in IV Solution 250 milliLiter(s) infuse at 20.8 mL/Hr  Dose Rate: 0.7874 Gm/kG/Day Infuse Over: 12 Hours; Stop After 12 Hours  Administration Instructions: Use 1.2 micron in-line filter  Entered: Apr 10 2024  5:00PM    Parenteral Nutrition - Adult-  Entered: Apr 10 2024  5:00PM    Diet Clear Liquid-  Entered: Apr 10 2024  8:57AM    Parenteral Nutrition - Adult-  Entered: Apr 9 2024  5:00PM  
This patient has been assessed with a concern for Malnutrition and has been determined to have a diagnosis/diagnoses of Severe protein-calorie malnutrition.    This patient is being managed with:   lipid fat emulsion (Fish Oil and Plant Based) 20% Infusion-[Known as SMOFLIPID 20% Infusion]  50 Gram(s) in IV Solution 250 milliLiter(s) infuse at 20.8 mL/Hr  Dose Rate: 0.7874 Gm/kG/Day Infuse Over: 12 Hours; Stop After 12 Hours  Administration Instructions: Use 1.2 micron in-line filter  Entered: Apr 6 2024  5:00PM    Parenteral Nutrition - Adult-  Entered: Apr 6 2024  5:00PM    Diet NPO-  Except Medications  Entered: Mar 31 2024  5:53PM  
This patient has been assessed with a concern for Malnutrition and has been determined to have a diagnosis/diagnoses of Severe protein-calorie malnutrition.    This patient is being managed with:   lipid fat emulsion (Fish Oil and Plant Based) 20% Infusion-[Known as SMOFLIPID 20% Infusion]  50 Gram(s) in IV Solution 250 milliLiter(s) infuse at 20.83 mL/Hr  Dose Rate: 0.7874 Gm/kG/Day Infuse Over: 12 Hours; Stop After 12 Hours  Administration Instructions: Use 1.2 micron in-line filter  Entered: Apr 14 2024  5:00PM    Parenteral Nutrition - Adult-  Entered: Apr 14 2024  5:00PM    Parenteral Nutrition - Adult-  Entered: Apr 13 2024  5:00PM    Diet Clear Liquid-  Entered: Apr 10 2024  8:57AM  
This patient has been assessed with a concern for Malnutrition and has been determined to have a diagnosis/diagnoses of Severe protein-calorie malnutrition.    This patient is being managed with:   Parenteral Nutrition - Adult-  Entered: Apr 17 2024  5:00PM    Diet Pureed-  Supplement Feeding Modality:  Oral  Ensure Plus High Protein Cans or Servings Per Day:  3       Frequency:  Daily  Entered: Apr 17 2024  4:53PM  
This patient has been assessed with a concern for Malnutrition and has been determined to have a diagnosis/diagnoses of Severe protein-calorie malnutrition.    This patient is being managed with:   Parenteral Nutrition - Adult-  Entered: Apr 19 2024  5:00PM    lipid fat emulsion (Fish Oil and Plant Based) 20% Infusion-[Known as SMOFLIPID 20% Infusion]  50.8 Gram(s) in IV Solution 254 milliLiter(s) infuse at 21.2 mL/Hr  Dose Rate: 0.8 Gm/kG/Day Infuse Over: 12 Hours; Stop After 12 Hours  Administration Instructions: Use 1.2 micron in-line filter  Entered: Apr 19 2024  5:00PM    Parenteral Nutrition - Adult-  Entered: Apr 18 2024  5:00PM    Diet Pureed-  Supplement Feeding Modality:  Oral  Ensure Plus High Protein Cans or Servings Per Day:  3       Frequency:  Daily  Entered: Apr 17 2024  4:53PM  
This patient has been assessed with a concern for Malnutrition and has been determined to have a diagnosis/diagnoses of Severe protein-calorie malnutrition.    This patient is being managed with:   lipid fat emulsion (Fish Oil and Plant Based) 20% Infusion-[Known as SMOFLIPID 20% Infusion]  50 Gram(s) in IV Solution 250 milliLiter(s) infuse at 20.8 mL/Hr  Dose Rate: 0.7874 Gm/kG/Day Infuse Over: 12 Hours; Stop After 12 Hours  Administration Instructions: Use 1.2 micron in-line filter  Entered: Apr 10 2024  5:00PM    Parenteral Nutrition - Adult-  Entered: Apr 10 2024  5:00PM    Diet Clear Liquid-  Entered: Apr 10 2024  8:57AM  
This patient has been assessed with a concern for Malnutrition and has been determined to have a diagnosis/diagnoses of Severe protein-calorie malnutrition.    This patient is being managed with:   lipid fat emulsion (Fish Oil and Plant Based) 20% Infusion-[Known as SMOFLIPID 20% Infusion]  50 Gram(s) in IV Solution 250 milliLiter(s) infuse at 20.8 mL/Hr  Dose Rate: 0.7874 Gm/kG/Day Infuse Over: 12 Hours; Stop After 12 Hours  Administration Instructions: Use 1.2 micron in-line filter  Entered: Apr 11 2024  5:00PM    Parenteral Nutrition - Adult-  Entered: Apr 11 2024  5:00PM    Diet Clear Liquid-  Entered: Apr 10 2024  8:57AM  
This patient has been assessed with a concern for Malnutrition and has been determined to have a diagnosis/diagnoses of Severe protein-calorie malnutrition.    This patient is being managed with:   lipid fat emulsion (Fish Oil and Plant Based) 20% Infusion-[Known as SMOFLIPID 20% Infusion]  50 Gram(s) in IV Solution 250 milliLiter(s) infuse at 20.8 mL/Hr  Dose Rate: 0.7874 Gm/kG/Day Infuse Over: 12 Hours; Stop After 12 Hours  Administration Instructions: Use 1.2 micron in-line filter  Entered: Apr 11 2024  5:00PM    Parenteral Nutrition - Adult-  Entered: Apr 11 2024  5:00PM    Parenteral Nutrition - Adult-  Entered: Apr 10 2024  5:00PM    Diet Clear Liquid-  Entered: Apr 10 2024  8:57AM  
This patient has been assessed with a concern for Malnutrition and has been determined to have a diagnosis/diagnoses of Severe protein-calorie malnutrition.    This patient is being managed with:   lipid fat emulsion (Fish Oil and Plant Based) 20% Infusion-[Known as SMOFLIPID 20% Infusion]  50 Gram(s) in IV Solution 250 milliLiter(s) infuse at 20.8 mL/Hr  Dose Rate: 0.7874 Gm/kG/Day Infuse Over: 12 Hours; Stop After 12 Hours  Administration Instructions: Use 1.2 micron in-line filter  Entered: Apr 16 2024  5:00PM    Parenteral Nutrition - Adult-  Entered: Apr 16 2024  5:00PM    Parenteral Nutrition - Adult-  Entered: Apr 15 2024  5:00PM    Diet Clear Liquid-  Entered: Apr 10 2024  8:57AM  
This patient has been assessed with a concern for Malnutrition and has been determined to have a diagnosis/diagnoses of Severe protein-calorie malnutrition.    This patient is being managed with:   lipid fat emulsion (Fish Oil and Plant Based) 20% Infusion-[Known as SMOFLIPID 20% Infusion]  50 Gram(s) in IV Solution 250 milliLiter(s) infuse at 20.8 mL/Hr  Dose Rate: 0.7874 Gm/kG/Day Infuse Over: 12 Hours; Stop After 12 Hours  Administration Instructions: Use 1.2 micron in-line filter  Entered: Apr 20 2024  5:00PM    Parenteral Nutrition - Adult-  Entered: Apr 20 2024  5:00PM    Diet Pureed-  Supplement Feeding Modality:  Oral  Ensure Plus High Protein Cans or Servings Per Day:  3       Frequency:  Daily  Entered: Apr 17 2024  4:53PM  
This patient has been assessed with a concern for Malnutrition and has been determined to have a diagnosis/diagnoses of Severe protein-calorie malnutrition.    This patient is being managed with:   lipid fat emulsion (Fish Oil and Plant Based) 20% Infusion-[Known as SMOFLIPID 20% Infusion]  50 Gram(s) in IV Solution 250 milliLiter(s) infuse at 20.83 mL/Hr  Dose Rate: 0.7874 Gm/kG/Day Infuse Over: 12 Hours; Stop After 12 Hours  Administration Instructions: Use 1.2 micron in-line filter  Entered: Apr 13 2024  5:00PM    Parenteral Nutrition - Adult-  Entered: Apr 13 2024  5:00PM    Diet Clear Liquid-  Entered: Apr 10 2024  8:57AM  
This patient has been assessed with a concern for Malnutrition and has been determined to have a diagnosis/diagnoses of Severe protein-calorie malnutrition.    This patient is being managed with:   lipid fat emulsion (Fish Oil and Plant Based) 20% Infusion-[Known as SMOFLIPID 20% Infusion]  50 Gram(s) in IV Solution 250 milliLiter(s) infuse at 20.83 mL/Hr  Dose Rate: 0.8 Gm/kG/Day Infuse Over: 12 Hours; Stop After 12 Hours  Administration Instructions: Use 1.2 micron in-line filter  Entered: Apr 8 2024  5:00PM    Parenteral Nutrition - Adult-  Entered: Apr 8 2024  5:00PM    Diet NPO-  Except Medications  With Ice Chips/Sips of Water  Entered: Apr 8 2024  7:00AM  
This patient has been assessed with a concern for Malnutrition and has been determined to have a diagnosis/diagnoses of Severe protein-calorie malnutrition.    This patient is being managed with:   Diet NPO-  Except Medications  Entered: Mar 31 2024  5:53PM  
This patient has been assessed with a concern for Malnutrition and has been determined to have a diagnosis/diagnoses of Severe protein-calorie malnutrition.    This patient is being managed with:   Parenteral Nutrition - Adult-  Entered: Apr 18 2024  5:00PM    lipid fat emulsion (Fish Oil and Plant Based) 20% Infusion-[Known as SMOFLIPID 20% Infusion]  50 Gram(s) in IV Solution 250 milliLiter(s) infuse at 20.83 mL/Hr  Dose Rate: 0.7874 Gm/kG/Day Infuse Over: 12 Hours; Stop After 12 Hours  Administration Instructions: Use 1.2 micron in-line filter  Entered: Apr 18 2024  7:58AM    Parenteral Nutrition - Adult-  Entered: Apr 17 2024  5:00PM    Diet Pureed-  Supplement Feeding Modality:  Oral  Ensure Plus High Protein Cans or Servings Per Day:  3       Frequency:  Daily  Entered: Apr 17 2024  4:53PM  
This patient has been assessed with a concern for Malnutrition and has been determined to have a diagnosis/diagnoses of Severe protein-calorie malnutrition.    This patient is being managed with:   Parenteral Nutrition - Adult-  Entered: Apr 9 2024  5:00PM    lipid fat emulsion (Fish Oil and Plant Based) 20% Infusion-[Known as SMOFLIPID 20% Infusion]  50 Gram(s) in IV Solution 250 milliLiter(s) infuse at 20.83 mL/Hr  Dose Rate: 0.8 Gm/kG/Day Infuse Over: 12 Hours; Stop After 12 Hours  Administration Instructions: Use 1.2 micron in-line filter  Entered: Apr 9 2024  5:00PM    Parenteral Nutrition - Adult-  Entered: Apr 8 2024  5:00PM    Diet NPO-  Except Medications  With Ice Chips/Sips of Water  Entered: Apr 8 2024  7:00AM  
This patient has been assessed with a concern for Malnutrition and has been determined to have a diagnosis/diagnoses of Severe protein-calorie malnutrition.    This patient is being managed with:   lipid fat emulsion (Fish Oil and Plant Based) 20% Infusion-[Known as SMOFLIPID 20% Infusion]  50 Gram(s) in IV Solution 250 milliLiter(s) infuse at 20.8 mL/Hr  Dose Rate: 0.7874 Gm/kG/Day Infuse Over: 12 Hours; Stop After 12 Hours  Administration Instructions: Use 1.2 micron in-line filter  Entered: Apr 12 2024  5:00PM    Parenteral Nutrition - Adult-  Entered: Apr 12 2024  5:00PM    Parenteral Nutrition - Adult-  Entered: Apr 11 2024  5:00PM    Diet Clear Liquid-  Entered: Apr 10 2024  8:57AM  
This patient has been assessed with a concern for Malnutrition and has been determined to have a diagnosis/diagnoses of Severe protein-calorie malnutrition.    This patient is being managed with:   lipid fat emulsion (Fish Oil and Plant Based) 20% Infusion-[Known as SMOFLIPID 20% Infusion]  50 Gram(s) in IV Solution 250 milliLiter(s) infuse at 20.8 mL/Hr  Dose Rate: 0.7874 Gm/kG/Day Infuse Over: 12 Hours; Stop After 12 Hours  Administration Instructions: Use 1.2 micron in-line filter  Entered: Apr 15 2024  5:00PM    Parenteral Nutrition - Adult-  Entered: Apr 15 2024  5:00PM    Parenteral Nutrition - Adult-  Entered: Apr 14 2024  5:00PM    Diet Clear Liquid-  Entered: Apr 10 2024  8:57AM  
This patient has been assessed with a concern for Malnutrition and has been determined to have a diagnosis/diagnoses of Severe protein-calorie malnutrition.    This patient is being managed with:   lipid fat emulsion (Fish Oil and Plant Based) 20% Infusion-[Known as SMOFLIPID 20% Infusion]  50 Gram(s) in IV Solution 250 milliLiter(s) infuse at 20.8 mL/Hr  Dose Rate: 0.7874 Gm/kG/Day Infuse Over: 12 Hours; Stop After 12 Hours  Administration Instructions: Use 1.2 micron in-line filter  Entered: Apr 22 2024  5:00PM    Parenteral Nutrition - Adult-  Entered: Apr 22 2024  5:00PM    Diet Regular-  Supplement Feeding Modality:  Oral  Ensure Plus High Protein Cans or Servings Per Day:  1       Frequency:  Three Times a day  Entered: Apr 22 2024  7:38AM  
This patient has been assessed with a concern for Malnutrition and has been determined to have a diagnosis/diagnoses of Severe protein-calorie malnutrition.    This patient is being managed with:   lipid fat emulsion (Fish Oil and Plant Based) 20% Infusion-[Known as SMOFLIPID 20% Infusion]  50 Gram(s) in IV Solution 250 milliLiter(s) infuse at 20.83 mL/Hr  Dose Rate: 0.7874 Gm/kG/Day Infuse Over: 12 Hours; Stop After 12 Hours  Administration Instructions: Use 1.2 micron in-line filter  Entered: Apr 14 2024  5:00PM    Parenteral Nutrition - Adult-  Entered: Apr 14 2024  5:00PM    Diet Clear Liquid-  Entered: Apr 10 2024  8:57AM  
This patient has been assessed with a concern for Malnutrition and has been determined to have a diagnosis/diagnoses of Severe protein-calorie malnutrition.    This patient is being managed with:   lipid fat emulsion (Fish Oil and Plant Based) 20% Infusion-[Known as SMOFLIPID 20% Infusion]  50 Gram(s) in IV Solution 250 milliLiter(s) infuse at 20.83 mL/Hr  Dose Rate: 0.7874 Gm/kG/Day Infuse Over: 12 Hours; Stop After 12 Hours  Administration Instructions: Use 1.2 micron in-line filter  Entered: Apr 18 2024  5:00PM    Parenteral Nutrition - Adult-  Entered: Apr 18 2024  5:00PM    Diet Pureed-  Supplement Feeding Modality:  Oral  Ensure Plus High Protein Cans or Servings Per Day:  3       Frequency:  Daily  Entered: Apr 17 2024  4:53PM  
This patient has been assessed with a concern for Malnutrition and has been determined to have a diagnosis/diagnoses of Severe protein-calorie malnutrition.    This patient is being managed with:   Diet NPO-  Except Medications  Entered: Mar 31 2024  5:53PM  
This patient has been assessed with a concern for Malnutrition and has been determined to have a diagnosis/diagnoses of Severe protein-calorie malnutrition.    This patient is being managed with:   Diet NPO-  Except Medications  With Ice Chips/Sips of Water  Entered: Apr 8 2024  7:00AM    Parenteral Nutrition - Adult-  Entered: Apr 7 2024  5:00PM  
This patient has been assessed with a concern for Malnutrition and has been determined to have a diagnosis/diagnoses of Severe protein-calorie malnutrition.    This patient is being managed with:   Parenteral Nutrition - Adult-  Entered: Apr 19 2024  5:00PM    lipid fat emulsion (Fish Oil and Plant Based) 20% Infusion-[Known as SMOFLIPID 20% Infusion]  50 Gram(s) in IV Solution 250 milliLiter(s) infuse at 20.83 mL/Hr  Dose Rate: 0.8 Gm/kG/Day Infuse Over: 12 Hours; Stop After 12 Hours  Administration Instructions: Use 1.2 micron in-line filter  Entered: Apr 19 2024  5:00PM    Diet Pureed-  Supplement Feeding Modality:  Oral  Ensure Plus High Protein Cans or Servings Per Day:  3       Frequency:  Daily  Entered: Apr 17 2024  4:53PM  
This patient has been assessed with a concern for Malnutrition and has been determined to have a diagnosis/diagnoses of Severe protein-calorie malnutrition.    This patient is being managed with:   lipid fat emulsion (Fish Oil and Plant Based) 20% Infusion-[Known as SMOFLIPID 20% Infusion]  50 Gram(s) in IV Solution 250 milliLiter(s) infuse at 20.8 mL/Hr  Dose Rate: 0.7874 Gm/kG/Day Infuse Over: 12 Hours; Stop After 12 Hours  Administration Instructions: Use 1.2 micron in-line filter  Entered: Apr 12 2024  5:00PM    Parenteral Nutrition - Adult-  Entered: Apr 12 2024  5:00PM    Diet Clear Liquid-  Entered: Apr 10 2024  8:57AM  
This patient has been assessed with a concern for Malnutrition and has been determined to have a diagnosis/diagnoses of Severe protein-calorie malnutrition.    This patient is being managed with:   lipid fat emulsion (Fish Oil and Plant Based) 20% Infusion-[Known as SMOFLIPID 20% Infusion]  50 Gram(s) in IV Solution 250 milliLiter(s) infuse at 20.8 mL/Hr  Dose Rate: 0.7874 Gm/kG/Day Infuse Over: 12 Hours; Stop After 12 Hours  Administration Instructions: Use 1.2 micron in-line filter  Entered: Apr 22 2024  5:00PM    Parenteral Nutrition - Adult-  Entered: Apr 22 2024  5:00PM    Diet Regular-  Supplement Feeding Modality:  Oral  Ensure Plus High Protein Cans or Servings Per Day:  1       Frequency:  Three Times a day  Entered: Apr 22 2024  7:38AM    Parenteral Nutrition - Adult-  Entered: Apr 21 2024  5:00PM  
This patient has been assessed with a concern for Malnutrition and has been determined to have a diagnosis/diagnoses of Severe protein-calorie malnutrition.    This patient is being managed with:   lipid fat emulsion (Fish Oil and Plant Based) 20% Infusion-[Known as SMOFLIPID 20% Infusion]  50 Gram(s) in IV Solution 250 milliLiter(s) infuse at 20.83 mL/Hr  Dose Rate: 0.7874 Gm/kG/Day Infuse Over: 12 Hours; Stop After 12 Hours  Administration Instructions: Use 1.2 micron in-line filter  Entered: Apr 13 2024  5:00PM    Parenteral Nutrition - Adult-  Entered: Apr 13 2024  5:00PM    Parenteral Nutrition - Adult-  Entered: Apr 12 2024  5:00PM    Diet Clear Liquid-  Entered: Apr 10 2024  8:57AM  
This patient has been assessed with a concern for Malnutrition and has been determined to have a diagnosis/diagnoses of Severe protein-calorie malnutrition.    This patient is being managed with:   Diet NPO-  Except Medications  Entered: Mar 31 2024  5:53PM  
This patient has been assessed with a concern for Malnutrition and has been determined to have a diagnosis/diagnoses of Severe protein-calorie malnutrition.    This patient is being managed with:   Parenteral Nutrition - Adult-  Entered: Apr 22 2024  5:00PM    Diet Regular-  Supplement Feeding Modality:  Oral  Ensure Plus High Protein Cans or Servings Per Day:  1       Frequency:  Three Times a day  Entered: Apr 22 2024  7:38AM  
This patient has been assessed with a concern for Malnutrition and has been determined to have a diagnosis/diagnoses of Severe protein-calorie malnutrition.    This patient is being managed with:   Parenteral Nutrition - Adult-  Entered: Apr 5 2024  5:00PM    lipid fat emulsion (Fish Oil and Plant Based) 20% Infusion-[Known as SMOFLIPID 20% Infusion]  50 Gram(s) in IV Solution 250 milliLiter(s) infuse at 20.83 mL/Hr  Dose Rate: 0.7633 Gm/kG/Day Infuse Over: 12 Hours; Stop After 12 Hours  Administration Instructions: Use 1.2 micron in-line filter  Entered: Apr 5 2024  5:00PM    Diet NPO-  Except Medications  Entered: Mar 31 2024  5:53PM  
This patient has been assessed with a concern for Malnutrition and has been determined to have a diagnosis/diagnoses of Severe protein-calorie malnutrition.    This patient is being managed with:   Parenteral Nutrition - Adult-  Entered: Apr 5 2024  5:00PM    lipid fat emulsion (Fish Oil and Plant Based) 20% Infusion-[Known as SMOFLIPID 20% Infusion]  50 Gram(s) in IV Solution 250 milliLiter(s) infuse at 20.83 mL/Hr  Dose Rate: 0.7633 Gm/kG/Day Infuse Over: 12 Hours; Stop After 12 Hours  Administration Instructions: Use 1.2 micron in-line filter  Entered: Apr 5 2024  5:00PM    Diet NPO-  Except Medications  Entered: Mar 31 2024  5:53PM  
This patient has been assessed with a concern for Malnutrition and has been determined to have a diagnosis/diagnoses of Severe protein-calorie malnutrition.    This patient is being managed with:   lipid fat emulsion (Fish Oil and Plant Based) 20% Infusion-[Known as SMOFLIPID 20% Infusion]  50 Gram(s) in IV Solution 250 milliLiter(s) infuse at 20.8 mL/Hr  Dose Rate: 0.7874 Gm/kG/Day Infuse Over: 12 Hours; Stop After 12 Hours  Administration Instructions: Use 1.2 micron in-line filter  Entered: Apr 15 2024  5:00PM    Parenteral Nutrition - Adult-  Entered: Apr 15 2024  5:00PM    Diet Clear Liquid-  Entered: Apr 10 2024  8:57AM  
This patient has been assessed with a concern for Malnutrition and has been determined to have a diagnosis/diagnoses of Severe protein-calorie malnutrition.    This patient is being managed with:   lipid fat emulsion (Fish Oil and Plant Based) 20% Infusion-[Known as SMOFLIPID 20% Infusion]  50 Gram(s) in IV Solution 250 milliLiter(s) infuse at 20.8 mL/Hr  Dose Rate: 0.7874 Gm/kG/Day Infuse Over: 12 Hours; Stop After 12 Hours  Administration Instructions: Use 1.2 micron in-line filter  Entered: Apr 20 2024  5:00PM    Parenteral Nutrition - Adult-  Entered: Apr 20 2024  5:00PM    Parenteral Nutrition - Adult-  Entered: Apr 19 2024  5:00PM    Diet Pureed-  Supplement Feeding Modality:  Oral  Ensure Plus High Protein Cans or Servings Per Day:  3       Frequency:  Daily  Entered: Apr 17 2024  4:53PM  
This patient has been assessed with a concern for Malnutrition and has been determined to have a diagnosis/diagnoses of Severe protein-calorie malnutrition.    This patient is being managed with:   lipid fat emulsion (Fish Oil and Plant Based) 20% Infusion-[Known as SMOFLIPID 20% Infusion]  50 Gram(s) in IV Solution 250 milliLiter(s) infuse at 20.8 mL/Hr  Dose Rate: 0.7874 Gm/kG/Day Infuse Over: 12 Hours; Stop After 12 Hours  Administration Instructions: Use 1.2 micron in-line filter  Entered: Apr 21 2024  5:00PM    Parenteral Nutrition - Adult-  Entered: Apr 21 2024  5:00PM    Parenteral Nutrition - Adult-  Entered: Apr 20 2024  5:00PM    Diet Pureed-  Supplement Feeding Modality:  Oral  Ensure Plus High Protein Cans or Servings Per Day:  3       Frequency:  Daily  Entered: Apr 17 2024  4:53PM  
This patient has been assessed with a concern for Malnutrition and has been determined to have a diagnosis/diagnoses of Severe protein-calorie malnutrition.    This patient is being managed with:   lipid fat emulsion (Fish Oil and Plant Based) 20% Infusion-[Known as SMOFLIPID 20% Infusion]  50 Gram(s) in IV Solution 250 milliLiter(s) infuse at 20.8 mL/Hr  Dose Rate: 0.7874 Gm/kG/Day Infuse Over: 12 Hours; Stop After 12 Hours  Administration Instructions: Use 1.2 micron in-line filter  Entered: Apr 7 2024  5:00PM    Parenteral Nutrition - Adult-  Entered: Apr 7 2024  5:00PM    Diet NPO-  Except Medications  Entered: Mar 31 2024  5:53PM

## 2024-04-23 NOTE — PROGRESS NOTE ADULT - SUBJECTIVE AND OBJECTIVE BOX
STATUS POST: exlap, R-hemicolectomy, side to side stapled anastamosis    POST OPERATIVE DAY #: 24    SUBJECTIVE: Pt seen and examined by chief resident. Pt is doing well, resting comfortably on bed. Pain controlled. Diet tolerated. Ambulating out of bed. +F/+BM. No nausea or vomiting. No complaints at this time.    Vital Signs Last 24 Hrs  T(C): 36.9 (23 Apr 2024 05:07), Max: 37.3 (23 Apr 2024 00:00)  T(F): 98.4 (23 Apr 2024 05:07), Max: 99.1 (23 Apr 2024 00:00)  HR: 80 (23 Apr 2024 06:18) (61 - 85)  BP: 135/78 (23 Apr 2024 06:18) (115/65 - 149/81)  BP(mean): --  RR: 17 (23 Apr 2024 06:18) (17 - 18)  SpO2: 97% (23 Apr 2024 06:18) (96% - 97%)    Parameters below as of 23 Apr 2024 06:18  Patient On (Oxygen Delivery Method): room air        I&O's Summary    22 Apr 2024 07:01  -  23 Apr 2024 07:00  --------------------------------------------------------  IN: 1369.6 mL / OUT: 2475 mL / NET: -1105.4 mL        Physical Exam:  General Appearance: Appears well, NAD  Pulmonary: Nonlabored breathing, no respiratory distress  Abdomen: Soft, improved distention, nontender, midline incision clean dry and intact   Extremities: WWP, SCD's in place     LABS:    04-22    134<L>  |  97  |  16  ----------------------------<  113<H>  4.3   |  30  |  0.74    Ca    9.3      22 Apr 2024 05:30  Phos  4.8     04-22  Mg     1.7     04-22        Urinalysis Basic - ( 22 Apr 2024 05:30 )    Color: x / Appearance: x / SG: x / pH: x  Gluc: 113 mg/dL / Ketone: x  / Bili: x / Urobili: x   Blood: x / Protein: x / Nitrite: x   Leuk Esterase: x / RBC: x / WBC x   Sq Epi: x / Non Sq Epi: x / Bacteria: x

## 2024-04-24 PROBLEM — F11.90 OPIOID USE, UNSPECIFIED, UNCOMPLICATED: Chronic | Status: ACTIVE | Noted: 2024-03-29

## 2024-05-01 ENCOUNTER — APPOINTMENT (OUTPATIENT)
Dept: BARIATRICS | Facility: CLINIC | Age: 69
End: 2024-05-01
Payer: MEDICARE

## 2024-05-01 VITALS
SYSTOLIC BLOOD PRESSURE: 136 MMHG | TEMPERATURE: 97.6 F | WEIGHT: 129 LBS | HEIGHT: 66 IN | OXYGEN SATURATION: 99 % | DIASTOLIC BLOOD PRESSURE: 79 MMHG | BODY MASS INDEX: 20.73 KG/M2 | HEART RATE: 86 BPM

## 2024-05-01 DIAGNOSIS — G47.00 INSOMNIA, UNSPECIFIED: ICD-10-CM

## 2024-05-01 DIAGNOSIS — Z00.00 ENCOUNTER FOR GENERAL ADULT MEDICAL EXAMINATION W/OUT ABNORMAL FINDINGS: ICD-10-CM

## 2024-05-01 DIAGNOSIS — K40.30 UNILATERAL INGUINAL HERNIA, WITH OBSTRUCTION, W/OUT GANGRENE, NOT SPECIFIED AS RECURRENT: ICD-10-CM

## 2024-05-01 DIAGNOSIS — K56.2 VOLVULUS: ICD-10-CM

## 2024-05-01 DIAGNOSIS — D50.8 OTHER IRON DEFICIENCY ANEMIAS: ICD-10-CM

## 2024-05-01 DIAGNOSIS — R53.1 WEAKNESS: ICD-10-CM

## 2024-05-01 DIAGNOSIS — E83.51 HYPOCALCEMIA: ICD-10-CM

## 2024-05-01 DIAGNOSIS — R26.81 UNSTEADINESS ON FEET: ICD-10-CM

## 2024-05-01 DIAGNOSIS — R10.9 UNSPECIFIED ABDOMINAL PAIN: ICD-10-CM

## 2024-05-01 DIAGNOSIS — E46 UNSPECIFIED PROTEIN-CALORIE MALNUTRITION: ICD-10-CM

## 2024-05-01 DIAGNOSIS — I10 ESSENTIAL (PRIMARY) HYPERTENSION: ICD-10-CM

## 2024-05-01 DIAGNOSIS — D50.9 IRON DEFICIENCY ANEMIA, UNSPECIFIED: ICD-10-CM

## 2024-05-01 DIAGNOSIS — Z78.9 OTHER SPECIFIED HEALTH STATUS: ICD-10-CM

## 2024-05-01 DIAGNOSIS — R22.41 LOCALIZED SWELLING, MASS AND LUMP, RIGHT LOWER LIMB: ICD-10-CM

## 2024-05-01 PROCEDURE — 99024 POSTOP FOLLOW-UP VISIT: CPT

## 2024-05-01 RX ORDER — METHADONE HYDROCHLORIDE 40 MG/1
40 TABLET ORAL
Refills: 0 | Status: ACTIVE | COMMUNITY

## 2024-05-01 RX ORDER — GABAPENTIN 300 MG/1
300 CAPSULE ORAL
Refills: 0 | Status: ACTIVE | COMMUNITY

## 2024-05-01 RX ORDER — CHOLECALCIFEROL (VITAMIN D3) 1250 MCG
1.25 MG CAPSULE ORAL
Refills: 0 | Status: ACTIVE | COMMUNITY

## 2024-05-01 RX ORDER — AMLODIPINE BESYLATE 10 MG/1
10 TABLET ORAL
Refills: 0 | Status: ACTIVE | COMMUNITY

## 2024-05-01 RX ORDER — ACETAMINOPHEN 500 MG/1
TABLET ORAL
Refills: 0 | Status: ACTIVE | COMMUNITY

## 2024-05-01 RX ORDER — IBUPROFEN 600 MG/1
600 TABLET, FILM COATED ORAL
Refills: 0 | Status: ACTIVE | COMMUNITY

## 2024-05-01 RX ORDER — ELECTROLYTES/DEXTROSE
10 SOLUTION, ORAL ORAL
Refills: 0 | Status: ACTIVE | COMMUNITY

## 2024-05-01 RX ORDER — CHLORHEXIDINE GLUCONATE 4 %
325 (65 FE) LIQUID (ML) TOPICAL
Refills: 0 | Status: ACTIVE | COMMUNITY

## 2024-05-05 PROBLEM — K56.2 VOLVULUS OF SMALL INTESTINE: Status: ACTIVE | Noted: 2024-05-01

## 2024-05-05 PROBLEM — D50.9 IRON DEFICIENCY ANEMIA: Status: ACTIVE | Noted: 2024-05-01

## 2024-05-05 PROBLEM — D50.8 OTHER IRON DEFICIENCY ANEMIA: Status: ACTIVE | Noted: 2024-05-01

## 2024-05-05 PROBLEM — R53.1 WEAK: Status: ACTIVE | Noted: 2024-05-01

## 2024-05-05 PROBLEM — E83.51 HYPOCALCEMIA: Status: ACTIVE | Noted: 2024-05-01

## 2024-05-05 PROBLEM — I10 PRIMARY HYPERTENSION: Status: ACTIVE | Noted: 2024-05-01

## 2024-05-05 PROBLEM — R22.41 LOCALIZED SWELLING OF RIGHT LOWER LEG: Status: ACTIVE | Noted: 2024-05-01

## 2024-05-05 PROBLEM — E46 PROTEIN-CALORIE MALNUTRITION: Status: ACTIVE | Noted: 2024-05-01

## 2024-05-05 PROBLEM — G47.00 INSOMNIA: Status: ACTIVE | Noted: 2024-05-01

## 2024-05-05 PROBLEM — R26.81 UNSTEADINESS ON FEET: Status: ACTIVE | Noted: 2024-05-01

## 2024-05-05 PROBLEM — R10.9 ABDOMINAL PAIN: Status: ACTIVE | Noted: 2024-05-01

## 2024-05-05 PROBLEM — K56.2: Status: ACTIVE | Noted: 2024-05-01

## 2024-05-05 PROBLEM — Z78.9 NON-SMOKER: Status: ACTIVE | Noted: 2024-05-01

## 2024-05-05 PROBLEM — K40.30: Status: ACTIVE | Noted: 2024-05-01

## 2024-05-05 NOTE — ASSESSMENT
[FreeTextEntry1] : Patient is a 68 year old gentleman with history of HTN, iron deficiency anemia, Hep C, methadone use for chronic pain syndrome, right inguinal hernia repair 20 years ago, right upper extremity shrapnel removal 40 years ago, recently underwent exploratory laparotomy, open right hemicolectomy for cecal volvulus. Presents now for follow-up. Doing well overall, tolerating pureed diet, having appropriate bowel function. Denies fevers, chills, chest pain, dyspnea, dysuria, hematochezia, melena. At time of evaluation, afebrile, hemodynamically stable, abdomen soft, nontender, nondistended, no rebound or guarding, incisions c/d/i.  Doing well overall Advance diet to regular Ensure protein shakes TID Ambulate PT/OT Return to clinic PRN  I, Dr. Shena Rodriguez, spent 35 minutes with the patient >50% counseling/coordination of care including, reviewing the patient's history, performing an examination, reviewing relevant labs and radiographic imaging, reviewing PCP and consultant notes, discussion of medical and surgical management of the diagnosis as well as associated risks and benefits, and completing documentation.

## 2024-05-05 NOTE — HISTORY OF PRESENT ILLNESS
[de-identified] : Patient is a 68 year old gentleman with history of HTN, iron deficiency anemia, Hep C, methadone use for chronic pain syndrome, right inguinal hernia repair 20 years ago, right upper extremity shrapnel removal 40 years ago, recently underwent exploratory laparotomy, open right hemicolectomy for cecal volvulus. Presents now for follow-up. Doing well overall, tolerating pureed diet, having appropriate bowel function. Denies fevers, chills, chest pain, dyspnea, dysuria, hematochezia, melena. At time of evaluation, afebrile, hemodynamically stable, abdomen soft, nontender, nondistended, no rebound or guarding, incisions c/d/i.

## 2024-05-06 DIAGNOSIS — K56.2 VOLVULUS: ICD-10-CM

## 2024-05-06 DIAGNOSIS — R18.8 OTHER ASCITES: ICD-10-CM

## 2024-05-06 DIAGNOSIS — Z11.52 ENCOUNTER FOR SCREENING FOR COVID-19: ICD-10-CM

## 2024-05-06 DIAGNOSIS — R52 PAIN, UNSPECIFIED: ICD-10-CM

## 2024-05-06 DIAGNOSIS — I10 ESSENTIAL (PRIMARY) HYPERTENSION: ICD-10-CM

## 2024-05-06 DIAGNOSIS — K56.609 UNSPECIFIED INTESTINAL OBSTRUCTION, UNSPECIFIED AS TO PARTIAL VERSUS COMPLETE OBSTRUCTION: ICD-10-CM

## 2024-05-06 DIAGNOSIS — E43 UNSPECIFIED SEVERE PROTEIN-CALORIE MALNUTRITION: ICD-10-CM

## 2024-05-06 DIAGNOSIS — H54.7 UNSPECIFIED VISUAL LOSS: ICD-10-CM

## 2024-05-06 DIAGNOSIS — F11.20 OPIOID DEPENDENCE, UNCOMPLICATED: ICD-10-CM

## 2024-05-06 DIAGNOSIS — N32.89 OTHER SPECIFIED DISORDERS OF BLADDER: ICD-10-CM

## 2024-05-06 DIAGNOSIS — D69.6 THROMBOCYTOPENIA, UNSPECIFIED: ICD-10-CM

## 2024-05-06 DIAGNOSIS — K31.89 OTHER DISEASES OF STOMACH AND DUODENUM: ICD-10-CM

## 2024-05-06 DIAGNOSIS — K86.89 OTHER SPECIFIED DISEASES OF PANCREAS: ICD-10-CM

## 2024-05-06 DIAGNOSIS — E83.42 HYPOMAGNESEMIA: ICD-10-CM

## 2024-05-06 DIAGNOSIS — D62 ACUTE POSTHEMORRHAGIC ANEMIA: ICD-10-CM

## 2024-05-06 DIAGNOSIS — N17.9 ACUTE KIDNEY FAILURE, UNSPECIFIED: ICD-10-CM

## 2024-05-06 DIAGNOSIS — N13.30 UNSPECIFIED HYDRONEPHROSIS: ICD-10-CM

## 2024-05-06 DIAGNOSIS — K59.00 CONSTIPATION, UNSPECIFIED: ICD-10-CM

## 2024-08-06 NOTE — PATIENT PROFILE ADULT - FUNCTIONAL ASSESSMENT - DAILY ACTIVITY 5.
4 = No assist / stand by assistance If you are a smoker, it is important for your health to stop smoking. Please be aware that second hand smoke is also harmful.

## 2025-04-07 ENCOUNTER — EMERGENCY (EMERGENCY)
Facility: HOSPITAL | Age: 70
LOS: 1 days | End: 2025-04-07
Attending: EMERGENCY MEDICINE | Admitting: EMERGENCY MEDICINE
Payer: MEDICARE

## 2025-04-07 VITALS
RESPIRATION RATE: 16 BRPM | TEMPERATURE: 98 F | HEART RATE: 83 BPM | DIASTOLIC BLOOD PRESSURE: 78 MMHG | SYSTOLIC BLOOD PRESSURE: 176 MMHG | OXYGEN SATURATION: 98 %

## 2025-04-07 DIAGNOSIS — Z98.890 OTHER SPECIFIED POSTPROCEDURAL STATES: Chronic | ICD-10-CM

## 2025-04-07 LAB
ALBUMIN SERPL ELPH-MCNC: 3 G/DL — LOW (ref 3.4–5)
ALP SERPL-CCNC: 121 U/L — HIGH (ref 40–120)
ALT FLD-CCNC: 61 U/L — HIGH (ref 12–42)
ANION GAP SERPL CALC-SCNC: 5 MMOL/L — LOW (ref 9–16)
APPEARANCE UR: CLEAR — SIGNIFICANT CHANGE UP
AST SERPL-CCNC: 68 U/L — HIGH (ref 15–37)
BASOPHILS # BLD AUTO: 0.01 K/UL — SIGNIFICANT CHANGE UP (ref 0–0.2)
BASOPHILS NFR BLD AUTO: 0.2 % — SIGNIFICANT CHANGE UP (ref 0–2)
BILIRUB SERPL-MCNC: 0.7 MG/DL — SIGNIFICANT CHANGE UP (ref 0.2–1.2)
BILIRUB UR-MCNC: NEGATIVE — SIGNIFICANT CHANGE UP
BUN SERPL-MCNC: 14 MG/DL — SIGNIFICANT CHANGE UP (ref 7–23)
CALCIUM SERPL-MCNC: 8.5 MG/DL — SIGNIFICANT CHANGE UP (ref 8.5–10.5)
CHLORIDE SERPL-SCNC: 103 MMOL/L — SIGNIFICANT CHANGE UP (ref 96–108)
CO2 SERPL-SCNC: 29 MMOL/L — SIGNIFICANT CHANGE UP (ref 22–31)
COLOR SPEC: YELLOW — SIGNIFICANT CHANGE UP
CREAT SERPL-MCNC: 0.98 MG/DL — SIGNIFICANT CHANGE UP (ref 0.5–1.3)
DIFF PNL FLD: NEGATIVE — SIGNIFICANT CHANGE UP
EGFR: 82 ML/MIN/1.73M2 — SIGNIFICANT CHANGE UP
EGFR: 82 ML/MIN/1.73M2 — SIGNIFICANT CHANGE UP
EOSINOPHIL # BLD AUTO: 0.03 K/UL — SIGNIFICANT CHANGE UP (ref 0–0.5)
EOSINOPHIL NFR BLD AUTO: 0.5 % — SIGNIFICANT CHANGE UP (ref 0–6)
GLUCOSE SERPL-MCNC: 70 MG/DL — SIGNIFICANT CHANGE UP (ref 70–99)
GLUCOSE UR QL: NEGATIVE MG/DL — SIGNIFICANT CHANGE UP
HCT VFR BLD CALC: 37.4 % — LOW (ref 39–50)
HGB BLD-MCNC: 12.5 G/DL — LOW (ref 13–17)
IMM GRANULOCYTES # BLD AUTO: 0.01 K/UL — SIGNIFICANT CHANGE UP (ref 0–0.07)
IMM GRANULOCYTES NFR BLD AUTO: 0.2 % — SIGNIFICANT CHANGE UP (ref 0–0.9)
KETONES UR-MCNC: NEGATIVE MG/DL — SIGNIFICANT CHANGE UP
LACTATE BLDV-MCNC: 0.7 MMOL/L — SIGNIFICANT CHANGE UP (ref 0.5–2)
LEUKOCYTE ESTERASE UR-ACNC: NEGATIVE — SIGNIFICANT CHANGE UP
LYMPHOCYTES # BLD AUTO: 1.72 K/UL — SIGNIFICANT CHANGE UP (ref 1–3.3)
LYMPHOCYTES NFR BLD AUTO: 26 % — SIGNIFICANT CHANGE UP (ref 13–44)
MCHC RBC-ENTMCNC: 31.3 PG — SIGNIFICANT CHANGE UP (ref 27–34)
MCHC RBC-ENTMCNC: 33.4 G/DL — SIGNIFICANT CHANGE UP (ref 32–36)
MCV RBC AUTO: 93.5 FL — SIGNIFICANT CHANGE UP (ref 80–100)
MONOCYTES # BLD AUTO: 0.57 K/UL — SIGNIFICANT CHANGE UP (ref 0–0.9)
MONOCYTES NFR BLD AUTO: 8.6 % — SIGNIFICANT CHANGE UP (ref 2–14)
NEUTROPHILS # BLD AUTO: 4.27 K/UL — SIGNIFICANT CHANGE UP (ref 1.8–7.4)
NEUTROPHILS NFR BLD AUTO: 64.5 % — SIGNIFICANT CHANGE UP (ref 43–77)
NITRITE UR-MCNC: NEGATIVE — SIGNIFICANT CHANGE UP
NRBC # BLD AUTO: 0 K/UL — SIGNIFICANT CHANGE UP (ref 0–0)
NRBC # FLD: 0 K/UL — SIGNIFICANT CHANGE UP (ref 0–0)
NRBC BLD AUTO-RTO: 0 /100 WBCS — SIGNIFICANT CHANGE UP (ref 0–0)
PCO2 BLDV: 58 MMHG — HIGH (ref 42–55)
PCP SPEC-MCNC: SIGNIFICANT CHANGE UP
PH BLDV: 7.32 — SIGNIFICANT CHANGE UP (ref 7.32–7.43)
PH UR: 7 — SIGNIFICANT CHANGE UP (ref 5–8)
PLATELET # BLD AUTO: 163 K/UL — SIGNIFICANT CHANGE UP (ref 150–400)
PMV BLD: 10.1 FL — SIGNIFICANT CHANGE UP (ref 7–13)
PO2 BLDV: 35 MMHG — SIGNIFICANT CHANGE UP (ref 25–45)
POTASSIUM SERPL-MCNC: 5.3 MMOL/L — SIGNIFICANT CHANGE UP (ref 3.5–5.3)
POTASSIUM SERPL-SCNC: 5.3 MMOL/L — SIGNIFICANT CHANGE UP (ref 3.5–5.3)
PROT SERPL-MCNC: 7.2 G/DL — SIGNIFICANT CHANGE UP (ref 6.4–8.2)
PROT UR-MCNC: NEGATIVE MG/DL — SIGNIFICANT CHANGE UP
RBC # BLD: 4 M/UL — LOW (ref 4.2–5.8)
RBC # FLD: 14.2 % — SIGNIFICANT CHANGE UP (ref 10.3–14.5)
SAO2 % BLDV: 58 % — LOW (ref 67–88)
SODIUM SERPL-SCNC: 137 MMOL/L — SIGNIFICANT CHANGE UP (ref 132–145)
SP GR SPEC: 1.01 — SIGNIFICANT CHANGE UP (ref 1–1.03)
UROBILINOGEN FLD QL: 0.2 MG/DL — SIGNIFICANT CHANGE UP (ref 0.2–1)
WBC # BLD: 6.61 K/UL — SIGNIFICANT CHANGE UP (ref 3.8–10.5)
WBC # FLD AUTO: 6.61 K/UL — SIGNIFICANT CHANGE UP (ref 3.8–10.5)

## 2025-04-07 PROCEDURE — 73590 X-RAY EXAM OF LOWER LEG: CPT | Mod: 26,RT

## 2025-04-07 PROCEDURE — 99285 EMERGENCY DEPT VISIT HI MDM: CPT

## 2025-04-07 PROCEDURE — 70491 CT SOFT TISSUE NECK W/DYE: CPT | Mod: 26

## 2025-04-07 RX ORDER — AMPICILLIN SODIUM AND SULBACTAM SODIUM 1; .5 G/1; G/1
3 INJECTION, POWDER, FOR SOLUTION INTRAMUSCULAR; INTRAVENOUS ONCE
Refills: 0 | Status: COMPLETED | OUTPATIENT
Start: 2025-04-07 | End: 2025-04-07

## 2025-04-07 RX ORDER — SULFAMETHOXAZOLE/TRIMETHOPRIM 800-160 MG
1 TABLET ORAL
Refills: 0
Start: 2025-04-07

## 2025-04-07 RX ORDER — VANCOMYCIN HCL IN 5 % DEXTROSE 1.5G/250ML
1000 PLASTIC BAG, INJECTION (ML) INTRAVENOUS ONCE
Refills: 0 | Status: COMPLETED | OUTPATIENT
Start: 2025-04-07 | End: 2025-04-07

## 2025-04-07 RX ORDER — SULFAMETHOXAZOLE/TRIMETHOPRIM 800-160 MG
1 TABLET ORAL
Qty: 14 | Refills: 0
Start: 2025-04-07 | End: 2025-04-13

## 2025-04-07 RX ORDER — KETOROLAC TROMETHAMINE 30 MG/ML
15 INJECTION, SOLUTION INTRAMUSCULAR; INTRAVENOUS ONCE
Refills: 0 | Status: DISCONTINUED | OUTPATIENT
Start: 2025-04-07 | End: 2025-04-07

## 2025-04-07 RX ORDER — AMOXICILLIN AND CLAVULANATE POTASSIUM 500; 125 MG/1; MG/1
1 TABLET, FILM COATED ORAL
Qty: 14 | Refills: 0
Start: 2025-04-07 | End: 2025-04-13

## 2025-04-07 RX ORDER — ACETAMINOPHEN 500 MG/5ML
975 LIQUID (ML) ORAL ONCE
Refills: 0 | Status: COMPLETED | OUTPATIENT
Start: 2025-04-07 | End: 2025-04-08

## 2025-04-07 RX ORDER — AMOXICILLIN AND CLAVULANATE POTASSIUM 500; 125 MG/1; MG/1
1 TABLET, FILM COATED ORAL
Qty: 20 | Refills: 0
Start: 2025-04-07 | End: 2025-04-16

## 2025-04-07 RX ADMIN — AMPICILLIN SODIUM AND SULBACTAM SODIUM 200 GRAM(S): 1; .5 INJECTION, POWDER, FOR SOLUTION INTRAMUSCULAR; INTRAVENOUS at 22:07

## 2025-04-07 RX ADMIN — Medication 250 MILLIGRAM(S): at 23:43

## 2025-04-07 NOTE — ED ADULT NURSE NOTE - NSFALLUNIVINTERV_ED_ALL_ED
Bed/Stretcher in lowest position, wheels locked, appropriate side rails in place/Call bell, personal items and telephone in reach/Instruct patient to call for assistance before getting out of bed/chair/stretcher/Non-slip footwear applied when patient is off stretcher/[place] to call system/Physically safe environment - no spills, clutter or unnecessary equipment/Purposeful proactive rounding/Room/bathroom lighting operational, light cord in reach

## 2025-04-07 NOTE — ED PROVIDER NOTE - OBJECTIVE STATEMENT
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) and subsequently dc to [facility], p/w Open wound to right posterior calf that is been there for approximately 1 month.  Patient notes this started as a bump and it opened up over 2 weeks ago and has been draining fluid.  Patient has not seen a primary care doctor as he currently does not have 1.  Patient also complains of dental pain to the right upper and lower gingival regions in the molar regions for several weeks now.  He denies active IV drug abuse, denies fever chills nausea vomiting diarrhea facial swelling numbness or paresthesias.  He denies history of chronic skin infections.Denies recent hospitalizations otherwise or antibiotics.  Patient is a non-smoker denies active drug use or alcohol

## 2025-04-07 NOTE — ED PROVIDER NOTE - CARE PROVIDER_API CALL
Man King  Internal Medicine  [address] [place] [id number]  Phone: [phone]  Fax: [phone]  Follow Up Time:

## 2025-04-07 NOTE — ED ADULT NURSE NOTE - OBJECTIVE STATEMENT
Patient with pmhx of DMT2 and HTN, presents for wound to the back of his right mid calf and right lower tooth infection.

## 2025-04-07 NOTE — ED PROVIDER NOTE - PATIENT PORTAL LINK FT
No
You can access the FollowMyHealth Patient Portal offered by [facility] by registering at the following website: [email or web address]. By joining [facility]’s FollowMyHealth portal, you will also be able to view your health information using other applications (apps) compatible with our system.

## 2025-04-07 NOTE — ED PROVIDER NOTE - NSFOLLOWUPINSTRUCTIONS_ED_ALL_ED_FT
Dental Abscess    WHAT YOU NEED TO KNOW:    What is a dental abscess? A dental abscess is a collection of pus in or around a tooth. A dental abscess is caused by bacteria. The bacteria can enter the tooth when the enamel (outer part of the tooth) is damaged by tooth decay. Bacteria can also enter the tooth through a chip in the tooth or a cut in the gum. Food particles that are stuck between the teeth for a long time may also lead to an abscess.  Dental Abscess    What increases my risk for a dental abscess?    Poor tooth care    Medical conditions, such as diabetes, gastric reflux, or diseases that weaken the immune system    Procedures on the tooth or the gums    Dry mouth or very little saliva    Smoking or drinking alcohol    Radiation therapy of the head and neck    Certain medicines, such as steroids, allergy, or blood pressure medicines  What are the signs and symptoms of a dental abscess?    Toothache, a loose tooth, or a tooth that is very sensitive to pressure or temperature    Bad breath, unpleasant taste, and drooling    Fever    Pain, redness, and swelling of the gums, or swelling of your face and neck    Pain when you open or close your mouth    Trouble opening your mouth  How is a dental abscess diagnosed? Your healthcare provider will examine your teeth and gums. He or she will check for pus, redness, swelling, or a mass. You may need an x-ray to check for infection in deeper tissues or broken teeth.    How is a dental abscess treated?    Medicines may be given to treat a bacterial infection and decrease pain.    Incision and drainage is a cut in the abscess to allow the pus to drain. A sample of fluid may be collected from your abscess. The fluid is sent to a lab and tested for bacteria. Ask your healthcare provider for more information.    A root canal is a procedure to remove the bacteria and prevent more infection. It is usually done after an incision and drainage. A filling or crown will be placed over the tooth after you have healed from your root canal.    Tooth removal may be needed if the infection affects deeper tissues. This is usually done after an incision and drainage.  How can I manage my symptoms?    Rinse your mouth every 2 hours with salt water. This will help keep the area clean.    Gently brush your teeth twice a day with a soft tooth brush. This will help keep the area clean.    Eat soft foods as directed. Soft foods may cause less pain. Examples include applesauce, yogurt, and cooked pasta. Ask your healthcare provider how long to follow this instruction.    Apply a warm compress to your tooth or gum. Use a cotton ball or gauze soaked in warm water. Remove the compress in 10 minutes or when it becomes cool. Repeat 3 times a day.  What can I do to prevent another dental abscess?    Brush your teeth at least 2 times a day with fluoride toothpaste.    Use dental floss at least once a day to clean between your teeth.    Rinse your mouth with water or mouthwash after meals and snacks. Chew sugarless gum.    Avoid sugary and starchy food that can stick between your teeth. Limit drinks high in sugar, such as soda or fruit juice.    See your dentist every 6 months for dental cleanings and oral exams.  When should I seek immediate care?    You have severe pain in your tooth or jaw.    You have trouble breathing because of pain or swelling.  When should I call my doctor or dentist?    Your symptoms get worse, even after treatment.    Your mouth is bleeding.    You cannot eat or drink because of pain or swelling.    Your abscess returns.    You have an injury that causes a crack in your tooth.    You have questions or concerns about your condition or care.  CARE AGREEMENT:    You have the right to help plan your care. Learn about your health condition and how it may be treated. Discuss treatment options with your healthcare providers to decide what care you want to receive. You always have the right to refuse treatment.    © Maggie THOMAS L.P. 1973, 2025

## 2025-04-07 NOTE — ED PROVIDER NOTE - PROGRESS NOTE DETAILS
Patient would like to be discharged all results were explained to patient he is tolerating p.o. and has no trismus has no facial swelling or signs of Eloy angina.  He is nontoxic-appearing ambulatory provided sterile wound care instructions.  Patient requires follow-up with internal medicine and will also require follow-up with dentistry placed coordinator calls for dentistry and wound care.  Patient agrees with plan strict return precautions were discussed as he currently has no primary care.

## 2025-04-07 NOTE — ED PROVIDER NOTE - CLINICAL SUMMARY MEDICAL DECISION MAKING FREE TEXT BOX
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  Patient notes this started as a bump and it opened up over 2 weeks ago and has been draining fluid.  Patient has not seen a primary care doctor as he currently does not have 1.  Patient also complains of dental pain to the right upper and lower gingival regions in the molar regions for several weeks now.  He denies active IV drug abuse, denies fever chills nausea vomiting diarrhea facial swelling numbness or paresthesias.  He denies history of chronic skin infections.Denies recent hospitalizations otherwise or antibiotics.  Patient is a non-smoker denies active drug use or alcohol    I will obtain x-rays of the tib-fib region on the right I do not suspect DVT, no palpable cords there is no region of crepitus streaking fluctuance or induration the wound appears chronic.  Will start treatment with IV vancomycin and Unasyn and likely discharge with oral antibiotics.  I will perform CT soft tissue neck and mandibular region for rule out abscess.  Patient has no murmurs on exam has no chest pain shortness of breath to suggest endocarditis and he is not an active IVDA.  Patient does require referral to dental will provide referrals as well as primary care.I do not suspect necrotizing fasciitis or exfoliative skin disease in the region of the calf.

## 2025-04-07 NOTE — ED ADULT TRIAGE NOTE - CHIEF COMPLAINT QUOTE
pt reports abscesses on left lower leg and left lower tooth, chills x 2 weeks; states he "applied garlic on it"; denies PMH pt reports abscesses on right lower leg and right lower tooth, chills x 2 weeks; states he "applied garlic on it"; denies PMH

## 2025-04-07 NOTE — ED ADULT NURSE NOTE - CHIEF COMPLAINT QUOTE
pt reports abscesses on right lower leg and right lower tooth, chills x 2 weeks; states he "applied garlic on it"; denies PMH

## 2025-04-07 NOTE — ED PROVIDER NOTE - NSFOLLOWUPCLINICS_GEN_ALL_ED_FT
[facility]  Dental  [address] [place] [id number]  Phone: [phone]  Fax:

## 2025-04-07 NOTE — ED PROVIDER NOTE - LOWER EXTREMITY EXAM, RIGHT
There is a 3 x 5 cm circular chronic stage I ulcer to the mid posterior calf the right lower extremity there is no surrounding crepitus streaking increased warmth induration or fluctuance.  It is draining serosanguineous fluid.  There is no lesions.  No palpable cords.  Distal +2 DP, full range of motion in right knee and ankle and foot with no deformities or joint stiffness/TENDERNESS

## 2025-04-08 VITALS
OXYGEN SATURATION: 96 % | SYSTOLIC BLOOD PRESSURE: 163 MMHG | TEMPERATURE: 98 F | HEART RATE: 83 BPM | DIASTOLIC BLOOD PRESSURE: 95 MMHG | RESPIRATION RATE: 15 BRPM

## 2025-04-08 RX ORDER — AMOXICILLIN AND CLAVULANATE POTASSIUM 500; 125 MG/1; MG/1
1 TABLET, FILM COATED ORAL
Qty: 20 | Refills: 0
Start: 2025-04-08 | End: 2025-04-17

## 2025-04-08 RX ORDER — IBUPROFEN 200 MG
1 TABLET ORAL
Qty: 20 | Refills: 0
Start: 2025-04-08 | End: 2025-04-12

## 2025-04-08 RX ORDER — SULFAMETHOXAZOLE/TRIMETHOPRIM 800-160 MG
1 TABLET ORAL
Qty: 14 | Refills: 0
Start: 2025-04-08 | End: 2025-04-14

## 2025-04-08 RX ADMIN — Medication 975 MILLIGRAM(S): at 00:40

## 2025-04-08 RX ADMIN — KETOROLAC TROMETHAMINE 15 MILLIGRAM(S): 30 INJECTION, SOLUTION INTRAMUSCULAR; INTRAVENOUS at 00:39

## 2025-04-10 DIAGNOSIS — K08.89 OTHER SPECIFIED DISORDERS OF TEETH AND SUPPORTING STRUCTURES: ICD-10-CM

## 2025-04-10 DIAGNOSIS — K04.7 PERIAPICAL ABSCESS WITHOUT SINUS: ICD-10-CM

## 2025-04-18 ENCOUNTER — EMERGENCY (EMERGENCY)
Facility: HOSPITAL | Age: 70
LOS: 1 days | End: 2025-04-18
Attending: STUDENT IN AN ORGANIZED HEALTH CARE EDUCATION/TRAINING PROGRAM | Admitting: EMERGENCY MEDICINE
Payer: MEDICARE

## 2025-04-18 VITALS
TEMPERATURE: 98 F | WEIGHT: 134.92 LBS | SYSTOLIC BLOOD PRESSURE: 175 MMHG | OXYGEN SATURATION: 95 % | HEIGHT: 67 IN | HEART RATE: 97 BPM | DIASTOLIC BLOOD PRESSURE: 111 MMHG | RESPIRATION RATE: 19 BRPM

## 2025-04-18 VITALS
DIASTOLIC BLOOD PRESSURE: 92 MMHG | TEMPERATURE: 98 F | RESPIRATION RATE: 18 BRPM | HEART RATE: 81 BPM | SYSTOLIC BLOOD PRESSURE: 146 MMHG | OXYGEN SATURATION: 98 %

## 2025-04-18 DIAGNOSIS — Z98.890 OTHER SPECIFIED POSTPROCEDURAL STATES: Chronic | ICD-10-CM

## 2025-04-18 LAB
ANION GAP SERPL CALC-SCNC: 11 MMOL/L — SIGNIFICANT CHANGE UP (ref 5–17)
BASOPHILS # BLD AUTO: 0.02 K/UL — SIGNIFICANT CHANGE UP (ref 0–0.2)
BASOPHILS NFR BLD AUTO: 0.4 % — SIGNIFICANT CHANGE UP (ref 0–2)
BUN SERPL-MCNC: 15 MG/DL — SIGNIFICANT CHANGE UP (ref 7–23)
CALCIUM SERPL-MCNC: 8.4 MG/DL — SIGNIFICANT CHANGE UP (ref 8.4–10.5)
CHLORIDE SERPL-SCNC: 100 MMOL/L — SIGNIFICANT CHANGE UP (ref 96–108)
CO2 SERPL-SCNC: 26 MMOL/L — SIGNIFICANT CHANGE UP (ref 22–31)
CREAT SERPL-MCNC: 0.85 MG/DL — SIGNIFICANT CHANGE UP (ref 0.5–1.3)
CRP SERPL-MCNC: <3 MG/L — SIGNIFICANT CHANGE UP (ref 0–4)
EGFR: 93 ML/MIN/1.73M2 — SIGNIFICANT CHANGE UP
EGFR: 93 ML/MIN/1.73M2 — SIGNIFICANT CHANGE UP
EOSINOPHIL # BLD AUTO: 0.02 K/UL — SIGNIFICANT CHANGE UP (ref 0–0.5)
EOSINOPHIL NFR BLD AUTO: 0.4 % — SIGNIFICANT CHANGE UP (ref 0–6)
ERYTHROCYTE [SEDIMENTATION RATE] IN BLOOD: 11 MM/HR — SIGNIFICANT CHANGE UP
GLUCOSE SERPL-MCNC: 73 MG/DL — SIGNIFICANT CHANGE UP (ref 70–99)
HCT VFR BLD CALC: 36.5 % — LOW (ref 39–50)
HGB BLD-MCNC: 12.5 G/DL — LOW (ref 13–17)
IMM GRANULOCYTES NFR BLD AUTO: 0.2 % — SIGNIFICANT CHANGE UP (ref 0–0.9)
LYMPHOCYTES # BLD AUTO: 1.8 K/UL — SIGNIFICANT CHANGE UP (ref 1–3.3)
LYMPHOCYTES # BLD AUTO: 33.8 % — SIGNIFICANT CHANGE UP (ref 13–44)
MCHC RBC-ENTMCNC: 31.5 PG — SIGNIFICANT CHANGE UP (ref 27–34)
MCHC RBC-ENTMCNC: 34.2 G/DL — SIGNIFICANT CHANGE UP (ref 32–36)
MCV RBC AUTO: 91.9 FL — SIGNIFICANT CHANGE UP (ref 80–100)
MONOCYTES # BLD AUTO: 0.48 K/UL — SIGNIFICANT CHANGE UP (ref 0–0.9)
MONOCYTES NFR BLD AUTO: 9 % — SIGNIFICANT CHANGE UP (ref 2–14)
NEUTROPHILS # BLD AUTO: 3 K/UL — SIGNIFICANT CHANGE UP (ref 1.8–7.4)
NEUTROPHILS NFR BLD AUTO: 56.2 % — SIGNIFICANT CHANGE UP (ref 43–77)
NRBC BLD AUTO-RTO: 0 /100 WBCS — SIGNIFICANT CHANGE UP (ref 0–0)
PLATELET # BLD AUTO: 191 K/UL — SIGNIFICANT CHANGE UP (ref 150–400)
POTASSIUM SERPL-MCNC: 4.2 MMOL/L — SIGNIFICANT CHANGE UP (ref 3.5–5.3)
POTASSIUM SERPL-SCNC: 4.2 MMOL/L — SIGNIFICANT CHANGE UP (ref 3.5–5.3)
RBC # BLD: 3.97 M/UL — LOW (ref 4.2–5.8)
RBC # FLD: 13.9 % — SIGNIFICANT CHANGE UP (ref 10.3–14.5)
SODIUM SERPL-SCNC: 137 MMOL/L — SIGNIFICANT CHANGE UP (ref 135–145)
WBC # BLD: 5.33 K/UL — SIGNIFICANT CHANGE UP (ref 3.8–10.5)
WBC # FLD AUTO: 5.33 K/UL — SIGNIFICANT CHANGE UP (ref 3.8–10.5)

## 2025-04-18 PROCEDURE — 85652 RBC SED RATE AUTOMATED: CPT

## 2025-04-18 PROCEDURE — 99283 EMERGENCY DEPT VISIT LOW MDM: CPT

## 2025-04-18 PROCEDURE — 99284 EMERGENCY DEPT VISIT MOD MDM: CPT | Mod: FS

## 2025-04-18 PROCEDURE — 80048 BASIC METABOLIC PNL TOTAL CA: CPT

## 2025-04-18 PROCEDURE — 85025 COMPLETE CBC W/AUTO DIFF WBC: CPT

## 2025-04-18 PROCEDURE — 36415 COLL VENOUS BLD VENIPUNCTURE: CPT

## 2025-04-18 PROCEDURE — 86140 C-REACTIVE PROTEIN: CPT

## 2025-04-18 NOTE — ED ADULT NURSE NOTE - OBJECTIVE STATEMENT
Patient here for c/o non healing right calf wound x 1 month, jaw infection. Patient endorses having been on abx however stating that he does not have time to follow up as outpatient for further treatment. Carie aaox4, calm, NAD. Breathing even, unlabored, -amu. Patient denying f/c, sob, dizziness, weakness. Patient here for c/o non healing right calf wound x 1 month, jaw infection. Patient endorses having been on abx however stating that he does not have time to follow up as outpatient for further treatment. [place] aaox4, calm, NAD. Breathing even, unlabored, -amu. Patient denying f/c, sob, dizziness, weakness. Face symmetrical,  -redness, -tenderness no obvious swelling.

## 2025-04-18 NOTE — ED ADULT NURSE NOTE - NSFALLUNIVINTERV_ED_ALL_ED
Bed/Stretcher in lowest position, wheels locked, appropriate side rails in place/Call bell, personal items and telephone in reach/Instruct patient to call for assistance before getting out of bed/chair/stretcher/Non-slip footwear applied when patient is off stretcher/[place] to call system/Physically safe environment - no spills, clutter or unnecessary equipment/Purposeful proactive rounding/Room/bathroom lighting operational, light cord in reach

## 2025-04-18 NOTE — ED PROVIDER NOTE - ATTENDING APP SHARED VISIT CONTRIBUTION OF CARE
68 yo M w PMHx opioid abuse (Methadone), presents with non-healing right calf wound (>1 month). Initial drainage, treated with antibiotics (non-compliant). Denies fever, chills, edema, numbness, weakness. Afebrile, good pulses, no infection/abscess/cellulitis. Wound care instructions given; discharged for outpatient follow-up.

## 2025-04-18 NOTE — ED PROVIDER NOTE - OBJECTIVE STATEMENT
68 yo male with h/o opioids abuse currently on Methadone, present to ER c/o non healing wound on his right calf. Pt reports its been more than a month. Pt mentioned initially it was draining pus. Pt was seen in the ER and has been prescribed abx. Admits that he has not been complaint with medications. Pt denies fever, chills, leg edema , numbness or weakness

## 2025-04-18 NOTE — ED PROVIDER NOTE - PATIENT PORTAL LINK FT
You can access the FollowMyHealth Patient Portal offered by [facility] by registering at the following website: [email or web address]. By joining [facility]’s FollowMyHealth portal, you will also be able to view your health information using other applications (apps) compatible with our system.

## 2025-04-18 NOTE — ED ADULT TRIAGE NOTE - CHIEF COMPLAINT QUOTE
pt presents to ED with wound to the right calf, pt with hx of htn, states that it was a bump when it started, has been getting worse and it's draining. Patient denies fever.

## 2025-04-18 NOTE — ED PROVIDER NOTE - NSFOLLOWUPCLINICS_GEN_ALL_ED_FT
[facility]  Family Medicine  [address]  [place], [place] [address]  Phone: [phone]  Fax:

## 2025-04-18 NOTE — ED PROVIDER NOTE - CLINICAL SUMMARY MEDICAL DECISION MAKING FREE TEXT BOX
70 yo male with h/o opioids abuse currently on Methadone, present to ER c/o non healing wound on his right calf. Pt reports its been more than a month. Pt mentioned initially it was draining pus. Pt was seen in the ER and has been prescribed abx. Admits that he has not been complaint with medications. Pt denies fever, chills, leg edema , numbness or weakness  pt ambulatory, afebrile, has good distal pulses and no signs of infection to his right lower leg. no findings to suspect  an abscess or cellulitis. wound care explained. will d/c home for out pt f/u at the clinic.

## 2025-04-18 NOTE — ED PROVIDER NOTE - PHYSICAL EXAMINATION
Constitutional: awake and alert, in no acute distress  HEENT: head normocephalic and atraumatic. moist mucous membranes  Eyes: extraocular movements intact, normal conjunctiva  Neck: supple, normal ROM  Cardiovascular: regular rate   Pulmonary: no respiratory distress  Gastrointestinal: abdomen flat and nondistended  Skin: warm, dry, normal for ethnicity, small superficial skin ulcer, poorly healing, w/o any mucopurulent drainage, no increased warmth or erythema surrounding ulcer  Musculoskeletal: no edema, no deformity, NROM, distal; pulses present   Neurological: oriented x4, no focal neurologic deficit.   Psychiatric: calm and cooperative, no SI/HI

## 2025-04-21 DIAGNOSIS — L97.219 NON-PRESSURE CHRONIC ULCER OF RIGHT CALF WITH UNSPECIFIED SEVERITY: ICD-10-CM

## 2025-04-21 DIAGNOSIS — F17.200 NICOTINE DEPENDENCE, UNSPECIFIED, UNCOMPLICATED: ICD-10-CM

## 2025-04-27 ENCOUNTER — INPATIENT (INPATIENT)
Facility: HOSPITAL | Age: 70
LOS: 6 days | Discharge: NURSING, REHAB OR LONG-TERM CARE | End: 2025-05-04
Attending: STUDENT IN AN ORGANIZED HEALTH CARE EDUCATION/TRAINING PROGRAM | Admitting: INTERNAL MEDICINE
Payer: MEDICARE

## 2025-04-27 VITALS
WEIGHT: 134.92 LBS | TEMPERATURE: 102 F | HEART RATE: 97 BPM | SYSTOLIC BLOOD PRESSURE: 180 MMHG | DIASTOLIC BLOOD PRESSURE: 97 MMHG | OXYGEN SATURATION: 97 % | HEIGHT: 67 IN | RESPIRATION RATE: 15 BRPM

## 2025-04-27 DIAGNOSIS — Z98.890 OTHER SPECIFIED POSTPROCEDURAL STATES: Chronic | ICD-10-CM

## 2025-04-27 LAB
ALBUMIN SERPL ELPH-MCNC: 2.7 G/DL — LOW (ref 3.4–5)
ALP SERPL-CCNC: 111 U/L — SIGNIFICANT CHANGE UP (ref 40–120)
ALT FLD-CCNC: 29 U/L — SIGNIFICANT CHANGE UP (ref 12–42)
ANION GAP SERPL CALC-SCNC: 5 MMOL/L — LOW (ref 9–16)
APTT BLD: 31.6 SEC — SIGNIFICANT CHANGE UP (ref 26.1–36.8)
AST SERPL-CCNC: 29 U/L — SIGNIFICANT CHANGE UP (ref 15–37)
BASOPHILS # BLD AUTO: 0.02 K/UL — SIGNIFICANT CHANGE UP (ref 0–0.2)
BASOPHILS NFR BLD AUTO: 0.2 % — SIGNIFICANT CHANGE UP (ref 0–2)
BILIRUB SERPL-MCNC: 0.5 MG/DL — SIGNIFICANT CHANGE UP (ref 0.2–1.2)
BUN SERPL-MCNC: 17 MG/DL — SIGNIFICANT CHANGE UP (ref 7–23)
CALCIUM SERPL-MCNC: 8.9 MG/DL — SIGNIFICANT CHANGE UP (ref 8.5–10.5)
CHLORIDE SERPL-SCNC: 99 MMOL/L — SIGNIFICANT CHANGE UP (ref 96–108)
CO2 SERPL-SCNC: 29 MMOL/L — SIGNIFICANT CHANGE UP (ref 22–31)
CREAT SERPL-MCNC: 0.97 MG/DL — SIGNIFICANT CHANGE UP (ref 0.5–1.3)
EGFR: 83 ML/MIN/1.73M2 — SIGNIFICANT CHANGE UP
EGFR: 83 ML/MIN/1.73M2 — SIGNIFICANT CHANGE UP
EOSINOPHIL # BLD AUTO: 0 K/UL — SIGNIFICANT CHANGE UP (ref 0–0.5)
EOSINOPHIL NFR BLD AUTO: 0 % — SIGNIFICANT CHANGE UP (ref 0–6)
FLUAV AG NPH QL: SIGNIFICANT CHANGE UP
FLUBV AG NPH QL: SIGNIFICANT CHANGE UP
GLUCOSE SERPL-MCNC: 93 MG/DL — SIGNIFICANT CHANGE UP (ref 70–99)
HCT VFR BLD CALC: 35.8 % — LOW (ref 39–50)
HGB BLD-MCNC: 12.1 G/DL — LOW (ref 13–17)
IMM GRANULOCYTES # BLD AUTO: 0.07 K/UL — SIGNIFICANT CHANGE UP (ref 0–0.07)
IMM GRANULOCYTES NFR BLD AUTO: 0.7 % — SIGNIFICANT CHANGE UP (ref 0–0.9)
INR BLD: 1.08 — SIGNIFICANT CHANGE UP (ref 0.85–1.16)
LACTATE BLDV-MCNC: 1.2 MMOL/L — SIGNIFICANT CHANGE UP (ref 0.5–2)
LYMPHOCYTES # BLD AUTO: 1.07 K/UL — SIGNIFICANT CHANGE UP (ref 1–3.3)
LYMPHOCYTES NFR BLD AUTO: 10.5 % — LOW (ref 13–44)
MCHC RBC-ENTMCNC: 31.5 PG — SIGNIFICANT CHANGE UP (ref 27–34)
MCHC RBC-ENTMCNC: 33.8 G/DL — SIGNIFICANT CHANGE UP (ref 32–36)
MCV RBC AUTO: 93.2 FL — SIGNIFICANT CHANGE UP (ref 80–100)
MONOCYTES # BLD AUTO: 0.98 K/UL — HIGH (ref 0–0.9)
MONOCYTES NFR BLD AUTO: 9.6 % — SIGNIFICANT CHANGE UP (ref 2–14)
NEUTROPHILS # BLD AUTO: 8.06 K/UL — HIGH (ref 1.8–7.4)
NEUTROPHILS NFR BLD AUTO: 79 % — HIGH (ref 43–77)
NRBC # BLD AUTO: 0 K/UL — SIGNIFICANT CHANGE UP (ref 0–0)
NRBC # FLD: 0 K/UL — SIGNIFICANT CHANGE UP (ref 0–0)
NRBC BLD AUTO-RTO: 0 /100 WBCS — SIGNIFICANT CHANGE UP (ref 0–0)
PLATELET # BLD AUTO: 198 K/UL — SIGNIFICANT CHANGE UP (ref 150–400)
PMV BLD: 9.3 FL — SIGNIFICANT CHANGE UP (ref 7–13)
POTASSIUM SERPL-MCNC: 4.6 MMOL/L — SIGNIFICANT CHANGE UP (ref 3.5–5.3)
POTASSIUM SERPL-SCNC: 4.6 MMOL/L — SIGNIFICANT CHANGE UP (ref 3.5–5.3)
PROT SERPL-MCNC: 7.7 G/DL — SIGNIFICANT CHANGE UP (ref 6.4–8.2)
PROTHROM AB SERPL-ACNC: 12.5 SEC — SIGNIFICANT CHANGE UP (ref 9.9–13.4)
RBC # BLD: 3.84 M/UL — LOW (ref 4.2–5.8)
RBC # FLD: 13.5 % — SIGNIFICANT CHANGE UP (ref 10.3–14.5)
RSV RNA NPH QL NAA+NON-PROBE: SIGNIFICANT CHANGE UP
SARS-COV-2 RNA SPEC QL NAA+PROBE: SIGNIFICANT CHANGE UP
SODIUM SERPL-SCNC: 133 MMOL/L — SIGNIFICANT CHANGE UP (ref 132–145)
WBC # BLD: 10.2 K/UL — SIGNIFICANT CHANGE UP (ref 3.8–10.5)
WBC # FLD AUTO: 10.2 K/UL — SIGNIFICANT CHANGE UP (ref 3.8–10.5)

## 2025-04-27 PROCEDURE — 99285 EMERGENCY DEPT VISIT HI MDM: CPT

## 2025-04-27 PROCEDURE — 73590 X-RAY EXAM OF LOWER LEG: CPT | Mod: 26,RT

## 2025-04-27 PROCEDURE — 71045 X-RAY EXAM CHEST 1 VIEW: CPT | Mod: 26

## 2025-04-27 RX ORDER — ONDANSETRON HCL/PF 4 MG/2 ML
4 VIAL (ML) INJECTION ONCE
Refills: 0 | Status: COMPLETED | OUTPATIENT
Start: 2025-04-27 | End: 2025-04-27

## 2025-04-27 RX ORDER — PIPERACILLIN-TAZO-DEXTROSE,ISO 2.25G/50ML
3.38 IV SOLUTION, PIGGYBACK PREMIX FROZEN(ML) INTRAVENOUS ONCE
Refills: 0 | Status: COMPLETED | OUTPATIENT
Start: 2025-04-27 | End: 2025-04-27

## 2025-04-27 RX ORDER — ACETAMINOPHEN 500 MG/5ML
1000 LIQUID (ML) ORAL ONCE
Refills: 0 | Status: COMPLETED | OUTPATIENT
Start: 2025-04-27 | End: 2025-04-27

## 2025-04-27 RX ORDER — VANCOMYCIN HCL IN 5 % DEXTROSE 1.5G/250ML
1000 PLASTIC BAG, INJECTION (ML) INTRAVENOUS ONCE
Refills: 0 | Status: COMPLETED | OUTPATIENT
Start: 2025-04-27 | End: 2025-04-27

## 2025-04-27 RX ORDER — ACETAMINOPHEN 500 MG/5ML
975 LIQUID (ML) ORAL ONCE
Refills: 0 | Status: COMPLETED | OUTPATIENT
Start: 2025-04-27 | End: 2025-04-27

## 2025-04-27 RX ORDER — AMLODIPINE BESYLATE 10 MG/1
5 TABLET ORAL ONCE
Refills: 0 | Status: COMPLETED | OUTPATIENT
Start: 2025-04-27 | End: 2025-04-27

## 2025-04-27 RX ADMIN — Medication 975 MILLIGRAM(S): at 23:09

## 2025-04-27 RX ADMIN — Medication 3.38 GRAM(S): at 22:07

## 2025-04-27 RX ADMIN — Medication 250 MILLIGRAM(S): at 22:07

## 2025-04-27 RX ADMIN — Medication 4 MILLIGRAM(S): at 22:07

## 2025-04-27 RX ADMIN — Medication 1000 MILLIGRAM(S): at 22:30

## 2025-04-27 RX ADMIN — Medication 200 GRAM(S): at 21:37

## 2025-04-27 RX ADMIN — Medication 975 MILLIGRAM(S): at 21:38

## 2025-04-27 RX ADMIN — Medication 1000 MILLIGRAM(S): at 23:09

## 2025-04-27 RX ADMIN — Medication 400 MILLIGRAM(S): at 22:07

## 2025-04-27 RX ADMIN — AMLODIPINE BESYLATE 5 MILLIGRAM(S): 10 TABLET ORAL at 22:37

## 2025-04-27 NOTE — ED PROVIDER NOTE - OBJECTIVE STATEMENT
68 y/o M with PMHx of methadone use (for opioid use on methadone 190 mg), HTN on amlodipine,  and PSHx of R inguinal hernia repair open (20+yrs ago) and R upper arm shrapnel removal (40yrs ago) hx of Cecal volvulus resulting in a distal small bowel obstruction s/p exlap, R-hemicolectomy, side to side stapled anastamosis (3/30/2025 at [facility]), seen 2x in the past month in the ED for non-healing R calf wound, present now for approximately 2 months, s/p oral abx regimen with some mild improvement during abx course, but reports worsening pain/swelling/redness/discharge since finishing oral abx. Pt states he traveled to [place] to visit family and the wound deteriorated on his trip. Pt had a dental abscess on the R side during prior visit this month, reports improvement of pain/swelling though still has some discomfort with chewing. Today, p/w fever/chills and pain in the R calf surrounding this chronic wound, which is actively draining. No cough/SOB/CP. No rhinorrhea/sore throat. Pt has a poor appetite but is able to tolerate PO, having small BMs and passing gas. Pt states that he occasionally gets stabbing abd pain associated with eating since his abd surgery years ago but denies active abd pain.

## 2025-04-27 NOTE — ED PROVIDER NOTE - PHYSICAL EXAMINATION
VITAL SIGNS: I have reviewed nursing notes and confirm.  CONSTITUTIONAL: Well-developed; well-nourished; in no acute distress.  SKIN: Skin exam is warm and dry, no acute rash.  HEAD: Normocephalic; atraumatic.  EYES: PERRL, EOM intact; conjunctiva and sclera clear.  ENT: No nasal discharge; airway clear, + poor dentition  NECK: Supple; non tender.  CARD: RRR  RESP: Unlabored. No wheezes, rales or rhonchi.  ABD: soft; non-distended; non-tender  EXT: Normal ROM. + nonpitting edema to RLE from ankle to knee w/erythema/warmth around R calf with draining wound, open approximately 1x2cm with some purulent discharge, distal perfusion/sensation/motor intact, all other ext exam wnl  NEURO: Alert, oriented. Grossly unremarkable.  PSYCH: Cooperative, appropriate.

## 2025-04-27 NOTE — ED ADULT NURSE NOTE - OBJECTIVE STATEMENT
pt reports right lower leg pain, redness, swelling x 2 weeks; now presents with fever; hx peripheral vascular disease, hepatitis C, chronic jaw issues; pt alert & oriented x4, in no acute distress; pt arrives with own cane, ambulating safely; recent travel from [place], arrived here last week

## 2025-04-27 NOTE — ED ADULT TRIAGE NOTE - CHIEF COMPLAINT QUOTE
Pt with complaint of right lower leg infection, swelling and pain worsening over the past two weeks. Pt febrile at triage.

## 2025-04-27 NOTE — ED ADULT NURSE REASSESSMENT NOTE - NS ED NURSE REASSESS COMMENT FT1
pt vomited 3 mins immediately after administering PO Tylenol; Dr. Hartmann made aware; pt given IV Tylenol and Zofran as ordered

## 2025-04-27 NOTE — ED PROVIDER NOTE - CLINICAL SUMMARY MEDICAL DECISION MAKING FREE TEXT BOX
+ persistent/worsening cellulitis/infection of chronic wound despite course of oral abx as outpt. + fever, normal lactate. pt received IV abx, wt based IVF, tylenol, and cultures were sent. Pt comfortable and HDS. Suitable for [facility] at [facility]. Accepted by Dr. Reese.

## 2025-04-28 DIAGNOSIS — L03.115 CELLULITIS OF RIGHT LOWER LIMB: ICD-10-CM

## 2025-04-28 DIAGNOSIS — L03.90 CELLULITIS, UNSPECIFIED: ICD-10-CM

## 2025-04-28 DIAGNOSIS — K59.00 CONSTIPATION, UNSPECIFIED: ICD-10-CM

## 2025-04-28 DIAGNOSIS — B18.2 CHRONIC VIRAL HEPATITIS C: ICD-10-CM

## 2025-04-28 DIAGNOSIS — Z29.9 ENCOUNTER FOR PROPHYLACTIC MEASURES, UNSPECIFIED: ICD-10-CM

## 2025-04-28 DIAGNOSIS — F11.20 OPIOID DEPENDENCE, UNCOMPLICATED: ICD-10-CM

## 2025-04-28 LAB
ALBUMIN SERPL ELPH-MCNC: 2.8 G/DL — LOW (ref 3.3–5)
ALP SERPL-CCNC: 100 U/L — SIGNIFICANT CHANGE UP (ref 40–120)
ALT FLD-CCNC: 17 U/L — SIGNIFICANT CHANGE UP (ref 10–45)
ANION GAP SERPL CALC-SCNC: 7 MMOL/L — SIGNIFICANT CHANGE UP (ref 5–17)
AST SERPL-CCNC: 22 U/L — SIGNIFICANT CHANGE UP (ref 10–40)
BASOPHILS # BLD AUTO: 0.02 K/UL — SIGNIFICANT CHANGE UP (ref 0–0.2)
BASOPHILS NFR BLD AUTO: 0.2 % — SIGNIFICANT CHANGE UP (ref 0–2)
BILIRUB SERPL-MCNC: 0.6 MG/DL — SIGNIFICANT CHANGE UP (ref 0.2–1.2)
BUN SERPL-MCNC: 13 MG/DL — SIGNIFICANT CHANGE UP (ref 7–23)
CALCIUM SERPL-MCNC: 8.4 MG/DL — SIGNIFICANT CHANGE UP (ref 8.4–10.5)
CHLORIDE SERPL-SCNC: 102 MMOL/L — SIGNIFICANT CHANGE UP (ref 96–108)
CO2 SERPL-SCNC: 27 MMOL/L — SIGNIFICANT CHANGE UP (ref 22–31)
CREAT SERPL-MCNC: 0.74 MG/DL — SIGNIFICANT CHANGE UP (ref 0.5–1.3)
CRP SERPL-MCNC: 43.5 MG/L — HIGH (ref 0–4)
EGFR: 97 ML/MIN/1.73M2 — SIGNIFICANT CHANGE UP
EGFR: 97 ML/MIN/1.73M2 — SIGNIFICANT CHANGE UP
EOSINOPHIL # BLD AUTO: 0.02 K/UL — SIGNIFICANT CHANGE UP (ref 0–0.5)
EOSINOPHIL NFR BLD AUTO: 0.2 % — SIGNIFICANT CHANGE UP (ref 0–6)
ERYTHROCYTE [SEDIMENTATION RATE] IN BLOOD: 32 MM/HR — HIGH
GLUCOSE SERPL-MCNC: 60 MG/DL — LOW (ref 70–99)
GRAM STN FLD: ABNORMAL
HCT VFR BLD CALC: 34.7 % — LOW (ref 39–50)
HGB BLD-MCNC: 11.3 G/DL — LOW (ref 13–17)
HIV 1+2 AB+HIV1 P24 AG SERPL QL IA: SIGNIFICANT CHANGE UP
IMM GRANULOCYTES NFR BLD AUTO: 0.6 % — SIGNIFICANT CHANGE UP (ref 0–0.9)
LYMPHOCYTES # BLD AUTO: 1.54 K/UL — SIGNIFICANT CHANGE UP (ref 1–3.3)
LYMPHOCYTES # BLD AUTO: 15.8 % — SIGNIFICANT CHANGE UP (ref 13–44)
MAGNESIUM SERPL-MCNC: 1.9 MG/DL — SIGNIFICANT CHANGE UP (ref 1.6–2.6)
MCHC RBC-ENTMCNC: 30.7 PG — SIGNIFICANT CHANGE UP (ref 27–34)
MCHC RBC-ENTMCNC: 32.6 G/DL — SIGNIFICANT CHANGE UP (ref 32–36)
MCV RBC AUTO: 94.3 FL — SIGNIFICANT CHANGE UP (ref 80–100)
METHOD TYPE: SIGNIFICANT CHANGE UP
MONOCYTES # BLD AUTO: 1.25 K/UL — HIGH (ref 0–0.9)
MONOCYTES NFR BLD AUTO: 12.8 % — SIGNIFICANT CHANGE UP (ref 2–14)
NEUTROPHILS # BLD AUTO: 6.85 K/UL — SIGNIFICANT CHANGE UP (ref 1.8–7.4)
NEUTROPHILS NFR BLD AUTO: 70.4 % — SIGNIFICANT CHANGE UP (ref 43–77)
NRBC BLD AUTO-RTO: 0 /100 WBCS — SIGNIFICANT CHANGE UP (ref 0–0)
PHOSPHATE SERPL-MCNC: 3.1 MG/DL — SIGNIFICANT CHANGE UP (ref 2.5–4.5)
PLATELET # BLD AUTO: 163 K/UL — SIGNIFICANT CHANGE UP (ref 150–400)
POTASSIUM SERPL-MCNC: 4.7 MMOL/L — SIGNIFICANT CHANGE UP (ref 3.5–5.3)
POTASSIUM SERPL-SCNC: 4.7 MMOL/L — SIGNIFICANT CHANGE UP (ref 3.5–5.3)
PROT SERPL-MCNC: 6.4 G/DL — SIGNIFICANT CHANGE UP (ref 6–8.3)
RBC # BLD: 3.68 M/UL — LOW (ref 4.2–5.8)
RBC # FLD: 13.5 % — SIGNIFICANT CHANGE UP (ref 10.3–14.5)
S PYO DNA BLD POS QL NAA+NON-PROBE: SIGNIFICANT CHANGE UP
SODIUM SERPL-SCNC: 136 MMOL/L — SIGNIFICANT CHANGE UP (ref 135–145)
SPECIMEN SOURCE: SIGNIFICANT CHANGE UP
WBC # BLD: 9.74 K/UL — SIGNIFICANT CHANGE UP (ref 3.8–10.5)
WBC # FLD AUTO: 9.74 K/UL — SIGNIFICANT CHANGE UP (ref 3.8–10.5)

## 2025-04-28 PROCEDURE — 11042 DBRDMT SUBQ TIS 1ST 20SQCM/<: CPT

## 2025-04-28 PROCEDURE — 99223 1ST HOSP IP/OBS HIGH 75: CPT | Mod: GC

## 2025-04-28 PROCEDURE — 99221 1ST HOSP IP/OBS SF/LOW 40: CPT | Mod: 25

## 2025-04-28 PROCEDURE — 99233 SBSQ HOSP IP/OBS HIGH 50: CPT | Mod: GC

## 2025-04-28 PROCEDURE — 73701 CT LOWER EXTREMITY W/DYE: CPT | Mod: 26,RT

## 2025-04-28 RX ORDER — VANCOMYCIN HCL IN 5 % DEXTROSE 1.5G/250ML
1000 PLASTIC BAG, INJECTION (ML) INTRAVENOUS EVERY 12 HOURS
Refills: 0 | Status: COMPLETED | OUTPATIENT
Start: 2025-04-28 | End: 2025-04-28

## 2025-04-28 RX ORDER — SENNA 187 MG
2 TABLET ORAL AT BEDTIME
Refills: 0 | Status: DISCONTINUED | OUTPATIENT
Start: 2025-04-28 | End: 2025-05-04

## 2025-04-28 RX ORDER — ENOXAPARIN SODIUM 100 MG/ML
40 INJECTION SUBCUTANEOUS EVERY 24 HOURS
Refills: 0 | Status: DISCONTINUED | OUTPATIENT
Start: 2025-04-28 | End: 2025-05-04

## 2025-04-28 RX ORDER — SODIUM CHLORIDE 9 G/1000ML
1000 INJECTION, SOLUTION INTRAVENOUS ONCE
Refills: 0 | Status: COMPLETED | OUTPATIENT
Start: 2025-04-28 | End: 2025-04-28

## 2025-04-28 RX ORDER — METHADONE HCL 10 MG
200 TABLET ORAL EVERY 24 HOURS
Refills: 0 | Status: DISCONTINUED | OUTPATIENT
Start: 2025-04-28 | End: 2025-04-29

## 2025-04-28 RX ORDER — PIPERACILLIN-TAZO-DEXTROSE,ISO 2.25G/50ML
3.38 IV SOLUTION, PIGGYBACK PREMIX FROZEN(ML) INTRAVENOUS ONCE
Refills: 0 | Status: COMPLETED | OUTPATIENT
Start: 2025-04-28 | End: 2025-04-28

## 2025-04-28 RX ORDER — CLINDAMYCIN PHOSPHATE 150 MG/ML
600 VIAL (ML) INJECTION ONCE
Refills: 0 | Status: COMPLETED | OUTPATIENT
Start: 2025-04-28 | End: 2025-04-28

## 2025-04-28 RX ORDER — MAGNESIUM CITRATE
296 SOLUTION, ORAL ORAL ONCE
Refills: 0 | Status: COMPLETED | OUTPATIENT
Start: 2025-04-28 | End: 2025-04-28

## 2025-04-28 RX ORDER — KETOROLAC TROMETHAMINE 30 MG/ML
15 INJECTION, SOLUTION INTRAMUSCULAR; INTRAVENOUS ONCE
Refills: 0 | Status: DISCONTINUED | OUTPATIENT
Start: 2025-04-28 | End: 2025-04-28

## 2025-04-28 RX ORDER — KETOROLAC TROMETHAMINE 30 MG/ML
15 INJECTION, SOLUTION INTRAMUSCULAR; INTRAVENOUS EVERY 6 HOURS
Refills: 0 | Status: DISCONTINUED | OUTPATIENT
Start: 2025-04-28 | End: 2025-04-30

## 2025-04-28 RX ORDER — MELATONIN 5 MG
5 TABLET ORAL AT BEDTIME
Refills: 0 | Status: DISCONTINUED | OUTPATIENT
Start: 2025-04-28 | End: 2025-05-04

## 2025-04-28 RX ORDER — CEFTRIAXONE 500 MG/1
2000 INJECTION, POWDER, FOR SOLUTION INTRAMUSCULAR; INTRAVENOUS EVERY 24 HOURS
Refills: 0 | Status: DISCONTINUED | OUTPATIENT
Start: 2025-04-28 | End: 2025-04-28

## 2025-04-28 RX ORDER — PIPERACILLIN-TAZO-DEXTROSE,ISO 2.25G/50ML
3.38 IV SOLUTION, PIGGYBACK PREMIX FROZEN(ML) INTRAVENOUS EVERY 8 HOURS
Refills: 0 | Status: DISCONTINUED | OUTPATIENT
Start: 2025-04-28 | End: 2025-05-01

## 2025-04-28 RX ORDER — HYDROMORPHONE/SOD CHLOR,ISO/PF 2 MG/10 ML
0.5 SYRINGE (ML) INJECTION ONCE
Refills: 0 | Status: DISCONTINUED | OUTPATIENT
Start: 2025-04-28 | End: 2025-04-28

## 2025-04-28 RX ORDER — LIDOCAINE HCL/PF 10 MG/ML
30 VIAL (ML) INJECTION ONCE
Refills: 0 | Status: COMPLETED | OUTPATIENT
Start: 2025-04-28 | End: 2025-04-28

## 2025-04-28 RX ORDER — ACETAMINOPHEN 500 MG/5ML
975 LIQUID (ML) ORAL EVERY 8 HOURS
Refills: 0 | Status: DISCONTINUED | OUTPATIENT
Start: 2025-04-28 | End: 2025-05-04

## 2025-04-28 RX ORDER — POLYETHYLENE GLYCOL 3350 17 G/17G
17 POWDER, FOR SOLUTION ORAL EVERY 24 HOURS
Refills: 0 | Status: DISCONTINUED | OUTPATIENT
Start: 2025-04-29 | End: 2025-05-04

## 2025-04-28 RX ADMIN — Medication 2 TABLET(S): at 21:12

## 2025-04-28 RX ADMIN — Medication 250 MILLIGRAM(S): at 09:07

## 2025-04-28 RX ADMIN — SODIUM CHLORIDE 1000 MILLILITER(S): 9 INJECTION, SOLUTION INTRAVENOUS at 03:04

## 2025-04-28 RX ADMIN — Medication 5 MILLIGRAM(S): at 21:27

## 2025-04-28 RX ADMIN — Medication 200 MILLIGRAM(S): at 12:40

## 2025-04-28 RX ADMIN — KETOROLAC TROMETHAMINE 15 MILLIGRAM(S): 30 INJECTION, SOLUTION INTRAMUSCULAR; INTRAVENOUS at 12:40

## 2025-04-28 RX ADMIN — Medication 25 GRAM(S): at 21:12

## 2025-04-28 RX ADMIN — Medication 15 MILLILITER(S): at 23:08

## 2025-04-28 RX ADMIN — Medication 296 MILLILITER(S): at 13:25

## 2025-04-28 RX ADMIN — Medication 250 MILLIGRAM(S): at 21:12

## 2025-04-28 RX ADMIN — Medication 100 MILLIGRAM(S): at 07:03

## 2025-04-28 RX ADMIN — Medication 0.5 MILLIGRAM(S): at 15:40

## 2025-04-28 RX ADMIN — CEFTRIAXONE 100 MILLIGRAM(S): 500 INJECTION, POWDER, FOR SOLUTION INTRAMUSCULAR; INTRAVENOUS at 07:03

## 2025-04-28 RX ADMIN — KETOROLAC TROMETHAMINE 15 MILLIGRAM(S): 30 INJECTION, SOLUTION INTRAMUSCULAR; INTRAVENOUS at 13:05

## 2025-04-28 RX ADMIN — Medication 4 MILLIGRAM(S): at 00:01

## 2025-04-28 RX ADMIN — ENOXAPARIN SODIUM 40 MILLIGRAM(S): 100 INJECTION SUBCUTANEOUS at 03:04

## 2025-04-28 RX ADMIN — Medication 25 GRAM(S): at 15:51

## 2025-04-28 RX ADMIN — KETOROLAC TROMETHAMINE 15 MILLIGRAM(S): 30 INJECTION, SOLUTION INTRAMUSCULAR; INTRAVENOUS at 03:19

## 2025-04-28 RX ADMIN — Medication 0.5 MILLIGRAM(S): at 15:21

## 2025-04-28 RX ADMIN — KETOROLAC TROMETHAMINE 15 MILLIGRAM(S): 30 INJECTION, SOLUTION INTRAMUSCULAR; INTRAVENOUS at 03:04

## 2025-04-28 RX ADMIN — Medication 200 GRAM(S): at 12:41

## 2025-04-28 RX ADMIN — Medication 30 MILLILITER(S): at 15:23

## 2025-04-28 NOTE — H&P ADULT - ASSESSMENT
69 year old male with opiate use disorder on Methadone, chronic hep C not on treatment and surgical history of R. 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 Patient has unilateral RLE warmth, erythema and draining which patient said was previously purulent concerning for MRSA infection.

## 2025-04-28 NOTE — CHART NOTE - NSCHARTNOTEFT_GEN_A_CORE
Reviewed CT maxillofacial with IV contrast. No drainage odontogenic abscess noted. Can start Unasyn or Zosyn for odontogenic infection. Please page OMFS if pt developed worsening facial swelling, purulent drainage, or trismus. Reviewed CT maxillofacial with IV contrast. No drainage odontogenic abscess noted. Can start Unasyn or Zosyn and peridex mouthwash BID for odontogenic infection. Maintain oral hygiene. Please page OMFS if pt developed worsening facial swelling, purulent drainage, or trismus.

## 2025-04-28 NOTE — CONSULT NOTE ADULT - SUBJECTIVE AND OBJECTIVE BOX
Vascular Attending: Dr. Beckham    HPI: Mr. Harding is a 69 year old male with opiate use disorder on Methadone, chronic hep C not on treatment and surgical history of R. 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 Patient reports the wound has been worsening for the last 2 weeks. He noted purulent drainage from the posterior aspect. Over the last 3 days he started to notice systemic fevers. Patient was seen in the ED a few weeks prior for a dental infection and was given augmentin + bactrim for which he finished the course. His symptoms started after finishing the course of PO abx.     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 Patient admitted to medicine for sepsis due to RLE wound/cellulitis with concern for underlying abscess. Vascular surgery consulted for further assessment of RLE wound.    Afebrile, HR 86, /72, on room air  WBC 9.74, hgb 11.3, Cr 0.74, ESR/CRP elevated  XR R tib/fib: Possible small foci of gasin the subcutaneous tissues of the right mid calf, without evidence of tracking deeper.  CT RLE w/ IV contrast pending    PAST MEDICAL & SURGICAL HISTORY:  Methadone use  H/O right inguinal hernia repair  H/O right hemicolectomy    ROS: Denies headache, blurred vision, chest pain, SOB, abdominal pain, nausea or vomiting.     MEDICATIONS  (STANDING):  acetaminophen     Tablet .. 975 milliGRAM(s) Oral every 8 hours  cefTRIAXone   IVPB 2000 milliGRAM(s) IV Intermittent every 24 hours  enoxaparin Injectable 40 milliGRAM(s) SubCutaneous every 24 hours  vancomycin  IVPB 1000 milliGRAM(s) IV Intermittent every 12 hours    MEDICATIONS  (PRN):  ketorolac   Injectable 15 milliGRAM(s) IV Push every 6 hours PRN Moderate Pain (4 - 6)      Allergies: No Known Allergies    Intolerances        SOCIAL HISTORY:    FAMILY HISTORY:      Vital Signs Last 24 Hrs  T(C): 36.7 (28 Apr 2025 05:49), Max: 38.9 (27 Apr 2025 21:20)  T(F): 98.1 (28 Apr 2025 05:49), Max: 102 (27 Apr 2025 21:20)  HR: 86 (28 Apr 2025 05:49) (71 - 99)  BP: 169/72 (28 Apr 2025 05:49) (138/65 - 180/97)  BP(mean): 104 (28 Apr 2025 05:49) (104 - 116)  RR: 18 (28 Apr 2025 05:49) (15 - 18)  SpO2: 100% (28 Apr 2025 05:49) (95% - 100%)    Parameters below as of 28 Apr 2025 05:49  Patient On (Oxygen Delivery Method): room air      PHYSICAL EXAM:  General: NAD, pt resting comfortably in bed  Pulm: No respiratory distress, nonlabored breathing, on room air  CVS: NSR, HDS  Abd: Soft, NT, ND  Extremities: WWP, no edema  Pulses:       LABS:                        11.3   9.74  )-----------( 163      ( 28 Apr 2025 05:30 )             34.7     04-28    136  |  102  |  13  ----------------------------<  60[L]  4.7   |  27  |  0.74    Ca    8.4      28 Apr 2025 05:30  Phos  3.1     04-28  Mg     1.9     04-28    TPro  6.4  /  Alb  2.8[L]  /  TBili  0.6  /  DBili  x   /  AST  22  /  ALT  17  /  AlkPhos  100  04-28    PT/INR - ( 27 Apr 2025 21:21 )   PT: 12.5 sec;   INR: 1.08          PTT - ( 27 Apr 2025 21:21 )  PTT:31.6 sec  Urinalysis Basic - ( 28 Apr 2025 05:30 )    Color: x / Appearance: x / SG: x / pH: x  Gluc: 60 mg/dL / Ketone: x  / Bili: x / Urobili: x   Blood: x / Protein: x / Nitrite: x   Leuk Esterase: x / RBC: x / WBC x   Sq Epi: x / Non Sq Epi: x / Bacteria: x        RADIOLOGY & ADDITIONAL STUDIES  R XR tib/fib: Possible small foci of gas in the subcutaneous tissues of the right mid calf, without evidence of tracking deeper. Clinical correlation recommended.  CT RLE w/ IV contrast pending Vascular Attending: Dr. Beckham    HPI: Mr. Harding is a 69 year old male with opiate use disorder on Methadone, chronic hep C not on treatment and surgical history of R. 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 Patient reports the wound has been worsening for the last 2 weeks. He noted purulent drainage from the posterior aspect. Over the last 3 days he started to notice systemic fevers. Patient was seen in the ED a few weeks prior for a dental infection and was given augmentin + bactrim for which he finished the course. His symptoms started after finishing the course of PO abx.     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 Patient admitted to medicine for sepsis due to RLE wound/cellulitis with concern for underlying abscess. Vascular surgery consulted for further assessment of RLE wound.    Afebrile, HR 86, /72, on room air  WBC 9.74, hgb 11.3, Cr 0.74, ESR/CRP elevated  XR R tib/fib: Possible small foci of gasin the subcutaneous tissues of the right mid calf, without evidence of tracking deeper.  CT RLE w/ IV contrast pending    PAST MEDICAL & SURGICAL HISTORY:  Methadone use  H/O right inguinal hernia repair  H/O right hemicolectomy    ROS: Denies headache, blurred vision, chest pain, SOB, abdominal pain, nausea or vomiting.     MEDICATIONS  (STANDING):  acetaminophen     Tablet .. 975 milliGRAM(s) Oral every 8 hours  cefTRIAXone   IVPB 2000 milliGRAM(s) IV Intermittent every 24 hours  enoxaparin Injectable 40 milliGRAM(s) SubCutaneous every 24 hours  vancomycin  IVPB 1000 milliGRAM(s) IV Intermittent every 12 hours    MEDICATIONS  (PRN):  ketorolac   Injectable 15 milliGRAM(s) IV Push every 6 hours PRN Moderate Pain (4 - 6)      Allergies: No Known Allergies    Intolerances        SOCIAL HISTORY:    FAMILY HISTORY:      Vital Signs Last 24 Hrs  T(C): 36.7 (28 Apr 2025 05:49), Max: 38.9 (27 Apr 2025 21:20)  T(F): 98.1 (28 Apr 2025 05:49), Max: 102 (27 Apr 2025 21:20)  HR: 86 (28 Apr 2025 05:49) (71 - 99)  BP: 169/72 (28 Apr 2025 05:49) (138/65 - 180/97)  BP(mean): 104 (28 Apr 2025 05:49) (104 - 116)  RR: 18 (28 Apr 2025 05:49) (15 - 18)  SpO2: 100% (28 Apr 2025 05:49) (95% - 100%)    Parameters below as of 28 Apr 2025 05:49  Patient On (Oxygen Delivery Method): room air      PHYSICAL EXAM:  General: NAD, pt resting comfortably in bed  Pulm: No respiratory distress, nonlabored breathing, on room air  CVS: NSR, HDS  Abd: Soft, NT, ND  Extremities: WWP, Posterior aspect of mid R calf w/ appox 1x1cm open wound w/ serosanguinous drainage, erythematous circumferentially, no pus expressed. moderately tender surrounding wound, fluctuance appreciated distal to wound. motor and sensation intact in b/l LE.  Pulses: RLE palpable DP/PT    LABS:                        11.3   9.74  )-----------( 163      ( 28 Apr 2025 05:30 )             34.7     04-28    136  |  102  |  13  ----------------------------<  60[L]  4.7   |  27  |  0.74    Ca    8.4      28 Apr 2025 05:30  Phos  3.1     04-28  Mg     1.9     04-28    TPro  6.4  /  Alb  2.8[L]  /  TBili  0.6  /  DBili  x   /  AST  22  /  ALT  17  /  AlkPhos  100  04-28    PT/INR - ( 27 Apr 2025 21:21 )   PT: 12.5 sec;   INR: 1.08          PTT - ( 27 Apr 2025 21:21 )  PTT:31.6 sec  Urinalysis Basic - ( 28 Apr 2025 05:30 )    Color: x / Appearance: x / SG: x / pH: x  Gluc: 60 mg/dL / Ketone: x  / Bili: x / Urobili: x   Blood: x / Protein: x / Nitrite: x   Leuk Esterase: x / RBC: x / WBC x   Sq Epi: x / Non Sq Epi: x / Bacteria: x        RADIOLOGY & ADDITIONAL STUDIES  R XR tib/fib: Possible small foci of gas in the subcutaneous tissues of the right mid calf, without evidence of tracking deeper. Clinical correlation recommended.  CT RLE w/ IV contrast pending Vascular Attending: Dr. Beckham    HPI: Mr. Harding is a 69 year old male with opiate use disorder on Methadone, chronic hep C not on treatment and surgical history of R. 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 Patient reports the wound has been worsening for the last 2 weeks. He noted purulent drainage from the posterior aspect. Over the last 3 days he started to notice systemic fevers. Patient was seen in the ED a few weeks prior for a dental infection and was given augmentin + bactrim for which he finished the course. His symptoms started after finishing the course of PO abx.     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 Patient admitted to medicine for sepsis due to RLE wound/cellulitis with concern for underlying abscess. Patient reports RLE wound for the last month, but reports wound became more painful about 2 weeks ago. Vascular surgery consulted for further assessment of RLE wound.    Afebrile, HR 86, /72, on room air  WBC 9.74, hgb 11.3, Cr 0.74, ESR/CRP elevated  XR R tib/fib: Possible small foci of gasin the subcutaneous tissues of the right mid calf, without evidence of tracking deeper.  CT RLE w/ IV contrast pending    PAST MEDICAL & SURGICAL HISTORY:  Methadone use  H/O right inguinal hernia repair  H/O right hemicolectomy    ROS: Denies headache, blurred vision, chest pain, SOB, abdominal pain, nausea or vomiting.     MEDICATIONS  (STANDING):  acetaminophen     Tablet .. 975 milliGRAM(s) Oral every 8 hours  cefTRIAXone   IVPB 2000 milliGRAM(s) IV Intermittent every 24 hours  enoxaparin Injectable 40 milliGRAM(s) SubCutaneous every 24 hours  vancomycin  IVPB 1000 milliGRAM(s) IV Intermittent every 12 hours    MEDICATIONS  (PRN):  ketorolac   Injectable 15 milliGRAM(s) IV Push every 6 hours PRN Moderate Pain (4 - 6)      Allergies: No Known Allergies    Intolerances        SOCIAL HISTORY:    FAMILY HISTORY:      Vital Signs Last 24 Hrs  T(C): 36.7 (28 Apr 2025 05:49), Max: 38.9 (27 Apr 2025 21:20)  T(F): 98.1 (28 Apr 2025 05:49), Max: 102 (27 Apr 2025 21:20)  HR: 86 (28 Apr 2025 05:49) (71 - 99)  BP: 169/72 (28 Apr 2025 05:49) (138/65 - 180/97)  BP(mean): 104 (28 Apr 2025 05:49) (104 - 116)  RR: 18 (28 Apr 2025 05:49) (15 - 18)  SpO2: 100% (28 Apr 2025 05:49) (95% - 100%)    Parameters below as of 28 Apr 2025 05:49  Patient On (Oxygen Delivery Method): room air      PHYSICAL EXAM:  General: NAD, pt resting comfortably in bed  Pulm: No respiratory distress, nonlabored breathing, on room air  CVS: NSR, HDS  Abd: Soft, NT, ND  Extremities: WWP, Posterior aspect of mid R calf w/ appox 1x1cm open wound w/ serosanguinous drainage, erythematous circumferentially, no pus expressed. moderately tender surrounding wound, fluctuance appreciated distal to wound. motor and sensation intact in b/l LE.  Pulses: RLE palpable DP/PT    LABS:                        11.3   9.74  )-----------( 163      ( 28 Apr 2025 05:30 )             34.7     04-28    136  |  102  |  13  ----------------------------<  60[L]  4.7   |  27  |  0.74    Ca    8.4      28 Apr 2025 05:30  Phos  3.1     04-28  Mg     1.9     04-28    TPro  6.4  /  Alb  2.8[L]  /  TBili  0.6  /  DBili  x   /  AST  22  /  ALT  17  /  AlkPhos  100  04-28    PT/INR - ( 27 Apr 2025 21:21 )   PT: 12.5 sec;   INR: 1.08          PTT - ( 27 Apr 2025 21:21 )  PTT:31.6 sec  Urinalysis Basic - ( 28 Apr 2025 05:30 )    Color: x / Appearance: x / SG: x / pH: x  Gluc: 60 mg/dL / Ketone: x  / Bili: x / Urobili: x   Blood: x / Protein: x / Nitrite: x   Leuk Esterase: x / RBC: x / WBC x   Sq Epi: x / Non Sq Epi: x / Bacteria: x        RADIOLOGY & ADDITIONAL STUDIES  R XR tib/fib: Possible small foci of gas in the subcutaneous tissues of the right mid calf, without evidence of tracking deeper. Clinical correlation recommended.  CT RLE w/ IV contrast pending

## 2025-04-28 NOTE — PROGRESS NOTE ADULT - SUBJECTIVE AND OBJECTIVE BOX
***INCOMPLETE NOTE*** ***INCOMPLETE NOTE***    OVERNIGHT EVENTS: NAEO    SUBJECTIVE / INTERVAL HPI: Patient seen and examined at bedside. Reports 10/10 right lower leg pain and continuous drainage. Patient states leg pain is radiating to right foot and ankle and right knee. Patient states redness and swelling has progressed and pain medication providing minimal relief. 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 Patient states fever is better. Patient also endorses constipation Patient denying chest pain, SOB, palpitations, cough, fever, chills, nausea, vomiting, diarrhea, abdominal pain.     Remaining ROS negative       PHYSICAL EXAM:  General:NAD, appears comfortable    HEENT: Oropharynx with noted infection and abscess to right lower jaw. No drainage of abscess in mouth. Swelling appreciated to right face. Tender to palpation of vestibule and right lower gum. PERRL, anicteric sclera  Cardiovascular:  RRR  Respiratory: CTA B/L  Gastrointestinal: soft, NT/ND; +BSx4  Extremities: RLE with erythema serosanguinous drainage present. Fluctuant area noted near opening of wound. Pain on palpation present and limited ROM of right lower leg and ankle. 2+ pedal pulses  Vascular: 2+ radial pulses  Neurological: AAOx3; no focal deficits  Psychiatric: pleasant mood and affect  Dermatologic: Erythema and drainage present on RLE wound with erythema spreading from tibial tuberosity to proximal to the talus bone. Drainage is serosanguinous. Darker discoloration present on medial malleolus.  Central post surgical incision scar present to abdomen - healed and no erythema. Right upper extremity scar present and healed.     VITAL SIGNS:  Vital Signs Last 24 Hrs  T(C): 36.7 (28 Apr 2025 05:49), Max: 38.9 (27 Apr 2025 21:20)  T(F): 98.1 (28 Apr 2025 05:49), Max: 102 (27 Apr 2025 21:20)  HR: 86 (28 Apr 2025 05:49) (71 - 99)  BP: 169/72 (28 Apr 2025 05:49) (138/65 - 180/97)  BP(mean): 104 (28 Apr 2025 05:49) (104 - 116)  RR: 18 (28 Apr 2025 05:49) (15 - 18)  SpO2: 100% (28 Apr 2025 05:49) (95% - 100%)    Parameters below as of 28 Apr 2025 05:49  Patient On (Oxygen Delivery Method): room air          MEDICATIONS:  MEDICATIONS  (STANDING):  acetaminophen     Tablet .. 975 milliGRAM(s) Oral every 8 hours  cefTRIAXone   IVPB 2000 milliGRAM(s) IV Intermittent every 24 hours  enoxaparin Injectable 40 milliGRAM(s) SubCutaneous every 24 hours  magnesium citrate Oral Solution 296 milliLiter(s) Oral once  methadone    Tablet 200 milliGRAM(s) Oral every 24 hours  senna 2 Tablet(s) Oral at bedtime  vancomycin  IVPB 1000 milliGRAM(s) IV Intermittent every 12 hours    MEDICATIONS  (PRN):  ketorolac   Injectable 15 milliGRAM(s) IV Push every 6 hours PRN Moderate Pain (4 - 6)      ALLERGIES:  Allergies    No Known Allergies    Intolerances        LABS:                        11.3   9.74  )-----------( 163      ( 28 Apr 2025 05:30 )             34.7     04-28    136  |  102  |  13  ----------------------------<  60[L]  4.7   |  27  |  0.74    Ca    8.4      28 Apr 2025 05:30  Phos  3.1     04-28  Mg     1.9     04-28    TPro  6.4  /  Alb  2.8[L]  /  TBili  0.6  /  DBili  x   /  AST  22  /  ALT  17  /  AlkPhos  100  04-28    PT/INR - ( 27 Apr 2025 21:21 )   PT: 12.5 sec;   INR: 1.08          PTT - ( 27 Apr 2025 21:21 )  PTT:31.6 sec  Urinalysis Basic - ( 28 Apr 2025 05:30 )    Color: x / Appearance: x / SG: x / pH: x  Gluc: 60 mg/dL / Ketone: x  / Bili: x / Urobili: x   Blood: x / Protein: x / Nitrite: x   Leuk Esterase: x / RBC: x / WBC x   Sq Epi: x / Non Sq Epi: x / Bacteria: x      CAPILLARY BLOOD GLUCOSE          RADIOLOGY & ADDITIONAL TESTS: Reviewed. OVERNIGHT EVENTS: NAEO    SUBJECTIVE / INTERVAL HPI: Patient seen and examined at bedside. Reports 10/10 right lower leg pain and continuous drainage. Patient states leg pain is radiating to right foot and ankle and right knee. Patient states redness and swelling has progressed and pain medication providing minimal relief. 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 Patient states fever is better. Patient also endorses constipation Patient denying chest pain, SOB, palpitations, cough, fever, chills, nausea, vomiting, diarrhea, abdominal pain.     Remaining ROS negative       PHYSICAL EXAM:  General: NAD, appears comfortable    HEENT: Oropharynx with noted infection and abscess to right lower jaw. No drainage of abscess in mouth. Swelling appreciated to right face. Tender to palpation of vestibule and right lower gum. PERRL, anicteric sclera  Cardiovascular: RRR  Respiratory: CTA B/L  Gastrointestinal: soft, NT/ND; +BSx4  Extremities: RLE with erythema serosanguinous drainage present. Fluctuant area noted near opening of wound. Pain on palpation present and limited ROM of right lower leg and ankle. 2+ pedal pulses  Vascular: 2+ radial pulses  Neurological: AAOx3; no focal deficits  Psychiatric: pleasant mood and affect  Dermatologic: Erythema and drainage present on RLE wound with erythema spreading from tibial tuberosity to proximal to the talus bone. Drainage is serosanguinous. Darker discoloration present on medial malleolus.  Central post surgical incision scar present to abdomen - healed and no erythema. Right upper extremity scar present and healed.     VITAL SIGNS:  Vital Signs Last 24 Hrs  T(C): 36.7 (28 Apr 2025 05:49), Max: 38.9 (27 Apr 2025 21:20)  T(F): 98.1 (28 Apr 2025 05:49), Max: 102 (27 Apr 2025 21:20)  HR: 86 (28 Apr 2025 05:49) (71 - 99)  BP: 169/72 (28 Apr 2025 05:49) (138/65 - 180/97)  BP(mean): 104 (28 Apr 2025 05:49) (104 - 116)  RR: 18 (28 Apr 2025 05:49) (15 - 18)  SpO2: 100% (28 Apr 2025 05:49) (95% - 100%)    Parameters below as of 28 Apr 2025 05:49  Patient On (Oxygen Delivery Method): room air          MEDICATIONS:  MEDICATIONS  (STANDING):  acetaminophen     Tablet .. 975 milliGRAM(s) Oral every 8 hours  cefTRIAXone   IVPB 2000 milliGRAM(s) IV Intermittent every 24 hours  enoxaparin Injectable 40 milliGRAM(s) SubCutaneous every 24 hours  magnesium citrate Oral Solution 296 milliLiter(s) Oral once  methadone    Tablet 200 milliGRAM(s) Oral every 24 hours  senna 2 Tablet(s) Oral at bedtime  vancomycin  IVPB 1000 milliGRAM(s) IV Intermittent every 12 hours    MEDICATIONS  (PRN):  ketorolac   Injectable 15 milliGRAM(s) IV Push every 6 hours PRN Moderate Pain (4 - 6)      ALLERGIES:  Allergies    No Known Allergies    Intolerances        LABS:                        11.3   9.74  )-----------( 163      ( 28 Apr 2025 05:30 )             34.7     04-28    136  |  102  |  13  ----------------------------<  60[L]  4.7   |  27  |  0.74    Ca    8.4      28 Apr 2025 05:30  Phos  3.1     04-28  Mg     1.9     04-28    TPro  6.4  /  Alb  2.8[L]  /  TBili  0.6  /  DBili  x   /  AST  22  /  ALT  17  /  AlkPhos  100  04-28    PT/INR - ( 27 Apr 2025 21:21 )   PT: 12.5 sec;   INR: 1.08          PTT - ( 27 Apr 2025 21:21 )  PTT:31.6 sec  Urinalysis Basic - ( 28 Apr 2025 05:30 )    Color: x / Appearance: x / SG: x / pH: x  Gluc: 60 mg/dL / Ketone: x  / Bili: x / Urobili: x   Blood: x / Protein: x / Nitrite: x   Leuk Esterase: x / RBC: x / WBC x   Sq Epi: x / Non Sq Epi: x / Bacteria: x      CAPILLARY BLOOD GLUCOSE          RADIOLOGY & ADDITIONAL TESTS: Reviewed.

## 2025-04-28 NOTE — CONSULT NOTE ADULT - ASSESSMENT
Assessment/Plan: 69M with opiate use disorder, chronic hep C, cecal volvulus currently a/f sepsis secondary to a non-healing RLE wound. He also has progressive worsening of jaw pain over the past two years. 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 Based on clinical evaluation and radiographic findings, his mandibular symptoms are most consistent with chronic changes secondary to previous mandibular fracture.    - No Acute OMFS intervention at this time  - c/w unasyn or zosyn and can transition to Augmentin for total of 7 days   - Rec Peridex rinse BID x 2 weeks  - rest of care per primary

## 2025-04-28 NOTE — H&P ADULT - PROBLEM SELECTOR PLAN 1
pt. meeting 2/4 SIRS (T and HR) with source RLE cellulitis  Pt. states was previously draining purulent material.   There is concern for underlying abscess on my exam.     - f/u bcx  - pt. not given any IVF in ED. He looks eu/hypovolemic on exam  - will give 1L of LR as patient is still tachycardic.   - get CT LE with IV contrast  - f/u ESR and CRP (were recently normal)  - vancomycin 15mg/kg q12hr  - f/u vanc trough prior to 4th dose  - Toradol for pain control pt. meeting 2/4 SIRS (T and HR) with source RLE cellulitis  Pt. states was previously draining purulent material.   There is concern for underlying abscess on my exam.   He was seen for dental pain on 4/7/25 and noted to have an infection at that time. He was given appropriate abx and completed the course. I do not think that is the source of his infection given his RLE cellulitis.     - f/u bcx  - pt. not given any IVF in ED. He looks eu/hypovolemic on exam  - will give 1L of LR as patient is still tachycardic.   - get CT LE with IV contrast  - f/u ESR and CRP (were recently normal)  - CTX 2g daily  - vancomycin 15mg/kg q12hr  - f/u vanc trough prior to 4th dose  - Toradol for pain control  - consider vascular consult for his RLE wound  - Consider OMFS for dental infection if not improving.

## 2025-04-28 NOTE — H&P ADULT - ATTENDING COMMENTS
69 year old male with opiate use disorder on Methadone, chronic hep C not on treatment and surgical history of R. 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 Admitted for sepsis 2/2 RLE cellulitis, c/f abscess       Plan   -c/w vanc/ceftriaxone for now    -CT RLE    -f/up blood cx    -Vascular consult for possible I&D    -serial exams    -confirm methadone in am    -bowel regimen.

## 2025-04-28 NOTE — PROGRESS NOTE ADULT - PROBLEM SELECTOR PLAN 3
pt. reports taking 200mg of methadone daily.    - call methadone clinic in AM ([phone]) Patient endorses no bowel movement since admission. Patient reports no pain in abdomen or nausea, vomiting. Abdomen nondistended and nontender and normal bowel sounds    - Mag citrate  - Miralax   - Senna Patient endorses no bowel movement since 3-4 days prior to admission. Patient reports no pain in abdomen or nausea, vomiting. Abdomen nondistended and nontender and normal bowel sounds    - Giving Mag citrate x1  - Bowel regimen: Miralax qd, Senna 2 tabs qhs

## 2025-04-28 NOTE — H&P ADULT - HISTORY OF PRESENT ILLNESS
Mr. Harding is a 69 year old male with opiate use disorder on Methadone, chronic hep C not on treatment and surgical history of R. 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 Patient reports the wound has been worsening for the last 2 weeks. He noted purulent drainage from the posterior aspect. Over the last 3 days he started to notice systemic fevers. Patient was seen in the ED a few weeks prior for a dental infection and was given augmentin + bactrim for which he finished the course. His symptoms started after finishing the course of PO abx.     In the ED:   VITAL SIGNS: Last 24 Hours  T(F): 98.7 (28 Apr 2025 01:00), Max: 102 (27 Apr 2025 21:20)  HR: 95 (28 Apr 2025 01:00) (71 - 99)  BP: 151/98 (28 Apr 2025 01:00) (138/65 - 180/97)  RR: 18 (28 Apr 2025 01:00) (15 - 18)  SpO2: 95% (28 Apr 2025 01:00) (95% - 97%)  Labs: WBC 10.2, total protein 7.7, albumin 2.7  Interventions: Morphine, tylenol, amlodipine 5, zofran, zosyn, vanc

## 2025-04-28 NOTE — CONSULT NOTE ADULT - ASSESSMENT
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 Patient admitted to medicine for sepsis due to RLE wound/cellulitis with concern for underlying abscess. Vascular surgery consulted for further assessment of RLE wound. Vitals stable - afebrile, HR 86, /72, on room air. Labs - WBC 9.74, hgb 11.3, Cr 0.74, ESR/CRP elevated. 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 Patient admitted to medicine for sepsis due to RLE wound/cellulitis with concern for underlying abscess. Vitals stable - afebrile, HR 86, /72, on room air. Patient noted to be febrile at [facility] to 102 oral. Labs - WBC 9.74, hgb 11.3, Cr 0.74, ESR/CRP elevated. On exam patient with approximately 1x1cm open wound on posterior aspect of R calf w/ serosanguinous drainage. Patient with palpable distal pulses. Currently being treated with vanc/zosyn. Vascular surgery consulted for further assessment of RLE wound.    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 Patient admitted to medicine for sepsis due to RLE wound/cellulitis with concern for underlying abscess. Vitals stable - afebrile, HR 86, /72, on room air. Patient noted to be febrile in the ED to 102F oral. Labs - WBC 9.74, hgb 11.3, Cr 0.74, ESR/CRP elevated. On exam patient with approximately 1x1cm open wound on posterior aspect of R calf w/ serosanguinous drainage. Patient with palpable distal pulses. Currently being treated with vanc/zosyn. Vascular surgery consulted for further assessment of RLE wound.    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 Patient admitted to medicine for sepsis due to RLE wound/cellulitis with concern for underlying abscess. Vitals stable - afebrile, HR 86, /72, on room air. Patient noted to be febrile in the ED to 102F oral. Labs - WBC 9.74, hgb 11.3, Cr 0.74, ESR/CRP elevated. On exam patient with approximately 1x1cm open wound on posterior aspect of R calf w/ serosanguinous drainage. Patient with palpable distal pulses. Currently being treated with vanc/zosyn. Vascular surgery consulted for further assessment of RLE wound.    Vascular Surgery Recommendations:  - No acute vascular surgery intervention  - Follow up CT RLE w/ IV contrast results  - Daily wound care  - Vascular surgery will continue to follow  - Please call [phone] with any questions or concerns    CT RLE w/ IV contrast demonstrates abscess within subcutaneous tissues of posterior mid calf measuring 3.6 x 1.1 x 1.3cm.  Plan for bedside I&D with pre-procedure pain control.    Case discussed with chief resident on call. 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 Patient admitted to medicine for sepsis due to RLE wound/cellulitis with concern for underlying abscess. Vitals stable - afebrile, HR 86, /72, on room air. Patient noted to be febrile in the ED to 102F oral. Labs - WBC 9.74, hgb 11.3, Cr 0.74, ESR/CRP elevated. On exam patient with approximately 1x1cm open wound on posterior aspect of R calf w/ serosanguinous drainage. Patient with palpable distal pulses. Currently being treated with vanc/zosyn. Vascular surgery consulted for further assessment of RLE wound.    Imaging reviewed:  - CT RLE w/ IV contrast (4/28/25): abscess within subcutaneous tissues of posterior mid calf measuring 3.6 x 1.1 x 1.3cm.    Vascular Surgery Recommendations:  - s/p bedside I&D of abscess (4/28/25)  - f/u wound culture  - Daily wound care  - Vascular surgery will continue to follow  - Please call [phone] with any questions or concerns    Case discussed with chief resident and attending on call.

## 2025-04-28 NOTE — PROGRESS NOTE ADULT - ASSESSMENT
69 year old male with opiate use disorder on Methadone, chronic hep C not on treatment and surgical history of R. 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 Patient has unilateral RLE warmth, erythema and draining which patient said was previously purulent concerning for MRSA infection.  69 year old male with opiate use disorder on Methadone, chronic hep C not on treatment and surgical history of R. 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 Patient has unilateral RLE warmth, erythema and draining which patient said was previously purulent concerning for MRSA infection. Course further complicated by established dental infection causing discomfort.

## 2025-04-28 NOTE — CHART NOTE - NSCHARTNOTEFT_GEN_A_CORE
Home med: Methadone 200mg qd  called methadone clinic in AM ([phone]) - confirmed with Santiago Nguyen LPN at clinic for 200 mg daily due again to clinic on May 9th Home med: Methadone 200mg qd  called methadone clinic [facility] ([phone]) - confirmed with Santiago Nguyen LPN at clinic for 200 mg daily due again to clinic on May 9th

## 2025-04-28 NOTE — PROGRESS NOTE ADULT - PROBLEM SELECTOR PLAN 4
Pt. has known hepC, not on treatment  He has an elevated protein gap which I suspect is secondary to his untreated hepC    - HIV negative 1 year prior  - f/u HIV screen  - pt. needs outpatient hepC follow-up pt. reports taking 200mg of methadone daily. Methadone clinic confirmed 200 mg daily by Santiago Nguyen LPN pt. reports taking 200mg of methadone daily. Methadone clinic confirmed 200 mg daily by Santiago Nguyen LPN  QTc 426 (4/27)    - Continue home Methadone 200mg qd

## 2025-04-28 NOTE — PROGRESS NOTE ADULT - ATTENDING COMMENTS
Follow up CT RLE w/ IV contrast results- vascular consulted   will cw bs ABX -   omfs consulted - fu recs   fu blood cultures  cw methadone    rest as above

## 2025-04-28 NOTE — PROCEDURE NOTE - ADDITIONAL PROCEDURE DETAILS
Incision left open, packing placed, covered with gauze, abd pad, wrapped with kerlix and ace bandage.

## 2025-04-28 NOTE — PATIENT PROFILE ADULT - FUNCTIONAL ASSESSMENT - DAILY ACTIVITY 1.
Detail Level: Zone Detail Level: Detailed Sunscreen Recommendations: JOON 4 = No assist / stand by assistance Detail Level: Simple

## 2025-04-28 NOTE — PROGRESS NOTE ADULT - PROBLEM SELECTOR PLAN 5
Dvt ppx: lovenox  PPI ppx: none indicated  Diet: regular  Dispo: RMF  Activity: as tolerated Pt. has known hepC, not on treatment  He has an elevated protein gap which I suspect is secondary to his untreated hepC    - HIV negative today  - pt. needs outpatient hepC follow-up Pt. has known hepC, not on treatment. HIV negative (4/28)  He has an elevated protein gap which I suspect is secondary to his untreated hepC    - pt. needs outpatient hepC follow-up

## 2025-04-28 NOTE — H&P ADULT - NSHPLABSRESULTS_GEN_ALL_CORE
LABS:                         12.1   10.20 )-----------( 198      ( 27 Apr 2025 21:21 )             35.8     04-27    133  |  99  |  17  ----------------------------<  93  4.6   |  29  |  0.97    Ca    8.9      27 Apr 2025 21:21    TPro  7.7  /  Alb  2.7[L]  /  TBili  0.5  /  DBili  x   /  AST  29  /  ALT  29  /  AlkPhos  111  04-27    PT/INR - ( 27 Apr 2025 21:21 )   PT: 12.5 sec;   INR: 1.08          PTT - ( 27 Apr 2025 21:21 )  PTT:31.6 sec  Urinalysis Basic - ( 27 Apr 2025 21:21 )    Color: x / Appearance: x / SG: x / pH: x  Gluc: 93 mg/dL / Ketone: x  / Bili: x / Urobili: x   Blood: x / Protein: x / Nitrite: x   Leuk Esterase: x / RBC: x / WBC x   Sq Epi: x / Non Sq Epi: x / Bacteria: x                RADIOLOGY, EKG & ADDITIONAL TESTS:

## 2025-04-28 NOTE — PROGRESS NOTE ADULT - PROBLEM SELECTOR PLAN 1
pt. meeting 2/4 SIRS (T and HR) with source RLE cellulitis  Pt. states was previously draining purulent material.   There is concern for underlying abscess on my exam.   He was seen for dental pain on 4/7/25 and noted to have an infection at that time. He was given appropriate abx and completed the course. I do not think that is the source of his infection given his RLE cellulitis.     - f/u bcx  - pt. not given any IVF in ED. He looks eu/hypovolemic on exam  - will give 1L of LR as patient is still tachycardic.   - get CT LE with IV contrast  - f/u ESR and CRP (were recently normal)  - CTX 2g daily  - vancomycin 15mg/kg q12hr  - f/u vanc trough prior to 4th dose  - Toradol for pain control  - consider vascular consult for his RLE wound  - Consider OMFS for dental infection if not improving. pt. meeting 2/4 SIRS (T and HR) with source RLE cellulitis  Pt. states was previously draining purulent material.   There is concern for underlying abscess on my exam.   He was seen for dental pain on 4/7/25 and noted to have an infection at that time. He was given appropriate abx and completed the course. Unlikely that this is the source of his infection given his RLE cellulitis. CRP 43.5 on 4/28  XR of right tibia and fibula showed: Possible small foci of gas in the subcutaneous tissues of the right mid   calf, without evidence of tracking deeper. Clinical correlation   recommended.    - f/u bcx pending  - CT LE with IV contrast  - CTX change to Unasyn as per OMFS consult   - vancomycin 15mg/kg q12hr  - f/u vanc trough prior to 4th dose  - Toradol for pain control  - Vascular consulted for RLE wound  - OMFS consulted for dental infection pt. meeting 2/4 SIRS (T and HR) with source RLE cellulitis  Pt. states was previously draining purulent material.   There is concern for underlying abscess on my exam.   He was seen for dental pain on 4/7/25 and noted to have an infection at that time. He was given appropriate abx and completed the course. Unlikely that this is the source of his infection given his RLE cellulitis. CRP 43.5 on 4/28  XR of right tibia and fibula showed: Possible small foci of gas in the subcutaneous tissues of the right mid   calf, without evidence of tracking deeper. Clinical correlation   recommended.    - f/u bcx pending  - CT LE with IV contrast  - CTX change to Zosyn as per OMFS consult   - vancomycin 15mg/kg q12hr  - f/u vanc trough prior to 4th dose  - Toradol for pain control  - Vascular consulted for RLE wound  - OMFS consulted for dental infection Patient meeting 2/4 SIRS (T and HR) with source RLE cellulitis. Pt. states was previously draining purulent material.   He was seen for dental pain on 4/7/25 and noted to have an infection at that time. He was given appropriate abx and completed the course. Unlikely that this is the source of his infection given his RLE cellulitis. CRP 43.5 on 4/28  CT RLE 4/28 - Subcutaneous mid-calf fluid collection concerning for abscess formation    - Vascular surgery recs appreciated  - OMFS consulted for dental infection  - F/u BCx x2 (4/27)  - Empiric Zosyn 3.375g q8h (4/28 - ), Vancomycin 1g q12h (4/27 - )  - f/u vanc trough prior to 4th dose  - Toradol for pain control  - Plan for bedside debridement of RLE abscess with vascular surgery

## 2025-04-28 NOTE — H&P ADULT - PROBLEM SELECTOR PLAN 4
Pt. has known hepC, not on treatment  He has an elevated protein gap which I suspect is secondary to his untreated hepC    - HIV negative 1 year prior  - f/u HIV screen  - pt. needs outpatient hepC follow-up

## 2025-04-28 NOTE — H&P ADULT - NSHPPHYSICALEXAM_GEN_ALL_CORE
General: NAD, AAOx3, cachectic   HEENT: atraumatic, normocephalic, poor dentition  Pulmonary: clear to auscultation bilaterally; No wheeze  Cardiovascular: Regular rate and rhythm; no murmurs, rubs or gallops. Normal S1S2  Gastrointestinal: Soft, nontender, nondistended; bowel sounds present  Musculoskeletal: 2+ peripheral pulses. b/l chronic venous statis changes. RLE with erythema, warmth, pain to palpation. There is a posterior swelling with opening that is draining serosanguinous fluid. Attempted to express, but patient deferred due to pain  Neurology: Pt. alert and oriented, fluent speech, able to move all extremities  Skin: no rashes or lesions
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
## 2025-04-29 DIAGNOSIS — E87.1 HYPO-OSMOLALITY AND HYPONATREMIA: ICD-10-CM

## 2025-04-29 LAB
ANION GAP SERPL CALC-SCNC: 7 MMOL/L — SIGNIFICANT CHANGE UP (ref 5–17)
BASOPHILS # BLD AUTO: 0.02 K/UL — SIGNIFICANT CHANGE UP (ref 0–0.2)
BASOPHILS NFR BLD AUTO: 0.3 % — SIGNIFICANT CHANGE UP (ref 0–2)
BILIRUB SERPL-MCNC: 0.3 MG/DL — SIGNIFICANT CHANGE UP (ref 0.2–1.2)
BLD GP AB SCN SERPL QL: NEGATIVE — SIGNIFICANT CHANGE UP
BUN SERPL-MCNC: 15 MG/DL — SIGNIFICANT CHANGE UP (ref 7–23)
CALCIUM SERPL-MCNC: 8 MG/DL — LOW (ref 8.4–10.5)
CHLORIDE SERPL-SCNC: 99 MMOL/L — SIGNIFICANT CHANGE UP (ref 96–108)
CO2 SERPL-SCNC: 26 MMOL/L — SIGNIFICANT CHANGE UP (ref 22–31)
CREAT ?TM UR-MCNC: 79 MG/DL — SIGNIFICANT CHANGE UP
CREAT SERPL-MCNC: 0.74 MG/DL — SIGNIFICANT CHANGE UP (ref 0.5–1.3)
EGFR: 97 ML/MIN/1.73M2 — SIGNIFICANT CHANGE UP
EGFR: 97 ML/MIN/1.73M2 — SIGNIFICANT CHANGE UP
EOSINOPHIL # BLD AUTO: 0.05 K/UL — SIGNIFICANT CHANGE UP (ref 0–0.5)
EOSINOPHIL NFR BLD AUTO: 0.9 % — SIGNIFICANT CHANGE UP (ref 0–6)
GLUCOSE SERPL-MCNC: 80 MG/DL — SIGNIFICANT CHANGE UP (ref 70–99)
HCT VFR BLD CALC: 34.1 % — LOW (ref 39–50)
HGB BLD-MCNC: 11.3 G/DL — LOW (ref 13–17)
IMM GRANULOCYTES NFR BLD AUTO: 0.5 % — SIGNIFICANT CHANGE UP (ref 0–0.9)
INR BLD: 1.05 — SIGNIFICANT CHANGE UP (ref 0.85–1.16)
LYMPHOCYTES # BLD AUTO: 1.4 K/UL — SIGNIFICANT CHANGE UP (ref 1–3.3)
LYMPHOCYTES # BLD AUTO: 24.3 % — SIGNIFICANT CHANGE UP (ref 13–44)
MAGNESIUM SERPL-MCNC: 2 MG/DL — SIGNIFICANT CHANGE UP (ref 1.6–2.6)
MCHC RBC-ENTMCNC: 31.7 PG — SIGNIFICANT CHANGE UP (ref 27–34)
MCHC RBC-ENTMCNC: 33.1 G/DL — SIGNIFICANT CHANGE UP (ref 32–36)
MCV RBC AUTO: 95.5 FL — SIGNIFICANT CHANGE UP (ref 80–100)
MELD SCORE WITH DIALYSIS: 23 POINTS — SIGNIFICANT CHANGE UP
MELD SCORE WITHOUT DIALYSIS: 7 POINTS — SIGNIFICANT CHANGE UP
MONOCYTES # BLD AUTO: 0.65 K/UL — SIGNIFICANT CHANGE UP (ref 0–0.9)
MONOCYTES NFR BLD AUTO: 11.3 % — SIGNIFICANT CHANGE UP (ref 2–14)
NEUTROPHILS # BLD AUTO: 3.61 K/UL — SIGNIFICANT CHANGE UP (ref 1.8–7.4)
NEUTROPHILS NFR BLD AUTO: 62.7 % — SIGNIFICANT CHANGE UP (ref 43–77)
NRBC BLD AUTO-RTO: 0 /100 WBCS — SIGNIFICANT CHANGE UP (ref 0–0)
OSMOLALITY UR: 403 MOSM/KG — SIGNIFICANT CHANGE UP (ref 300–900)
PHOSPHATE SERPL-MCNC: 3.1 MG/DL — SIGNIFICANT CHANGE UP (ref 2.5–4.5)
PLATELET # BLD AUTO: 181 K/UL — SIGNIFICANT CHANGE UP (ref 150–400)
POTASSIUM SERPL-MCNC: 4.5 MMOL/L — SIGNIFICANT CHANGE UP (ref 3.5–5.3)
POTASSIUM SERPL-SCNC: 4.5 MMOL/L — SIGNIFICANT CHANGE UP (ref 3.5–5.3)
POTASSIUM UR-SCNC: 50 MMOL/L — SIGNIFICANT CHANGE UP
PROT ?TM UR-MCNC: 13 MG/DL — HIGH (ref 0–12)
PROT/CREAT UR-RTO: 0.2 RATIO — SIGNIFICANT CHANGE UP (ref 0–0.2)
PROTHROM AB SERPL-ACNC: 12.3 SEC — SIGNIFICANT CHANGE UP (ref 9.9–13.4)
RBC # BLD: 3.57 M/UL — LOW (ref 4.2–5.8)
RBC # FLD: 13.5 % — SIGNIFICANT CHANGE UP (ref 10.3–14.5)
RH IG SCN BLD-IMP: NEGATIVE — SIGNIFICANT CHANGE UP
SODIUM SERPL-SCNC: 132 MMOL/L — LOW (ref 135–145)
SODIUM UR-SCNC: 27 MMOL/L — SIGNIFICANT CHANGE UP
UUN UR-MCNC: 593 MG/DL — SIGNIFICANT CHANGE UP
VANCOMYCIN TROUGH SERPL-MCNC: 12 UG/ML — SIGNIFICANT CHANGE UP (ref 10–20)
WBC # BLD: 5.76 K/UL — SIGNIFICANT CHANGE UP (ref 3.8–10.5)
WBC # FLD AUTO: 5.76 K/UL — SIGNIFICANT CHANGE UP (ref 3.8–10.5)

## 2025-04-29 PROCEDURE — 99222 1ST HOSP IP/OBS MODERATE 55: CPT

## 2025-04-29 PROCEDURE — G0545: CPT

## 2025-04-29 PROCEDURE — 99233 SBSQ HOSP IP/OBS HIGH 50: CPT | Mod: GC

## 2025-04-29 RX ORDER — METHADONE HCL 10 MG
200 TABLET ORAL EVERY 24 HOURS
Refills: 0 | Status: DISCONTINUED | OUTPATIENT
Start: 2025-04-29 | End: 2025-05-04

## 2025-04-29 RX ORDER — SALINE 7; 19 G/118ML; G/118ML
1 ENEMA RECTAL ONCE
Refills: 0 | Status: COMPLETED | OUTPATIENT
Start: 2025-04-29 | End: 2025-04-29

## 2025-04-29 RX ORDER — MAGNESIUM CITRATE
296 SOLUTION, ORAL ORAL ONCE
Refills: 0 | Status: COMPLETED | OUTPATIENT
Start: 2025-04-29 | End: 2025-04-29

## 2025-04-29 RX ADMIN — ENOXAPARIN SODIUM 40 MILLIGRAM(S): 100 INJECTION SUBCUTANEOUS at 06:23

## 2025-04-29 RX ADMIN — Medication 15 MILLILITER(S): at 17:11

## 2025-04-29 RX ADMIN — KETOROLAC TROMETHAMINE 15 MILLIGRAM(S): 30 INJECTION, SOLUTION INTRAMUSCULAR; INTRAVENOUS at 07:32

## 2025-04-29 RX ADMIN — KETOROLAC TROMETHAMINE 15 MILLIGRAM(S): 30 INJECTION, SOLUTION INTRAMUSCULAR; INTRAVENOUS at 01:18

## 2025-04-29 RX ADMIN — Medication 25 GRAM(S): at 06:23

## 2025-04-29 RX ADMIN — Medication 2 TABLET(S): at 21:31

## 2025-04-29 RX ADMIN — POLYETHYLENE GLYCOL 3350 17 GRAM(S): 17 POWDER, FOR SOLUTION ORAL at 08:42

## 2025-04-29 RX ADMIN — KETOROLAC TROMETHAMINE 15 MILLIGRAM(S): 30 INJECTION, SOLUTION INTRAMUSCULAR; INTRAVENOUS at 15:45

## 2025-04-29 RX ADMIN — Medication 975 MILLIGRAM(S): at 21:31

## 2025-04-29 RX ADMIN — KETOROLAC TROMETHAMINE 15 MILLIGRAM(S): 30 INJECTION, SOLUTION INTRAMUSCULAR; INTRAVENOUS at 01:03

## 2025-04-29 RX ADMIN — Medication 25 GRAM(S): at 21:30

## 2025-04-29 RX ADMIN — Medication 200 MILLIGRAM(S): at 12:03

## 2025-04-29 RX ADMIN — Medication 25 GRAM(S): at 13:40

## 2025-04-29 RX ADMIN — KETOROLAC TROMETHAMINE 15 MILLIGRAM(S): 30 INJECTION, SOLUTION INTRAMUSCULAR; INTRAVENOUS at 15:28

## 2025-04-29 RX ADMIN — Medication 296 MILLILITER(S): at 08:42

## 2025-04-29 RX ADMIN — KETOROLAC TROMETHAMINE 15 MILLIGRAM(S): 30 INJECTION, SOLUTION INTRAMUSCULAR; INTRAVENOUS at 07:47

## 2025-04-29 RX ADMIN — KETOROLAC TROMETHAMINE 15 MILLIGRAM(S): 30 INJECTION, SOLUTION INTRAMUSCULAR; INTRAVENOUS at 21:30

## 2025-04-29 RX ADMIN — Medication 15 MILLILITER(S): at 07:32

## 2025-04-29 RX ADMIN — Medication 5 MILLIGRAM(S): at 21:31

## 2025-04-29 RX ADMIN — KETOROLAC TROMETHAMINE 15 MILLIGRAM(S): 30 INJECTION, SOLUTION INTRAMUSCULAR; INTRAVENOUS at 22:30

## 2025-04-29 RX ADMIN — Medication 975 MILLIGRAM(S): at 22:31

## 2025-04-29 NOTE — PROGRESS NOTE ADULT - ASSESSMENT
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 Patient admitted to medicine for sepsis due to RLE wound/cellulitis with concern for underlying abscess. Vitals stable - afebrile, HR 86, /72, on room air. Patient noted to be febrile in the ED to 102F oral. Labs - WBC 9.74, hgb 11.3, Cr 0.74, ESR/CRP elevated. On exam patient with approximately 1x1cm open wound on posterior aspect of R calf w/ serosanguinous drainage. Patient with palpable distal pulses. Currently being treated with vanc/zosyn. Vascular surgery consulted for further assessment of RLE wound.    Imaging reviewed:  - CT RLE w/ IV contrast (4/28/25): abscess within subcutaneous tissues of posterior mid calf measuring 3.6 x 1.1 x 1.3cm.    Vascular Surgery Recommendations:  - s/p bedside I&D of abscess (4/28/25)  - f/u wound culture  - Daily wound care  - Vascular surgery will continue to follow  - Please call [phone] with any questions or concerns    Case discussed with chief resident and attending on call. 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 Patient admitted to medicine for sepsis due to RLE wound/cellulitis with concern for underlying abscess. Vitals stable - afebrile, HR 86, /72, on room air. Patient noted to be febrile in the ED to 102F oral. Labs - WBC 9.74, hgb 11.3, Cr 0.74, ESR/CRP elevated. On exam patient with approximately 1x1cm open wound on posterior aspect of R calf w/ serosanguinous drainage. Patient with palpable distal pulses. Currently being treated with vanc/zosyn. Vascular surgery consulted for further assessment of RLE wound.       Vascular Surgery Recommendations:  - s/p bedside I&D of abscess (4/28/25)  - f/u wound culture  - Daily wound care  - Vascular surgery will continue to follow  - Please call [phone] with any questions or concerns    Case discussed with chief resident and attending on call. 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 Patient admitted to medicine for sepsis due to RLE wound/cellulitis with concern for underlying abscess. Vitals stable - afebrile, HR 86, /72, on room air. Patient noted to be febrile in the ED to 102F oral. Labs - WBC 9.74, hgb 11.3, Cr 0.74, ESR/CRP elevated. On exam patient with approximately 1x1cm open wound on posterior aspect of R calf w/ serosanguinous drainage. Patient with palpable distal pulses. Currently being treated with vanc/zosyn. Vascular surgery consulted, patient found to have abscess on CT now s/p I+D by vascular on 4/28.        Vascular Surgery Recommendations:  - s/p bedside I&D of abscess (4/28/25)  - f/u wound culture  - Daily wound care  - Vascular surgery will continue to follow  - Please call [phone] with any questions or concerns    Case discussed with chief resident and attending on call.

## 2025-04-29 NOTE — DIETITIAN INITIAL EVALUATION ADULT - PROBLEM SELECTOR PLAN 1
pt. meeting 2/4 SIRS (T and HR) with source RLE cellulitis  Pt. states was previously draining purulent material.   There is concern for underlying abscess on my exam.   He was seen for dental pain on 4/7/25 and noted to have an infection at that time. He was given appropriate abx and completed the course. I do not think that is the source of his infection given his RLE cellulitis.     - f/u bcx  - pt. not given any IVF in ED. He looks eu/hypovolemic on exam  - will give 1L of LR as patient is still tachycardic.   - get CT LE with IV contrast  - f/u ESR and CRP (were recently normal)  - CTX 2g daily  - vancomycin 15mg/kg q12hr  - f/u vanc trough prior to 4th dose  - Toradol for pain control  - consider vascular consult for his RLE wound  - Consider OMFS for dental infection if not improving.

## 2025-04-29 NOTE — PROGRESS NOTE ADULT - SUBJECTIVE AND OBJECTIVE BOX
DAILY PROGRESS NOTE    S: Patient dressing changed bedside. Patient expressed pain during dressing change. Denies fevers/chills overnight.     O:     T(C): 36.5 (04-29-25 @ 05:55), Max: 37.1 (04-28-25 @ 13:34)  HR: 79 (04-29-25 @ 05:55) (79 - 96)  BP: 178/95 (04-29-25 @ 05:55) (156/93 - 178/95)  RR: 18 (04-29-25 @ 05:55) (17 - 18)  SpO2: 100% (04-29-25 @ 05:55) (95% - 100%)    Physical Exam:   General: NAD, pt resting comfortably in bed  Pulm: No respiratory distress, nonlabored breathing, on room air  CVS: NSR, HDS  Abd: Soft, NT, ND  Extremities: WWP, Posterior aspect of mid R calf with open wound and I+D created wound with seropurulent drainage expressed this AM. TTP circumferentially around wound. No erythema noted around wound. Venous stasis changes of the extremity noted.   Pulses: RLE palpable DP/PT            Labs:     LABS:  cret                        11.3   5.76  )-----------( 181      ( 29 Apr 2025 08:09 )             34.1     04-29    132[L]  |  99  |  15  ----------------------------<  80  4.5   |  26  |  0.74    Ca    8.0[L]      29 Apr 2025 08:09  Phos  3.1     04-29  Mg     2.0     04-29    TPro  x   /  Alb  x   /  TBili  0.3  /  DBili  x   /  AST  x   /  ALT  x   /  AlkPhos  x   04-29    PT/INR - ( 29 Apr 2025 08:09 )   PT: 12.3 sec;   INR: 1.05          PTT - ( 27 Apr 2025 21:21 )  PTT:31.6 sec

## 2025-04-29 NOTE — PROGRESS NOTE ADULT - ATTENDING COMMENTS
Follow up CT RLE w/ IV contrast results- abscess  vascular consulted s/p InD   will cw bs ABX - dc vanc   omfs consulted - fu recs - they will see pt on Wednesday      fu blood cultures  + strep pyogenes - cw zosyn fu surv cx   cw methadone  - may need movantik   rest as above Follow up CT RLE w/ IV contrast results- abscess  vascular consulted s/p InD   will cw bs ABX - dc vanc   omfs consulted - fu recs - they will see pt on Wednesday      fu blood cultures  + strep pyogenes - cw zosyn fu surv cx - ID consult   cw methadone  - may need movantik   rest as above

## 2025-04-29 NOTE — DIETITIAN INITIAL EVALUATION ADULT - PERTINENT LABORATORY DATA
04-29    132[L]  |  99  |  15  ----------------------------<  80  4.5   |  26  |  0.74    Ca    8.0[L]      29 Apr 2025 08:09  Phos  3.1     04-29  Mg     2.0     04-29    TPro  x   /  Alb  x   /  TBili  0.3  /  DBili  x   /  AST  x   /  ALT  x   /  AlkPhos  x   04-29

## 2025-04-29 NOTE — PROGRESS NOTE ADULT - PROBLEM SELECTOR PLAN 4
pt. reports taking 200mg of methadone daily. Methadone clinic confirmed 200 mg daily by Santiago Nguyen LPN  QTc 426 (4/27)    - Continue home Methadone 200mg qd Na 132 (4/29)   consider SIADH iso pain  Plan:   urine studies 4/29   - serum osm on 4/30 am  -ctm

## 2025-04-29 NOTE — DIETITIAN INITIAL EVALUATION ADULT - OTHER CALCULATIONS
pounds. Patient within % IBW (91%) thus actual body weight used for all calculations. Needs adjusted for advanced age and malnutrition.

## 2025-04-29 NOTE — PROGRESS NOTE ADULT - PROBLEM SELECTOR PLAN 1
Patient meeting 2/4 SIRS (T and HR) with source RLE cellulitis. Pt. states was previously draining purulent material.   He was seen for dental pain on 4/7/25 and noted to have an infection at that time. He was given appropriate abx and completed the course. Unlikely that this is the source of his infection given his RLE cellulitis. CRP 43.5 on 4/28  CT RLE 4/28 - Subcutaneous mid-calf fluid collection concerning for abscess formation    - OMFS consulted for dental infection pending recommendations  - F/u BCx x2 - GAS   - Empiric Zosyn 3.375g q8h (4/28 - ), Vancomycin 1g q12h (4/27 - )  - f/u vanc trough prior to 4th dose - 4/29 am   - Toradol for pain control  - Vascular surgery consulted s/p bedside debridement of RLE abscess with vascular surgery Patient meeting 2/4 SIRS (T and HR) with source RLE cellulitis. Pt. states was previously draining purulent material.   He was seen for dental pain on 4/7/25 and noted to have an infection at that time. He was given appropriate abx and completed the course. Unlikely that this is the source of his infection given his RLE cellulitis. CRP 43.5 on 4/28  CT RLE 4/28 - Subcutaneous mid-calf fluid collection concerning for abscess formation. Vancomycin 1g q12h (4/27 - 4/29) therapeutic    - OMFS consulted for dental infection pending recommendations  - F/u BCx x2 - GAS   - surveillance cultures pending  - pending fluid cultures  - Empiric Zosyn 3.375g q8h (4/28 - ),   - f/u vanc trough prior to 4th dose - 4/29 am   - Toradol for pain control  - Vascular surgery consulted s/p bedside debridement of RLE abscess with vascular surgery

## 2025-04-29 NOTE — CONSULT NOTE ADULT - ATTENDING COMMENTS
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  Patient reports he found a lump on his RLE without a trauma about 2 months ago. Since it didn't cause any pain, he just monitored it. Then after his trip to [place] two weeks PTA, RLE lump busted and started to drain pus.  The area became swollen and painful, a/w intermittent fever so he came to the ED. Denied n/v/d, abdominal pain, CP, SOB.  He also reports a tooth falling out 2 weeks PTA with pain. Upon arrival, he was Tm 102, VSS. Lab notable for WBC 10.2, Cr .97, CT RLE showed posterior mid calf abscess (3.6 x 1.1 x 1.3cm). CT maxlofacial showed scattered dental/periodontal disease, sub-cm subperiosteal lucency - phlegmon or abscess with adjacent celluliitis.  Vascular saw patient and did I&D - WCx pending. OMFS saw patient - felt jaw pain due to chronic changes 2/2 prior mandibular fracture, no drainable odontogenic abscess and recommended abx for 7 days. CXR neg. He was admitted and vanc/Zosyn started. 4/27 BCx grew GAS so vanco was stopped, and ID was called.  Exam notable for RLE swelling, erythema and s/p I&D with packing material, ttp.  Patient here with GAS baceteremia 2/2 RLE abscess and dental disease.  Since it is unclear if abscess is GAS monomicrobial or polymicrobial, and need to cover dental disease, it is reasonable to keep Zosyn for now.  f/u BCx surveillance and WCx.  Obtain TTE.  Anticipate 2 weeks of IV abx to treat GAS bacteremia.     Team 1 will follow you.  Case d/w primary team.    Pat Espinoza MD, MS  Infectious Disease attending  office phone [phone]  For any questions during evening/weekend/holiday, please page ID on call

## 2025-04-29 NOTE — DIETITIAN NUTRITION RISK NOTIFICATION - TREATMENT: THE FOLLOWING DIET HAS BEEN RECOMMENDED
1. Consider regular solids per patient preference - communicated with MD. Continue with Ensure Plus High Protein 2x/day (350kcal, 20g protein per serving). RD to add fortified foods: strawberry vanilla smoothie, chocolate peanut butter smoothie, oatmeal, mashed potatoes.  2. Encourage pt to meet nutritional needs as able   3. Monitor labs: electrolytes, cbc  4. Monitor weights   5. Pain and bowel regimen per team   6. Will continue to assess/honor food preferences as able  7. Monitor adherence to diet education       1. Consider regular solids per patient preference - communicated with MD. Continue with Ensure Plus High Protein 2x/day (350kcal, 20g protein per serving). RD to add fortified foods: strawberry vanilla smoothie, chocolate peanut butter smoothie, oatmeal, mashed potatoes. Recommend MVI.  2. Encourage pt to meet nutritional needs as able   3. Monitor labs: electrolytes, cbc  4. Monitor weights   5. Pain and bowel regimen per team   6. Will continue to assess/honor food preferences as able  7. Monitor adherence to diet education

## 2025-04-29 NOTE — PHYSICAL THERAPY INITIAL EVALUATION ADULT - ADDITIONAL COMMENTS
Pt. lives in an SRO with " a few" ANTHONY. At baseline, ambulates with SC or RW. Reports he has primarily been using the RW recently due to increased pain in the RLE.

## 2025-04-29 NOTE — DIETITIAN INITIAL EVALUATION ADULT - PERSON TAUGHT/METHOD
Discussed importance of adequate protein, food sources of protein. Patient verbalized understanding./verbal instruction/patient instructed

## 2025-04-29 NOTE — PHYSICAL THERAPY INITIAL EVALUATION ADULT - PERTINENT HX OF CURRENT PROBLEM, REHAB EVAL
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 Patient admitted to medicine for sepsis due to RLE wound/cellulitis with concern for underlying abscess. Vitals stable - afebrile, HR 86, /72, on room air. Patient noted to be febrile in the ED to 102F oral. Labs - WBC 9.74, hgb 11.3, Cr 0.74, ESR/CRP elevated. On exam patient with approximately 1x1cm open wound on posterior aspect of R calf w/ serosanguinous drainage. Patient with palpable distal pulses. Currently being treated with vanc/zosyn. Vascular surgery consulted, patient found to have abscess on CT now s/p I+D by vascular on 4/28.

## 2025-04-29 NOTE — PHYSICAL THERAPY INITIAL EVALUATION ADULT - WEIGHT-BEARING RESTRICTIONS: GAIT, REHAB EVAL
Pt. initially ambulating with NWB RLE due to pain, however progressed to WBAT during ambulation/weight-bearing as tolerated

## 2025-04-29 NOTE — PROGRESS NOTE ADULT - ADVANCED CARE PLANNING
Voluntary discussion occurred addressing advanced care planning
no chest pain/no palpitations/no dyspnea on exertion/no peripheral edema

## 2025-04-29 NOTE — CONSULT NOTE ADULT - REASON FOR ADMISSION
sepsis secondary to cellulitis

## 2025-04-29 NOTE — CONSULT NOTE ADULT - ASSESSMENT
I M    69 year old male with opiate use disorder on Methadone, chronic hep C not on treatment and surgical history of R. 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 Patient has unilateral RLE warmth, erythema and draining which patient said was previously purulent concerning for MRSA infection. Course further complicated by established dental infection causing discomfort.      Problem/Plan - 1:  ·  Problem: Sepsis due to cellulitis.   ·  Plan: Patient meeting 2/4 SIRS (T and HR) with source RLE cellulitis. Pt. states was previously draining purulent material.   He was seen for dental pain on 4/7/25 and noted to have an infection at that time. He was given appropriate abx and completed the course. Unlikely that this is the source of his infection given his RLE cellulitis. CRP 43.5 on 4/28  CT RLE 4/28 - Subcutaneous mid-calf fluid collection concerning for abscess formation    - Vascular surgery recs appreciated  - OMFS consulted for dental infection  - F/u BCx x2 (4/27)  - Empiric Zosyn 3.375g q8h (4/28 - ), Vancomycin 1g q12h (4/27 - )  - f/u vanc trough prior to 4th dose  - Toradol for pain control  - Plan for bedside debridement of RLE abscess with vascular surgery.    Problem/Plan - 2:  ·  Problem: Cellulitis of right lower leg.   ·  Plan: Plan as above.    Problem/Plan - 3:  ·  Problem: Constipation.   ·  Plan: Patient endorses no bowel movement since 3-4 days prior to admission. Patient reports no pain in abdomen or nausea, vomiting. Abdomen nondistended and nontender and normal bowel sounds    - Giving Mag citrate x1  - Bowel regimen: Miralax qd, Senna 2 tabs qhs.    Problem/Plan - 4:  ·  Problem: Methadone dependence.   ·  Plan: pt. reports taking 200mg of methadone daily. Methadone clinic confirmed 200 mg daily by Santiago Nguyen LPN  QTc 426 (4/27)    - Continue home Methadone 200mg qd.    Problem/Plan - 5:  ·  Problem: Chronic hepatitis C virus infection.   ·  Plan: Pt. has known hepC, not on treatment. HIV negative (4/28)  He has an elevated protein gap which I suspect is secondary to his untreated hepC    - pt. needs outpatient hepC follow-up.    Problem/Plan - 6:  ·  Problem: Prophylactic measure.   ·  Plan: Dvt ppx: lovenox  PPI ppx: none indicated  Diet: regular  Dispo: RMF  Activity: as tolerated.

## 2025-04-29 NOTE — PROGRESS NOTE ADULT - PROBLEM SELECTOR PLAN 3
Patient endorses no bowel movement since 3-4 days prior to admission. Patient reports no pain in abdomen or nausea, vomiting. Abdomen nondistended and nontender and normal bowel sounds    - Giving Mag citrate x1 on 4/28 will try additional dose on 4/29  - Bowel regimen: Miralax qd, Senna 2 tabs qhs Patient endorses no bowel movement since 3-4 days prior to admission. Patient reports no pain in abdomen or nausea, vomiting. Abdomen nondistended and nontender and normal bowel sounds    - Giving Mag citrate x1 on 4/28 will try additional dose on 4/29  - if no improvement will escalate to enema  - Bowel regimen: Miralax qd, Senna 2 tabs qhs  - consider movantik

## 2025-04-29 NOTE — PROGRESS NOTE ADULT - PROBLEM SELECTOR PLAN 5
Pt. has known hepC, not on treatment. HIV negative (4/28)  He has an elevated protein gap which I suspect is secondary to his untreated hepC    - pt. needs outpatient hepC follow-up pt. reports taking 200mg of methadone daily. Methadone clinic confirmed 200 mg daily by Santiago Nguyen LPN  QTc 426 (4/27)    - Continue home Methadone 200mg qd will transition from po to Liquid to reduce pill burden

## 2025-04-29 NOTE — DIETITIAN INITIAL EVALUATION ADULT - OTHER INFO
Per H&P: 69 year old male with opiate use disorder on Methadone, chronic hep C not on treatment and surgical history of R. 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 Patient has unilateral RLE warmth, erythema and draining which patient said was previously purulent concerning for MRSA infection.     Patient seen at bedside for nutrition assessment. Current diet order: soft and bite sized, Ensure plus high protein 2x/day. No known food allergies. Patient reports some mechanical issues with chewing and is on soft and bite sized diet per patient preference. However, patient is requesting to have items not allowed on soft and bite sized diet such as peanut butter and jelly sandwich and Syriac toast. Patient reports he is able to self regulate ordering softer foods with the host and is able to cut his foods into smaller pieces so communicated with MD about considering diet change to regular solids per patient preference. Reports good appetite in hospital and says he was eating well PTA. Endorses weight loss when he had hemicolectomy surgery ~1 year ago and has since gained some weight back. Dosing weight: 135 pounds, BMI 21.1, reports UBW of 150 pounds x1 year ago indicating 15 pound/10% weight loss x1 year - not clinically significant. +2 edema to R leg. Observed with severe wasting to temples, buccals, orbitals, dorsal hand. Based on ASPEN guidelines, patient meets criteria for severe malnutrition due to severe muscle and fat wasting. Provided nutrition education - see below. Patient agreeable to adding all fortified foods: smoothies, mashed potatoes, oatmeal. Recommend to continue with Ensure supplement. No pressure injuries documented. Denies nausea/vomiting/diarrhea/constipation - last BM 4/29. Labs: sodium 132, calcium 8. Meds: antibiotics, bowel regimen. See nutrition recommendations below.

## 2025-04-29 NOTE — PROGRESS NOTE ADULT - ASSESSMENT
69 year old male with opiate use disorder on Methadone, chronic hep C not on treatment and surgical history of R. 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 Patient has unilateral RLE warmth, erythema and draining which patient said was previously purulent concerning for MRSA infection. Course further complicated by established dental infection causing discomfort.

## 2025-04-29 NOTE — PROGRESS NOTE ADULT - CONVERSATION DETAILS
Patient and I discussed GOC this afternoon,   He states that in the event that he went into Cardiopulmonary arrest he would want CPR and external compressions.   He states that in the event he was unable to breath on his own or needed ventilator support that he would want Non-invasive ventilation and trial of intubation for a short period of time. He states that a trach for long term ventilator support is not within his GOC.     He states that his brother Delta (phone number: [phone]) is his HCP.

## 2025-04-29 NOTE — DIETITIAN INITIAL EVALUATION ADULT - PERTINENT MEDS FT
MEDICATIONS  (STANDING):  acetaminophen     Tablet .. 975 milliGRAM(s) Oral every 8 hours  chlorhexidine 0.12% Liquid 15 milliLiter(s) Swish and Spit two times a day  enoxaparin Injectable 40 milliGRAM(s) SubCutaneous every 24 hours  melatonin 5 milliGRAM(s) Oral at bedtime  methadone  Concentrate 200 milliGRAM(s) Oral every 24 hours  piperacillin/tazobactam IVPB.. 3.375 Gram(s) IV Intermittent every 8 hours  polyethylene glycol 3350 17 Gram(s) Oral every 24 hours  senna 2 Tablet(s) Oral at bedtime    MEDICATIONS  (PRN):  ketorolac   Injectable 15 milliGRAM(s) IV Push every 6 hours PRN Moderate Pain (4 - 6)

## 2025-04-29 NOTE — CONSULT NOTE ADULT - ASSESSMENT
69y M w/ PMHx opiate use disorder on chronic methadone, chronic Hep C never treated, cecal volvulus s/p R hemicolectomy, R inguinal hernia repair, p/w worsening purulent RLE wound after returning from trip to [place]. Per pt, noticed leg swelling as well as tooth pain when returning from trip, attempted to treat at home but had persistent drainage from RLE wound. Denies trauma to area, did have intermittent fevers along w/ vomiting from pain @home. CT RLE w/ collection w/in subcutaneous tissues of posterior mid calf w/ rim enhancement concerning for abscess formation, moderate edema surrounding calf. s/p bedside I&D w/ vascular sx on 4/28. Also found w/ right mandibular phlegmon vs abscess, s/p OFMS evaluation, most consistent w/ chronic changes 2/2 previous mandibular fracture. Also, found w/ 1/4 Bcx growing GAS likely 2/2 RLE wound. On exam, tenderness RLE, packed open wound w/ improvement.     Plan:   -c/w zosyn 3.375 q8 for empiric dosing, will cover for both RLE abscess, dental abscess  -c/w vancomycin, obtain trough prior to 4th dose   -surveillance Bcx   -f/u wound cx   -obtain TTE given GAS bacteremia although less likely to cause endocarditis   -hx of HCV, never treated, interesting in tx, can initiate outpatient    69y M w/ PMHx opiate use disorder on chronic methadone, chronic Hep C never treated, cecal volvulus s/p R hemicolectomy, R inguinal hernia repair, p/w worsening purulent RLE wound after returning from trip to [place]. Per pt, noticed leg swelling as well as tooth pain when returning from trip, attempted to treat at home but had persistent drainage from RLE wound. Denies trauma to area, did have intermittent fevers along w/ vomiting from pain @home. CT RLE w/ collection w/in subcutaneous tissues of posterior mid calf w/ rim enhancement concerning for abscess formation, moderate edema surrounding calf. s/p bedside I&D w/ vascular sx on 4/28. Also found w/ right mandibular phlegmon vs abscess, s/p OFMS evaluation, most consistent w/ chronic changes 2/2 previous mandibular fracture. Also, found w/ 1/4 Bcx growing GAS likely 2/2 RLE wound. On exam, tenderness RLE, packed open wound w/ improvement.     Plan:   -c/w zosyn 3.375 q8 for empiric dosing, will cover for both RLE abscess, dental abscess  -stop vanco  -surveillance Bcx   -f/u wound cx   -obtain TTE given GAS bacteremia although less likely to cause endocarditis   -hx of HCV, never treated, interesting in tx, can initiate outpatient

## 2025-04-29 NOTE — PROGRESS NOTE ADULT - PROBLEM SELECTOR PLAN 6
Dvt ppx: lovenox  PPI ppx: none indicated  Diet: regular  Dispo: RMF  Activity: as tolerated Pt. has known hepC, not on treatment. HIV negative (4/28)  He has an elevated protein gap which I suspect is secondary to his untreated hepC    - pt. needs outpatient hepC follow-up

## 2025-04-29 NOTE — CONSULT NOTE ADULT - SUBJECTIVE AND OBJECTIVE BOX
Patient is a 69y old  Male who presents with a chief complaint of sepsis secondary to cellulitis (29 Apr 2025 05:32)      HPI:  Mr. Harding is a 69 year old male with opiate use disorder on Methadone, chronic hep C not on treatment and surgical history of R. 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 Patient reports the wound has been worsening for the last 2 weeks. He noted purulent drainage from the posterior aspect. Over the last 3 days he started to notice systemic fevers. Patient was seen in the ED a few weeks prior for a dental infection and was given augmentin + bactrim for which he finished the course. His symptoms started after finishing the course of PO abx.     In the ED:   VITAL SIGNS: Last 24 Hours  T(F): 98.7 (28 Apr 2025 01:00), Max: 102 (27 Apr 2025 21:20)  HR: 95 (28 Apr 2025 01:00) (71 - 99)  BP: 151/98 (28 Apr 2025 01:00) (138/65 - 180/97)  RR: 18 (28 Apr 2025 01:00) (15 - 18)  SpO2: 95% (28 Apr 2025 01:00) (95% - 97%)  Labs: WBC 10.2, total protein 7.7, albumin 2.7  Interventions: Morphine, tylenol, amlodipine 5, zofran, zosyn, vanc   (28 Apr 2025 01:52)    PAST MEDICAL & SURGICAL HISTORY:  Methadone use      H/O right inguinal hernia repair      H/O right hemicolectomy        MEDICATIONS  (STANDING):  acetaminophen     Tablet .. 975 milliGRAM(s) Oral every 8 hours  chlorhexidine 0.12% Liquid 15 milliLiter(s) Swish and Spit two times a day  enoxaparin Injectable 40 milliGRAM(s) SubCutaneous every 24 hours  magnesium citrate Oral Solution 296 milliLiter(s) Oral once  melatonin 5 milliGRAM(s) Oral at bedtime  methadone    Tablet 200 milliGRAM(s) Oral every 24 hours  piperacillin/tazobactam IVPB.. 3.375 Gram(s) IV Intermittent every 8 hours  polyethylene glycol 3350 17 Gram(s) Oral every 24 hours  senna 2 Tablet(s) Oral at bedtime    MEDICATIONS  (PRN):  ketorolac   Injectable 15 milliGRAM(s) IV Push every 6 hours PRN Moderate Pain (4 - 6)          Home Living Status :  lives alone in an elevator accessible apartment building          -  Home Services :  none        Baseline Functional Level Prior to Admission :             - ADL's/ IADL's :  independent          - Ambulatory status prior to admission :   walked with a cane , rolling walker         FAMILY HISTORY:      CBC Full  -  ( 28 Apr 2025 05:30 )  WBC Count : 9.74 K/uL  RBC Count : 3.68 M/uL  Hemoglobin : 11.3 g/dL  Hematocrit : 34.7 %  Platelet Count - Automated : 163 K/uL  Mean Cell Volume : 94.3 fl  Mean Cell Hemoglobin : 30.7 pg  Mean Cell Hemoglobin Concentration : 32.6 g/dL  Auto Neutrophil # : x  Auto Lymphocyte # : x  Auto Monocyte # : x  Auto Eosinophil # : x  Auto Basophil # : x  Auto Neutrophil % : x  Auto Lymphocyte % : x  Auto Monocyte % : x  Auto Eosinophil % : x  Auto Basophil % : x      04-28    136  |  102  |  13  ----------------------------<  60[L]  4.7   |  27  |  0.74    Ca    8.4      28 Apr 2025 05:30  Phos  3.1     04-28  Mg     1.9     04-28    TPro  6.4  /  Alb  2.8[L]  /  TBili  0.6  /  DBili  x   /  AST  22  /  ALT  17  /  AlkPhos  100  04-28      Urinalysis Basic - ( 28 Apr 2025 05:30 )    Color: x / Appearance: x / SG: x / pH: x  Gluc: 60 mg/dL / Ketone: x  / Bili: x / Urobili: x   Blood: x / Protein: x / Nitrite: x   Leuk Esterase: x / RBC: x / WBC x   Sq Epi: x / Non Sq Epi: x / Bacteria: x        Radiology :       ACC: [id number] EXAM:  CT LWR EXT IC RT   ORDERED BY: MISTY HICKS     PROCEDURE DATE:  04/28/2025          INTERPRETATION:  Exam Type: CT LOWER EXTREMITY WITH IV CONTRAST RIGHT  Exam Date and Time: 4/28/2025 1:11 PM  Indication: Concern for abscess  Comparison: Tib-fib radiograph from 4/27/2025    TECHNIQUE:  Multiplanar CT images of the right lower extremity were obtained with   contrast.    Contrast: 93 cc Isovue-370 IV    FINDINGS:    Partially visualized plate and screw fixation of the distal lateral tibia   transfixing a chronic fracture deformity. No evidence of periprosthetic   fracture or loosening. No acute fracture. No significant knee joint   effusion. Ankle mortise is intact. Moderate osteoarthritis of the   midfoot. Mild osteoarthritis the tibiotalar joint.    Marked edema is seen within the subcutaneous tissues surrounding the   calf. There is a hypodense collection at the posterior aspect of the mid   calf within the subcutaneous tissues which roughly measures 3.6 x 1.1x   1.3 cm CC by AP by TRV with mild rim enhancement. Mild skin thickening   surrounding the calf.    IMPRESSION:  Collection within the the subcutaneous tissues of the posterior mid calf   with rim enhancement concerning for abscess formation. Moderate edema   surrounding the calf.      ACC: [id number] EXAM:  XR TIB FIB 2 VIEWS RT   ORDERED BY: TAMAR WONG     PROCEDURE DATE:  04/27/2025          INTERPRETATION:  CLINICAL INDICATION: eval for osteo/gas, + r calf wound    COMPARISON: Right tibia/fibula x-ray 4/7/2025.    TECHNIQUE: 2 views of the right tibia/fibula.    INTERPRETATION:  Joint spaces are maintained. No effusions. No acute fracture or   dislocation. Status post open reduction internal fixation of the distal   femur with bulky anteriorly projecting callus formation. No evidence of   osseous erosion or destruction.  Possible small foci of gas in the subcutaneous tissues of the right mid   calf, without evidence of tracking deeper.  No evidence of hardware complication.    IMPRESSION:  Possible small foci of gasin the subcutaneous tissues of the right mid   calf, without evidence of tracking deeper. Clinical correlation   recommended.      ACC: [id number] EXAM:  XR CHEST AP OR PA 1V   ORDERED BY: TAMAR WONG     PROCEDURE DATE:  04/27/2025          INTERPRETATION:  XR CHEST dated 4/27/2025 at 10:03 PM.    CLINICAL INFORMATION: Sepsis    TECHNIQUE: AP view of the chest    COMPARISON:Chest x-ray 3/29/2024.    FINDINGS:    Normal cardiomediastinal and hilar silhouettes. The lungs are clear.  No   pleural effusions. No pneumothorax. Soft tissues and osseous structures   are within normal limits.      IMPRESSION:  No acute pathology.            Review of Systems : per HPI         Vital Signs Last 24 Hrs  T(C): 36.5 (29 Apr 2025 05:55), Max: 37.1 (28 Apr 2025 13:34)  T(F): 97.7 (29 Apr 2025 05:55), Max: 98.8 (28 Apr 2025 13:34)  HR: 79 (29 Apr 2025 05:55) (79 - 96)  BP: 178/95 (29 Apr 2025 05:55) (156/93 - 178/95)  BP(mean): 111 (28 Apr 2025 20:46) (111 - 111)  RR: 18 (29 Apr 2025 05:55) (17 - 18)  SpO2: 100% (29 Apr 2025 05:55) (95% - 100%)    Parameters below as of 29 Apr 2025 05:55  Patient On (Oxygen Delivery Method): room air            Physical Exam:    lying comfortably in semi Caruso's position , awake , alert , no acute complaints , feels tired     Head: normocephalic , atraumatic    Eyes: PERRLA , EOMI , no nystagmus , sclera anicteric    ENT / FACE: neg nasal discharge , uvula midline , no oropharyngeal erythema / exudate    Neck: supple , negative JVD , negative carotid bruits , no thyromegaly    Chest: CTA bilaterally      Cardiovascular: regular rate and rhythm , neg murmurs / rubs / gallops    Abdomen: soft , non distended , no tenderness to palpation in all 4 quadrants ,  normal bowel sounds     Extremities:  Erythema and drainage present on RLE wound with erythema    Neurologic Exam:     Alert and oriented to person , place , date/year     Cranial Nerves:           II:                         pupils equal , round and reactive to light , visual fields intact         III/ IV/VI:             extraocular movements intact , neg nystagmus , neg ptosis        V:                        facial sensation intact , V1-3 normal        VII:                      face symmetric , no droop , normal eye closure and smile        VIII:                     hearing intact to finger rub bilaterally        IX and X:             no hoarseness , gag intact , palate/ uvula rise symmetrically        XI:                       SCM / trapezius strength intact bilateral        XII:                      no tongue deviation    Motor Exam:        > 3+/5 x 4 extremities , without drift     Sensation:         intact to light touch x 4 extremities                            no neglect or extinction on double simultaneous testing    DTR:           biceps/brachioradialis: equal                            patella/ankle: equal          neg Babinski     Coordination:            Finger to Nose:  neg dysmetria bilaterally        Gait:  not tested         PM&R Impression: admitted for sepsis 2/2 R LE cellulitis      - deconditioned    - no focal weakness       Recommendations / Plan:       1) Physical / Occupational therapy focusing on therapeutic exercises , equipment evaluation , bed mobility/transfer out of bed evaluation , progressive ambulation with assistive devices prn .    2) Current disposition plan recommendation:    pending functional progress

## 2025-04-29 NOTE — CONSULT NOTE ADULT - SUBJECTIVE AND OBJECTIVE BOX
INFECTIOUS DISEASES INITIAL CONSULT NOTE    HPI:  Mr. Harding is a 69 year old male with opiate use disorder on Methadone, chronic hep C not on treatment and surgical history of R. 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 Patient reports the wound has been worsening for the last 2 weeks. He noted purulent drainage from the posterior aspect. Over the last 3 days he started to notice systemic fevers. Patient was seen in the ED a few weeks prior for a dental infection and was given augmentin + bactrim for which he finished the course. His symptoms started after finishing the course of PO abx.     In the ED:   VITAL SIGNS: Last 24 Hours  T(F): 98.7 (28 Apr 2025 01:00), Max: 102 (27 Apr 2025 21:20)  HR: 95 (28 Apr 2025 01:00) (71 - 99)  BP: 151/98 (28 Apr 2025 01:00) (138/65 - 180/97)  RR: 18 (28 Apr 2025 01:00) (15 - 18)  SpO2: 95% (28 Apr 2025 01:00) (95% - 97%)  Labs: WBC 10.2, total protein 7.7, albumin 2.7  Interventions: Morphine, tylenol, amlodipine 5, zofran, zosyn, vanc   (28 Apr 2025 01:52)      PAST MEDICAL & SURGICAL HISTORY:  Methadone use      H/O right inguinal hernia repair      H/O right hemicolectomy            Review of Systems:   Constitutional, eyes, ENT, cardiovascular, respiratory, gastrointestinal, genitourinary, integumentary, neurological, psychiatric and heme/lymph are otherwise negative other than noted above       ANTIBIOTICS:  MEDICATIONS  (STANDING):  acetaminophen     Tablet .. 975 milliGRAM(s) Oral every 8 hours  chlorhexidine 0.12% Liquid 15 milliLiter(s) Swish and Spit two times a day  enoxaparin Injectable 40 milliGRAM(s) SubCutaneous every 24 hours  melatonin 5 milliGRAM(s) Oral at bedtime  methadone  Concentrate 200 milliGRAM(s) Oral every 24 hours  piperacillin/tazobactam IVPB.. 3.375 Gram(s) IV Intermittent every 8 hours  polyethylene glycol 3350 17 Gram(s) Oral every 24 hours  senna 2 Tablet(s) Oral at bedtime    MEDICATIONS  (PRN):  ketorolac   Injectable 15 milliGRAM(s) IV Push every 6 hours PRN Moderate Pain (4 - 6)      Allergies    No Known Allergies    Intolerances        SOCIAL HISTORY:  Smoking: never smoker   Alcohol: denies alcohol use   Drug use: opiate use disorder on chronic methadone       FAMILY HISTORY:   no FH leading to current infection    Vital Signs Last 24 Hrs  T(C): 36.7 (29 Apr 2025 12:15), Max: 36.7 (28 Apr 2025 20:46)  T(F): 98 (29 Apr 2025 12:15), Max: 98 (28 Apr 2025 20:46)  HR: 91 (29 Apr 2025 12:15) (79 - 95)  BP: 153/94 (29 Apr 2025 12:15) (153/94 - 178/95)  BP(mean): 111 (28 Apr 2025 20:46) (111 - 111)  RR: 18 (29 Apr 2025 12:15) (18 - 18)  SpO2: 98% (29 Apr 2025 12:15) (95% - 100%)    Parameters below as of 29 Apr 2025 12:15  Patient On (Oxygen Delivery Method): room air      PHYSICAL EXAM:  Constitutional: alert, mild distress   Eyes: PERRLA, EOMI   Mouth: poor dentition, multiple root tips, no purulent drainage   Pulmonary: no respiratory distress and lungs were clear to auscultation bilaterally.   Heart: heart rate was normal and rhythm regular, normal S1 and S2  Vascular:. no b/l le edema   Abdomen: normal bowel sounds, soft, non-tender  Neurological: no focal deficits.   Psychiatric: the affect was normal      LABS:                        11.3   5.76  )-----------( 181      ( 29 Apr 2025 08:09 )             34.1     04-29    132[L]  |  99  |  15  ----------------------------<  80  4.5   |  26  |  0.74    Ca    8.0[L]      29 Apr 2025 08:09  Phos  3.1     04-29  Mg     2.0     04-29    TPro  x   /  Alb  x   /  TBili  0.3  /  DBili  x   /  AST  x   /  ALT  x   /  AlkPhos  x   04-29    PT/INR - ( 29 Apr 2025 08:09 )   PT: 12.3 sec;   INR: 1.05          PTT - ( 27 Apr 2025 21:21 )  PTT:31.6 sec  Urinalysis Basic - ( 29 Apr 2025 08:09 )    Color: x / Appearance: x / SG: x / pH: x  Gluc: 80 mg/dL / Ketone: x  / Bili: x / Urobili: x   Blood: x / Protein: x / Nitrite: x   Leuk Esterase: x / RBC: x / WBC x   Sq Epi: x / Non Sq Epi: x / Bacteria: x        MICROBIOLOGY:    RADIOLOGY & ADDITIONAL STUDIES:

## 2025-04-29 NOTE — PROGRESS NOTE ADULT - SUBJECTIVE AND OBJECTIVE BOX
Patient is a 69y old  Male who presents with a chief complaint of sepsis secondary to cellulitis (29 Apr 2025 05:32)      HOSPITAL COURSE: 69 year old male with opiate use disorder on Methadone, chronic hep C not on treatment and surgical history of R. 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 Patient was started on empiric IV antibiotics and found to have RLE abscess, now s/p bedside I&D with vascular surgery on 4/28, wound cultures pending. Patient was also found to have suspected Right mandibular phlegmon vs. abscess. OMFS consulted, evaluation pending.     OVERNIGHT EVENTS: NAEON    Subjective: patient examined at the bed this morning states that he has no fevers no chills, no chest pain, shortness, breath, ménage, and vomiting. States that he is still constipated did not have a bowel movement overnight. He says that his main concern overnight was that his pain in his right leg was very severe nine out of 10 did not require additional pain medications. not notable symptoms with associated HTN on likely pain related.   he wants ensure added to his diet. Also reporting pins in needles sensation in his bilateral hands.     ROS: otherwise negative    T(C): 36.5 (04-29-25 @ 05:55), Max: 37.1 (04-28-25 @ 13:34)  HR: 79 (04-29-25 @ 05:55) (79 - 96)  BP: 178/95 (04-29-25 @ 05:55) (156/93 - 178/95)  RR: 18 (04-29-25 @ 05:55) (17 - 18)  SpO2: 100% (04-29-25 @ 05:55) (95% - 100%)  Wt(kg): --Vital Signs Last 24 Hrs  T(C): 36.5 (29 Apr 2025 05:55), Max: 37.1 (28 Apr 2025 13:34)  T(F): 97.7 (29 Apr 2025 05:55), Max: 98.8 (28 Apr 2025 13:34)  HR: 79 (29 Apr 2025 05:55) (79 - 96)  BP: 178/95 (29 Apr 2025 05:55) (156/93 - 178/95)  BP(mean): 111 (28 Apr 2025 20:46) (111 - 111)  RR: 18 (29 Apr 2025 05:55) (17 - 18)  SpO2: 100% (29 Apr 2025 05:55) (95% - 100%)    Parameters below as of 29 Apr 2025 05:55  Patient On (Oxygen Delivery Method): room air        PHYSICAL EXAM:  General: NAD, appears comfortable    HEENT: Oropharynx with noted infection and abscess to right lower jaw. No drainage of abscess in mouth. Swelling appreciated to right face. Tender to palpation of vestibule and right lower gum. PERRL, anicteric sclera  Cardiovascular: RRR  Respiratory: CTA B/L  Gastrointestinal: soft, NT/ND; +BSx4  Extremities: RLE with erythema serosanguinous drainage present. Fluctuant area noted near opening of wound. Pain on palpation present and limited ROM of right lower leg and ankle. 2+ pedal pulses  Vascular: 2+ radial pulses  Neurological: AAOx3; no focal deficits  Psychiatric: pleasant mood and affect  Dermatologic: Erythema and drainage present on RLE wound with erythema spreading from tibial tuberosity to proximal to the talus bone. Drainage is serosanguinous. Darker discoloration present on medial malleolus.  Central post surgical incision scar present to abdomen - healed and no erythema. Right upper extremity scar present and healed.     LABS:                        11.3   9.74  )-----------( 163      ( 28 Apr 2025 05:30 )             34.7     04-28    136  |  102  |  13  ----------------------------<  60[L]  4.7   |  27  |  0.74    Ca    8.4      28 Apr 2025 05:30  Phos  3.1     04-28  Mg     1.9     04-28    TPro  6.4  /  Alb  2.8[L]  /  TBili  0.6  /  DBili  x   /  AST  22  /  ALT  17  /  AlkPhos  100  04-28      PT/INR - ( 27 Apr 2025 21:21 )   PT: 12.5 sec;   INR: 1.08          PTT - ( 27 Apr 2025 21:21 )  PTT:31.6 sec  Urinalysis Basic - ( 28 Apr 2025 05:30 )    Color: x / Appearance: x / SG: x / pH: x  Gluc: 60 mg/dL / Ketone: x  / Bili: x / Urobili: x   Blood: x / Protein: x / Nitrite: x   Leuk Esterase: x / RBC: x / WBC x   Sq Epi: x / Non Sq Epi: x / Bacteria: x      CAPILLARY BLOOD GLUCOSE            Urinalysis Basic - ( 28 Apr 2025 05:30 )    Color: x / Appearance: x / SG: x / pH: x  Gluc: 60 mg/dL / Ketone: x  / Bili: x / Urobili: x   Blood: x / Protein: x / Nitrite: x   Leuk Esterase: x / RBC: x / WBC x   Sq Epi: x / Non Sq Epi: x / Bacteria: x        MEDICATIONS  (STANDING):  acetaminophen     Tablet .. 975 milliGRAM(s) Oral every 8 hours  chlorhexidine 0.12% Liquid 15 milliLiter(s) Swish and Spit two times a day  enoxaparin Injectable 40 milliGRAM(s) SubCutaneous every 24 hours  melatonin 5 milliGRAM(s) Oral at bedtime  methadone    Tablet 200 milliGRAM(s) Oral every 24 hours  piperacillin/tazobactam IVPB.. 3.375 Gram(s) IV Intermittent every 8 hours  polyethylene glycol 3350 17 Gram(s) Oral every 24 hours  senna 2 Tablet(s) Oral at bedtime    MEDICATIONS  (PRN):  ketorolac   Injectable 15 milliGRAM(s) IV Push every 6 hours PRN Moderate Pain (4 - 6)      RADIOLOGY & ADDITIONAL TESTS: Reviewed   Patient is a 69y old  Male who presents with a chief complaint of sepsis secondary to cellulitis (29 Apr 2025 05:32)      HOSPITAL COURSE: 69 year old male with opiate use disorder on Methadone, chronic hep C not on treatment and surgical history of R. 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 Patient was started on empiric IV antibiotics and found to have RLE abscess, now s/p bedside I&D with vascular surgery on 4/28, wound cultures pending. Patient was also found to have suspected Right mandibular phlegmon vs. abscess. OMFS consulted, evaluation pending.     OVERNIGHT EVENTS: NAEON    Subjective: patient examined at the bed this morning states that he has no fevers no chills, no chest pain, shortness, breath, nausea, and vomiting. States that he is still constipated did not have a bowel movement overnight. He says that his main concern overnight was that his pain in his right leg was very severe nine out of 10 did not require additional pain medications. not notable symptoms with associated HTN on likely pain related.   he wants ensure added to his diet. Also reporting pins in needles sensation in his bilateral hands.     ROS: otherwise negative    T(C): 36.5 (04-29-25 @ 05:55), Max: 37.1 (04-28-25 @ 13:34)  HR: 79 (04-29-25 @ 05:55) (79 - 96)  BP: 178/95 (04-29-25 @ 05:55) (156/93 - 178/95)  RR: 18 (04-29-25 @ 05:55) (17 - 18)  SpO2: 100% (04-29-25 @ 05:55) (95% - 100%)  Wt(kg): --Vital Signs Last 24 Hrs  T(C): 36.5 (29 Apr 2025 05:55), Max: 37.1 (28 Apr 2025 13:34)  T(F): 97.7 (29 Apr 2025 05:55), Max: 98.8 (28 Apr 2025 13:34)  HR: 79 (29 Apr 2025 05:55) (79 - 96)  BP: 178/95 (29 Apr 2025 05:55) (156/93 - 178/95)  BP(mean): 111 (28 Apr 2025 20:46) (111 - 111)  RR: 18 (29 Apr 2025 05:55) (17 - 18)  SpO2: 100% (29 Apr 2025 05:55) (95% - 100%)    Parameters below as of 29 Apr 2025 05:55  Patient On (Oxygen Delivery Method): room air        PHYSICAL EXAM:  General: NAD, appears comfortable    HEENT: Oropharynx with noted infection and abscess to right lower jaw. No drainage of abscess in mouth. Swelling appreciated to right face. Tender to palpation of vestibule and right lower gum. PERRL, anicteric sclera  Cardiovascular: RRR  Respiratory: CTA B/L  Gastrointestinal: soft, NT/ND; +BSx4  Extremities: RLE with erythema serosanguinous drainage present. Fluctuant area noted near opening of wound. Pain on palpation present and limited ROM of right lower leg and ankle. 2+ pedal pulses  Vascular: 2+ radial pulses  Neurological: AAOx3; no focal deficits  Psychiatric: pleasant mood and affect  Dermatologic: Erythema and drainage present on RLE wound with erythema spreading from tibial tuberosity to proximal to the talus bone. Drainage is serosanguinous. Darker discoloration present on medial malleolus.  Central post surgical incision scar present to abdomen - healed and no erythema. Right upper extremity scar present and healed.     LABS:                        11.3   9.74  )-----------( 163      ( 28 Apr 2025 05:30 )             34.7     04-28    136  |  102  |  13  ----------------------------<  60[L]  4.7   |  27  |  0.74    Ca    8.4      28 Apr 2025 05:30  Phos  3.1     04-28  Mg     1.9     04-28    TPro  6.4  /  Alb  2.8[L]  /  TBili  0.6  /  DBili  x   /  AST  22  /  ALT  17  /  AlkPhos  100  04-28      PT/INR - ( 27 Apr 2025 21:21 )   PT: 12.5 sec;   INR: 1.08          PTT - ( 27 Apr 2025 21:21 )  PTT:31.6 sec  Urinalysis Basic - ( 28 Apr 2025 05:30 )    Color: x / Appearance: x / SG: x / pH: x  Gluc: 60 mg/dL / Ketone: x  / Bili: x / Urobili: x   Blood: x / Protein: x / Nitrite: x   Leuk Esterase: x / RBC: x / WBC x   Sq Epi: x / Non Sq Epi: x / Bacteria: x      CAPILLARY BLOOD GLUCOSE            Urinalysis Basic - ( 28 Apr 2025 05:30 )    Color: x / Appearance: x / SG: x / pH: x  Gluc: 60 mg/dL / Ketone: x  / Bili: x / Urobili: x   Blood: x / Protein: x / Nitrite: x   Leuk Esterase: x / RBC: x / WBC x   Sq Epi: x / Non Sq Epi: x / Bacteria: x        MEDICATIONS  (STANDING):  acetaminophen     Tablet .. 975 milliGRAM(s) Oral every 8 hours  chlorhexidine 0.12% Liquid 15 milliLiter(s) Swish and Spit two times a day  enoxaparin Injectable 40 milliGRAM(s) SubCutaneous every 24 hours  melatonin 5 milliGRAM(s) Oral at bedtime  methadone    Tablet 200 milliGRAM(s) Oral every 24 hours  piperacillin/tazobactam IVPB.. 3.375 Gram(s) IV Intermittent every 8 hours  polyethylene glycol 3350 17 Gram(s) Oral every 24 hours  senna 2 Tablet(s) Oral at bedtime    MEDICATIONS  (PRN):  ketorolac   Injectable 15 milliGRAM(s) IV Push every 6 hours PRN Moderate Pain (4 - 6)      RADIOLOGY & ADDITIONAL TESTS: Reviewed

## 2025-04-30 ENCOUNTER — RESULT REVIEW (OUTPATIENT)
Age: 70
End: 2025-04-30

## 2025-04-30 LAB
-  CEFTRIAXONE: SIGNIFICANT CHANGE UP
-  CLINDAMYCIN: SIGNIFICANT CHANGE UP
-  LEVOFLOXACIN: SIGNIFICANT CHANGE UP
-  PENICILLIN: SIGNIFICANT CHANGE UP
-  TETRACYCLINE: SIGNIFICANT CHANGE UP
-  VANCOMYCIN: SIGNIFICANT CHANGE UP
ALBUMIN SERPL ELPH-MCNC: 3.1 G/DL — LOW (ref 3.3–5)
ALP SERPL-CCNC: 107 U/L — SIGNIFICANT CHANGE UP (ref 40–120)
ALT FLD-CCNC: 18 U/L — SIGNIFICANT CHANGE UP (ref 10–45)
ANION GAP SERPL CALC-SCNC: 7 MMOL/L — SIGNIFICANT CHANGE UP (ref 5–17)
AST SERPL-CCNC: 27 U/L — SIGNIFICANT CHANGE UP (ref 10–40)
BASOPHILS # BLD AUTO: 0 K/UL — SIGNIFICANT CHANGE UP (ref 0–0.2)
BASOPHILS NFR BLD AUTO: 0 % — SIGNIFICANT CHANGE UP (ref 0–2)
BILIRUB SERPL-MCNC: 0.2 MG/DL — SIGNIFICANT CHANGE UP (ref 0.2–1.2)
BUN SERPL-MCNC: 17 MG/DL — SIGNIFICANT CHANGE UP (ref 7–23)
CALCIUM SERPL-MCNC: 8.6 MG/DL — SIGNIFICANT CHANGE UP (ref 8.4–10.5)
CHLORIDE SERPL-SCNC: 100 MMOL/L — SIGNIFICANT CHANGE UP (ref 96–108)
CO2 SERPL-SCNC: 27 MMOL/L — SIGNIFICANT CHANGE UP (ref 22–31)
CREAT SERPL-MCNC: 1.02 MG/DL — SIGNIFICANT CHANGE UP (ref 0.5–1.3)
CULTURE RESULTS: ABNORMAL
EGFR: 79 ML/MIN/1.73M2 — SIGNIFICANT CHANGE UP
EGFR: 79 ML/MIN/1.73M2 — SIGNIFICANT CHANGE UP
EOSINOPHIL # BLD AUTO: 0.09 K/UL — SIGNIFICANT CHANGE UP (ref 0–0.5)
EOSINOPHIL NFR BLD AUTO: 1.8 % — SIGNIFICANT CHANGE UP (ref 0–6)
GLUCOSE SERPL-MCNC: 83 MG/DL — SIGNIFICANT CHANGE UP (ref 70–99)
HCT VFR BLD CALC: 34.4 % — LOW (ref 39–50)
HGB BLD-MCNC: 11.5 G/DL — LOW (ref 13–17)
INR BLD: 1.01 — SIGNIFICANT CHANGE UP (ref 0.85–1.16)
LYMPHOCYTES # BLD AUTO: 1.35 K/UL — SIGNIFICANT CHANGE UP (ref 1–3.3)
LYMPHOCYTES # BLD AUTO: 26.3 % — SIGNIFICANT CHANGE UP (ref 13–44)
MAGNESIUM SERPL-MCNC: 2.1 MG/DL — SIGNIFICANT CHANGE UP (ref 1.6–2.6)
MCHC RBC-ENTMCNC: 31 PG — SIGNIFICANT CHANGE UP (ref 27–34)
MCHC RBC-ENTMCNC: 33.4 G/DL — SIGNIFICANT CHANGE UP (ref 32–36)
MCV RBC AUTO: 92.7 FL — SIGNIFICANT CHANGE UP (ref 80–100)
MELD SCORE WITH DIALYSIS: 22 POINTS — SIGNIFICANT CHANGE UP
MELD SCORE WITHOUT DIALYSIS: 7 POINTS — SIGNIFICANT CHANGE UP
METHOD TYPE: SIGNIFICANT CHANGE UP
MONOCYTES # BLD AUTO: 0.49 K/UL — SIGNIFICANT CHANGE UP (ref 0–0.9)
MONOCYTES NFR BLD AUTO: 9.6 % — SIGNIFICANT CHANGE UP (ref 2–14)
NEUTROPHILS # BLD AUTO: 3.1 K/UL — SIGNIFICANT CHANGE UP (ref 1.8–7.4)
NEUTROPHILS NFR BLD AUTO: 60.5 % — SIGNIFICANT CHANGE UP (ref 43–77)
ORGANISM # SPEC MICROSCOPIC CNT: ABNORMAL
ORGANISM # SPEC MICROSCOPIC CNT: ABNORMAL
ORGANISM # SPEC MICROSCOPIC CNT: SIGNIFICANT CHANGE UP
OSMOLALITY SERPL: 281 MOSM/KG — SIGNIFICANT CHANGE UP (ref 280–301)
PHOSPHATE SERPL-MCNC: 2.9 MG/DL — SIGNIFICANT CHANGE UP (ref 2.5–4.5)
PLATELET # BLD AUTO: 227 K/UL — SIGNIFICANT CHANGE UP (ref 150–400)
POTASSIUM SERPL-MCNC: 4.6 MMOL/L — SIGNIFICANT CHANGE UP (ref 3.5–5.3)
POTASSIUM SERPL-SCNC: 4.6 MMOL/L — SIGNIFICANT CHANGE UP (ref 3.5–5.3)
PROT SERPL-MCNC: 6.9 G/DL — SIGNIFICANT CHANGE UP (ref 6–8.3)
PROTHROM AB SERPL-ACNC: 11.6 SEC — SIGNIFICANT CHANGE UP (ref 9.9–13.4)
RBC # BLD: 3.71 M/UL — LOW (ref 4.2–5.8)
RBC # FLD: 13.2 % — SIGNIFICANT CHANGE UP (ref 10.3–14.5)
SODIUM SERPL-SCNC: 134 MMOL/L — LOW (ref 135–145)
SPECIMEN SOURCE: SIGNIFICANT CHANGE UP
WBC # BLD: 5.13 K/UL — SIGNIFICANT CHANGE UP (ref 3.8–10.5)
WBC # FLD AUTO: 5.13 K/UL — SIGNIFICANT CHANGE UP (ref 3.8–10.5)

## 2025-04-30 PROCEDURE — 93306 TTE W/DOPPLER COMPLETE: CPT | Mod: 26

## 2025-04-30 PROCEDURE — 99232 SBSQ HOSP IP/OBS MODERATE 35: CPT | Mod: GC

## 2025-04-30 PROCEDURE — 99497 ADVNCD CARE PLAN 30 MIN: CPT

## 2025-04-30 PROCEDURE — G0545: CPT

## 2025-04-30 PROCEDURE — 99233 SBSQ HOSP IP/OBS HIGH 50: CPT | Mod: GC

## 2025-04-30 RX ORDER — KETOROLAC TROMETHAMINE 30 MG/ML
30 INJECTION, SOLUTION INTRAMUSCULAR; INTRAVENOUS EVERY 8 HOURS
Refills: 0 | Status: DISCONTINUED | OUTPATIENT
Start: 2025-04-30 | End: 2025-05-04

## 2025-04-30 RX ORDER — BISACODYL 5 MG
10 TABLET, DELAYED RELEASE (ENTERIC COATED) ORAL DAILY
Refills: 0 | Status: DISCONTINUED | OUTPATIENT
Start: 2025-04-30 | End: 2025-05-04

## 2025-04-30 RX ORDER — OXYCODONE HYDROCHLORIDE 30 MG/1
10 TABLET ORAL EVERY 4 HOURS
Refills: 0 | Status: DISCONTINUED | OUTPATIENT
Start: 2025-04-30 | End: 2025-04-30

## 2025-04-30 RX ORDER — GABAPENTIN 400 MG/1
100 CAPSULE ORAL EVERY 8 HOURS
Refills: 0 | Status: DISCONTINUED | OUTPATIENT
Start: 2025-04-30 | End: 2025-05-01

## 2025-04-30 RX ORDER — BISACODYL 5 MG
5 TABLET, DELAYED RELEASE (ENTERIC COATED) ORAL DAILY
Refills: 0 | Status: DISCONTINUED | OUTPATIENT
Start: 2025-04-30 | End: 2025-05-04

## 2025-04-30 RX ADMIN — Medication 200 MILLIGRAM(S): at 11:44

## 2025-04-30 RX ADMIN — Medication 975 MILLIGRAM(S): at 22:45

## 2025-04-30 RX ADMIN — Medication 40 MILLIGRAM(S): at 09:22

## 2025-04-30 RX ADMIN — POLYETHYLENE GLYCOL 3350 17 GRAM(S): 17 POWDER, FOR SOLUTION ORAL at 08:43

## 2025-04-30 RX ADMIN — Medication 25 GRAM(S): at 22:46

## 2025-04-30 RX ADMIN — Medication 2 TABLET(S): at 22:45

## 2025-04-30 RX ADMIN — Medication 15 MILLILITER(S): at 17:25

## 2025-04-30 RX ADMIN — OXYCODONE HYDROCHLORIDE 10 MILLIGRAM(S): 30 TABLET ORAL at 08:42

## 2025-04-30 RX ADMIN — OXYCODONE HYDROCHLORIDE 10 MILLIGRAM(S): 30 TABLET ORAL at 09:42

## 2025-04-30 RX ADMIN — GABAPENTIN 100 MILLIGRAM(S): 400 CAPSULE ORAL at 17:25

## 2025-04-30 RX ADMIN — Medication 25 GRAM(S): at 05:27

## 2025-04-30 RX ADMIN — Medication 975 MILLIGRAM(S): at 23:45

## 2025-04-30 RX ADMIN — Medication 975 MILLIGRAM(S): at 06:26

## 2025-04-30 RX ADMIN — KETOROLAC TROMETHAMINE 30 MILLIGRAM(S): 30 INJECTION, SOLUTION INTRAMUSCULAR; INTRAVENOUS at 22:46

## 2025-04-30 RX ADMIN — KETOROLAC TROMETHAMINE 30 MILLIGRAM(S): 30 INJECTION, SOLUTION INTRAMUSCULAR; INTRAVENOUS at 23:01

## 2025-04-30 RX ADMIN — Medication 15 MILLILITER(S): at 05:26

## 2025-04-30 RX ADMIN — GABAPENTIN 100 MILLIGRAM(S): 400 CAPSULE ORAL at 09:22

## 2025-04-30 RX ADMIN — Medication 25 GRAM(S): at 13:41

## 2025-04-30 RX ADMIN — Medication 5 MILLIGRAM(S): at 08:42

## 2025-04-30 RX ADMIN — Medication 975 MILLIGRAM(S): at 05:26

## 2025-04-30 RX ADMIN — ENOXAPARIN SODIUM 40 MILLIGRAM(S): 100 INJECTION SUBCUTANEOUS at 03:23

## 2025-04-30 RX ADMIN — Medication 5 MILLIGRAM(S): at 22:45

## 2025-04-30 RX ADMIN — KETOROLAC TROMETHAMINE 15 MILLIGRAM(S): 30 INJECTION, SOLUTION INTRAMUSCULAR; INTRAVENOUS at 05:29

## 2025-04-30 NOTE — PROGRESS NOTE ADULT - SUBJECTIVE AND OBJECTIVE BOX
INFECTIOUS DISEASES CONSULT FOLLOW-UP NOTE    INTERVAL HPI/OVERNIGHT EVENTS:      ROS:   Constitutional, eyes, ENT, cardiovascular, respiratory, gastrointestinal, genitourinary, integumentary, neurological, psychiatric and heme/lymph are otherwise negative other than noted above       ANTIBIOTICS/RELEVANT:    MEDICATIONS  (STANDING):  acetaminophen     Tablet .. 975 milliGRAM(s) Oral every 8 hours  chlorhexidine 0.12% Liquid 15 milliLiter(s) Swish and Spit two times a day  enoxaparin Injectable 40 milliGRAM(s) SubCutaneous every 24 hours  melatonin 5 milliGRAM(s) Oral at bedtime  methadone  Concentrate 200 milliGRAM(s) Oral every 24 hours  piperacillin/tazobactam IVPB.. 3.375 Gram(s) IV Intermittent every 8 hours  polyethylene glycol 3350 17 Gram(s) Oral every 24 hours  senna 2 Tablet(s) Oral at bedtime    MEDICATIONS  (PRN):  ketorolac   Injectable 15 milliGRAM(s) IV Push every 6 hours PRN Moderate Pain (4 - 6)        Vital Signs Last 24 Hrs  T(C): 36.4 (30 Apr 2025 05:54), Max: 36.7 (29 Apr 2025 12:15)  T(F): 97.6 (30 Apr 2025 05:54), Max: 98 (29 Apr 2025 12:15)  HR: 90 (30 Apr 2025 05:54) (87 - 91)  BP: 137/87 (29 Apr 2025 22:16) (137/87 - 153/94)  BP(mean): --  RR: 18 (30 Apr 2025 05:54) (18 - 18)  SpO2: 97% (30 Apr 2025 05:54) (97% - 98%)    Parameters below as of 30 Apr 2025 05:54  Patient On (Oxygen Delivery Method): room air        PHYSICAL EXAM:  Constitutional: alert, NAD  Eyes: the sclera and conjunctiva were normal.   ENT: the ears and nose were normal in appearance.   Neck: the appearance of the neck was normal and the neck was supple.   Pulmonary: no respiratory distress and lungs were clear to auscultation bilaterally.   Heart: heart rate was normal and rhythm regular, normal S1 and S2  Vascular:. there was no peripheral edema  Abdomen: normal bowel sounds, soft, non-tender  Neurological: no focal deficits.   Psychiatric: the affect was normal        LABS:                        11.3   5.76  )-----------( 181      ( 29 Apr 2025 08:09 )             34.1     04-29    132[L]  |  99  |  15  ----------------------------<  80  4.5   |  26  |  0.74    Ca    8.0[L]      29 Apr 2025 08:09  Phos  3.1     04-29  Mg     2.0     04-29    TPro  x   /  Alb  x   /  TBili  0.3  /  DBili  x   /  AST  x   /  ALT  x   /  AlkPhos  x   04-29    PT/INR - ( 29 Apr 2025 08:09 )   PT: 12.3 sec;   INR: 1.05            Urinalysis Basic - ( 29 Apr 2025 08:09 )    Color: x / Appearance: x / SG: x / pH: x  Gluc: 80 mg/dL / Ketone: x  / Bili: x / Urobili: x   Blood: x / Protein: x / Nitrite: x   Leuk Esterase: x / RBC: x / WBC x   Sq Epi: x / Non Sq Epi: x / Bacteria: x        MICROBIOLOGY:      RADIOLOGY & ADDITIONAL STUDIES:  Reviewed INFECTIOUS DISEASES CONSULT FOLLOW-UP NOTE    INTERVAL HPI/OVERNIGHT EVENTS: No acute overnight events.    SUBJECTIVE: Patient seen at bedside. Patient endorses pain in the RLE, especially with dressing changes. Rates the pain 8/10, with occasional "shooting pains". Also noticed increased swelling of RLE to the knee. Otherwise no acute complaints, denies fevers, chills, n/v/c/d.      ROS:   Constitutional, eyes, ENT, cardiovascular, respiratory, gastrointestinal, genitourinary, integumentary, neurological, psychiatric and heme/lymph are otherwise negative other than noted above       ANTIBIOTICS/RELEVANT:    MEDICATIONS  (STANDING):  acetaminophen     Tablet .. 975 milliGRAM(s) Oral every 8 hours  chlorhexidine 0.12% Liquid 15 milliLiter(s) Swish and Spit two times a day  enoxaparin Injectable 40 milliGRAM(s) SubCutaneous every 24 hours  melatonin 5 milliGRAM(s) Oral at bedtime  methadone  Concentrate 200 milliGRAM(s) Oral every 24 hours  piperacillin/tazobactam IVPB.. 3.375 Gram(s) IV Intermittent every 8 hours  polyethylene glycol 3350 17 Gram(s) Oral every 24 hours  senna 2 Tablet(s) Oral at bedtime    MEDICATIONS  (PRN):  ketorolac   Injectable 15 milliGRAM(s) IV Push every 6 hours PRN Moderate Pain (4 - 6)        Vital Signs Last 24 Hrs  T(C): 36.4 (30 Apr 2025 05:54), Max: 36.7 (29 Apr 2025 12:15)  T(F): 97.6 (30 Apr 2025 05:54), Max: 98 (29 Apr 2025 12:15)  HR: 90 (30 Apr 2025 05:54) (87 - 91)  BP: 137/87 (29 Apr 2025 22:16) (137/87 - 153/94)  BP(mean): --  RR: 18 (30 Apr 2025 05:54) (18 - 18)  SpO2: 97% (30 Apr 2025 05:54) (97% - 98%)    Parameters below as of 30 Apr 2025 05:54  Patient On (Oxygen Delivery Method): room air        PHYSICAL EXAM:  Constitutional: alert, NAD  Eyes: the sclera and conjunctiva were normal.   ENT: the ears and nose were normal in appearance. MMM.  Neck: the appearance of the neck was normal and the neck was supple.   Pulmonary: no respiratory distress and lungs were clear to auscultation bilaterally.   Heart: heart rate was normal and rhythm regular, normal S1 and S2  Vascular:. there was no peripheral edema. 2+ pulses in b/l DP and PT  Abdomen: normal bowel sounds, soft, mild generalized TTP (chronic baseline)  Ext: NROM in all four extremities, able to walk on RLE. RLE dressings removed for exam. RLE wound with packing, clean, no pus or increased erythema. RLE swelling from foot to knee, mildly TTP, no erythema or warmth.  Neurological: no focal deficits. AOx3. CN grossly intact.  Psychiatric: the affect was normal        LABS:                        11.3   5.76  )-----------( 181      ( 29 Apr 2025 08:09 )             34.1     04-29    132[L]  |  99  |  15  ----------------------------<  80  4.5   |  26  |  0.74    Ca    8.0[L]      29 Apr 2025 08:09  Phos  3.1     04-29  Mg     2.0     04-29    TPro  x   /  Alb  x   /  TBili  0.3  /  DBili  x   /  AST  x   /  ALT  x   /  AlkPhos  x   04-29    PT/INR - ( 29 Apr 2025 08:09 )   PT: 12.3 sec;   INR: 1.05            Urinalysis Basic - ( 29 Apr 2025 08:09 )    Color: x / Appearance: x / SG: x / pH: x  Gluc: 80 mg/dL / Ketone: x  / Bili: x / Urobili: x   Blood: x / Protein: x / Nitrite: x   Leuk Esterase: x / RBC: x / WBC x   Sq Epi: x / Non Sq Epi: x / Bacteria: x        MICROBIOLOGY: No new microbiology results      RADIOLOGY & ADDITIONAL STUDIES: No new imaging.

## 2025-04-30 NOTE — PROGRESS NOTE ADULT - PROBLEM SELECTOR PLAN 3
Patient endorses no bowel movement since 3-4 days prior to admission. Patient reports no pain in abdomen or nausea, vomiting. Abdomen nondistended and nontender and normal bowel sounds    - Giving Mag citrate x1 on 4/28 will try additional dose on 4/29  - if no improvement will escalate to enema  - Bowel regimen: Miralax qd, Senna 2 tabs qhs  - consider movantik Patient endorses no bowel movement since 3-4 days prior to admission. Patient reports no pain in abdomen or nausea, vomiting. Abdomen nondistended and nontender and normal bowel sounds. S/p 2 doses of mag citrate and 1 round of enema with bowel movement improvement     - Ctm  - add dulcolax   - Bowel regimen: Miralax qd, Senna 2 tabs qhs  - consider movantik

## 2025-04-30 NOTE — PROGRESS NOTE ADULT - PROBLEM SELECTOR PLAN 4
Na 132 (4/29)   consider SIADH iso pain  Plan:   urine studies 4/29   - serum osm on 4/30 am  -ctm Na 132 (4/29) 134 4/30. Urine studies: sodium 27, protein 13, osmolality 403.  consider SIADH iso pain    Plan:  - serum osm 4/30 am  - ctm  - encourage PO intake

## 2025-04-30 NOTE — OCCUPATIONAL THERAPY INITIAL EVALUATION ADULT - ADDITIONAL COMMENTS
Pt r handed and wears glasses. Pt lives in [place] with attached bathroom +tub shower +gb. Pt typically ambulates with SC however has been utilizing RW recently 2/2 pain to RLE. Pt takes public transport to/from appointments, independently manages methadone schedule.

## 2025-04-30 NOTE — PROGRESS NOTE ADULT - SUBJECTIVE AND OBJECTIVE BOX
INTERVAL EVENTS:   No acute events overnight.    SUBJECTIVE:   Continues to have leg and mandibular pain.     PHYSICAL EXAM:  Constitutional: resting comfortably in bed; NAD  HEENT: anicteric sclera, right mandible region swollen and mildly red, poor dentition   Respiratory: CTA B/L; no W/R/R, no retractions  Cardiac: +S1/S2; RRR  Gastrointestinal: soft, NT/ND; no rebound or guarding;  Extremities: no edema, r lower extremity ace wrapped     VITAL SIGNS:  Vital Signs Last 24 Hrs  T(C): 36.4 (30 Apr 2025 05:54), Max: 36.7 (29 Apr 2025 12:15)  T(F): 97.6 (30 Apr 2025 05:54), Max: 98 (29 Apr 2025 12:15)  HR: 90 (30 Apr 2025 05:54) (87 - 91)  BP: 137/87 (29 Apr 2025 22:16) (137/87 - 153/94)  BP(mean): --  RR: 18 (30 Apr 2025 05:54) (18 - 18)  SpO2: 97% (30 Apr 2025 05:54) (97% - 98%)    Parameters below as of 30 Apr 2025 05:54  Patient On (Oxygen Delivery Method): room air      INPATIENT MEDICATIONS:   MEDICATIONS  (STANDING):  acetaminophen     Tablet .. 975 milliGRAM(s) Oral every 8 hours  bisacodyl 5 milliGRAM(s) Oral daily  chlorhexidine 0.12% Liquid 15 milliLiter(s) Swish and Spit two times a day  enoxaparin Injectable 40 milliGRAM(s) SubCutaneous every 24 hours  melatonin 5 milliGRAM(s) Oral at bedtime  methadone  Concentrate 200 milliGRAM(s) Oral every 24 hours  piperacillin/tazobactam IVPB.. 3.375 Gram(s) IV Intermittent every 8 hours  polyethylene glycol 3350 17 Gram(s) Oral every 24 hours  senna 2 Tablet(s) Oral at bedtime    MEDICATIONS  (PRN):  ketorolac   Injectable 15 milliGRAM(s) IV Push every 6 hours PRN Moderate Pain (4 - 6)  oxyCODONE    IR 10 milliGRAM(s) Oral every 4 hours PRN Severe Pain (7 - 10)    ALLERGIES:  Allergies    No Known Allergies    Intolerances    LABS:                       11.3   5.76  )-----------( 181      ( 29 Apr 2025 08:09 )             34.1     04-29    132[L]  |  99  |  15  ----------------------------<  80  4.5   |  26  |  0.74    Ca    8.0[L]      29 Apr 2025 08:09  Phos  3.1     04-29  Mg     2.0     04-29    TPro  x   /  Alb  x   /  TBili  0.3  /  DBili  x   /  AST  x   /  ALT  x   /  AlkPhos  x   04-29    PT/INR - ( 30 Apr 2025 05:30 )   PT: 11.6 sec;   INR: 1.01            Urinalysis Basic - ( 29 Apr 2025 08:09 )    Color: x / Appearance: x / SG: x / pH: x  Gluc: 80 mg/dL / Ketone: x  / Bili: x / Urobili: x   Blood: x / Protein: x / Nitrite: x   Leuk Esterase: x / RBC: x / WBC x   Sq Epi: x / Non Sq Epi: x / Bacteria: x      CAPILLARY BLOOD GLUCOSE        RADIOLOGY & ADDITIONAL TESTS: Reviewed.

## 2025-04-30 NOTE — PROGRESS NOTE ADULT - PROBLEM SELECTOR PLAN 7
Dvt ppx: lovenox  PPI ppx: none indicated  Diet: regular  Dispo: RMF  Activity: as tolerated Dvt ppx: lovenox  PPI ppx: none indicated  Diet: switch to regular  Dispo: RMF  Activity: as tolerated

## 2025-04-30 NOTE — OCCUPATIONAL THERAPY INITIAL EVALUATION ADULT - DIAGNOSIS, OT EVAL
Pt presents to [facility] with R calf pain now s/p I&D performed 4/28. OT consulted 2/2 dec balance impacting ADL and functional mob.

## 2025-04-30 NOTE — PROGRESS NOTE ADULT - PROBLEM SELECTOR PLAN 5
pt. reports taking 200mg of methadone daily. Methadone clinic confirmed 200 mg daily by Santiago Nguyen LPN  QTc 426 (4/27)    - Continue home Methadone 200mg qd will transition from po to Liquid to reduce pill burden pt. reports taking 200mg of methadone daily. Methadone clinic confirmed 200 mg daily by Santiago Nguyen LPN  QTc 426 (4/27)    - Continue home Methadone 200mg qd liquid formulation

## 2025-04-30 NOTE — PROGRESS NOTE ADULT - ATTENDING COMMENTS
RLE pain improving, but RLE more swollen than yesterday.  Exam notable for RLE swelling, wound clean.  WBC 5, Cr 0.12, BCx 4/29 pending, 4/27 GAS.  WCx pending.  TTE w/o vegetation.  Patient here with GAS baceteremia 2/2 RLE abscess and dental disease.  Since it is unclear if abscess is GAS monomicrobial or polymicrobial, and need to cover dental disease, it is reasonable to keep Zosyn for now.  f/u BCx surveillance and WCx.  Anticipate 2 weeks of IV abx to treat GAS bacteremia.       Team 1 will follow you.  Case d/w primary team.    Pat Espinoza MD, MS  Infectious Disease attending  office phone [phone]  For any questions during evening/weekend/holiday, please page ID on call .

## 2025-04-30 NOTE — OCCUPATIONAL THERAPY INITIAL EVALUATION ADULT - SHORT TERM MEMORY, REHAB EVAL
Patient Quality Outreach      Summary:    Patient has the following on her problem list/HM: None    Patient is due/failing the following:   Breast Cancer Screening - Mammogram    Type of outreach:    Sent letter.    Questions for provider review:    None                                                                                                                                     Rhiannon Mark CMA on 8/13/2021 at 2:24 PM       Chart routed to Care Team.           intact

## 2025-04-30 NOTE — PROGRESS NOTE ADULT - ASSESSMENT
69y M w/ PMHx opiate use disorder on chronic methadone, chronic Hep C never treated, cecal volvulus s/p R hemicolectomy, R inguinal hernia repair, p/w worsening purulent RLE wound after returning from trip to [place]. Per pt, noticed leg swelling as well as tooth pain when returning from trip, attempted to treat at home but had persistent drainage from RLE wound. Denies trauma to area, did have intermittent fevers along w/ vomiting from pain @home. CT RLE w/ collection w/in subcutaneous tissues of posterior mid calf w/ rim enhancement concerning for abscess formation, moderate edema surrounding calf. s/p bedside I&D w/ vascular sx on 4/28. Also found w/ right mandibular phlegmon vs abscess, s/p OFMS evaluation, most consistent w/ chronic changes 2/2 previous mandibular fracture. Also, found w/ 1/4 Bcx growing GAS likely 2/2 RLE wound. On exam, tenderness RLE, packed open wound w/ improvement.     Plan:   -c/w zosyn 3.375 q8 for empiric dosing, will cover for both RLE abscess, dental abscess  -stop vanco  -surveillance Bcx   -f/u wound cx   -obtain TTE given GAS bacteremia although less likely to cause endocarditis   -hx of HCV, never treated, interesting in tx, can initiate outpatient    69y M w/ PMHx opiate use disorder on chronic methadone, chronic Hep C never treated, cecal volvulus s/p R hemicolectomy, R inguinal hernia repair, p/w worsening purulent RLE wound after returning from trip to [place]. Per pt, noticed leg swelling as well as tooth pain when returning from trip, attempted to treat at home but had persistent drainage from RLE wound. Denies trauma to area, did have intermittent fevers along w/ vomiting from pain @home. CT RLE w/ collection w/in subcutaneous tissues of posterior mid calf w/ rim enhancement concerning for abscess formation, moderate edema surrounding calf. s/p bedside I&D w/ vascular sx on 4/28. Also found w/ right mandibular phlegmon vs abscess, s/p OFMS evaluation, most consistent w/ chronic changes 2/2 previous mandibular fracture. Also, found w/ 1/4 Bcx growing GAS likely 2/2 RLE wound. On exam, tenderness RLE, packed open wound w/ improvement.     Plan:  - obtain RLE Duplex to r/o DVT  - obtain TTE given GAS bacteremia although less likely to cause endocarditis   - f/u BCx surveillance and WCx  - c/w zosyn 3.375 q8 for empiric dosing, will cover for both RLE abscess, dental abscess  - hx of HCV, never treated, interesting in tx, can initiate outpatient

## 2025-04-30 NOTE — PROGRESS NOTE ADULT - ATTENDING COMMENTS
Follow up CT RLE w/ IV contrast results- abscess  vascular consulted s/p InD   will cw bs ABX - zosyn    omfs consulted - fu recs - they will see pt today - will fu recs      fu blood cultures  + strep pyogenes - cw zosyn fu surv cx - ID consulted- recommended TTE    cw methadone  - may need movantik   continue w toradol - started on gabapentin   rest as above

## 2025-04-30 NOTE — PROGRESS NOTE ADULT - PROBLEM SELECTOR PLAN 6
Pt. has known hepC, not on treatment. HIV negative (4/28)  He has an elevated protein gap which I suspect is secondary to his untreated hepC    - pt. needs outpatient hepC follow-up

## 2025-04-30 NOTE — PROGRESS NOTE ADULT - SUBJECTIVE AND OBJECTIVE BOX
DAILY PROGRESS NOTE    S: Patient Dressing changed bedside. Patient states his leg pain seems to be a little bit better today. He denies fevers/chills/chest pain/SOB.     O:     T(C): 36.4 (04-30-25 @ 05:54), Max: 36.7 (04-29-25 @ 12:15)  HR: 90 (04-30-25 @ 05:54) (87 - 91)  BP: 137/87 (04-29-25 @ 22:16) (137/87 - 153/94)  RR: 18 (04-30-25 @ 05:54) (18 - 18)  SpO2: 97% (04-30-25 @ 05:54) (97% - 98%)    Physical Exam:   General: NAD, pt resting comfortably in bed  Pulm: No respiratory distress, nonlabored breathing, on room air  CVS: NSR, HDS  Abd: Soft, NT, ND  Extremities: WWP, Posterior aspect of mid R calf with open wound and I+D created wound with no purulent drainage this AM. TTP circumferentially around wound. No erythema noted around wound. Venous stasis changes of the extremity noted.   Pulses: RLE palpable DP/PT      Labs:     LABS:  cret                        11.3   5.76  )-----------( 181      ( 29 Apr 2025 08:09 )             34.1     04-30    134[L]  |  100  |  17  ----------------------------<  83  4.6   |  27  |  1.02    Ca    8.6      30 Apr 2025 05:30  Phos  2.9     04-30  Mg     2.1     04-30    TPro  6.9  /  Alb  3.1[L]  /  TBili  0.2  /  DBili  x   /  AST  27  /  ALT  18  /  AlkPhos  107  04-30    PT/INR - ( 30 Apr 2025 05:30 )   PT: 11.6 sec;   INR: 1.01

## 2025-04-30 NOTE — OCCUPATIONAL THERAPY INITIAL EVALUATION ADULT - GENERAL OBSERVATIONS, REHAB EVAL
MARY Underwood aware of intent to eval. Pt received seated in chair +IV +NAD; pt left semisupine in bed +bed alarm +call bell with all lines intact and needs met, MARY Underwood made aware.,

## 2025-04-30 NOTE — PROGRESS NOTE ADULT - PROBLEM SELECTOR PLAN 1
Patient meeting 2/4 SIRS (T and HR) with source RLE cellulitis. Pt. states was previously draining purulent material.   He was seen for dental pain on 4/7/25 and noted to have an infection at that time. He was given appropriate abx and completed the course. Unlikely that this is the source of his infection given his RLE cellulitis. CRP 43.5 on 4/28  CT RLE 4/28 - Subcutaneous mid-calf fluid collection concerning for abscess formation. Vancomycin 1g q12h (4/27 - 4/29) therapeutic    - OMFS consulted for dental infection pending recommendations  - F/u BCx x2 - GAS   - surveillance cultures pending  - pending fluid cultures  - Empiric Zosyn 3.375g q8h (4/28 - ),   - f/u vanc trough prior to 4th dose - 4/29 am   - Toradol for pain control  - Vascular surgery consulted s/p bedside debridement of RLE abscess with vascular surgery Patient meeting 2/4 SIRS (T and HR) with source RLE cellulitis. Pt. states was previously draining purulent material.   He was seen for dental pain on 4/7/25 and noted to have an infection at that time. He was given appropriate abx and completed the course. Unlikely that this is the source of his infection given his RLE cellulitis. CRP 43.5 on 4/28  CT RLE 4/28 - Subcutaneous mid-calf fluid collection concerning for abscess formation. Vancomycin 1g q12h (4/27 - 4/29) therapeutic. S/p I&D by vascular team.     - OMFS consulted for dental infection pending recommendations  - F/u BCx x2 - GAS   - surveillance cultures pending  - pending fluid cultures  - Empiric Zosyn 3.375g q8h (4/28 - ),    - Increase Toradol dose for pain control  - Vascular surgery consulted s/p bedside debridement of RLE abscess with vascular surgery  - Gabapentin 100 TID for pain management

## 2025-04-30 NOTE — PROGRESS NOTE ADULT - ASSESSMENT
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 Patient admitted to medicine for sepsis due to RLE wound/cellulitis with concern for underlying abscess. Vitals stable - afebrile, HR 86, /72, on room air. Patient noted to be febrile in the ED to 102F oral. Labs - WBC 9.74, hgb 11.3, Cr 0.74, ESR/CRP elevated. On exam patient with approximately 1x1cm open wound on posterior aspect of R calf w/ serosanguinous drainage. Patient with palpable distal pulses. Currently being treated with vanc/zosyn. Vascular surgery consulted, patient found to have abscess on CT now s/p I+D by vascular on 4/28.        Vascular Surgery Recommendations:  - s/p bedside I&D of abscess (4/28/25)  - f/u wound culture  - Daily wound care  - Surveillance cx , IV abx for bacteremia  - Follow-up TTE  - Appreciate ID Reccs  - Vascular surgery will continue to follow  - Please call [phone] with any questions or concerns

## 2025-05-01 DIAGNOSIS — A41.9 SEPSIS, UNSPECIFIED ORGANISM: ICD-10-CM

## 2025-05-01 DIAGNOSIS — R78.81 BACTEREMIA: ICD-10-CM

## 2025-05-01 LAB
ALBUMIN SERPL ELPH-MCNC: 3 G/DL — LOW (ref 3.3–5)
ALP SERPL-CCNC: 126 U/L — HIGH (ref 40–120)
ALT FLD-CCNC: 20 U/L — SIGNIFICANT CHANGE UP (ref 10–45)
ANION GAP SERPL CALC-SCNC: 8 MMOL/L — SIGNIFICANT CHANGE UP (ref 5–17)
AST SERPL-CCNC: 30 U/L — SIGNIFICANT CHANGE UP (ref 10–40)
BASOPHILS # BLD AUTO: 0.03 K/UL — SIGNIFICANT CHANGE UP (ref 0–0.2)
BASOPHILS NFR BLD AUTO: 0.5 % — SIGNIFICANT CHANGE UP (ref 0–2)
BILIRUB SERPL-MCNC: 0.2 MG/DL — SIGNIFICANT CHANGE UP (ref 0.2–1.2)
BUN SERPL-MCNC: 22 MG/DL — SIGNIFICANT CHANGE UP (ref 7–23)
CALCIUM SERPL-MCNC: 8.8 MG/DL — SIGNIFICANT CHANGE UP (ref 8.4–10.5)
CHLORIDE SERPL-SCNC: 99 MMOL/L — SIGNIFICANT CHANGE UP (ref 96–108)
CO2 SERPL-SCNC: 27 MMOL/L — SIGNIFICANT CHANGE UP (ref 22–31)
CREAT SERPL-MCNC: 0.86 MG/DL — SIGNIFICANT CHANGE UP (ref 0.5–1.3)
EGFR: 93 ML/MIN/1.73M2 — SIGNIFICANT CHANGE UP
EGFR: 93 ML/MIN/1.73M2 — SIGNIFICANT CHANGE UP
EOSINOPHIL # BLD AUTO: 0.1 K/UL — SIGNIFICANT CHANGE UP (ref 0–0.5)
EOSINOPHIL NFR BLD AUTO: 1.8 % — SIGNIFICANT CHANGE UP (ref 0–6)
GLUCOSE SERPL-MCNC: 78 MG/DL — SIGNIFICANT CHANGE UP (ref 70–99)
HCT VFR BLD CALC: 37.6 % — LOW (ref 39–50)
HGB BLD-MCNC: 12.5 G/DL — LOW (ref 13–17)
IMM GRANULOCYTES NFR BLD AUTO: 0.4 % — SIGNIFICANT CHANGE UP (ref 0–0.9)
LYMPHOCYTES # BLD AUTO: 2.48 K/UL — SIGNIFICANT CHANGE UP (ref 1–3.3)
LYMPHOCYTES # BLD AUTO: 44.2 % — HIGH (ref 13–44)
MAGNESIUM SERPL-MCNC: 2 MG/DL — SIGNIFICANT CHANGE UP (ref 1.6–2.6)
MCHC RBC-ENTMCNC: 32.1 PG — SIGNIFICANT CHANGE UP (ref 27–34)
MCHC RBC-ENTMCNC: 33.2 G/DL — SIGNIFICANT CHANGE UP (ref 32–36)
MCV RBC AUTO: 96.4 FL — SIGNIFICANT CHANGE UP (ref 80–100)
MONOCYTES # BLD AUTO: 0.64 K/UL — SIGNIFICANT CHANGE UP (ref 0–0.9)
MONOCYTES NFR BLD AUTO: 11.4 % — SIGNIFICANT CHANGE UP (ref 2–14)
NEUTROPHILS # BLD AUTO: 2.34 K/UL — SIGNIFICANT CHANGE UP (ref 1.8–7.4)
NEUTROPHILS NFR BLD AUTO: 41.7 % — LOW (ref 43–77)
NRBC BLD AUTO-RTO: 0 /100 WBCS — SIGNIFICANT CHANGE UP (ref 0–0)
PHOSPHATE SERPL-MCNC: 3.3 MG/DL — SIGNIFICANT CHANGE UP (ref 2.5–4.5)
PLATELET # BLD AUTO: 258 K/UL — SIGNIFICANT CHANGE UP (ref 150–400)
POTASSIUM SERPL-MCNC: 5.1 MMOL/L — SIGNIFICANT CHANGE UP (ref 3.5–5.3)
POTASSIUM SERPL-SCNC: 5.1 MMOL/L — SIGNIFICANT CHANGE UP (ref 3.5–5.3)
PROT SERPL-MCNC: 7.2 G/DL — SIGNIFICANT CHANGE UP (ref 6–8.3)
RBC # BLD: 3.9 M/UL — LOW (ref 4.2–5.8)
RBC # FLD: 13.4 % — SIGNIFICANT CHANGE UP (ref 10.3–14.5)
SODIUM SERPL-SCNC: 134 MMOL/L — LOW (ref 135–145)
WBC # BLD: 5.61 K/UL — SIGNIFICANT CHANGE UP (ref 3.8–10.5)
WBC # FLD AUTO: 5.61 K/UL — SIGNIFICANT CHANGE UP (ref 3.8–10.5)

## 2025-05-01 PROCEDURE — 99232 SBSQ HOSP IP/OBS MODERATE 35: CPT | Mod: GC

## 2025-05-01 PROCEDURE — G0545: CPT

## 2025-05-01 PROCEDURE — 99233 SBSQ HOSP IP/OBS HIGH 50: CPT | Mod: GC

## 2025-05-01 RX ORDER — GABAPENTIN 400 MG/1
200 CAPSULE ORAL EVERY 8 HOURS
Refills: 0 | Status: DISCONTINUED | OUTPATIENT
Start: 2025-05-01 | End: 2025-05-02

## 2025-05-01 RX ORDER — LISINOPRIL 5 MG/1
20 TABLET ORAL DAILY
Refills: 0 | Status: DISCONTINUED | OUTPATIENT
Start: 2025-05-02 | End: 2025-05-03

## 2025-05-01 RX ORDER — TAMSULOSIN HYDROCHLORIDE 0.4 MG/1
0.4 CAPSULE ORAL AT BEDTIME
Refills: 0 | Status: DISCONTINUED | OUTPATIENT
Start: 2025-05-01 | End: 2025-05-04

## 2025-05-01 RX ORDER — GABAPENTIN 400 MG/1
100 CAPSULE ORAL ONCE
Refills: 0 | Status: COMPLETED | OUTPATIENT
Start: 2025-05-01 | End: 2025-05-01

## 2025-05-01 RX ORDER — AMLODIPINE BESYLATE 10 MG/1
10 TABLET ORAL EVERY 24 HOURS
Refills: 0 | Status: DISCONTINUED | OUTPATIENT
Start: 2025-05-01 | End: 2025-05-04

## 2025-05-01 RX ORDER — CEFTRIAXONE 500 MG/1
2000 INJECTION, POWDER, FOR SOLUTION INTRAMUSCULAR; INTRAVENOUS EVERY 24 HOURS
Refills: 0 | Status: DISCONTINUED | OUTPATIENT
Start: 2025-05-01 | End: 2025-05-04

## 2025-05-01 RX ADMIN — Medication 975 MILLIGRAM(S): at 21:13

## 2025-05-01 RX ADMIN — AMLODIPINE BESYLATE 10 MILLIGRAM(S): 10 TABLET ORAL at 13:29

## 2025-05-01 RX ADMIN — GABAPENTIN 100 MILLIGRAM(S): 400 CAPSULE ORAL at 08:56

## 2025-05-01 RX ADMIN — GABAPENTIN 100 MILLIGRAM(S): 400 CAPSULE ORAL at 00:55

## 2025-05-01 RX ADMIN — CEFTRIAXONE 100 MILLIGRAM(S): 500 INJECTION, POWDER, FOR SOLUTION INTRAMUSCULAR; INTRAVENOUS at 13:27

## 2025-05-01 RX ADMIN — POLYETHYLENE GLYCOL 3350 17 GRAM(S): 17 POWDER, FOR SOLUTION ORAL at 08:56

## 2025-05-01 RX ADMIN — Medication 25 GRAM(S): at 05:28

## 2025-05-01 RX ADMIN — Medication 5 MILLIGRAM(S): at 13:29

## 2025-05-01 RX ADMIN — Medication 975 MILLIGRAM(S): at 06:29

## 2025-05-01 RX ADMIN — GABAPENTIN 200 MILLIGRAM(S): 400 CAPSULE ORAL at 17:02

## 2025-05-01 RX ADMIN — ENOXAPARIN SODIUM 40 MILLIGRAM(S): 100 INJECTION SUBCUTANEOUS at 03:04

## 2025-05-01 RX ADMIN — TAMSULOSIN HYDROCHLORIDE 0.4 MILLIGRAM(S): 0.4 CAPSULE ORAL at 21:13

## 2025-05-01 RX ADMIN — Medication 975 MILLIGRAM(S): at 05:29

## 2025-05-01 RX ADMIN — KETOROLAC TROMETHAMINE 30 MILLIGRAM(S): 30 INJECTION, SOLUTION INTRAMUSCULAR; INTRAVENOUS at 08:56

## 2025-05-01 RX ADMIN — Medication 15 MILLILITER(S): at 05:29

## 2025-05-01 RX ADMIN — Medication 975 MILLIGRAM(S): at 21:53

## 2025-05-01 RX ADMIN — Medication 2 TABLET(S): at 21:13

## 2025-05-01 RX ADMIN — KETOROLAC TROMETHAMINE 15 MILLIGRAM(S): 30 INJECTION, SOLUTION INTRAMUSCULAR; INTRAVENOUS at 05:44

## 2025-05-01 RX ADMIN — Medication 40 MILLIGRAM(S): at 06:01

## 2025-05-01 RX ADMIN — KETOROLAC TROMETHAMINE 30 MILLIGRAM(S): 30 INJECTION, SOLUTION INTRAMUSCULAR; INTRAVENOUS at 21:55

## 2025-05-01 RX ADMIN — KETOROLAC TROMETHAMINE 30 MILLIGRAM(S): 30 INJECTION, SOLUTION INTRAMUSCULAR; INTRAVENOUS at 22:10

## 2025-05-01 RX ADMIN — KETOROLAC TROMETHAMINE 30 MILLIGRAM(S): 30 INJECTION, SOLUTION INTRAMUSCULAR; INTRAVENOUS at 09:11

## 2025-05-01 RX ADMIN — GABAPENTIN 100 MILLIGRAM(S): 400 CAPSULE ORAL at 09:23

## 2025-05-01 RX ADMIN — Medication 5 MILLIGRAM(S): at 21:13

## 2025-05-01 RX ADMIN — Medication 200 MILLIGRAM(S): at 13:26

## 2025-05-01 RX ADMIN — Medication 15 MILLILITER(S): at 17:05

## 2025-05-01 NOTE — PROGRESS NOTE ADULT - SUBJECTIVE AND OBJECTIVE BOX
INTERVAL EVENTS:   No acute events overnight.    SUBJECTIVE:         PHYSICAL EXAM:    VITAL SIGNS:  Vital Signs Last 24 Hrs  T(C): 36.5 (01 May 2025 06:04), Max: 36.8 (30 Apr 2025 20:36)  T(F): 97.7 (01 May 2025 06:04), Max: 98.2 (30 Apr 2025 20:36)  HR: 85 (01 May 2025 06:04) (85 - 98)  BP: 149/93 (01 May 2025 06:04) (149/93 - 168/103)  BP(mean): --  RR: 18 (01 May 2025 06:04) (18 - 18)  SpO2: 97% (01 May 2025 06:04) (97% - 98%)    Parameters below as of 01 May 2025 06:04  Patient On (Oxygen Delivery Method): room air      INPATIENT MEDICATIONS:   MEDICATIONS  (STANDING):  acetaminophen     Tablet .. 975 milliGRAM(s) Oral every 8 hours  bisacodyl 5 milliGRAM(s) Oral daily  chlorhexidine 0.12% Liquid 15 milliLiter(s) Swish and Spit two times a day  enoxaparin Injectable 40 milliGRAM(s) SubCutaneous every 24 hours  gabapentin 100 milliGRAM(s) Oral every 8 hours  melatonin 5 milliGRAM(s) Oral at bedtime  methadone  Concentrate 200 milliGRAM(s) Oral every 24 hours  pantoprazole    Tablet 40 milliGRAM(s) Oral before breakfast  piperacillin/tazobactam IVPB.. 3.375 Gram(s) IV Intermittent every 8 hours  polyethylene glycol 3350 17 Gram(s) Oral every 24 hours  senna 2 Tablet(s) Oral at bedtime    MEDICATIONS  (PRN):  bisacodyl Suppository 10 milliGRAM(s) Rectal daily PRN Constipation  ketorolac   Injectable 30 milliGRAM(s) IV Push every 8 hours PRN Severe Pain (7 - 10)    ALLERGIES:  Allergies    No Known Allergies    Intolerances    LABS:                       12.5   5.61  )-----------( 258      ( 01 May 2025 05:30 )             37.6     04-30    134[L]  |  100  |  17  ----------------------------<  83  4.6   |  27  |  1.02    Ca    8.6      30 Apr 2025 05:30  Phos  2.9     04-30  Mg     2.1     04-30    TPro  6.9  /  Alb  3.1[L]  /  TBili  0.2  /  DBili  x   /  AST  27  /  ALT  18  /  AlkPhos  107  04-30    PT/INR - ( 30 Apr 2025 05:30 )   PT: 11.6 sec;   INR: 1.01            Urinalysis Basic - ( 30 Apr 2025 05:30 )    Color: x / Appearance: x / SG: x / pH: x  Gluc: 83 mg/dL / Ketone: x  / Bili: x / Urobili: x   Blood: x / Protein: x / Nitrite: x   Leuk Esterase: x / RBC: x / WBC x   Sq Epi: x / Non Sq Epi: x / Bacteria: x      CAPILLARY BLOOD GLUCOSE        RADIOLOGY & ADDITIONAL TESTS: Reviewed.   INTERVAL EVENTS:   No acute events overnight.    SUBJECTIVE: Reports leg pain and swelling improving. Rates pain 8/10 today. Mouth pain same as yesterday. Has had bowel movements since augmenting regimen and feels well. Denies fever, chills, SOB, chest pain, nausea, vomiting, diarrhea.        PHYSICAL EXAM:  Eyes: anicteric sclera  Respiratory: CTA B/L; no W/R/R  Cardiac: +S1/S2; RRR; no murmur   Gastrointestinal: Central incision scar present to abdomen - healed well and no s/s of infection. abdomen soft, NT/ND. No rebound or guarding  Extremities: RLE wound with erythema and 3+ pitting edema extending from proximal to ankle joint and ending proximal to knee joint. no clubbing or cyanosis  Vascular: 2+ radial and pedal pulses  Dermatologic: Scars present from previous injuries- healed and no s/s of infection. RLE wound with erythema and warmth present. Currently bandaged with gauze.   Neurologic: AAOx3;; no focal deficits    VITAL SIGNS:  Vital Signs Last 24 Hrs  T(C): 36.5 (01 May 2025 06:04), Max: 36.8 (30 Apr 2025 20:36)  T(F): 97.7 (01 May 2025 06:04), Max: 98.2 (30 Apr 2025 20:36)  HR: 85 (01 May 2025 06:04) (85 - 98)  BP: 149/93 (01 May 2025 06:04) (149/93 - 168/103)  BP(mean): --  RR: 18 (01 May 2025 06:04) (18 - 18)  SpO2: 97% (01 May 2025 06:04) (97% - 98%)    Parameters below as of 01 May 2025 06:04  Patient On (Oxygen Delivery Method): room air      INPATIENT MEDICATIONS:   MEDICATIONS  (STANDING):  acetaminophen     Tablet .. 975 milliGRAM(s) Oral every 8 hours  bisacodyl 5 milliGRAM(s) Oral daily  chlorhexidine 0.12% Liquid 15 milliLiter(s) Swish and Spit two times a day  enoxaparin Injectable 40 milliGRAM(s) SubCutaneous every 24 hours  gabapentin 100 milliGRAM(s) Oral every 8 hours  melatonin 5 milliGRAM(s) Oral at bedtime  methadone  Concentrate 200 milliGRAM(s) Oral every 24 hours  pantoprazole    Tablet 40 milliGRAM(s) Oral before breakfast  piperacillin/tazobactam IVPB.. 3.375 Gram(s) IV Intermittent every 8 hours  polyethylene glycol 3350 17 Gram(s) Oral every 24 hours  senna 2 Tablet(s) Oral at bedtime    MEDICATIONS  (PRN):  bisacodyl Suppository 10 milliGRAM(s) Rectal daily PRN Constipation  ketorolac   Injectable 30 milliGRAM(s) IV Push every 8 hours PRN Severe Pain (7 - 10)    ALLERGIES:  Allergies    No Known Allergies    Intolerances    LABS:                       12.5   5.61  )-----------( 258      ( 01 May 2025 05:30 )             37.6     04-30    134[L]  |  100  |  17  ----------------------------<  83  4.6   |  27  |  1.02    Ca    8.6      30 Apr 2025 05:30  Phos  2.9     04-30  Mg     2.1     04-30    TPro  6.9  /  Alb  3.1[L]  /  TBili  0.2  /  DBili  x   /  AST  27  /  ALT  18  /  AlkPhos  107  04-30    PT/INR - ( 30 Apr 2025 05:30 )   PT: 11.6 sec;   INR: 1.01            Urinalysis Basic - ( 30 Apr 2025 05:30 )    Color: x / Appearance: x / SG: x / pH: x  Gluc: 83 mg/dL / Ketone: x  / Bili: x / Urobili: x   Blood: x / Protein: x / Nitrite: x   Leuk Esterase: x / RBC: x / WBC x   Sq Epi: x / Non Sq Epi: x / Bacteria: x      CAPILLARY BLOOD GLUCOSE        RADIOLOGY & ADDITIONAL TESTS: Reviewed.

## 2025-05-01 NOTE — PROGRESS NOTE ADULT - PROBLEM SELECTOR PLAN 4
Na 132 (4/29) 134 4/30. Urine studies: sodium 27, protein 13, osmolality 403.  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 Urine studies: sodium 27, protein 13, osmolality 403.  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 Patient reports no pain in abdomen or nausea, vomiting. Abdomen nondistended and nontender and normal bowel sounds. S/p 2 doses of mag citrate and 1 round of enema with bowel movement improvement     - Ctm  - Bowel regimen: Miralax qd, dulcolax, Senna 2 tabs qhs  - consider movantik

## 2025-05-01 NOTE — PROGRESS NOTE ADULT - ASSESSMENT
69y M w/ PMHx opiate use disorder on chronic methadone, chronic Hep C never treated, cecal volvulus s/p R hemicolectomy, R inguinal hernia repair, p/w worsening purulent RLE wound after returning from trip to [place]. Per pt, noticed leg swelling as well as tooth pain when returning from trip, attempted to treat at home but had persistent drainage from RLE wound. Denies trauma to area, did have intermittent fevers along w/ vomiting from pain @home. CT RLE w/ collection w/in subcutaneous tissues of posterior mid calf w/ rim enhancement concerning for abscess formation, moderate edema surrounding calf. s/p bedside I&D w/ vascular sx on 4/28. Also found w/ right mandibular phlegmon vs abscess, s/p OFMS evaluation, most consistent w/ chronic changes 2/2 previous mandibular fracture. Also, found w/ 1/4 Bcx growing GAS likely 2/2 RLE wound. On exam, tenderness RLE, packed open wound w/ improvement.     Plan:  - obtain RLE Duplex to r/o DVT  - obtain TTE given GAS bacteremia although less likely to cause endocarditis   - f/u BCx surveillance and WCx  - c/w zosyn 3.375 q8 for empiric dosing, will cover for both RLE abscess, dental abscess  - hx of HCV, never treated, interesting in tx, can initiate outpatient    69y M w/ PMHx opiate use disorder on chronic methadone, chronic Hep C never treated, cecal volvulus s/p R hemicolectomy, R inguinal hernia repair, p/w worsening purulent RLE wound after returning from trip to [place]. Per pt, noticed leg swelling as well as tooth pain when returning from trip, attempted to treat at home but had persistent drainage from RLE wound. Denies trauma to area, did have intermittent fevers along w/ vomiting from pain @home. CT RLE w/ collection w/in subcutaneous tissues of posterior mid calf w/ rim enhancement concerning for abscess formation, moderate edema surrounding calf. s/p bedside I&D w/ vascular sx on 4/28. Also found w/ right mandibular phlegmon vs abscess, s/p OFMS evaluation, most consistent w/ chronic changes 2/2 previous mandibular fracture. Also, found w/ 1/4 Bcx growing GAS likely 2/2 RLE wound. On exam, tenderness RLE, packed open wound w/ improvement.     Plan:  - obtain RLE Duplex to r/o DVT  - f/u BCx surveillance  - d/c zosyn 3.375g IV q8h  - start IV CTX 2g q24h (for total 2 week course, 4/29-5/12)  - hx of HCV, never treated, interesting in tx, can initiate outpatient  - ID signing off, can call back when pt ready for discharge   69y M w/ PMHx opiate use disorder on chronic methadone, chronic Hep C never treated, cecal volvulus s/p R hemicolectomy, R inguinal hernia repair, p/w worsening purulent RLE wound after returning from trip to [place]. Per pt, noticed leg swelling as well as tooth pain when returning from trip, attempted to treat at home but had persistent drainage from RLE wound. Denies trauma to area, did have intermittent fevers along w/ vomiting from pain @home. CT RLE w/ collection w/in subcutaneous tissues of posterior mid calf w/ rim enhancement concerning for abscess formation, moderate edema surrounding calf. s/p bedside I&D w/ vascular sx on 4/28. Also found w/ right mandibular phlegmon vs abscess, s/p OFMS evaluation, most consistent w/ chronic changes 2/2 previous mandibular fracture. Also, found w/ 1/4 Bcx growing GAS likely 2/2 RLE wound. On exam, tenderness RLE, packed open wound w/ improvement.     Plan:  - obtain RLE Duplex to r/o DVT  - f/u BCx surveillance  - d/c zosyn 3.375g IV q8h  - start IV CTX 2g q24h (for total 2 week course, 4/29-5/12)  - hx of HCV, never treated, interesting in tx, can initiate outpatient    #DISCHARGE  - Place single midline when patient ready for discahrge  - Discharge patient with ceftriaxone 2g IV q24h  - Duration of antibiotics is 2 weeks ( 4/29-5/12/25 )  - Weekly labs: CMP, CBC faxed to ID office at [phone]  - Patient to follow up with Dr. kaur in 2 weeks ([address], [address], [phone]), ID office will call patient to schedule   - will do HCV work-up as outpatient

## 2025-05-01 NOTE — PROGRESS NOTE ADULT - PROBLEM SELECTOR PLAN 1
Patient meeting 2/4 SIRS (T and HR) with source RLE cellulitis. Pt. states was previously draining purulent material.   He was seen for dental pain on 4/7/25 and noted to have an infection at that time. He was given appropriate abx and completed the course. Unlikely that this is the source of his infection given his RLE cellulitis. CRP 43.5 on 4/28  CT RLE 4/28 - Subcutaneous mid-calf fluid collection concerning for abscess formation. Vancomycin 1g q12h (4/27 - 4/29) therapeutic. S/p I&D by vascular team.     - OMFS consulted for dental infection pending recommendations  - F/u BCx x2 - GAS   - surveillance cultures pending  - pending fluid cultures  - Empiric Zosyn 3.375g q8h (4/28 - ),    - Increase Toradol dose for pain control  - Vascular surgery consulted s/p bedside debridement of RLE abscess with vascular surgery  - Gabapentin 100 TID for pain management Patient meeting 2/4 SIRS (T and HR) with source RLE cellulitis. Pt. states was previously draining purulent material.   He was seen for dental pain on 4/7/25 and noted to have an infection at that time. He was given appropriate abx and completed the course. Unlikely that this is the source of his infection given his RLE cellulitis. CRP 43.5 on 4/28  CT RLE 4/28 - Subcutaneous mid-calf fluid collection concerning for abscess formation. S/p Vancomycin 1g q12h (4/27 - 4/29) therapeutic. S/p Zosyn 4/28 - 5/1. S/p I&D by vascular team. Wound cx and blood cx demonstrating GAS.     - OMFS consulted for dental infection - recommend augmentin but will be covered with CTX  - F/u BCx x2 - GAS   - surveillance cultures NGTD  - Switch to CTX 2g once a day as per ID recs for 2 weeks.   - Empiric Zosyn 3.375g q8h (4/28 - 5/1 ),    - Toradol 30 mg dose for pain control  - Vascular surgery consulted s/p bedside debridement of RLE abscess with vascular surgery  - Gabapentin 200 TID for pain management

## 2025-05-01 NOTE — PROGRESS NOTE ADULT - PROBLEM SELECTOR PLAN 7
Dvt ppx: lovenox  PPI ppx: none indicated  Diet: switch to regular  Dispo: [facility]  Activity: as tolerated Pt. has known hepC, not on treatment. HIV negative (4/28)  He has an elevated protein gap which I suspect is secondary to his untreated hepC    - pt. needs outpatient hepC follow-up

## 2025-05-01 NOTE — PROGRESS NOTE ADULT - ASSESSMENT
Assessment/Plan: 69M with opiate use disorder, chronic hep C, cecal volvulus currently a/f sepsis secondary to a non-healing RLE wound. He also has progressive worsening of jaw pain over the past two years. 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 Based on clinical evaluation and radiographic findings, his mandibular symptoms are most consistent with chronic changes secondary to previous mandibular fracture.    - No Acute OMFS intervention at this time  - c/w unasyn or zosyn and can transition to Augmentin for total of 10 days   - Rec Peridex rinse BID x 2 weeks  - rest of care per primary   - follow up at Dr. Cardenas's office upon within one week upon discharge     [facility]  [address] [place] [address], [address]  [place] [address]  Tel: [facility] Phone Number [phone]

## 2025-05-01 NOTE — PROGRESS NOTE ADULT - ATTENDING COMMENTS
Patient feeling well, pain improving.  BCx ngtd since 4/29, and WCx grew GAS.  Patient here with GAS bacteremia 2/2 RLE abscess.  Switch zosyn to ceftriaxone 2g IV q24h for easy infusion - this will cover dental infection . Duration of abx is 2 weeks (4/29-5/12/25).  Per team, patient cannot get home infusion since he lives in [facility] housing and has to go to [facility].  Awaiting [facility] placement.  Weekly lab: CBC, CMP faxed to me.  We will set up ID follow up.  Will assess HCV management as outpatient.   Obtain duplex to r/o DVT of RLE.    Thank you for your consult.  Please re-consult us or call us with questions.  Case d/w primary team.    Pat Espinoza MD, MS  Infectious Disease attending  office phone [phone]  For any questions during evening/weekend/holiday, please page ID on call

## 2025-05-01 NOTE — PROGRESS NOTE ADULT - SUBJECTIVE AND OBJECTIVE BOX
Physical Medicine and Rehabilitation Progress Note :       Patient is a 69y old  Male who presents with a chief complaint of sepsis secondary to cellulitis (01 May 2025 08:55)      HPI:  Mr. Harding is a 69 year old male with opiate use disorder on Methadone, chronic hep C not on treatment and surgical history of R. 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 Patient reports the wound has been worsening for the last 2 weeks. He noted purulent drainage from the posterior aspect. Over the last 3 days he started to notice systemic fevers. Patient was seen in the ED a few weeks prior for a dental infection and was given augmentin + bactrim for which he finished the course. His symptoms started after finishing the course of PO abx.     In the ED:   VITAL SIGNS: Last 24 Hours  T(F): 98.7 (28 Apr 2025 01:00), Max: 102 (27 Apr 2025 21:20)  HR: 95 (28 Apr 2025 01:00) (71 - 99)  BP: 151/98 (28 Apr 2025 01:00) (138/65 - 180/97)  RR: 18 (28 Apr 2025 01:00) (15 - 18)  SpO2: 95% (28 Apr 2025 01:00) (95% - 97%)  Labs: WBC 10.2, total protein 7.7, albumin 2.7  Interventions: Morphine, tylenol, amlodipine 5, zofran, zosyn, vanc   (28 Apr 2025 01:52)                            12.5   5.61  )-----------( 258      ( 01 May 2025 05:30 )             37.6       05-01    134[L]  |  99  |  22  ----------------------------<  78  5.1   |  27  |  0.86    Ca    8.8      01 May 2025 05:30  Phos  3.3     05-01  Mg     2.0     05-01    TPro  7.2  /  Alb  3.0[L]  /  TBili  0.2  /  DBili  x   /  AST  30  /  ALT  20  /  AlkPhos  126[H]  05-01    Vital Signs Last 24 Hrs  T(C): 36.5 (01 May 2025 06:04), Max: 36.8 (30 Apr 2025 20:36)  T(F): 97.7 (01 May 2025 06:04), Max: 98.2 (30 Apr 2025 20:36)  HR: 85 (01 May 2025 06:04) (85 - 95)  BP: 149/93 (01 May 2025 06:04) (149/93 - 165/86)  BP(mean): --  RR: 18 (01 May 2025 06:04) (18 - 18)  SpO2: 97% (01 May 2025 06:04) (97% - 98%)    Parameters below as of 01 May 2025 06:04  Patient On (Oxygen Delivery Method): room air        MEDICATIONS  (STANDING):  acetaminophen     Tablet .. 975 milliGRAM(s) Oral every 8 hours  bisacodyl 5 milliGRAM(s) Oral daily  cefTRIAXone   IVPB 2000 milliGRAM(s) IV Intermittent every 24 hours  chlorhexidine 0.12% Liquid 15 milliLiter(s) Swish and Spit two times a day  enoxaparin Injectable 40 milliGRAM(s) SubCutaneous every 24 hours  gabapentin 200 milliGRAM(s) Oral every 8 hours  melatonin 5 milliGRAM(s) Oral at bedtime  methadone  Concentrate 200 milliGRAM(s) Oral every 24 hours  pantoprazole    Tablet 40 milliGRAM(s) Oral before breakfast  polyethylene glycol 3350 17 Gram(s) Oral every 24 hours  senna 2 Tablet(s) Oral at bedtime    MEDICATIONS  (PRN):  bisacodyl Suppository 10 milliGRAM(s) Rectal daily PRN Constipation  ketorolac   Injectable 30 milliGRAM(s) IV Push every 8 hours PRN Severe Pain (7 - 10)      T(C): 36.5 (05-01-25 @ 06:04), Max: 36.8 (04-30-25 @ 20:36)  HR: 85 (05-01-25 @ 06:04) (85 - 95)  BP: 149/93 (05-01-25 @ 06:04) (149/93 - 165/86)  RR: 18 (05-01-25 @ 06:04) (18 - 18)  SpO2: 97% (05-01-25 @ 06:04) (97% - 98%)    Physical Exam:   69 y o man lying comfortably in semi Caruso's position , awake , alert , no acute complaints     Head: normocephalic , atraumatic    Eyes: PERRLA , EOMI , no nystagmus , sclera anicteric    ENT / FACE: neg nasal discharge , uvula midline , no oropharyngeal erythema / exudate    Neck: supple , negative JVD , negative carotid bruits , no thyromegaly    Chest: CTA bilaterally      Cardiovascular: regular rate and rhythm , neg murmurs / rubs / gallops    Abdomen: soft , non distended , no tenderness to palpation in all 4 quadrants ,  normal bowel sounds     Extremities:  Erythema and drainage present on RLE wound with erythema    Neurologic Exam:     Alert and oriented to person , place , date/year     Cranial Nerves:           II:                         pupils equal , round and reactive to light , visual fields intact         III/ IV/VI:             extraocular movements intact , neg nystagmus , neg ptosis        V:                        facial sensation intact , V1-3 normal        VII:                      face symmetric , no droop , normal eye closure and smile        VIII:                     hearing intact to finger rub bilaterally        IX and X:             no hoarseness , gag intact , palate/ uvula rise symmetrically        XI:                       SCM / trapezius strength intact bilateral        XII:                      no tongue deviation    Motor Exam:        > 3+/5 x 4 extremities , without drift     Sensation:         intact to light touch x 4 extremities                            no neglect or extinction on double simultaneous testing    DTR:           biceps/brachioradialis: equal                            patella/ankle: equal          neg Babinski     Coordination:            Finger to Nose:  neg dysmetria bilaterally        4/30/2025  Functional Status Assessment :       Previous Level of Function:     · Bed Mobility/Transfers  needs device; SC + RW  · Bathing  independent  · Upper Body Dressing  independent  · Lower Body Dressing  independent  · Grooming  independent  · Toileting  independent  · Eating  independent  · Home Management Skills  independent  · Additional Comments  Pt r handed and wears glasses. Pt lives in [facility] with attached bathroom +tub shower +gb. Pt typically ambulates with SC however has been utilizing RW recently 2/2 pain to RLE. Pt takes public transport to/from appointments, independently manages methadone schedule.      Cognitive Status Examination:   · Level of Consciousness  alert  · Follow Commands/Answers Questions  100% of the time  · Personal Safety and Judgment  intact  · Short Term Memory  intact  · Long Term Memory  intact    Range of Motion Exam:   · Range of Motion Examination  Bilat shoulder flex to mod range otherwise AROM WNL; pt states 2/2 arthritis    Extremity Manual Muscle Testing:     · Right Upper Extremity  WFL except shoulder flex 3/5  · Left Upper Extremity  WFL except shoulder flex 3/5  · Right Lower Extremity  no strength deficits were identified  · Left Lower Extremity  no strength deficits were identified    Bed Mobility: Rolling/Turning:     · Level of [place]  independent    Bed Mobility: Scooting/Bridging:     · Level of [place]  independent    Bed Mobility: Sit to Supine:     · Level of [place]  independent    Bed Mobility: Supine to Sit:     · Level of [place]  independent    Transfer: Chair to Bed:     · Level of [place]  contact guard  · Physical Assist/Nonphysical Assist  1 person assist    Bed to Chair Safety Analysis:     · Impairments Contributing to Impaired Transfers  impaired balance    Transfer: Sit to Stand:     · Level of [place]  supervision  · Physical Assist/Nonphysical Assist  supervision; verbal cues  · Assistive Device  rolling walker    Transfer: Stand to Sit:     · Level of [place]  supervision  · Physical Assist/Nonphysical Assist  supervision; verbal cues  · Assistive Device  rolling walker    Sit/Stand Transfer Safety Analysis:     · Impairments Contributing to Impaired Transfers  impaired balance; pain    Transfer: Toilet Transfer:     · Level of [place]  deferred by patient    Balance Skills Assessment:     · Sitting Balance: Static  good balance  · Sitting Balance: Dynamic  good balance  · Standing Balance: Static  fair plus  · Standing Balance: Dynamic  fair balance    Sensory Examination:     Grossly Intact:   · Gross Sensory Examination  Grossly Intact; except bilat palms 75% of normal; equal bilaterally    · Balance Skills  Functional mob within room and hallway performed with CGA +RW      Light Touch Sensation:   · Left UE  mild impairment  · Right UE  mild impairment  · Left LE  within normal limits  · Right LE  within normal limits      Upper Body Dressing Training:     · Level of [place]  independent    Lower Body Dressing Training:     · Level of [place]  contact guard; don doff socks in single point stance +RW          PM&R Impression : as above    Current disposition plan recommendation :   [facility]

## 2025-05-01 NOTE — PROGRESS NOTE ADULT - PROBLEM SELECTOR PLAN 3
Patient endorses no bowel movement since 3-4 days prior to admission. Patient reports no pain in abdomen or nausea, vomiting. Abdomen nondistended and nontender and normal bowel sounds. S/p 2 doses of mag citrate and 1 round of enema with bowel movement improvement     - Ctm  - add dulcolax   - Bowel regimen: Miralax qd, Senna 2 tabs qhs  - consider movantik Patient endorses no bowel movement since 3-4 days prior to admission. Patient reports no pain in abdomen or nausea, vomiting. Abdomen nondistended and nontender and normal bowel sounds. S/p 2 doses of mag citrate and 1 round of enema with bowel movement improvement     - Ctm  - Bowel regimen: Miralax qd, dulcolax, Senna 2 tabs qhs  - consider movantik Blood cx from 4/27 and wound cx shows positivity for Group A Streptococcus. Zosyn 4/28-5/1. Vanc 4/27-4/29. Now on CTX 2g for 2 weeks     Plan   - Same as above plan for sepsis 2/2 cellulitis

## 2025-05-01 NOTE — PROGRESS NOTE ADULT - ATTENDING COMMENTS
Follow up CT RLE w/ IV contrast results- abscess  vascular consulted s/p InD   will cw bs ABX - zosyn    omfs consulted - fu recs - they will see pt today - will fu recs      fu blood cultures /wound cultures + strep pyogenes - cw zosyn fu surv cx - ID consulted- negative TTE  for IE-- will likely need 2 weeks of abx from 4/29 - will plan for dc after final ID recs   cw methadone  -    continue w toradol - started on gabapentin 100 tid   rest as above sepsis 2/2 severe SSTI and GAS bacteremia   s/p CT RLE w/ IV contrast results- abscess  vascular consulted s/p InD   will cw bs ABX - zosyn    omfs consulted - fu recs - they will see pt today - will fu recs      fu blood cultures /wound cultures + strep pyogenes - cw zosyn fu surv cx - ID consulted- negative TTE  for IE-- will  need 2 weeks of iv abx (2g qd rocephin)  from 4/29 - fu  final ID recs   cw methadone  -    continue w toradol - started on gabapentin 200 tid   rest as above

## 2025-05-01 NOTE — PROGRESS NOTE ADULT - ASSESSMENT
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 Patient admitted to medicine for sepsis due to RLE wound/cellulitis with concern for underlying abscess. Vitals stable - afebrile, HR 86, /72, on room air. Patient noted to be febrile in the ED to 102F oral. Labs - WBC 9.74, hgb 11.3, Cr 0.74, ESR/CRP elevated. On exam patient with approximately 1x1cm open wound on posterior aspect of R calf w/ serosanguinous drainage. Patient with palpable distal pulses. Currently being treated with vanc/zosyn. Vascular surgery consulted, patient found to have abscess on CT now s/p I+D by vascular on 4/28.        Vascular Surgery Recommendations:  - s/p bedside I&D of abscess (4/28/25)  - Daily wound care with packing of wound tunnel with packing strip after saline flush. Abd pad over wound and cover with kerlix wrap.  - WIll need VNS if planned d/c for home, if [facility] please copy wound care instructions above into discharge note.   - Surveillance cx , IV abx for bacteremia  - Appreciate ID Reccs  - Vascular surgery will continue to follow  - Please call [phone] with any questions or concerns

## 2025-05-01 NOTE — PROGRESS NOTE ADULT - SUBJECTIVE AND OBJECTIVE BOX
INTERVAL EVENTS: None    SUBJECTIVE/ROS: Patient seen and examined at bedside by surgical team.      MEDICATIONS  (STANDING):  acetaminophen     Tablet .. 975 milliGRAM(s) Oral every 8 hours  bisacodyl 5 milliGRAM(s) Oral daily  chlorhexidine 0.12% Liquid 15 milliLiter(s) Swish and Spit two times a day  enoxaparin Injectable 40 milliGRAM(s) SubCutaneous every 24 hours  gabapentin 100 milliGRAM(s) Oral every 8 hours  melatonin 5 milliGRAM(s) Oral at bedtime  methadone  Concentrate 200 milliGRAM(s) Oral every 24 hours  pantoprazole    Tablet 40 milliGRAM(s) Oral before breakfast  piperacillin/tazobactam IVPB.. 3.375 Gram(s) IV Intermittent every 8 hours  polyethylene glycol 3350 17 Gram(s) Oral every 24 hours  senna 2 Tablet(s) Oral at bedtime    MEDICATIONS  (PRN):  bisacodyl Suppository 10 milliGRAM(s) Rectal daily PRN Constipation  ketorolac   Injectable 30 milliGRAM(s) IV Push every 8 hours PRN Severe Pain (7 - 10)      LABS:                        12.5   5.61  )-----------( 258      ( 01 May 2025 05:30 )             37.6     05-01    134[L]  |  99  |  22  ----------------------------<  78  5.1   |  27  |  0.86    Ca    8.8      01 May 2025 05:30  Phos  3.3     05-01  Mg     2.0     05-01    TPro  7.2  /  Alb  3.0[L]  /  TBili  0.2  /  DBili  x   /  AST  30  /  ALT  20  /  AlkPhos  126[H]  05-01    PT/INR - ( 30 Apr 2025 05:30 )   PT: 11.6 sec;   INR: 1.01            Urinalysis Basic - ( 01 May 2025 05:30 )    Color: x / Appearance: x / SG: x / pH: x  Gluc: 78 mg/dL / Ketone: x  / Bili: x / Urobili: x   Blood: x / Protein: x / Nitrite: x   Leuk Esterase: x / RBC: x / WBC x   Sq Epi: x / Non Sq Epi: x / Bacteria: x          Vital Signs Last 24 Hrs  T(C): 36.5 (01 May 2025 06:04), Max: 36.8 (30 Apr 2025 20:36)  T(F): 97.7 (01 May 2025 06:04), Max: 98.2 (30 Apr 2025 20:36)  HR: 85 (01 May 2025 06:04) (85 - 98)  BP: 149/93 (01 May 2025 06:04) (149/93 - 168/103)  BP(mean): --  RR: 18 (01 May 2025 06:04) (18 - 18)  SpO2: 97% (01 May 2025 06:04) (97% - 98%)    Parameters below as of 01 May 2025 06:04  Patient On (Oxygen Delivery Method): room air        Physical Exam:  Gen: AAox3, NAD,  HEENT: NC/AT. normal conjunctiva. normal healing.   IOE: SERVANDO>35mm with difficulty opening and closing his mouth. malocclusion. Poor dentition with multiple root tips. no vestibule fullness or fluctuant. no purulent drainage. Inferior border of the mandible palpable bilaterally.   CN II-XII intact

## 2025-05-01 NOTE — PROGRESS NOTE ADULT - PROBLEM SELECTOR PLAN 8
Dvt ppx: lovenox  PPI ppx: none indicated  Diet: switch to regular  Dispo: RMF  Activity: as tolerated

## 2025-05-01 NOTE — PROGRESS NOTE ADULT - PROBLEM SELECTOR PLAN 6
Pt. has known hepC, not on treatment. HIV negative (4/28)  He has an elevated protein gap which I suspect is secondary to his untreated hepC    - pt. needs outpatient hepC follow-up pt. reports taking 200mg of methadone daily. Methadone clinic confirmed 200 mg daily by Santiago Nguyen LPN  QTc 426 (4/27)    - Continue home Methadone 200mg qd liquid formulation

## 2025-05-01 NOTE — PROGRESS NOTE ADULT - ASSESSMENT
69 year old male with opiate use disorder on Methadone, chronic hep C not on treatment and surgical history of R. 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 Patient has unilateral RLE warmth, erythema and draining which patient said was previously purulent concerning for MRSA infection. Course further complicated by established dental infection causing discomfort. 69 year old male with opiate use disorder on Methadone, chronic hep C not on treatment and surgical history of R. 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 Patient has unilateral RLE warmth, erythema and draining with positive Group A strep wound culture and blood culture concerning for bacteremia. Course further complicated by established dental infection causing discomfort.

## 2025-05-01 NOTE — PROGRESS NOTE ADULT - PROBLEM SELECTOR PLAN 5
pt. reports taking 200mg of methadone daily. Methadone clinic confirmed 200 mg daily by Santiago Nguyen LPN  QTc 426 (4/27)    - Continue home Methadone 200mg qd liquid formulation Na 132 (4/29) 134 4/30, 134 5/1. Urine studies: sodium 27, protein 13, osmolality 403.  consider SIADH iso pain    Plan:  - ctm  - encourage PO intake

## 2025-05-01 NOTE — PROGRESS NOTE ADULT - SUBJECTIVE AND OBJECTIVE BOX
INFECTIOUS DISEASES CONSULT FOLLOW-UP NOTE    INTERVAL HPI/OVERNIGHT EVENTS:      ROS:   Constitutional, eyes, ENT, cardiovascular, respiratory, gastrointestinal, genitourinary, integumentary, neurological, psychiatric and heme/lymph are otherwise negative other than noted above       ANTIBIOTICS/RELEVANT:    MEDICATIONS  (STANDING):  acetaminophen     Tablet .. 975 milliGRAM(s) Oral every 8 hours  bisacodyl 5 milliGRAM(s) Oral daily  chlorhexidine 0.12% Liquid 15 milliLiter(s) Swish and Spit two times a day  enoxaparin Injectable 40 milliGRAM(s) SubCutaneous every 24 hours  gabapentin 100 milliGRAM(s) Oral every 8 hours  melatonin 5 milliGRAM(s) Oral at bedtime  methadone  Concentrate 200 milliGRAM(s) Oral every 24 hours  pantoprazole    Tablet 40 milliGRAM(s) Oral before breakfast  piperacillin/tazobactam IVPB.. 3.375 Gram(s) IV Intermittent every 8 hours  polyethylene glycol 3350 17 Gram(s) Oral every 24 hours  senna 2 Tablet(s) Oral at bedtime    MEDICATIONS  (PRN):  bisacodyl Suppository 10 milliGRAM(s) Rectal daily PRN Constipation  ketorolac   Injectable 30 milliGRAM(s) IV Push every 8 hours PRN Severe Pain (7 - 10)        Vital Signs Last 24 Hrs  T(C): 36.5 (01 May 2025 06:04), Max: 36.8 (30 Apr 2025 20:36)  T(F): 97.7 (01 May 2025 06:04), Max: 98.2 (30 Apr 2025 20:36)  HR: 85 (01 May 2025 06:04) (85 - 98)  BP: 149/93 (01 May 2025 06:04) (149/93 - 168/103)  BP(mean): --  RR: 18 (01 May 2025 06:04) (18 - 18)  SpO2: 97% (01 May 2025 06:04) (97% - 98%)    Parameters below as of 01 May 2025 06:04  Patient On (Oxygen Delivery Method): room air        PHYSICAL EXAM:  Constitutional: alert, NAD  Eyes: the sclera and conjunctiva were normal.   ENT: the ears and nose were normal in appearance.   Neck: the appearance of the neck was normal and the neck was supple.   Pulmonary: no respiratory distress and lungs were clear to auscultation bilaterally.   Heart: heart rate was normal and rhythm regular, normal S1 and S2  Vascular:. there was no peripheral edema  Abdomen: normal bowel sounds, soft, non-tender  Neurological: no focal deficits.   Psychiatric: the affect was normal        LABS:                        12.5   5.61  )-----------( 258      ( 01 May 2025 05:30 )             37.6     04-30    134[L]  |  100  |  17  ----------------------------<  83  4.6   |  27  |  1.02    Ca    8.6      30 Apr 2025 05:30  Phos  2.9     04-30  Mg     2.1     04-30    TPro  6.9  /  Alb  3.1[L]  /  TBili  0.2  /  DBili  x   /  AST  27  /  ALT  18  /  AlkPhos  107  04-30    PT/INR - ( 30 Apr 2025 05:30 )   PT: 11.6 sec;   INR: 1.01            Urinalysis Basic - ( 30 Apr 2025 05:30 )    Color: x / Appearance: x / SG: x / pH: x  Gluc: 83 mg/dL / Ketone: x  / Bili: x / Urobili: x   Blood: x / Protein: x / Nitrite: x   Leuk Esterase: x / RBC: x / WBC x   Sq Epi: x / Non Sq Epi: x / Bacteria: x        MICROBIOLOGY:      RADIOLOGY & ADDITIONAL STUDIES:  Reviewed INFECTIOUS DISEASES CONSULT FOLLOW-UP NOTE    INTERVAL HPI/OVERNIGHT EVENTS: No AOE.    SUBJECTIVE: Patient seen at bedside, resting comfortably. Endorses pain and swelling in RLE, very tender especially during dressing changes. Denies any f/c, n/v/d. Endorses mild constipation and abdominal discomfort. Overall doing well.      ROS:   Constitutional, eyes, ENT, cardiovascular, respiratory, gastrointestinal, genitourinary, integumentary, neurological, psychiatric and heme/lymph are otherwise negative other than noted above       ANTIBIOTICS/RELEVANT:    MEDICATIONS  (STANDING):  acetaminophen     Tablet .. 975 milliGRAM(s) Oral every 8 hours  bisacodyl 5 milliGRAM(s) Oral daily  chlorhexidine 0.12% Liquid 15 milliLiter(s) Swish and Spit two times a day  enoxaparin Injectable 40 milliGRAM(s) SubCutaneous every 24 hours  gabapentin 100 milliGRAM(s) Oral every 8 hours  melatonin 5 milliGRAM(s) Oral at bedtime  methadone  Concentrate 200 milliGRAM(s) Oral every 24 hours  pantoprazole    Tablet 40 milliGRAM(s) Oral before breakfast  piperacillin/tazobactam IVPB.. 3.375 Gram(s) IV Intermittent every 8 hours  polyethylene glycol 3350 17 Gram(s) Oral every 24 hours  senna 2 Tablet(s) Oral at bedtime    MEDICATIONS  (PRN):  bisacodyl Suppository 10 milliGRAM(s) Rectal daily PRN Constipation  ketorolac   Injectable 30 milliGRAM(s) IV Push every 8 hours PRN Severe Pain (7 - 10)        Vital Signs Last 24 Hrs  T(C): 36.5 (01 May 2025 06:04), Max: 36.8 (30 Apr 2025 20:36)  T(F): 97.7 (01 May 2025 06:04), Max: 98.2 (30 Apr 2025 20:36)  HR: 85 (01 May 2025 06:04) (85 - 98)  BP: 149/93 (01 May 2025 06:04) (149/93 - 168/103)  BP(mean): --  RR: 18 (01 May 2025 06:04) (18 - 18)  SpO2: 97% (01 May 2025 06:04) (97% - 98%)    Parameters below as of 01 May 2025 06:04  Patient On (Oxygen Delivery Method): room air        PHYSICAL EXAM:  Constitutional: alert, NAD  Eyes: the sclera and conjunctiva were normal.   ENT: the ears and nose were normal in appearance.   Neck: the appearance of the neck was normal and the neck was supple.   Pulmonary: no respiratory distress and lungs were clear to auscultation bilaterally.   Heart: heart rate was normal and rhythm regular, normal S1 and S2  Ext: RLE gauze dressings removed for exam. RLE wound clean with packing. Edema, warmth, mild tenderness noted in RLE from foot to knee, worse in posterior aspect.  Vascular: DP/PT pulses 2+ b/l  Abdomen: normal bowel sounds, soft, mildly tender  Neurological: no focal deficits. AOx3  Psychiatric: the affect was normal        LABS:                        12.5   5.61  )-----------( 258      ( 01 May 2025 05:30 )             37.6     04-30    134[L]  |  100  |  17  ----------------------------<  83  4.6   |  27  |  1.02    Ca    8.6      30 Apr 2025 05:30  Phos  2.9     04-30  Mg     2.1     04-30    TPro  6.9  /  Alb  3.1[L]  /  TBili  0.2  /  DBili  x   /  AST  27  /  ALT  18  /  AlkPhos  107  04-30    PT/INR - ( 30 Apr 2025 05:30 )   PT: 11.6 sec;   INR: 1.01            Urinalysis Basic - ( 30 Apr 2025 05:30 )    Color: x / Appearance: x / SG: x / pH: x  Gluc: 83 mg/dL / Ketone: x  / Bili: x / Urobili: x   Blood: x / Protein: x / Nitrite: x   Leuk Esterase: x / RBC: x / WBC x   Sq Epi: x / Non Sq Epi: x / Bacteria: x        MICROBIOLOGY: Wound drainage cx + for GAS      RADIOLOGY & ADDITIONAL STUDIES: TTE showed no concern for infective endocarditis.

## 2025-05-01 NOTE — PROGRESS NOTE ADULT - SUBJECTIVE AND OBJECTIVE BOX
DAILY PROGRESS NOTE    S:     O:     T(C): 36.5 (05-01-25 @ 06:04), Max: 36.8 (04-30-25 @ 20:36)  HR: 85 (05-01-25 @ 06:04) (85 - 98)  BP: 149/93 (05-01-25 @ 06:04) (149/93 - 168/103)  RR: 18 (05-01-25 @ 06:04) (18 - 18)  SpO2: 97% (05-01-25 @ 06:04) (97% - 98%)    Physical Exam:   General: NAD, pt resting comfortably in bed  Pulm: No respiratory distress, nonlabored breathing, on room air  CVS: NSR, HDS  Abd: Soft, NT, ND  Extremities: WWP, Posterior aspect of mid R calf with open wound and I+D created wound with no purulent drainage this AM. wound bed is granulating well. Tenderness largely improved. No erythema noted around wound. Venous stasis changes of the extremity noted.   Pulses: RLE palpable DP/PT          Labs:     LABS:  cret                        12.5   5.61  )-----------( 258      ( 01 May 2025 05:30 )             37.6     05-01    134[L]  |  99  |  22  ----------------------------<  78  5.1   |  27  |  0.86    Ca    8.8      01 May 2025 05:30  Phos  3.3     05-01  Mg     2.0     05-01    TPro  7.2  /  Alb  3.0[L]  /  TBili  0.2  /  DBili  x   /  AST  30  /  ALT  20  /  AlkPhos  126[H]  05-01    PT/INR - ( 30 Apr 2025 05:30 )   PT: 11.6 sec;   INR: 1.01

## 2025-05-01 NOTE — PROGRESS NOTE ADULT - ASSESSMENT
I M    69 year old male with opiate use disorder on Methadone, chronic hep C not on treatment and surgical history of R. 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 Patient has unilateral RLE warmth, erythema and draining with positive Group A strep wound culture and blood culture concerning for bacteremia. Course further complicated by established dental infection causing discomfort.      Nutritional Assessment:  · Nutritional Assessment  This patient has been assessed with a concern for Malnutrition and has been determined to have a diagnosis/diagnoses of Severe protein-calorie malnutrition.    This patient is being managed with:   Diet Regular-  Supplement Feeding Modality:  Oral  Ensure Plus High Protein Cans or Servings Per Day:  1       Frequency:  Two Times a day  Entered: Apr 29 2025  6:40PM    Problem/Plan - 1:  ·  Problem: Sepsis due to cellulitis.   ·  Plan: Patient meeting 2/4 SIRS (T and HR) with source RLE cellulitis. Pt. states was previously draining purulent material.   He was seen for dental pain on 4/7/25 and noted to have an infection at that time. He was given appropriate abx and completed the course. Unlikely that this is the source of his infection given his RLE cellulitis. CRP 43.5 on 4/28  CT RLE 4/28 - Subcutaneous mid-calf fluid collection concerning for abscess formation. S/p Vancomycin 1g q12h (4/27 - 4/29) therapeutic. S/p Zosyn 4/28 - 5/1. S/p I&D by vascular team. Wound cx and blood cx demonstrating GAS.     - OMFS consulted for dental infection - recommend augmentin but will be covered with CTX  - F/u BCx x2 - GAS   - surveillance cultures NGTD  - Switch to CTX 2g once a day as per ID recs for 2 weeks.   - Empiric Zosyn 3.375g q8h (4/28 - 5/1 ),    - Toradol 30 mg dose for pain control  - Vascular surgery consulted s/p bedside debridement of RLE abscess with vascular surgery  - Gabapentin 200 TID for pain management.    Problem/Plan - 2:  ·  Problem: Cellulitis of right lower leg.   ·  Plan: Plan as above.    Problem/Plan - 3:  ·  Problem: Constipation.   ·  Plan: Patient endorses no bowel movement since 3-4 days prior to admission. Patient reports no pain in abdomen or nausea, vomiting. Abdomen nondistended and nontender and normal bowel sounds. S/p 2 doses of mag citrate and 1 round of enema with bowel movement improvement     - Ctm  - Bowel regimen: Miralax qd, dulcolax, Senna 2 tabs qhs  - consider movantik.    Problem/Plan - 4:  ·  Problem: Hyponatremia.   ·  Plan: Na 132 (4/29) 134 4/30, 134 5/1. Urine studies: sodium 27, protein 13, osmolality 403.  consider SIADH iso pain    Plan:  - ctm  - encourage PO intake.    Problem/Plan - 5:  ·  Problem: Methadone dependence.   ·  Plan: pt. reports taking 200mg of methadone daily. Methadone clinic confirmed 200 mg daily by Santiago Nguyen LPN  QTc 426 (4/27)    - Continue home Methadone 200mg qd liquid formulation.    Problem/Plan - 6:  ·  Problem: Chronic hepatitis C virus infection.   ·  Plan: Pt. has known hepC, not on treatment. HIV negative (4/28)  He has an elevated protein gap which I suspect is secondary to his untreated hepC    - pt. needs outpatient hepC follow-up.    Problem/Plan - 7:  ·  Problem: Prophylactic measure.   ·  Plan: Dvt ppx: lovenox  PPI ppx: none indicated  Diet: switch to regular  Dispo: RMF  Activity: as tolerated.    Attestation Statements:   Attestation Statements:  I have personally seen and examined the patient.  I fully participated in the care of this patient.  I have made amendments to the documentation where necessary, and agree with the history, physical exam, and plan as documented by the Resident.     sepsis 2/2 severe SSTI and GAS bacteremia   s/p CT RLE w/ IV contrast results- abscess  vascular consulted s/p InD   will cw bs ABX - zosyn    omfs consulted - fu recs - they will see pt today - will fu recs      fu blood cultures /wound cultures + strep pyogenes - cw zosyn fu surv cx - ID consulted- negative TTE  for IE-- will  need 2 weeks of iv abx (2g qd rocephin)  from 4/29 - fu  final ID recs   cw methadone  -    continue w toradol - started on gabapentin 200 tid   rest as above.

## 2025-05-02 PROCEDURE — 99233 SBSQ HOSP IP/OBS HIGH 50: CPT | Mod: GC

## 2025-05-02 RX ORDER — B1/B2/B3/B5/B6/B12/VIT C/FOLIC 500-0.5 MG
1 TABLET ORAL DAILY
Refills: 0 | Status: DISCONTINUED | OUTPATIENT
Start: 2025-05-02 | End: 2025-05-04

## 2025-05-02 RX ORDER — GABAPENTIN 400 MG/1
300 CAPSULE ORAL EVERY 8 HOURS
Refills: 0 | Status: DISCONTINUED | OUTPATIENT
Start: 2025-05-02 | End: 2025-05-04

## 2025-05-02 RX ORDER — GABAPENTIN 400 MG/1
100 CAPSULE ORAL ONCE
Refills: 0 | Status: COMPLETED | OUTPATIENT
Start: 2025-05-02 | End: 2025-05-02

## 2025-05-02 RX ADMIN — POLYETHYLENE GLYCOL 3350 17 GRAM(S): 17 POWDER, FOR SOLUTION ORAL at 08:34

## 2025-05-02 RX ADMIN — KETOROLAC TROMETHAMINE 30 MILLIGRAM(S): 30 INJECTION, SOLUTION INTRAMUSCULAR; INTRAVENOUS at 22:13

## 2025-05-02 RX ADMIN — CEFTRIAXONE 100 MILLIGRAM(S): 500 INJECTION, POWDER, FOR SOLUTION INTRAMUSCULAR; INTRAVENOUS at 13:49

## 2025-05-02 RX ADMIN — KETOROLAC TROMETHAMINE 30 MILLIGRAM(S): 30 INJECTION, SOLUTION INTRAMUSCULAR; INTRAVENOUS at 21:58

## 2025-05-02 RX ADMIN — KETOROLAC TROMETHAMINE 30 MILLIGRAM(S): 30 INJECTION, SOLUTION INTRAMUSCULAR; INTRAVENOUS at 08:33

## 2025-05-02 RX ADMIN — GABAPENTIN 100 MILLIGRAM(S): 400 CAPSULE ORAL at 11:37

## 2025-05-02 RX ADMIN — KETOROLAC TROMETHAMINE 30 MILLIGRAM(S): 30 INJECTION, SOLUTION INTRAMUSCULAR; INTRAVENOUS at 08:48

## 2025-05-02 RX ADMIN — GABAPENTIN 200 MILLIGRAM(S): 400 CAPSULE ORAL at 08:34

## 2025-05-02 RX ADMIN — Medication 40 MILLIGRAM(S): at 06:20

## 2025-05-02 RX ADMIN — Medication 15 MILLILITER(S): at 17:40

## 2025-05-02 RX ADMIN — Medication 1 TABLET(S): at 11:37

## 2025-05-02 RX ADMIN — Medication 200 MILLIGRAM(S): at 11:37

## 2025-05-02 RX ADMIN — Medication 5 MILLIGRAM(S): at 11:38

## 2025-05-02 RX ADMIN — TAMSULOSIN HYDROCHLORIDE 0.4 MILLIGRAM(S): 0.4 CAPSULE ORAL at 21:18

## 2025-05-02 RX ADMIN — Medication 5 MILLIGRAM(S): at 21:19

## 2025-05-02 RX ADMIN — Medication 2 TABLET(S): at 21:18

## 2025-05-02 RX ADMIN — Medication 15 MILLILITER(S): at 06:20

## 2025-05-02 RX ADMIN — GABAPENTIN 200 MILLIGRAM(S): 400 CAPSULE ORAL at 01:02

## 2025-05-02 RX ADMIN — AMLODIPINE BESYLATE 10 MILLIGRAM(S): 10 TABLET ORAL at 11:44

## 2025-05-02 RX ADMIN — GABAPENTIN 300 MILLIGRAM(S): 400 CAPSULE ORAL at 17:40

## 2025-05-02 NOTE — PROGRESS NOTE ADULT - ATTENDING COMMENTS
sepsis 2/2 severe SSTI and GAS bacteremia   s/p CT RLE w/ IV contrast results- abscess  vascular consulted s/p InD   will cw rocephin   omfs consulted - fu outpt and cw abx      fu blood cultures /wound cultures + strep pyogenes - cw zosyn fu surv cx - ID consulted- negative TTE  for IE--   per ID recs: Discharge patient with ceftriaxone 2g IV q24h  - Duration of antibiotics is 2 weeks ( 4/29-5/12/25 )  cw methadone  -    continue w toradol - started on gabapentin 300 tid   rest as above

## 2025-05-02 NOTE — PROGRESS NOTE ADULT - PROBLEM SELECTOR PLAN 3
Blood cx from 4/27 and wound cx shows positivity for Group A Streptococcus. Zosyn 4/28-5/1. Vanc 4/27-4/29. Now on CTX 2g for 2 weeks     Plan   - Same as above plan for sepsis 2/2 cellulitis Blood cx from 4/27 and wound cx shows positivity for Group A Streptococcus. Zosyn 4/28-5/1. Vanc 4/27-4/29.   Now on CTX 2g for 2 weeks (5/1 - )    - Same as above plan for sepsis 2/2 cellulitis

## 2025-05-02 NOTE — PROGRESS NOTE ADULT - PROBLEM SELECTOR PLAN 5
Na 132 (4/29) 134 4/30, 134 5/1. Urine studies: sodium 27, protein 13, osmolality 403.  consider SIADH iso pain    Plan:  - ctm  - encourage PO intake Na 132 (4/29) 134 4/30, 134 5/1. Urine studies: sodium 27, protein 13, osmolality 403.  consider SIADH iso pain    - C/w PO intake   - CTM CMP

## 2025-05-02 NOTE — CHART NOTE - NSCHARTNOTEFT_GEN_A_CORE
Admitting Diagnosis:   Patient is a 69y old  Male who presents with a chief complaint of sepsis secondary to cellulitis (02 May 2025 08:31)      PAST MEDICAL & SURGICAL HISTORY:  Methadone use  H/O right inguinal hernia repair  H/O right hemicolectomy    Current Nutrition Order: regular, Ensure plus high protein 2x/day       PO Intake: Good (%) [   ]  Fair (50-75%) [ x  ] Poor (<25%) [   ]    GI Issues: last BM 4/30 per chart    Pain: documented with severe pain    Skin Integrity: +1 edema to R leg    Labs:   05-01    134[L]  |  99  |  22  ----------------------------<  78  5.1   |  27  |  0.86    Ca    8.8      01 May 2025 05:30  Phos  3.3     05-01  Mg     2.0     05-01    TPro  7.2  /  Alb  3.0[L]  /  TBili  0.2  /  DBili  x   /  AST  30  /  ALT  20  /  AlkPhos  126[H]  05-01    CAPILLARY BLOOD GLUCOSE    Medications:  MEDICATIONS  (STANDING):  acetaminophen     Tablet .. 975 milliGRAM(s) Oral every 8 hours  amLODIPine   Tablet 10 milliGRAM(s) Oral every 24 hours  bisacodyl 5 milliGRAM(s) Oral daily  cefTRIAXone   IVPB 2000 milliGRAM(s) IV Intermittent every 24 hours  chlorhexidine 0.12% Liquid 15 milliLiter(s) Swish and Spit two times a day  enoxaparin Injectable 40 milliGRAM(s) SubCutaneous every 24 hours  gabapentin 300 milliGRAM(s) Oral every 8 hours  lisinopril 20 milliGRAM(s) Oral daily  melatonin 5 milliGRAM(s) Oral at bedtime  methadone  Concentrate 200 milliGRAM(s) Oral every 24 hours  multivitamin 1 Tablet(s) Oral daily  pantoprazole    Tablet 40 milliGRAM(s) Oral before breakfast  polyethylene glycol 3350 17 Gram(s) Oral every 24 hours  senna 2 Tablet(s) Oral at bedtime  tamsulosin 0.4 milliGRAM(s) Oral at bedtime    MEDICATIONS  (PRN):  bisacodyl Suppository 10 milliGRAM(s) Rectal daily PRN Constipation  ketorolac   Injectable 30 milliGRAM(s) IV Push every 8 hours PRN Severe Pain (7 - 10)      Weight:  Height for BMI (FEET)	5 Feet  Height for BMI (INCHES)	7 Inch(s)  Height for BMI (CENTIMETERS)	170.18 Centimeter(s)  Weight for BMI (lbs)	135 lb  Weight for BMI (kg)	61.2 kg  Body Mass Index	21.1    Weight Change: no new weights to assess.    [Severe Protein Calorie Malnutrition: Risk Assessment Completed 4/29]  - NFPE: severe muscle and fat wasting    Estimated energy needs:   Estimated Energy Needs Weight (lbs)	135 lb  Estimated Energy Needs Weight (kg)	61.2 kg  Estimated Energy Needs From (bessy/kg)	30  Estimated Energy Needs To (bessy/kg)	35  Estimated Energy Needs Calculated From (bessy/kg)	1836  Estimated Energy Needs Calculated To (bessy/kg)	2142    Estimated Protein Needs Weight (lbs)	135 lb  Estimated Protein Needs Weight (kg)	61.2 kg  Estimated Protein Needs From (g/kg)	1.2  Estimated Protein Needs To (g/kg)	1.5  Estimated Protein Needs Calculated From (g/kg)	73.44  Estimated Protein Needs Calculated To (g/kg)	91.8    Estimated Fluid Needs Weight (lbs)	135 lb  Estimated Fluid Needs Weight (kg)	61.2 kg  Estimated Fluid Needs From (ml/kg)	30  Estimated Fluid Needs To (ml/kg)	35  Estimated Fluid Needs Calculated From (ml/kg)	1836  Estimated Fluid Needs Calculated To (ml/kg)	2142  Other Calculations	 pounds. Patient within % IBW (91%) thus actual body weight used for all calculations. Needs adjusted for advanced age and malnutrition.    Subjective: Per H&P: 69 year old male with opiate use disorder on Methadone, chronic hep C not on treatment and surgical history of R. 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 Patient has unilateral RLE warmth, erythema and draining which patient said was previously purulent concerning for MRSA infection.    Patient seen at bedside for follow up assessment. Current diet order: regular, Ensure plus high protein 2x/day. Patient reports he is eating ~50-75% of meals and is enjoying the fortified foods. Patient was requesting another Ensure supplement, however informed patient that he is already receiving >100g of protein between the fortified foods and ensure 2x/day and discussed prioritizing food first. Patient verbalized understanding. Labs: sodium 134. Meds: antibiotic, MVI, bowel regimen. RD to f/u prn.    Previous Nutrition Diagnosis: Severe protein calorie malnutrition in context of chronic illness related to suspected inadequate intake as evidenced by severe muscle and fat wasting.    Active [ x  ]  Resolved [   ]    Goal: Patient to meet at least 75% of nutritional needs consistently     Recommendations:  1. Continue with regular diet and Ensure Plus High Protein 2x/day (350kcal, 20g protein per serving). Continue with fortified foods: strawberry vanilla smoothie, chocolate peanut butter smoothie, oatmeal, mashed potatoes.   2. Encourage pt to meet nutritional needs as able   3. Monitor labs: electrolytes, cbc  4. Monitor weights   5. Pain and bowel regimen per team   6. Will continue to assess/honor food preferences as able  7. Monitor adherence to diet education     Education: Discussed prioritizing foods first

## 2025-05-02 NOTE — PROGRESS NOTE ADULT - SUBJECTIVE AND OBJECTIVE BOX
DAILY PROGRESS NOTE    S:  Patient examined bedside, NAC. Dressing changed bedside.     O:     T(C): 36.5 (05-02-25 @ 05:54), Max: 37 (05-01-25 @ 12:27)  HR: 94 (05-02-25 @ 05:54) (83 - 94)  BP: 143/94 (05-02-25 @ 05:54) (143/94 - 199/66)  RR: 17 (05-02-25 @ 05:54) (17 - 18)  SpO2: 98% (05-02-25 @ 05:54) (98% - 98%)    Physical Exam:   General: NAD, pt resting comfortably in bed  Pulm: No respiratory distress, nonlabored breathing, on room air  CVS: NSR, HDS  Abd: Soft, NT, ND  Extremities: WWP, Posterior aspect of mid R calf with open wound and I+D created wound with no purulent drainage this AM. wound bed is granulating well. Tenderness largely improved. No erythema noted around wound. Venous stasis changes of the extremity noted.   Pulses: RLE palpable DP/PT                                        Labs:     LABS:  cret                        12.5   5.61  )-----------( 258      ( 01 May 2025 05:30 )             37.6     05-01    134[L]  |  99  |  22  ----------------------------<  78  5.1   |  27  |  0.86    Ca    8.8      01 May 2025 05:30  Phos  3.3     05-01  Mg     2.0     05-01    TPro  7.2  /  Alb  3.0[L]  /  TBili  0.2  /  DBili  x   /  AST  30  /  ALT  20  /  AlkPhos  126[H]  05-01

## 2025-05-02 NOTE — PROGRESS NOTE ADULT - PROBLEM SELECTOR PLAN 4
Patient endorses no bowel movement since 3-4 days prior to admission. Patient reports no pain in abdomen or nausea, vomiting. Abdomen nondistended and nontender and normal bowel sounds. S/p 2 doses of mag citrate and 1 round of enema with bowel movement improvement     - Ctm  - Bowel regimen: Miralax qd, dulcolax, Senna 2 tabs qhs  - consider movantik Patient endorses no bowel movement since 3-4 days prior to admission. Patient reports no pain in abdomen or nausea, vomiting. Abdomen nondistended and nontender and normal bowel sounds.   S/p 2 doses of mag citrate and 1 round of enema with bowel movement improvement     - CTM  - Bowel regimen: Miralax qd, dulcolax, Senna 2 tabs qhs  - consider movantik if nonresponsive

## 2025-05-02 NOTE — PROGRESS NOTE ADULT - PROBLEM SELECTOR PLAN 6
pt. reports taking 200mg of methadone daily. Methadone clinic confirmed 200 mg daily by Santiago Nguyen LPN  QTc 426 (4/27)    - Continue home Methadone 200mg qd liquid formulation

## 2025-05-02 NOTE — PROGRESS NOTE ADULT - ASSESSMENT
69 year old male with opiate use disorder on Methadone, chronic hep C not on treatment and surgical history of R. 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 Patient has unilateral RLE warmth, erythema and draining with positive Group A strep wound culture and blood culture concerning for bacteremia. Course further complicated by established dental infection causing discomfort. 69M w/ opiate use disorder on Methadone, chronic hep C not on treatment and surgical history of R. 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 Patient has unilateral RLE warmth, erythema and draining with positive Group A strep wound culture and blood culture concerning for bacteremia. Course further complicated by established dental infection causing discomfort.

## 2025-05-02 NOTE — PROGRESS NOTE ADULT - SUBJECTIVE AND OBJECTIVE BOX
INTERVAL HPI/OVERNIGHT EVENTS: alex    SUBJECTIVE: Patient seen and examined at bedside, resting comfortably in bed, and does not appear to be in any acute distress. Patient reports he/she is feeling well, denies any dizziness, lightheadedness, headache, chest pain, abdominal pain, nausea, vomiting, diarrhea, constipation.    Vital Signs Last 24 Hrs  T(C): 36.5 (02 May 2025 05:54), Max: 37 (01 May 2025 12:27)  T(F): 97.7 (02 May 2025 05:54), Max: 98.6 (01 May 2025 12:27)  HR: 94 (02 May 2025 05:54) (83 - 94)  BP: 143/94 (02 May 2025 05:54) (143/94 - 199/66)  BP(mean): --  RR: 17 (02 May 2025 05:54) (17 - 18)  SpO2: 98% (02 May 2025 05:54) (98% - 98%)    Parameters below as of 02 May 2025 05:54  Patient On (Oxygen Delivery Method): room air        PHYSICAL EXAM:  General: in no acute distress  Eyes: EOMI intact bilaterally. Anicteric sclerae, moist conjunctivae  HENT: Moist mucous membranes  Neck: Trachea midline, supple. No cervical lymphadenopathy in anterior or posterior chain.  Lungs: CTA B/L. No wheezes, rales, or rhonchi  Cardiovascular: RRR. No murmurs, rubs, or gallops  Abdomen: +BS, soft, non-tender non-distended; No rebound or guarding  Extremities: WWP, No clubbing, cyanosis or edema. 2+ peripheral pulses, cap refill <2 seconds  Neurological: AOx3 to person, place, time  Skin: Warm and dry. No obvious rash     LABS:                        12.5   5.61  )-----------( 258      ( 01 May 2025 05:30 )             37.6     05-01    134[L]  |  99  |  22  ----------------------------<  78  5.1   |  27  |  0.86    Ca    8.8      01 May 2025 05:30  Phos  3.3     05-01  Mg     2.0     05-01    TPro  7.2  /  Alb  3.0[L]  /  TBili  0.2  /  DBili  x   /  AST  30  /  ALT  20  /  AlkPhos  126[H]  05-01   INTERVAL HPI/OVERNIGHT EVENTS: alex    SUBJECTIVE: Patient seen and examined at bedside, resting in bed. He is having RLE pain and dental pain. He otherwise denies any dizziness, lightheadedness, headache, chest pain, abdominal pain, nausea, vomiting, diarrhea, constipation.    Vital Signs Last 24 Hrs  T(C): 36.5 (02 May 2025 05:54), Max: 37 (01 May 2025 12:27)  T(F): 97.7 (02 May 2025 05:54), Max: 98.6 (01 May 2025 12:27)  HR: 94 (02 May 2025 05:54) (83 - 94)  BP: 143/94 (02 May 2025 05:54) (143/94 - 199/66)  BP(mean): --  RR: 17 (02 May 2025 05:54) (17 - 18)  SpO2: 98% (02 May 2025 05:54) (98% - 98%)    Parameters below as of 02 May 2025 05:54  Patient On (Oxygen Delivery Method): room air    PHYSICAL EXAM:  General: in no acute distress  Eyes: EOMI intact bilaterally. Anicteric sclerae, moist conjunctivae  HENT: Moist mucous membranes, poor dentition  Neck: Trachea midline, supple. No cervical lymphadenopathy in anterior or posterior chain.  Lungs: CTA B/L. No wheezes, rales, or rhonchi  Cardiovascular: RRR. No murmurs, rubs, or gallops  Abdomen: +BS, soft, non-tender non-distended; No rebound or guarding  Extremities: WWP, No clubbing, cyanosis or edema. RLE with dressing intact, no active purulence or drainage  Neurological: Alert and oriented.  Skin: Warm and dry. No obvious rash     LABS:                        12.5   5.61  )-----------( 258      ( 01 May 2025 05:30 )             37.6     05-01    134[L]  |  99  |  22  ----------------------------<  78  5.1   |  27  |  0.86    Ca    8.8      01 May 2025 05:30  Phos  3.3     05-01  Mg     2.0     05-01    TPro  7.2  /  Alb  3.0[L]  /  TBili  0.2  /  DBili  x   /  AST  30  /  ALT  20  /  AlkPhos  126[H]  05-01

## 2025-05-02 NOTE — PROGRESS NOTE ADULT - PROBLEM SELECTOR PLAN 1
Patient meeting 2/4 SIRS (T and HR) with source RLE cellulitis. Pt. states was previously draining purulent material.   He was seen for dental pain on 4/7/25 and noted to have an infection at that time. He was given appropriate abx and completed the course. Unlikely that this is the source of his infection given his RLE cellulitis. CRP 43.5 on 4/28  CT RLE 4/28 - Subcutaneous mid-calf fluid collection concerning for abscess formation. S/p Vancomycin 1g q12h (4/27 - 4/29) therapeutic. S/p Zosyn 4/28 - 5/1. S/p I&D by vascular team. Wound cx and blood cx demonstrating GAS.     - OMFS consulted for dental infection - recommend augmentin but will be covered with CTX  - F/u BCx x2 - GAS   - surveillance cultures NGTD  - Switch to CTX 2g once a day as per ID recs for 2 weeks.   - Empiric Zosyn 3.375g q8h (4/28 - 5/1 ),    - Toradol 30 mg dose for pain control  - Vascular surgery consulted s/p bedside debridement of RLE abscess with vascular surgery  - Gabapentin 200 TID for pain management Patient meeting 2/4 SIRS (T and HR) with source RLE cellulitis. Pt. states was previously draining purulent material.   He was seen for dental pain on 4/7/25 and noted to have an infection at that time. He was given appropriate abx and completed the course. Unlikely that this is the source of his infection given his RLE cellulitis. CRP 43.5 on 4/28  CT RLE 4/28 - Subcutaneous mid-calf fluid collection concerning for abscess formation. S/p Vancomycin 1g q12h (4/27 - 4/29) therapeutic. S/p Zosyn 4/28 - 5/1. S/p I&D by vascular team.   Wound cx and BCx x2 - GAS  Empiric Zosyn 3.375g q8h (4/28 - 5/1 )    - OMFS consulted for dental infection - recommend augmentin, will be covered with CTX  - surveillance cultures NGTD  - C/w CTX 2G (5/1 - ) once a day as per ID recs for 2 weeks. Will receive peripheral line on Monday before d/c  - Toradol 30 mg dose for pain control  - Vascular surgery following, s/p bedside debridement of RLE abscess with vascular surgery  - Gabapentin 200 TID for pain management

## 2025-05-02 NOTE — PROGRESS NOTE ADULT - PROBLEM SELECTOR PLAN 7
Pt. has known hepC, not on treatment. HIV negative (4/28)  He has an elevated protein gap which I suspect is secondary to his untreated hepC    - pt. needs outpatient hepC follow-up Pt. has known hepC, not on treatment. HIV negative (4/28)  He has an elevated protein gap which I suspect is secondary to his untreated hepC    - outpatient hepC follow-up

## 2025-05-03 DIAGNOSIS — I10 ESSENTIAL (PRIMARY) HYPERTENSION: ICD-10-CM

## 2025-05-03 LAB
ANION GAP SERPL CALC-SCNC: 6 MMOL/L — SIGNIFICANT CHANGE UP (ref 5–17)
BUN SERPL-MCNC: 21 MG/DL — SIGNIFICANT CHANGE UP (ref 7–23)
CALCIUM SERPL-MCNC: 8.5 MG/DL — SIGNIFICANT CHANGE UP (ref 8.4–10.5)
CHLORIDE SERPL-SCNC: 98 MMOL/L — SIGNIFICANT CHANGE UP (ref 96–108)
CO2 SERPL-SCNC: 31 MMOL/L — SIGNIFICANT CHANGE UP (ref 22–31)
CREAT SERPL-MCNC: 0.89 MG/DL — SIGNIFICANT CHANGE UP (ref 0.5–1.3)
CULTURE RESULTS: SIGNIFICANT CHANGE UP
EGFR: 92 ML/MIN/1.73M2 — SIGNIFICANT CHANGE UP
EGFR: 92 ML/MIN/1.73M2 — SIGNIFICANT CHANGE UP
GLUCOSE SERPL-MCNC: 78 MG/DL — SIGNIFICANT CHANGE UP (ref 70–99)
POTASSIUM SERPL-MCNC: 4.8 MMOL/L — SIGNIFICANT CHANGE UP (ref 3.5–5.3)
POTASSIUM SERPL-SCNC: 4.8 MMOL/L — SIGNIFICANT CHANGE UP (ref 3.5–5.3)
SODIUM SERPL-SCNC: 135 MMOL/L — SIGNIFICANT CHANGE UP (ref 135–145)
SPECIMEN SOURCE: SIGNIFICANT CHANGE UP

## 2025-05-03 PROCEDURE — 99233 SBSQ HOSP IP/OBS HIGH 50: CPT | Mod: GC

## 2025-05-03 RX ORDER — LISINOPRIL 5 MG/1
40 TABLET ORAL DAILY
Refills: 0 | Status: DISCONTINUED | OUTPATIENT
Start: 2025-05-03 | End: 2025-05-03

## 2025-05-03 RX ORDER — LISINOPRIL 5 MG/1
20 TABLET ORAL ONCE
Refills: 0 | Status: COMPLETED | OUTPATIENT
Start: 2025-05-03 | End: 2025-05-03

## 2025-05-03 RX ORDER — ONDANSETRON HCL/PF 4 MG/2 ML
4 VIAL (ML) INJECTION ONCE
Refills: 0 | Status: COMPLETED | OUTPATIENT
Start: 2025-05-03 | End: 2025-05-03

## 2025-05-03 RX ORDER — ACETAMINOPHEN 500 MG/5ML
1000 LIQUID (ML) ORAL ONCE
Refills: 0 | Status: COMPLETED | OUTPATIENT
Start: 2025-05-03 | End: 2025-05-03

## 2025-05-03 RX ADMIN — AMLODIPINE BESYLATE 10 MILLIGRAM(S): 10 TABLET ORAL at 12:37

## 2025-05-03 RX ADMIN — Medication 5 MILLIGRAM(S): at 12:37

## 2025-05-03 RX ADMIN — KETOROLAC TROMETHAMINE 30 MILLIGRAM(S): 30 INJECTION, SOLUTION INTRAMUSCULAR; INTRAVENOUS at 18:57

## 2025-05-03 RX ADMIN — Medication 1 TABLET(S): at 12:37

## 2025-05-03 RX ADMIN — KETOROLAC TROMETHAMINE 30 MILLIGRAM(S): 30 INJECTION, SOLUTION INTRAMUSCULAR; INTRAVENOUS at 09:10

## 2025-05-03 RX ADMIN — Medication 4 MILLIGRAM(S): at 13:23

## 2025-05-03 RX ADMIN — Medication 15 MILLILITER(S): at 18:18

## 2025-05-03 RX ADMIN — CEFTRIAXONE 100 MILLIGRAM(S): 500 INJECTION, POWDER, FOR SOLUTION INTRAMUSCULAR; INTRAVENOUS at 12:37

## 2025-05-03 RX ADMIN — Medication 2 TABLET(S): at 21:16

## 2025-05-03 RX ADMIN — GABAPENTIN 300 MILLIGRAM(S): 400 CAPSULE ORAL at 16:30

## 2025-05-03 RX ADMIN — Medication 15 MILLILITER(S): at 06:30

## 2025-05-03 RX ADMIN — Medication 400 MILLIGRAM(S): at 13:22

## 2025-05-03 RX ADMIN — LISINOPRIL 20 MILLIGRAM(S): 5 TABLET ORAL at 06:30

## 2025-05-03 RX ADMIN — TAMSULOSIN HYDROCHLORIDE 0.4 MILLIGRAM(S): 0.4 CAPSULE ORAL at 21:16

## 2025-05-03 RX ADMIN — POLYETHYLENE GLYCOL 3350 17 GRAM(S): 17 POWDER, FOR SOLUTION ORAL at 08:53

## 2025-05-03 RX ADMIN — Medication 40 MILLIGRAM(S): at 06:30

## 2025-05-03 RX ADMIN — LISINOPRIL 20 MILLIGRAM(S): 5 TABLET ORAL at 11:06

## 2025-05-03 RX ADMIN — Medication 1000 MILLIGRAM(S): at 13:37

## 2025-05-03 RX ADMIN — KETOROLAC TROMETHAMINE 30 MILLIGRAM(S): 30 INJECTION, SOLUTION INTRAMUSCULAR; INTRAVENOUS at 18:41

## 2025-05-03 RX ADMIN — Medication 200 MILLIGRAM(S): at 12:37

## 2025-05-03 RX ADMIN — KETOROLAC TROMETHAMINE 30 MILLIGRAM(S): 30 INJECTION, SOLUTION INTRAMUSCULAR; INTRAVENOUS at 08:55

## 2025-05-03 RX ADMIN — GABAPENTIN 300 MILLIGRAM(S): 400 CAPSULE ORAL at 08:53

## 2025-05-03 RX ADMIN — GABAPENTIN 300 MILLIGRAM(S): 400 CAPSULE ORAL at 02:23

## 2025-05-03 RX ADMIN — Medication 5 MILLIGRAM(S): at 21:16

## 2025-05-03 NOTE — PROGRESS NOTE ADULT - TIME BILLING
RLE abscess, bacteremia.
bacteremia, leg abscess
Bedside exam and interview. Reviewed vitals, labs. Discussed patient's plan of care with housestaff. Documentation of encounter. Excludes teaching time and/or separately reported services.

## 2025-05-03 NOTE — PROGRESS NOTE ADULT - PROBLEM SELECTOR PROBLEM 2
Cellulitis of right lower leg

## 2025-05-03 NOTE — PROGRESS NOTE ADULT - PROBLEM SELECTOR PLAN 5
Na 132 (4/29) 134 4/30, 134 5/1. Urine studies: sodium 27, protein 13, osmolality 403.  consider SIADH iso pain    - C/w PO intake   - CTM CMP

## 2025-05-03 NOTE — PROGRESS NOTE ADULT - PROBLEM SELECTOR PROBLEM 1
Sepsis due to cellulitis

## 2025-05-03 NOTE — PROGRESS NOTE ADULT - NUTRITIONAL ASSESSMENT
This patient has been assessed with a concern for Malnutrition and has been determined to have a diagnosis/diagnoses of Severe protein-calorie malnutrition.    This patient is being managed with:   Diet Regular-  Supplement Feeding Modality:  Oral  Ensure Plus High Protein Cans or Servings Per Day:  1       Frequency:  Two Times a day  Entered: Apr 29 2025  6:40PM  

## 2025-05-03 NOTE — PROGRESS NOTE ADULT - PROBLEM SELECTOR PLAN 3
Blood cx from 4/27 and wound cx shows positivity for Group A Streptococcus. Zosyn 4/28-5/1. Vanc 4/27-4/29.   Now on CTX 2g for 2 weeks (5/1 - )    - Same as above plan for sepsis 2/2 cellulitis

## 2025-05-03 NOTE — PROGRESS NOTE ADULT - PROBLEM SELECTOR PLAN 8
Dvt ppx: lovenox  PPI ppx: none indicated  Diet: switch to regular  Dispo: RMF  Activity: as tolerated 5/3 on amlodipine 10 lisinopril 20, pressures persistently elevated throughout admission  more likely warranting medication adjustment than solely attributable to pain as he has remained hypertensive in periods of good pain control    - continue lisinopril 40 (increased from 20 on 5/3) & amlodipine 10 5/3 on amlodipine 10 lisinopril 20, pressures persistently elevated throughout admission  more likely warranting medication adjustment < attributing solely to pain as he has remained hypertensive in periods of good pain control    - continue lisinopril20-hctz 12.5 (changed from lisinopril 20 alone on 5/3) & amlodipine 10 **** 5/3 on amlodipine 10 lisinopril 20, pressures persistently elevated throughout admission  more likely warranting medication adjustment < attributing solely to pain as he has remained hypertensive in periods of good pain control    - continue lisinopril 20-hctz 12.5 (changed from lisinopril 20 alone on 5/3) & amlodipine 10

## 2025-05-03 NOTE — PROGRESS NOTE ADULT - ATTENDING COMMENTS
69M w/ opiate use disorder on Methadone, chronic hep C not on treatment and surgical history of R. 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 Patient has unilateral RLE warmth, erythema and draining with positive Group A strep wound culture and blood culture +GAS. TTE negative for IE. ID consulted, recommend Ceft x 2wks    Plan   -c/w Ceft x2weeks per ID recs   -vascular surgery recs, c/w wound care   -pain control   -outpt OMFS follow up   -c/w methadone   -pending SHERINE

## 2025-05-03 NOTE — PROGRESS NOTE ADULT - SUBJECTIVE AND OBJECTIVE BOX
DAILY PROGRESS NOTE    S:  Patient examined bedside, NAC.  Dressing changed. Plan for SHERINE sunday per primary.    O:     T(C): 36.6 (05-03-25 @ 06:16), Max: 36.9 (05-02-25 @ 11:40)  HR: 100 (05-03-25 @ 06:16) (81 - 100)  BP: 176/113 (05-03-25 @ 06:16) (147/62 - 176/113)  RR: 19 (05-03-25 @ 06:16) (18 - 19)  SpO2: 98% (05-03-25 @ 06:16) (97% - 98%)          Physical Exam:   General: NAD, pt resting comfortably in bed  Pulm: No respiratory distress, nonlabored breathing, on room air  CVS: NSR, HDS  Abd: Soft, NT, ND  Extremities: WWP, Posterior aspect of mid R calf with open wound and I+D created wound with no purulent drainage this AM. wound bed is granulating well. Tenderness largely improved. No erythema noted around wound. Venous stasis changes of the extremity noted.   Pulses: RLE palpable DP/PT                                   Labs: Pending

## 2025-05-03 NOTE — PROGRESS NOTE ADULT - PROBLEM/PLAN-7
DISPLAY PLAN FREE TEXT
3 = A little assistance

## 2025-05-03 NOTE — PROGRESS NOTE ADULT - ASSESSMENT
69M w/ opiate use disorder on Methadone, chronic hep C not on treatment and surgical history of R. 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 Patient has unilateral RLE warmth, erythema and draining with positive Group A strep wound culture and blood culture concerning for bacteremia. Course further complicated by established dental infection causing discomfort.

## 2025-05-03 NOTE — PROGRESS NOTE ADULT - PROBLEM SELECTOR PLAN 1
Patient meeting 2/4 SIRS (T and HR) with source RLE cellulitis. Pt. states was previously draining purulent material.   He was seen for dental pain on 4/7/25 and noted to have an infection at that time. He was given appropriate abx and completed the course. Unlikely that this is the source of his infection given his RLE cellulitis. CRP 43.5 on 4/28  CT RLE 4/28 - Subcutaneous mid-calf fluid collection concerning for abscess formation. S/p Vancomycin 1g q12h (4/27 - 4/29) therapeutic. S/p Zosyn 4/28 - 5/1. S/p I&D by vascular team.   Wound cx and BCx x2 - GAS  Empiric Zosyn 3.375g q8h (4/28 - 5/1 )    - OMFS consulted for dental infection - recommend augmentin, will be covered with CTX  - surveillance cultures NGTD  - C/w CTX 2G (5/1 - ) once a day as per ID recs for 2 weeks. Will receive peripheral line on Monday before d/c  - Toradol 30 mg dose for pain control  - Vascular surgery following, s/p bedside debridement of RLE abscess with vascular surgery  - Gabapentin 200 TID for pain management

## 2025-05-03 NOTE — PROGRESS NOTE ADULT - PROBLEM SELECTOR PLAN 4
Patient endorses no bowel movement since 3-4 days prior to admission. Patient reports no pain in abdomen or nausea, vomiting. Abdomen nondistended and nontender and normal bowel sounds.   S/p 2 doses of mag citrate and 1 round of enema with bowel movement improvement     - CTM  - Bowel regimen: Miralax qd, dulcolax, Senna 2 tabs qhs  - consider movantik if nonresponsive

## 2025-05-03 NOTE — PROGRESS NOTE ADULT - PROBLEM SELECTOR PLAN 7
Pt. has known hepC, not on treatment. HIV negative (4/28)  He has an elevated protein gap which I suspect is secondary to his untreated hepC    - outpatient hepC follow-up

## 2025-05-03 NOTE — PROGRESS NOTE ADULT - SUBJECTIVE AND OBJECTIVE BOX
INTERVAL/OVERNIGHT EVENTS: None    SUBJECTIVE:  Patient seen and examined at bedside, reports significant rle pain, states he does not find pain regimen effective, worries his treatment with methadone has caused him to have high tolerance for analgesics. No reported fever, chest pain, dyspnea, abdominal pain.     Vital Signs Last 12 Hrs  T(F): 97.9 (05-03-25 @ 06:16), Max: 97.9 (05-03-25 @ 06:16)  HR: 100 (05-03-25 @ 06:16) (100 - 100)  BP: 176/113 (05-03-25 @ 06:16) (176/113 - 176/113)  BP(mean): --  RR: 19 (05-03-25 @ 06:16) (19 - 19)  SpO2: 98% (05-03-25 @ 06:16) (98% - 98%)  I&O's Summary      PHYSICAL EXAM:  General: in no acute distress  Eyes: EOMI intact bilaterally. Anicteric sclerae, moist conjunctivae  HENT: Moist mucous membranes, poor dentition  Neck: Trachea midline, supple. No cervical lymphadenopathy in anterior or posterior chain.  Lungs: CTA B/L. No wheezes, rales, or rhonchi  Cardiovascular: RRR. No murmurs, rubs, or gallops  Abdomen: +BS, soft, non-tender non-distended; No rebound or guarding  Extremities: WWP, RLE wrapped, dressing without signs of purulence or discharge, edema notable to few cm over dressing  Neurological: Alert and oriented.  Skin: Warm and dry. No obvious rash     LABS:      RADIOLOGY & ADDITIONAL TESTS:    MEDICATIONS  (STANDING):  acetaminophen     Tablet .. 975 milliGRAM(s) Oral every 8 hours  amLODIPine   Tablet 10 milliGRAM(s) Oral every 24 hours  bisacodyl 5 milliGRAM(s) Oral daily  cefTRIAXone   IVPB 2000 milliGRAM(s) IV Intermittent every 24 hours  chlorhexidine 0.12% Liquid 15 milliLiter(s) Swish and Spit two times a day  enoxaparin Injectable 40 milliGRAM(s) SubCutaneous every 24 hours  gabapentin 300 milliGRAM(s) Oral every 8 hours  lisinopril 20 milliGRAM(s) Oral daily  melatonin 5 milliGRAM(s) Oral at bedtime  methadone  Concentrate 200 milliGRAM(s) Oral every 24 hours  multivitamin 1 Tablet(s) Oral daily  pantoprazole    Tablet 40 milliGRAM(s) Oral before breakfast  polyethylene glycol 3350 17 Gram(s) Oral every 24 hours  senna 2 Tablet(s) Oral at bedtime  tamsulosin 0.4 milliGRAM(s) Oral at bedtime    MEDICATIONS  (PRN):  bisacodyl Suppository 10 milliGRAM(s) Rectal daily PRN Constipation  ketorolac   Injectable 30 milliGRAM(s) IV Push every 8 hours PRN Severe Pain (7 - 10)

## 2025-05-04 VITALS
OXYGEN SATURATION: 94 % | SYSTOLIC BLOOD PRESSURE: 173 MMHG | DIASTOLIC BLOOD PRESSURE: 78 MMHG | TEMPERATURE: 98 F | RESPIRATION RATE: 18 BRPM | HEART RATE: 88 BPM

## 2025-05-04 LAB
ANION GAP SERPL CALC-SCNC: 5 MMOL/L — SIGNIFICANT CHANGE UP (ref 5–17)
BASOPHILS # BLD AUTO: 0.01 K/UL — SIGNIFICANT CHANGE UP (ref 0–0.2)
BASOPHILS NFR BLD AUTO: 0.1 % — SIGNIFICANT CHANGE UP (ref 0–2)
BUN SERPL-MCNC: 24 MG/DL — HIGH (ref 7–23)
CALCIUM SERPL-MCNC: 8.6 MG/DL — SIGNIFICANT CHANGE UP (ref 8.4–10.5)
CHLORIDE SERPL-SCNC: 99 MMOL/L — SIGNIFICANT CHANGE UP (ref 96–108)
CO2 SERPL-SCNC: 29 MMOL/L — SIGNIFICANT CHANGE UP (ref 22–31)
CREAT SERPL-MCNC: 0.86 MG/DL — SIGNIFICANT CHANGE UP (ref 0.5–1.3)
CULTURE RESULTS: ABNORMAL
CULTURE RESULTS: SIGNIFICANT CHANGE UP
EGFR: 93 ML/MIN/1.73M2 — SIGNIFICANT CHANGE UP
EGFR: 93 ML/MIN/1.73M2 — SIGNIFICANT CHANGE UP
EOSINOPHIL # BLD AUTO: 0.06 K/UL — SIGNIFICANT CHANGE UP (ref 0–0.5)
EOSINOPHIL NFR BLD AUTO: 0.7 % — SIGNIFICANT CHANGE UP (ref 0–6)
GLUCOSE SERPL-MCNC: 66 MG/DL — LOW (ref 70–99)
HCT VFR BLD CALC: 36.6 % — LOW (ref 39–50)
HGB BLD-MCNC: 12 G/DL — LOW (ref 13–17)
IMM GRANULOCYTES NFR BLD AUTO: 0.5 % — SIGNIFICANT CHANGE UP (ref 0–0.9)
LYMPHOCYTES # BLD AUTO: 1.61 K/UL — SIGNIFICANT CHANGE UP (ref 1–3.3)
LYMPHOCYTES # BLD AUTO: 19.2 % — SIGNIFICANT CHANGE UP (ref 13–44)
MAGNESIUM SERPL-MCNC: 2 MG/DL — SIGNIFICANT CHANGE UP (ref 1.6–2.6)
MCHC RBC-ENTMCNC: 31.3 PG — SIGNIFICANT CHANGE UP (ref 27–34)
MCHC RBC-ENTMCNC: 32.8 G/DL — SIGNIFICANT CHANGE UP (ref 32–36)
MCV RBC AUTO: 95.3 FL — SIGNIFICANT CHANGE UP (ref 80–100)
MONOCYTES # BLD AUTO: 0.74 K/UL — SIGNIFICANT CHANGE UP (ref 0–0.9)
MONOCYTES NFR BLD AUTO: 8.8 % — SIGNIFICANT CHANGE UP (ref 2–14)
NEUTROPHILS # BLD AUTO: 5.93 K/UL — SIGNIFICANT CHANGE UP (ref 1.8–7.4)
NEUTROPHILS NFR BLD AUTO: 70.7 % — SIGNIFICANT CHANGE UP (ref 43–77)
NRBC BLD AUTO-RTO: 0 /100 WBCS — SIGNIFICANT CHANGE UP (ref 0–0)
PHOSPHATE SERPL-MCNC: 3.2 MG/DL — SIGNIFICANT CHANGE UP (ref 2.5–4.5)
PLATELET # BLD AUTO: 272 K/UL — SIGNIFICANT CHANGE UP (ref 150–400)
POTASSIUM SERPL-MCNC: 5 MMOL/L — SIGNIFICANT CHANGE UP (ref 3.5–5.3)
POTASSIUM SERPL-SCNC: 5 MMOL/L — SIGNIFICANT CHANGE UP (ref 3.5–5.3)
RBC # BLD: 3.84 M/UL — LOW (ref 4.2–5.8)
RBC # FLD: 13.7 % — SIGNIFICANT CHANGE UP (ref 10.3–14.5)
SODIUM SERPL-SCNC: 133 MMOL/L — LOW (ref 135–145)
SPECIMEN SOURCE: SIGNIFICANT CHANGE UP
SPECIMEN SOURCE: SIGNIFICANT CHANGE UP
WBC # BLD: 8.39 K/UL — SIGNIFICANT CHANGE UP (ref 3.8–10.5)
WBC # FLD AUTO: 8.39 K/UL — SIGNIFICANT CHANGE UP (ref 3.8–10.5)

## 2025-05-04 PROCEDURE — 86900 BLOOD TYPING SEROLOGIC ABO: CPT

## 2025-05-04 PROCEDURE — 82247 BILIRUBIN TOTAL: CPT

## 2025-05-04 PROCEDURE — 86140 C-REACTIVE PROTEIN: CPT

## 2025-05-04 PROCEDURE — 85025 COMPLETE CBC W/AUTO DIFF WBC: CPT

## 2025-05-04 PROCEDURE — 87070 CULTURE OTHR SPECIMN AEROBIC: CPT

## 2025-05-04 PROCEDURE — 87389 HIV-1 AG W/HIV-1&-2 AB AG IA: CPT

## 2025-05-04 PROCEDURE — 87186 SC STD MICRODIL/AGAR DIL: CPT

## 2025-05-04 PROCEDURE — 83930 ASSAY OF BLOOD OSMOLALITY: CPT

## 2025-05-04 PROCEDURE — 87040 BLOOD CULTURE FOR BACTERIA: CPT

## 2025-05-04 PROCEDURE — 87075 CULTR BACTERIA EXCEPT BLOOD: CPT

## 2025-05-04 PROCEDURE — 96365 THER/PROPH/DIAG IV INF INIT: CPT

## 2025-05-04 PROCEDURE — 87150 DNA/RNA AMPLIFIED PROBE: CPT

## 2025-05-04 PROCEDURE — 85730 THROMBOPLASTIN TIME PARTIAL: CPT

## 2025-05-04 PROCEDURE — 84300 ASSAY OF URINE SODIUM: CPT

## 2025-05-04 PROCEDURE — 84100 ASSAY OF PHOSPHORUS: CPT

## 2025-05-04 PROCEDURE — 93306 TTE W/DOPPLER COMPLETE: CPT

## 2025-05-04 PROCEDURE — 80048 BASIC METABOLIC PNL TOTAL CA: CPT

## 2025-05-04 PROCEDURE — 99285 EMERGENCY DEPT VISIT HI MDM: CPT | Mod: 25

## 2025-05-04 PROCEDURE — 71045 X-RAY EXAM CHEST 1 VIEW: CPT

## 2025-05-04 PROCEDURE — 87077 CULTURE AEROBIC IDENTIFY: CPT

## 2025-05-04 PROCEDURE — 96367 TX/PROPH/DG ADDL SEQ IV INF: CPT

## 2025-05-04 PROCEDURE — 73701 CT LOWER EXTREMITY W/DYE: CPT | Mod: MC

## 2025-05-04 PROCEDURE — 97161 PT EVAL LOW COMPLEX 20 MIN: CPT

## 2025-05-04 PROCEDURE — 84540 ASSAY OF URINE/UREA-N: CPT

## 2025-05-04 PROCEDURE — 85610 PROTHROMBIN TIME: CPT

## 2025-05-04 PROCEDURE — 82570 ASSAY OF URINE CREATININE: CPT

## 2025-05-04 PROCEDURE — 84133 ASSAY OF URINE POTASSIUM: CPT

## 2025-05-04 PROCEDURE — 83605 ASSAY OF LACTIC ACID: CPT

## 2025-05-04 PROCEDURE — 84156 ASSAY OF PROTEIN URINE: CPT

## 2025-05-04 PROCEDURE — 86901 BLOOD TYPING SEROLOGIC RH(D): CPT

## 2025-05-04 PROCEDURE — 83935 ASSAY OF URINE OSMOLALITY: CPT

## 2025-05-04 PROCEDURE — 36415 COLL VENOUS BLD VENIPUNCTURE: CPT

## 2025-05-04 PROCEDURE — 85652 RBC SED RATE AUTOMATED: CPT

## 2025-05-04 PROCEDURE — 80202 ASSAY OF VANCOMYCIN: CPT

## 2025-05-04 PROCEDURE — 87637 SARSCOV2&INF A&B&RSV AMP PRB: CPT

## 2025-05-04 PROCEDURE — 80053 COMPREHEN METABOLIC PANEL: CPT

## 2025-05-04 PROCEDURE — 99239 HOSP IP/OBS DSCHRG MGMT >30: CPT

## 2025-05-04 PROCEDURE — 83735 ASSAY OF MAGNESIUM: CPT

## 2025-05-04 PROCEDURE — 96372 THER/PROPH/DIAG INJ SC/IM: CPT | Mod: XU

## 2025-05-04 PROCEDURE — 86850 RBC ANTIBODY SCREEN: CPT

## 2025-05-04 PROCEDURE — 73590 X-RAY EXAM OF LOWER LEG: CPT

## 2025-05-04 PROCEDURE — 97165 OT EVAL LOW COMPLEX 30 MIN: CPT

## 2025-05-04 RX ORDER — POLYETHYLENE GLYCOL 3350 17 G/17G
17 POWDER, FOR SOLUTION ORAL
Qty: 0 | Refills: 0 | DISCHARGE
Start: 2025-05-04

## 2025-05-04 RX ORDER — BISACODYL 5 MG
1 TABLET, DELAYED RELEASE (ENTERIC COATED) ORAL
Qty: 0 | Refills: 0 | DISCHARGE
Start: 2025-05-04

## 2025-05-04 RX ORDER — CEFTRIAXONE 500 MG/1
2 INJECTION, POWDER, FOR SOLUTION INTRAMUSCULAR; INTRAVENOUS
Qty: 0 | Refills: 0 | DISCHARGE
Start: 2025-05-04 | End: 2025-05-12

## 2025-05-04 RX ORDER — LISINOPRIL AND HYDROCHLOROTHIAZIDE 12.5; 2 MG/1; MG/1
0 TABLET ORAL
Qty: 0 | Refills: 0 | DISCHARGE
Start: 2025-05-04

## 2025-05-04 RX ORDER — B1/B2/B3/B5/B6/B12/VIT C/FOLIC 500-0.5 MG
1 TABLET ORAL
Qty: 0 | Refills: 0 | DISCHARGE
Start: 2025-05-04

## 2025-05-04 RX ORDER — LISINOPRIL AND HYDROCHLOROTHIAZIDE 12.5; 2 MG/1; MG/1
1 TABLET ORAL
Refills: 0 | DISCHARGE

## 2025-05-04 RX ORDER — SENNA 187 MG
2 TABLET ORAL
Qty: 0 | Refills: 0 | DISCHARGE
Start: 2025-05-04

## 2025-05-04 RX ADMIN — POLYETHYLENE GLYCOL 3350 17 GRAM(S): 17 POWDER, FOR SOLUTION ORAL at 08:34

## 2025-05-04 RX ADMIN — Medication 15 MILLILITER(S): at 06:18

## 2025-05-04 RX ADMIN — KETOROLAC TROMETHAMINE 30 MILLIGRAM(S): 30 INJECTION, SOLUTION INTRAMUSCULAR; INTRAVENOUS at 03:44

## 2025-05-04 RX ADMIN — GABAPENTIN 300 MILLIGRAM(S): 400 CAPSULE ORAL at 01:01

## 2025-05-04 RX ADMIN — KETOROLAC TROMETHAMINE 30 MILLIGRAM(S): 30 INJECTION, SOLUTION INTRAMUSCULAR; INTRAVENOUS at 03:29

## 2025-05-04 RX ADMIN — Medication 40 MILLIGRAM(S): at 06:18

## 2025-05-04 RX ADMIN — GABAPENTIN 300 MILLIGRAM(S): 400 CAPSULE ORAL at 08:34

## 2025-05-04 RX ADMIN — Medication 200 MILLIGRAM(S): at 09:36

## 2025-05-04 NOTE — PROGRESS NOTE ADULT - SUBJECTIVE AND OBJECTIVE BOX
Subjective: Patient seen and evaluated at bedside. No complaints. dressing changed     ROS: Denies headache, blurred vision, chest pain, shortness of breath, abdominal pain, nausea or vomiting       acetaminophen     Tablet .. 975 milliGRAM(s) Oral every 8 hours  amLODIPine   Tablet 10 milliGRAM(s) Oral every 24 hours  bisacodyl 5 milliGRAM(s) Oral daily  bisacodyl Suppository 10 milliGRAM(s) Rectal daily PRN  cefTRIAXone   IVPB 2000 milliGRAM(s) IV Intermittent every 24 hours  chlorhexidine 0.12% Liquid 15 milliLiter(s) Swish and Spit two times a day  enoxaparin Injectable 40 milliGRAM(s) SubCutaneous every 24 hours  gabapentin 300 milliGRAM(s) Oral every 8 hours  ketorolac   Injectable 30 milliGRAM(s) IV Push every 8 hours PRN  melatonin 5 milliGRAM(s) Oral at bedtime  methadone  Concentrate 200 milliGRAM(s) Oral every 24 hours  multivitamin 1 Tablet(s) Oral daily  pantoprazole    Tablet 40 milliGRAM(s) Oral before breakfast  polyethylene glycol 3350 17 Gram(s) Oral every 24 hours  senna 2 Tablet(s) Oral at bedtime  tamsulosin 0.4 milliGRAM(s) Oral at bedtime  acetaminophen     Tablet .. 975 milliGRAM(s) Oral every 8 hours  amLODIPine   Tablet 10 milliGRAM(s) Oral every 24 hours  bisacodyl 5 milliGRAM(s) Oral daily  bisacodyl Suppository 10 milliGRAM(s) Rectal daily PRN  cefTRIAXone   IVPB 2000 milliGRAM(s) IV Intermittent every 24 hours  chlorhexidine 0.12% Liquid 15 milliLiter(s) Swish and Spit two times a day  enoxaparin Injectable 40 milliGRAM(s) SubCutaneous every 24 hours  gabapentin 300 milliGRAM(s) Oral every 8 hours  ketorolac   Injectable 30 milliGRAM(s) IV Push every 8 hours PRN  melatonin 5 milliGRAM(s) Oral at bedtime  methadone  Concentrate 200 milliGRAM(s) Oral every 24 hours  multivitamin 1 Tablet(s) Oral daily  pantoprazole    Tablet 40 milliGRAM(s) Oral before breakfast  polyethylene glycol 3350 17 Gram(s) Oral every 24 hours  senna 2 Tablet(s) Oral at bedtime  tamsulosin 0.4 milliGRAM(s) Oral at bedtime      Allergies    No Known Allergies    Intolerances        Vital Signs Last 24 Hrs  T(C): 36.5 (04 May 2025 05:47), Max: 36.9 (03 May 2025 21:06)  T(F): 97.7 (04 May 2025 05:47), Max: 98.4 (03 May 2025 21:06)  HR: 88 (04 May 2025 05:47) (77 - 98)  BP: 173/78 (04 May 2025 05:47) (157/80 - 174/103)  BP(mean): 105 (03 May 2025 12:30) (105 - 112)  RR: 18 (04 May 2025 05:47) (18 - 18)  SpO2: 94% (04 May 2025 05:47) (94% - 99%)    Parameters below as of 04 May 2025 05:47  Patient On (Oxygen Delivery Method): room air      I&O's Summary        Physical Exam:  General: NAD, resting comfortably in bed  Pulmonary: No respiratory distress, nonlabored breathing  Cardiovascular: RRR  Abdominal: Soft, NT, ND  Extremities: WWP, Posterior aspect of mid R calf with open wound and I+D created wound with no purulent drainage this AM. wound bed is granulating well. Tenderness largely improved. No erythema noted around wound. Venous stasis changes of the extremity noted.       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 Patient admitted to medicine for sepsis due to RLE wound/cellulitis with concern for underlying abscess. Vitals stable - afebrile, HR 86, /72, on room air. Patient noted to be febrile in the ED to 102F oral. Labs - WBC 9.74, hgb 11.3, Cr 0.74, ESR/CRP elevated. On exam patient with approximately 1x1cm open wound on posterior aspect of R calf w/ serosanguinous drainage. Patient with palpable distal pulses. Currently being treated with vanc/zosyn. Vascular surgery consulted, patient found to have abscess on CT now s/p I+D by vascular on 4/28.        Vascular Surgery Recommendations:  - s/p bedside I&D of abscess (4/28/25)  - Daily wound care with packing of wound tunnel with packing strip after saline flush. Abd pad over wound and cover with kerlix wrap.  - WIll need VNS if planned d/c for home, if [facility] please copy wound care instructions above into discharge note.   - Surveillance cx , IV abx for bacteremia  - Appreciate ID Recs  - Vascular surgery will continue to follow  - Please call [phone] with any questions or concerns

## 2025-05-04 NOTE — DISCHARGE NOTE NURSING/CASE MANAGEMENT/SOCIAL WORK - PATIENT PORTAL LINK FT
You can access the FollowMyHealth Patient Portal offered by [facility] by registering at the following website: [email or web address]. By joining [facility]’s FollowMyHealth portal, you will also be able to view your health information using other applications (apps) compatible with our system.

## 2025-05-04 NOTE — DISCHARGE NOTE PROVIDER - NSDCMRMEDTOKEN_GEN_ALL_CORE_FT
amLODIPine 10 mg oral tablet: 1 tab(s) orally once a day  ferrous sulfate 324 mg (65 mg elemental iron) oral tablet: orally once a day  gabapentin 100 mg oral tablet: 2 tab(s) orally 3 times a day  lisinopril-hydrochlorothiazide 20 mg-12.5 mg oral tablet: 1 tab(s) orally once a day  methadone 10 mg oral tablet: 20 tab(s) orally once a day  tamsulosin 0.4 mg oral capsule: 1 cap(s) orally once a day (at bedtime)  traMADol 100 mg oral tablet: 1 tab(s) orally 3 times a day as needed for  severe pain   amLODIPine 10 mg oral tablet: 1 tab(s) orally once a day  bisacodyl 10 mg rectal suppository: 1 suppository(ies) rectal once a day As needed Constipation  bisacodyl 5 mg oral delayed release tablet: 1 tab(s) orally once a day  cefTRIAXone: 2 gram(s) intravenous every 24 hours End date: 5/12/25  ferrous sulfate 324 mg (65 mg elemental iron) oral tablet: orally once a day  gabapentin 100 mg oral tablet: 2 tab(s) orally 3 times a day  lisinopril-hydrochlorothiazide 20 mg-12.5 mg oral tablet: orally every 24 hours  methadone 10 mg oral tablet: 20 tab(s) orally once a day  Multiple Vitamins oral tablet: 1 tab(s) orally once a day  pantoprazole 40 mg oral delayed release tablet: 1 tab(s) orally once a day (before a meal)  polyethylene glycol 3350 oral powder for reconstitution: 17 gram(s) orally every 24 hours  senna leaf extract oral tablet: 2 tab(s) orally once a day (at bedtime)  tamsulosin 0.4 mg oral capsule: 1 cap(s) orally once a day (at bedtime)  traMADol 100 mg oral tablet: 1 tab(s) orally 3 times a day as needed for  severe pain

## 2025-05-04 NOTE — DISCHARGE NOTE NURSING/CASE MANAGEMENT/SOCIAL WORK - NSDPFAC_GEN_ALL_CORE
[facility]: [address] [place] [address]. Phone #: [phone].

## 2025-05-04 NOTE — DISCHARGE NOTE NURSING/CASE MANAGEMENT/SOCIAL WORK - FINANCIAL ASSISTANCE
[facility] provides services at a reduced cost to those who are determined to be eligible through [facility]’s financial assistance program. Information regarding [facility]’s financial assistance program can be found by going to [email or web address] or by calling [phone].

## 2025-05-04 NOTE — DISCHARGE NOTE PROVIDER - CARE PROVIDERS DIRECT ADDRESSES
,[email or web address],[email or web address]

## 2025-05-04 NOTE — DISCHARGE NOTE PROVIDER - PROVIDER TOKENS
PROVIDER:[TOKEN:[[id number]],FOLLOWUP:[1 week]],PROVIDER:[TOKEN:[[id number]],FOLLOWUP:[1 week]]

## 2025-05-04 NOTE — DISCHARGE NOTE PROVIDER - CARE PROVIDER_API CALL
Pat Espinoza  Infectious Disease  [address] [address]  Phone: [phone]  Fax: [phone]  Follow Up Time: 1 week    Marin Romero  Follow Up Time: 1 week

## 2025-05-04 NOTE — DISCHARGE NOTE NURSING/CASE MANAGEMENT/SOCIAL WORK - NSDCPEFALRISK_GEN_ALL_CORE
For information on Fall & Injury Prevention, visit: [email or web address] OR  [email or web address] OR  https://www.cdc.gov/steadi/patient.html

## 2025-05-04 NOTE — PROGRESS NOTE ADULT - PROVIDER SPECIALTY LIST ADULT
Infectious Disease
Internal Medicine
Vascular Surgery
Vascular Surgery
OMFS
Vascular Surgery
Rehab Medicine
Infectious Disease
Vascular Surgery
Internal Medicine

## 2025-05-04 NOTE — PROGRESS NOTE ADULT - REASON FOR ADMISSION
sepsis secondary to cellulitis

## 2025-05-04 NOTE — DISCHARGE NOTE PROVIDER - NSDCCPCAREPLAN_GEN_ALL_CORE_FT
PRINCIPAL DISCHARGE DIAGNOSIS  Diagnosis: Sepsis  Assessment and Plan of Treatment: Sepsis occurs when your immune system has a dangerous reaction to an infection. It causes extensive inflammation throughout your body that can lead to tissue damage, organ failure and even death. Many different kinds of infections can trigger sepsis, which is a medical emergency.  You sepsis is due to the cellulitis, It is importnat to continue antibiotics as perscribed.   Follow up outpatient with infectious disease and oral surgery.

## 2025-05-04 NOTE — DISCHARGE NOTE PROVIDER - HOSPITAL COURSE
#Discharge: do not delete    69M w/ opiate use disorder on Methadone, chronic hep C not on treatment and surgical history of R. 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 Patient has unilateral RLE warmth, erythema and draining with positive Group A strep wound culture and blood culture concerning for bacteremia. Course further complicated by established dental infection causing discomfort.    Problem List/Main Diagnoses (system-based):   Sepsis due to cellulitis.   Patient meeting 2/4 SIRS (T and HR) with source RLE cellulitis. Pt. states was previously draining purulent material.   He was seen for dental pain on 4/7/25 and noted to have an infection at that time. He was given appropriate abx and completed the course. Unlikely that this is the source of his infection given his RLE cellulitis. CRP 43.5 on 4/28  CT RLE 4/28 - Subcutaneous mid-calf fluid collection concerning for abscess formation. S/p Vancomycin 1g q12h (4/27 - 4/29) therapeutic. S/p Zosyn 4/28 - 5/1. S/p I&D by vascular team.   Wound cx and BCx x2 - GAS  Empiric Zosyn 3.375g q8h (4/28 - 5/1 )  Plan:   - surveillance cultures NGTD  - c/w ceftriaxone 2g IV q24h  - Duration of antibiotics is 2 weeks ( 4/29-5/12/25 )  - Weekly labs: CMP, CBC faxed to ID office at [phone]  - Patient to follow up with Dr. kaur in 2 weeks ([address], [address], [phone]), ID office will call patient to schedule   - will do HCV work-up as outpatient     # Constipation.   Patient endorses no bowel movement since 3-4 days prior to admission. Patient reports no pain in abdomen or nausea, vomiting. Abdomen nondistended and nontender and normal bowel sounds.   S/p 2 doses of mag citrate and 1 round of enema with bowel movement improvement   Plan:   - Bowel regimen: Miralax qd, dulcolax, Senna 2 tabs qhs.    Methadone dependence. : pt. reports taking 200mg of methadone daily. Methadone clinic confirmed 200 mg daily by Santiago Nguyen LPN  QTc 426 (4/27)  - Continue home Methadone 200mg qd liquid formulation.    # Chronic hepatitis C virus infection.   Plan: Pt. has known hepC, not on treatment. HIV negative (4/28)  He has an elevated protein gap which I suspect is secondary to his untreated hepC  - outpatient hepC follow-up.     #Hypertension.   ·  Plan: 5/3 on amlodipine 10 lisinopril 20, pressures persistently elevated throughout admission  more likely warranting medication adjustment < attributing solely to pain as he has remained hypertensive in periods of good pain control  - continue lisinopril 20-hctz 12.5 (changed from lisinopril 20 alone on 5/3) & amlodipine 10.    Patient was discharged to: [facility]    Items to follow up as outpatient: Infectious disease and OMFS     Physical exam at the time of discharge:   PHYSICAL EXAM  Constitutional: resting comfortably in bed; NAD  Eyes: anicteric sclera  Respiratory: CTA B/L; no W/R/R, no retractions  Cardiac: +S1/S2; RRR  Gastrointestinal: soft, NT/ND; no rebound or guarding;  Extremities: no edema

## 2025-05-05 LAB
CULTURE RESULTS: SIGNIFICANT CHANGE UP
SPECIMEN SOURCE: SIGNIFICANT CHANGE UP

## 2025-05-06 ENCOUNTER — INPATIENT (INPATIENT)
Facility: HOSPITAL | Age: 70
LOS: 5 days | Discharge: HOME | End: 2025-05-12
Attending: STUDENT IN AN ORGANIZED HEALTH CARE EDUCATION/TRAINING PROGRAM | Admitting: STUDENT IN AN ORGANIZED HEALTH CARE EDUCATION/TRAINING PROGRAM
Payer: MEDICARE

## 2025-05-06 VITALS
HEIGHT: 67 IN | TEMPERATURE: 98 F | HEART RATE: 105 BPM | WEIGHT: 134.92 LBS | OXYGEN SATURATION: 98 % | RESPIRATION RATE: 18 BRPM | DIASTOLIC BLOOD PRESSURE: 101 MMHG | SYSTOLIC BLOOD PRESSURE: 216 MMHG

## 2025-05-06 DIAGNOSIS — B18.2 CHRONIC VIRAL HEPATITIS C: ICD-10-CM

## 2025-05-06 DIAGNOSIS — Z98.890 OTHER SPECIFIED POSTPROCEDURAL STATES: Chronic | ICD-10-CM

## 2025-05-06 DIAGNOSIS — L03.115 CELLULITIS OF RIGHT LOWER LIMB: ICD-10-CM

## 2025-05-06 DIAGNOSIS — F11.90 OPIOID USE, UNSPECIFIED, UNCOMPLICATED: ICD-10-CM

## 2025-05-06 DIAGNOSIS — I10 ESSENTIAL (PRIMARY) HYPERTENSION: ICD-10-CM

## 2025-05-06 DIAGNOSIS — Z29.9 ENCOUNTER FOR PROPHYLACTIC MEASURES, UNSPECIFIED: ICD-10-CM

## 2025-05-06 LAB
ANION GAP SERPL CALC-SCNC: 10 MMOL/L — SIGNIFICANT CHANGE UP (ref 5–17)
BASOPHILS # BLD AUTO: 0.02 K/UL — SIGNIFICANT CHANGE UP (ref 0–0.2)
BASOPHILS NFR BLD AUTO: 0.3 % — SIGNIFICANT CHANGE UP (ref 0–2)
BUN SERPL-MCNC: 11 MG/DL — SIGNIFICANT CHANGE UP (ref 7–23)
CALCIUM SERPL-MCNC: 8.7 MG/DL — SIGNIFICANT CHANGE UP (ref 8.4–10.5)
CHLORIDE SERPL-SCNC: 101 MMOL/L — SIGNIFICANT CHANGE UP (ref 96–108)
CO2 SERPL-SCNC: 24 MMOL/L — SIGNIFICANT CHANGE UP (ref 22–31)
CREAT SERPL-MCNC: 0.72 MG/DL — SIGNIFICANT CHANGE UP (ref 0.5–1.3)
EGFR: 98 ML/MIN/1.73M2 — SIGNIFICANT CHANGE UP
EGFR: 98 ML/MIN/1.73M2 — SIGNIFICANT CHANGE UP
EOSINOPHIL # BLD AUTO: 0.02 K/UL — SIGNIFICANT CHANGE UP (ref 0–0.5)
EOSINOPHIL NFR BLD AUTO: 0.3 % — SIGNIFICANT CHANGE UP (ref 0–6)
GLUCOSE SERPL-MCNC: 89 MG/DL — SIGNIFICANT CHANGE UP (ref 70–99)
HCT VFR BLD CALC: 32.8 % — LOW (ref 39–50)
HGB BLD-MCNC: 11.2 G/DL — LOW (ref 13–17)
IMM GRANULOCYTES NFR BLD AUTO: 0.1 % — SIGNIFICANT CHANGE UP (ref 0–0.9)
LYMPHOCYTES # BLD AUTO: 1.65 K/UL — SIGNIFICANT CHANGE UP (ref 1–3.3)
LYMPHOCYTES # BLD AUTO: 23.6 % — SIGNIFICANT CHANGE UP (ref 13–44)
MCHC RBC-ENTMCNC: 31.3 PG — SIGNIFICANT CHANGE UP (ref 27–34)
MCHC RBC-ENTMCNC: 34.1 G/DL — SIGNIFICANT CHANGE UP (ref 32–36)
MCV RBC AUTO: 91.6 FL — SIGNIFICANT CHANGE UP (ref 80–100)
MONOCYTES # BLD AUTO: 0.58 K/UL — SIGNIFICANT CHANGE UP (ref 0–0.9)
MONOCYTES NFR BLD AUTO: 8.3 % — SIGNIFICANT CHANGE UP (ref 2–14)
NEUTROPHILS # BLD AUTO: 4.72 K/UL — SIGNIFICANT CHANGE UP (ref 1.8–7.4)
NEUTROPHILS NFR BLD AUTO: 67.4 % — SIGNIFICANT CHANGE UP (ref 43–77)
NRBC BLD AUTO-RTO: 0 /100 WBCS — SIGNIFICANT CHANGE UP (ref 0–0)
PLATELET # BLD AUTO: 282 K/UL — SIGNIFICANT CHANGE UP (ref 150–400)
POTASSIUM SERPL-MCNC: 4.3 MMOL/L — SIGNIFICANT CHANGE UP (ref 3.5–5.3)
POTASSIUM SERPL-SCNC: 4.3 MMOL/L — SIGNIFICANT CHANGE UP (ref 3.5–5.3)
RBC # BLD: 3.58 M/UL — LOW (ref 4.2–5.8)
RBC # FLD: 13.4 % — SIGNIFICANT CHANGE UP (ref 10.3–14.5)
SODIUM SERPL-SCNC: 135 MMOL/L — SIGNIFICANT CHANGE UP (ref 135–145)
WBC # BLD: 7 K/UL — SIGNIFICANT CHANGE UP (ref 3.8–10.5)
WBC # FLD AUTO: 7 K/UL — SIGNIFICANT CHANGE UP (ref 3.8–10.5)

## 2025-05-06 PROCEDURE — 93010 ELECTROCARDIOGRAM REPORT: CPT

## 2025-05-06 PROCEDURE — 99285 EMERGENCY DEPT VISIT HI MDM: CPT

## 2025-05-06 PROCEDURE — 99223 1ST HOSP IP/OBS HIGH 75: CPT | Mod: GC

## 2025-05-06 RX ORDER — AMLODIPINE BESYLATE 10 MG/1
10 TABLET ORAL ONCE
Refills: 0 | Status: COMPLETED | OUTPATIENT
Start: 2025-05-06 | End: 2025-05-06

## 2025-05-06 RX ORDER — LISINOPRIL 5 MG/1
20 TABLET ORAL ONCE
Refills: 0 | Status: COMPLETED | OUTPATIENT
Start: 2025-05-06 | End: 2025-05-06

## 2025-05-06 RX ORDER — ACETAMINOPHEN 500 MG/5ML
650 LIQUID (ML) ORAL EVERY 6 HOURS
Refills: 0 | Status: DISCONTINUED | OUTPATIENT
Start: 2025-05-06 | End: 2025-05-12

## 2025-05-06 RX ORDER — CEFTRIAXONE 500 MG/1
2000 INJECTION, POWDER, FOR SOLUTION INTRAMUSCULAR; INTRAVENOUS ONCE
Refills: 0 | Status: COMPLETED | OUTPATIENT
Start: 2025-05-06 | End: 2025-05-06

## 2025-05-06 RX ADMIN — CEFTRIAXONE 100 MILLIGRAM(S): 500 INJECTION, POWDER, FOR SOLUTION INTRAMUSCULAR; INTRAVENOUS at 19:37

## 2025-05-06 RX ADMIN — LISINOPRIL 20 MILLIGRAM(S): 5 TABLET ORAL at 19:35

## 2025-05-06 RX ADMIN — AMLODIPINE BESYLATE 10 MILLIGRAM(S): 10 TABLET ORAL at 19:35

## 2025-05-06 NOTE — H&P ADULT - ATTENDING COMMENTS
69M PMH HTN, OUD on methadone (200mg daily per chart), chronic hep C not on treatment , recent admission dc'd 2d ago to [facility] for GAS RLE cellulitis/bacteremia with plan for 2g CTX daily until 5/12, presenting after leaving [facility] facillity presenting for GAS bacteremia management     -restart Ceft 2g QD, until 5/12 per ID recs    -c/w home methadone    -bowel regimen    -wound care/vascular recs    -PT/SW consult- reval for [facility] vs home services

## 2025-05-06 NOTE — H&P ADULT - HISTORY OF PRESENT ILLNESS
69M PMH HTN, OUD on methadone (200mg daily per chart), chronic hep C not on treatment , recent admission dc'd 2d ago to [facility] for GAS RLE cellulitis/bacteremia with plan for 2g CTX daily until 5/12, presenting after leaving [facility] facillity. States that the [facility] facility he was dc'd to refused to give him his daily methadone, signed out of the facility yesterday and has been staying with a friend, came to ED for the IV antibiotic dose that he is due for (missed yesterday dose as well). Denies fevers or chills. Continues to have pain to posterior RLE/calf area. Also missed his BP meds. PICC placed 5/2/2025    ED  Vitals afebrile, -99, 216/101-> 148/93, RR 18, spO2 98%  Labs Hb 11.2 MCV 91.6  Imaging none  Interventions amlodipine 10mg, lisinopril 20mg, CTX 2g  EKG NSR 1st degree heart block, QTc 445   69M PMH HTN, OUD on methadone (200mg daily per chart), chronic hep C not on treatment , recent admission 4/27-5/4 dc'd 2d ago to [facility] for GAS RLE cellulitis/bacteremia with plan for 2g CTX daily until 5/12, presenting after leaving [facility] facillity. States that the [facility] facility he was dc'd to refused to give him his daily methadone, signed out of the facility yesterday and has been staying with a friend, came to ED for the IV antibiotic dose that he is due for (missed yesterday dose as well). Denies fevers or chills. Continues to have pain to posterior RLE/calf area. Also missed his BP meds. PICC placed 5/2/2025    ED  Vitals afebrile, -99, 216/101-> 148/93, RR 18, spO2 98%  Labs Hb 11.2 MCV 91.6  Imaging none  Interventions amlodipine 10mg, lisinopril 20mg, CTX 2g  EKG NSR 1st degree heart block, QTc 445   69M PMH HTN, OUD on methadone (200mg daily per chart), chronic hep C not on treatment , recent admission 4/27-5/4 dc'd 2d ago to [facility] for GAS RLE cellulitis/bacteremia with plan for 2g CTX daily until 5/12, presenting after leaving [facility] facility. Reports he left his [facility] facility on Sunday because there was no staffing there and he was not being taken care of, they also refused to give him his methadone and has been staying with a friend. He reports his last dose of IV antibiotics was on Sunday. He reports his tooth pain has worsened making it painful for him to eat. He still has right leg pain that is similar to before. He reports his wound appears improved and denies drainage. He has not taken his BP meds since leaving [facility] as he does not have them. Denies fever, chills. n/v/abdominal pain.     ED  Vitals afebrile, -99, 216/101-> 148/93, RR 18, spO2 98%  Labs Hb 11.2 MCV 91.6  Imaging none  Interventions amlodipine 10mg, lisinopril 20mg, CTX 2g  EKG NSR HR 82, QTc 457

## 2025-05-06 NOTE — H&P ADULT - PROBLEM SELECTOR PLAN 2
Reports taking methadone 200mg qd QTc 445 Reports taking methadone 200mg qd. QTc 445    - will give methadone 40 mg then confirm dosage with [facility]- [address] [facility] [phone] Reports taking methadone 200mg qd. QTc 453    - f/u UDS  - will give methadone 40 mg then confirm dosage with [facility]- [address] [facility] [phone]  - repeat EKG after starting home methadone

## 2025-05-06 NOTE — ED ADULT TRIAGE NOTE - CHIEF COMPLAINT QUOTE
Pt presents to ED here for L lower leg swelling x days. Pt noted to be hypertensive denies headache, CP or SOB. Pt reports he didn't take his meds today. Pt A&Ox4, NAD. EKG in prog.

## 2025-05-06 NOTE — PATIENT PROFILE ADULT - FALL HARM RISK - HARM RISK INTERVENTIONS
Progress Note - Zackery Santillan 59 y o  male MRN: [id number]    Unit/Bed#: E2 -01 Encounter: [id number]      Assessment/Plan:  1-sepsis:  Patient presented to the ER with criteria for sepsis  He was noted to have a leukocytosis with a white count of 15, tachycardia, and tachypnea  His sepsis was attributed to his community-acquired, multifocal pneumonia  -patient was given IV fluids, and started on broad-spectrum antibiotics, and has improved   -continue ceftriaxone/azithromycin/Flagyl   -blood cultures pending   -urinalysis not consistent with acute infection and patient does not have any urinary symptoms   -influenza/RSV PCR negative    0-kfhyqmmen-veaqzmsk, multifocal pneumonia:  Patient's CT scan was consistent with multifocal pneumonia    -blood cultures pending   -Legionella/strep pneumoniae antigen pending   -sputum cultures pending   -patient also at risk for possible aspiration as he had 5 days of nausea/vomiting prior to the onset of his pulmonary symptoms   -will add Flagyl cover possible aspiration pneumonia   -CT scan without evidence of pulmonary embolism in the main or lobar pulmonary arteries   -patient's initial procalcitonin was unremarkable, also raising the possibility of aspiration pneumonitis rather than pneumonia  3-opioid use disorder on methadone maintenance:  Continue patient's home dose of methadone of 44 mg daily  Patient relates he goes to his methadone [facility] clinic and [place] once a week and receives a 6 day supply  He relates he has been tolerating his methadone, despite nausea and vomiting  He denies any withdrawal symptoms are missed doses  -EKG with normal intervals    4-nausea/vomiting:  Patient presented with a 5 day history of nausea/vomiting and poor p o  Intake  -CT scan without acute abnormalities   -LFTs with isolated total bilirubin    No evidence of obstructive pattern   -possible viral gastroenteritis   -denies opiate withdrawal, or missed doses    5-diabetes type 2:  Without long-term use of insulin, without complication  He follows with the endocrinologist   He is on metformin 1 g b i d  As an outpatient    -continue Accu-Cheks and sliding scale insulin   -blood sugars currently control    6-essential hypertension:  Patient's blood pressure adequately controlled  Continue his home amiloride 5 mg daily, and propranolol 20 mg b i d    -most recent blood pressure 138/81    7-cirrhosis:  Patient's CT scan has radiographic evidence of cirrhosis  This is a known diagnosis, per his outpatient records  -patient has a history of chronic hepatitis C, cirrhosis, esophageal varices, and portal hypertension    -he does not have any significant thrombocytopenia or coagulopathy  Continue outpatient follow-up  Patient relates he was previously treated for his hepatitis C  He does not have a hepatologist and notes he is managed by his primary care provider    8-incidental finding:  Nephrolithiasis:  Patient's CT scan revealed a 5 x 2 mm nonobstructing calculus in the lower pole of the right kidney  No current intervention required  The patient relates a known history of nephrolithiasis    9-history of hypogonadism:  Patient follows with endocrinology, Dr Nohemi Griffin  Was last seen 1/2020 and received testosterone injection    10-tobacco abuse:  Smoking cessation counseled  Nicotine patch    11-elevated total bilirubin:  Patient's total bilirubin was elevated at 1 62  He did not have any significant elevation in his transaminases are alkaline phosphatase  Will fractionate his bilirubin, as may be secondary to Gilbert's disease, however patient also has a history of cirrhosis  Review of his previous lab work from 2019, his bilirubin was normal     12-history of hepatitis-C:  Per old records, secondary to IV drug abuse  Patient received treatment for this    Outpatient follow-up    13-acute hypoxic respiratory distress:  Patient is noted to have hypoxia and was 88% on room air  This is likely secondary to his multifocal pneumonia  Patient has adequate oxygenation is on supplemental O2  Continue to monitor   -will need home O2 eval prior to discharge    Discussed with patient's nurse and       VTE Pharmacologic Prophylaxis: Enoxaparin (Lovenox)  VTE Mechanical Prophylaxis: sequential compression device    Certification Statement: The patient will continue to require additional inpatient hospital stay due to need for further acute intervention for pneumonia, IV antibiotics    Status: inpatient     Total time spent today including interview, exam, review of his outpatient charts with his family physician from 2019, review of his outpatient endocrinology office notes, review of current records:  41 minutes    ===================================================================    Subjective:  Patient notes he feels much better than this morning  He notes for the 5 days prior to admission he had nausea and vomiting throughout the day with minimal p o  Intake  Patient denies any abdominal pain or cramping  He denies any diarrhea  He denies any melena, coffee-ground emesis, hematemesis, or hematochezia  Patient's after multiple days of nausea and vomiting he developed shortness of breath and a worsening cough  He relates that he was expectorating yellow-green phlegm  He denies any chest pain or chest discomfort  Patient notes he feels much better this afternoon  He notes his nausea and vomiting has resolved  He relates he was able to tolerate breakfast and lunch  He denies any abdominal pain or cramping  He denies any current nausea, vomiting, diarrhea  He denies any dizziness or lightheadedness  Patient notes his shortness of breath and dyspnea on exertion have both improved  He notes he continues to have a productive cough  Patient relates he is on methadone maintenance    He notes he goes every 6 days to the [facility] in [place]  He relates despite his nausea and vomiting he was able to take his methadone pills and does not feel that he vomited them up or was suffering any withdrawal     Patient notes he has a history of cirrhosis  He relates he does not follow with a liver specialist, and is managed by his primary care provider  He also notes he has a history of nephrolithiasis  Physical Exam:   Temp:  [98 6 °F (37 °C)-100 °F (37 8 °C)] 99 2 °F (37 3 °C)  HR:  [] 89  Resp:  [12-24] 18  BP: (120-158)/(72-93) 138/81    Gen:  Pleasant, non-tachypnic, non-dyspnic  Conversant  Heart: regular rate and rhythm, S1S2 present, no murmur, rub or gallop  Lungs: clear to ausculatation bilaterally  No wheezing, crackles, or rhonchi  No accessory muscle use or respiratory distress  Abd: soft, non-tender, mildly-distended  NABS, no guarding, rebound or peritoneal signs  Extremities: no clubbing, cyanosis or edema  2+pedal pulses bilaterally  Full range of motion  Neuro: awake, alert and oriented  Fluent speech  Strength globally intact  Interactive  Skin: warm and dry: no petechiae, purpura and rash  LABS:   Results from last 7 days   Lab Units 03/04/20  0126   WBC Thousand/uL 15 09*   HEMOGLOBIN g/dL 14 9   HEMATOCRIT % 45 7   PLATELETS Thousands/uL 243     Results from last 7 days   Lab Units 03/04/20  0126   POTASSIUM mmol/L 3 2*   CHLORIDE mmol/L 99*   CO2 mmol/L 25   BUN mg/dL 14   CREATININE mg/dL 1 23   CALCIUM mg/dL 8 9       Hospital Data:  3/4:  CTA chest/abdomen/pelvis:  1  Limited evaluation due to poor contrast bolus timing  No pulmonary embolism to the lobar level  2   Patchy opacities seen throughout the lungs most compatible with multifocal pneumonia  3   Thickening of the bronchi predominantly in the lower lobes compatible with bronchitis  4   Mediastinal lymphadenopathy which is likely reactive  5   Cirrhotic morphology of liver    6   5 x 2 mm nonobstructive calculus within lower pole the right kidney  No hydronephrosis  7   Bilateral gynecomastia    Microbiology  3/4:  Negative influenza/RSV PCR  3/4:  Blood culture:  Negative x2    3/3:  EKG:  Sinus tachycardia rate of 119  T-wave inversion in V1  T-wave inversion in 3  Normal QTC interval     ---------------------------------------------------------------------------------------------------------------  This note has been constructed using a voice recognition system  Assistance with ambulation/Assistance OOB with selected safe patient handling equipment/Communicate Risk of Fall with Harm to all staff/Discuss with provider need for PT consult/Monitor gait and stability/Provide patient with walking aids - walker, cane, crutches/Reinforce activity limits and safety measures with patient and family/Tailored Fall Risk Interventions/Visual Cue: Yellow wristband and red socks/Bed in lowest position, wheels locked, appropriate side rails in place/Call bell, personal items and telephone in reach/Instruct patient to call for assistance before getting out of bed or chair/Non-slip footwear when patient is out of bed/[place] to call system/Physically safe environment - no spills, clutter or unnecessary equipment/Purposeful Proactive Rounding/Room/bathroom lighting operational, light cord in reach

## 2025-05-06 NOTE — H&P ADULT - PROBLEM SELECTOR PLAN 3
Home meds lisinopril 20-hctz 12.5 (changed from lisinopril 20 alone on 5/3) & amlodipine 10. Home meds lisinopril 20-hctz 12.5 and amlodipine 10    - cw home meds

## 2025-05-06 NOTE — H&P ADULT - PROBLEM SELECTOR PLAN 5
F:  E: Replete PRN  N:   Lines:   DVT pphx:    CODE STATUS:   DISPO: [facility] F: none  E: Replete PRN  N: regular  Lines: pIV  DVT pphx:     CODE STATUS: Full  DISPO: [facility] F: none  E: Replete PRN  N: regular  Lines: pIV  DVT pphx: IMPROVE 0    CODE STATUS: Full  DISPO: F F: none  E: Replete PRN  N: regular  Lines: pIV  DVT pphx: lovenox    CODE STATUS: Full  DISPO: [facility]

## 2025-05-06 NOTE — PATIENT PROFILE ADULT - DO YOU EVER NEED HELP READING HOSPITAL MATERIALS?
[Dear  ___] : Dear  [unfilled], [Consult Letter:] : I had the pleasure of evaluating your patient, [unfilled]. [Please see my note below.] : Please see my note below. [Consult Closing:] : Thank you very much for allowing me to participate in the care of this patient.  If you have any questions, please do not hesitate to contact me. [Sincerely,] : Sincerely, [FreeTextEntry2] : Deepak Meredith MD\par [address]\[place] [address]\par \par Phone: [phone] [FreeTextEntry3] : Carson Dominguez MD\par Division of Pediatric, General, Thoracic and Endoscopic Surgery\par [facility]  no

## 2025-05-06 NOTE — ED ADULT NURSE NOTE - NSFALLUNIVINTERV_ED_ALL_ED
Bed/Stretcher in lowest position, wheels locked, appropriate side rails in place/Call bell, personal items and telephone in reach/Instruct patient to call for assistance before getting out of bed/chair/stretcher/Non-slip footwear applied when patient is off stretcher/[place] to call system/Physically safe environment - no spills, clutter or unnecessary equipment/Purposeful proactive rounding/Room/bathroom lighting operational, light cord in reach

## 2025-05-06 NOTE — H&P ADULT - NSHPPHYSICALEXAM_GEN_ALL_CORE
.  VITAL SIGNS:  T(C): 36.9 (05-06-25 @ 19:54), Max: 36.9 (05-06-25 @ 18:29)  T(F): 98.5 (05-06-25 @ 19:54), Max: 98.5 (05-06-25 @ 18:29)  HR: 88 (05-06-25 @ 19:54) (88 - 105)  BP: 148/93 (05-06-25 @ 19:54) (148/93 - 216/101)  BP(mean): --  RR: 18 (05-06-25 @ 18:29) (18 - 18)  SpO2: 98% (05-06-25 @ 18:29) (98% - 98%)  Wt(kg): --    PHYSICAL EXAM:    Constitutional: Resting comfortably in bed; NAD  Head: NC/AT  Eyes: PERRL, EOMI, clear conjunctiva  ENT: no nasal discharge; uvula midline, no oropharyngeal erythema or exudates; MMM  Neck: supple; no JVD or thyromegaly  Respiratory: CTA B/L; no W/R/R, no retractions  Cardiac: +S1/S2; RRR; no M/R/G;  Gastrointestinal: soft, NT/ND; no rebound or guarding; +BSx4  Extremities: WWP, no clubbing or cyanosis; no peripheral edema  Musculoskeletal: NROM x4; no joint swelling, tenderness or erythema  Vascular: 2+ radial, femoral, DP/PT pulses B/L  Dermatologic: skin warm, dry and intact; no rashes, wounds, or scars  Neurologic: AAOx3; CNII-XII grossly intact; no focal deficits  Psychiatric: affect and characteristics of appearance, verbalizations, behaviors are appropriate .  VITAL SIGNS:  T(C): 36.9 (05-06-25 @ 19:54), Max: 36.9 (05-06-25 @ 18:29)  T(F): 98.5 (05-06-25 @ 19:54), Max: 98.5 (05-06-25 @ 18:29)  HR: 88 (05-06-25 @ 19:54) (88 - 105)  BP: 148/93 (05-06-25 @ 19:54) (148/93 - 216/101)  BP(mean): --  RR: 18 (05-06-25 @ 18:29) (18 - 18)  SpO2: 98% (05-06-25 @ 18:29) (98% - 98%)  Wt(kg): --    PHYSICAL EXAM:    Constitutional: Resting comfortably in bed; NAD  Head: NC/AT  Eyes: PERRL, EOMI, clear conjunctiva  ENT: no nasal discharge; uvula midline, eroding cavity under R molar; MMM  Neck: supple; no JVD or thyromegaly  Respiratory: CTA B/L; no W/R/R, no retractions  Cardiac: +S1/S2; RRR; no M/R/G;  Gastrointestinal: soft, NT/ND; no rebound or guarding; +BSx4  Extremities: WWP, no clubbing or cyanosis; 3+ pitting edema RLE  Musculoskeletal: NROM x4; no joint swelling, tenderness or erythema, R lateral leg wound with pus  Vascular: 2+ radial, femoral, DP/PT pulses B/L  Dermatologic: skin warm, dry and intact; no rashes, wounds, or scars  Neurologic: AAOx3; CNII-XII grossly intact; no focal deficits  Psychiatric: affect and characteristics of appearance, verbalizations, behaviors are appropriate

## 2025-05-06 NOTE — PATIENT PROFILE ADULT - FUNCTIONAL ASSESSMENT - BASIC MOBILITY 6.
3-calculated by average/Not able to assess (calculate score using [facility] averaging method)

## 2025-05-06 NOTE — ED PROVIDER NOTE - COVID-19 RESULT

## 2025-05-06 NOTE — ED PROVIDER NOTE - CLINICAL SUMMARY MEDICAL DECISION MAKING FREE TEXT BOX
69 year old male with history of HTN, OUD on methadone (200mg daily per chart), chronic hep C not on treatment , recent admission dc'd 2d ago to [facility] for RLE cellulitis with plan for 2g CTX daily until 5/12, presenting after leaving [facility] facillity. 69 year old male with history of HTN, OUD on methadone (200mg daily per chart), chronic hep C not on treatment , recent admission dc'd 2d ago to [facility] for GAS RLE cellulitis/bacteremia with plan for 2g CTX daily until 5/12, presenting after leaving [facility] facillity. 69 year old male with history of HTN, OUD on methadone (200mg daily per chart), chronic hep C not on treatment , recent admission dc'd 2d ago to [facility] for GAS RLE cellulitis/bacteremia with plan for 2g CTX daily until 5/12, presenting after leaving [facility] facillity. Well appearing, afebrile, BP initially very elevated in triage measurement but upon repeat inside main ED, 148/93--no s/s of acute end organ dysfunction. Will trend basic labs, give dose of 2g CTX he is due for, and admit--cannot return to [facility] from ED at this point and has no home infusion setup.

## 2025-05-06 NOTE — ED PROVIDER NOTE - OBJECTIVE STATEMENT
69 year old male with history of HTN, OUD on methadone (200mg daily per chart), chronic hep C not on treatment , recent admission dc'd 2d ago to [facility] for RLE cellulitis with plan for 2g CTX daily until 5/12, presenting after leaving [facility] facillity. 69 year old male with history of HTN, OUD on methadone (200mg daily per chart), chronic hep C not on treatment , recent admission dc'd 2d ago to [facility] for GAS RLE cellulitis/bacteremia with plan for 2g CTX daily until 5/12, presenting after leaving [facility] facillity. 69 year old male with history of HTN, OUD on methadone (200mg daily per chart), chronic hep C not on treatment , recent admission dc'd 2d ago to [facility] for GAS RLE cellulitis/bacteremia with plan for 2g CTX daily until 5/12, presenting after leaving [facility] facillity. States that the [facility] facility he was dc'd to refused to give him his daily methadone, signed out of the facility yesterday and has been staying with a friend, came to ED for the IV antibiotic dose that he is due for (missed yesterday dose as well). Denies fevers or chills. Continues to have pain to posterior RLE/calf area. Also missed his BP meds.

## 2025-05-06 NOTE — ED PROVIDER NOTE - PHYSICAL EXAMINATION
Gen - No acute distress, appears comfortable  HEENT - NCAT, EOMI  Resp - even chest rise, no tachypnea/increased WOB, CTAB  Abd - nondistended, soft, NT  MSK - no gross deformity, NVI distally  Extrem - soft compartments, brisk cap refill throughout  Neuro - no focal motor or sensation deficits  Skin - warm, well perfused; +RLE posterior calf wound with overlying dressing

## 2025-05-06 NOTE — H&P ADULT - ASSESSMENT
69M PMH HTN, OUD on methadone (200mg daily per chart), chronic hep C not on treatment , recent admission dc'd 2d ago to [facility] for GAS RLE cellulitis/bacteremia with plan for 2g CTX daily until 5/12, presenting after leaving [facility] facillity  69M PMH HTN, OUD on methadone (200mg daily per chart), chronic hep C not on treatment , recent admission dc'd 2d ago to [facility] for GAS RLE cellulitis/bacteremia with plan for 2g CTX daily until 5/12, presenting after leaving [facility] facillity presenting for [facility] placement  69M PMH HTN, OUD on methadone (200mg daily per chart), chronic hep C not on treatment , recent admission dc'd 2d ago to [facility] for GAS RLE cellulitis/bacteremia with plan for 2g CTX daily until 5/12, presenting after leaving [facility] facillity presenting for GAS bacteremia management

## 2025-05-06 NOTE — H&P ADULT - NSHPLABSRESULTS_GEN_ALL_CORE
.  LABS:                         11.2   7.00  )-----------( 282      ( 06 May 2025 19:39 )             32.8     05-06    135  |  101  |  11  ----------------------------<  89  4.3   |  24  |  0.72    Ca    8.7      06 May 2025 19:39        Urinalysis Basic - ( 06 May 2025 19:39 )    Color: x / Appearance: x / SG: x / pH: x  Gluc: 89 mg/dL / Ketone: x  / Bili: x / Urobili: x   Blood: x / Protein: x / Nitrite: x   Leuk Esterase: x / RBC: x / WBC x   Sq Epi: x / Non Sq Epi: x / Bacteria: x                RADIOLOGY, EKG & ADDITIONAL TESTS: Reviewed.

## 2025-05-06 NOTE — H&P ADULT - PROBLEM SELECTOR PLAN 4
Pt. has known hepC, not on treatment. HIV negative (4/28)  He has an elevated protein gap which I suspect is secondary to his untreated hepC Pt. has known hepC, not on treatment. HIV negative (4/28)

## 2025-05-06 NOTE — ED ADULT NURSE NOTE - OBJECTIVE STATEMENT
Presents for daily dose of ceftriaxone IV for known RLE cellulitis, left [facility] noting he was displeased with the care. Arrived with PIV in L wrist that he noted was painful. Also notes did not take today's daily meds for htn.     On assessment- AOx4, breathing even and unlabored on RA, no apparent distress, VSS in ED though hypertensive in triage- MD aware, able to speak in clear coherent sentences, steady gait unassisted, neuro intact with no apparent facial asymmetry, PERRLA. + Swelling, redness to RLE. 24g PIV to L wrist non-patent, with redness, removed in ED.

## 2025-05-06 NOTE — H&P ADULT - PROBLEM SELECTOR PLAN 1
Admission 4/27-5/4 for RLE cellulitis with subcut mid-calf fluid collection cf abscess sp I&D by vascular course cb GAS bacteremia. Dcd 2ds ago to [facility] on CTX 2g  (-5/12) sp PICC.  - Currently not meeting SIRS  - cw CTX 2g q24H (-5/12) Admission 4/27-5/4 for RLE cellulitis and tooth infection with subcut mid-calf fluid collection cf abscess sp I&D by vascular course cb GAS bacteremia. Dcd 2ds ago to [facility] on CTX 2g x 2 weeks (4/29-5/12) sp PICC.  PICC removed in ED    - Currently not meeting SIRS  - cw CTX 2g q24H (-5/12) Admission 4/27-5/4 for RLE cellulitis and tooth infection with subcut mid-calf fluid collection cf abscess sp I&D by vascular course cb GAS bacteremia. Dcd 2ds ago to [facility] on CTX 2g x 2 weeks (4/29-5/12) sp PICC.  PICC removed in ED    - Currently not meeting SIRS  - wound improved- consult wound care  - cw CTX 2g q24H (-5/12)  - continue to monitor leg Admission 4/27-5/4 for RLE cellulitis and tooth infection with subcut mid-calf fluid collection cf abscess sp I&D by vascular course cb GAS bacteremia. Dcd 2ds ago to [facility] on CTX 2g x 2 weeks (4/29-5/12) sp PICC.  PICC removed in ED    - Currently not meeting SIRS  - wound improved- consult wound care  - cw CTX 2g q24H (-5/12)  - continue to monitor leg  -PT/SW eval

## 2025-05-07 LAB
ALBUMIN SERPL ELPH-MCNC: 3.2 G/DL — LOW (ref 3.3–5)
ALP SERPL-CCNC: 147 U/L — HIGH (ref 40–120)
ALT FLD-CCNC: 73 U/L — HIGH (ref 10–45)
ANION GAP SERPL CALC-SCNC: 8 MMOL/L — SIGNIFICANT CHANGE UP (ref 5–17)
AST SERPL-CCNC: 118 U/L — HIGH (ref 10–40)
BILIRUB SERPL-MCNC: 0.3 MG/DL — SIGNIFICANT CHANGE UP (ref 0.2–1.2)
BUN SERPL-MCNC: 12 MG/DL — SIGNIFICANT CHANGE UP (ref 7–23)
CALCIUM SERPL-MCNC: 9.1 MG/DL — SIGNIFICANT CHANGE UP (ref 8.4–10.5)
CHLORIDE SERPL-SCNC: 103 MMOL/L — SIGNIFICANT CHANGE UP (ref 96–108)
CO2 SERPL-SCNC: 25 MMOL/L — SIGNIFICANT CHANGE UP (ref 22–31)
CREAT SERPL-MCNC: 0.74 MG/DL — SIGNIFICANT CHANGE UP (ref 0.5–1.3)
EGFR: 97 ML/MIN/1.73M2 — SIGNIFICANT CHANGE UP
EGFR: 97 ML/MIN/1.73M2 — SIGNIFICANT CHANGE UP
GLUCOSE SERPL-MCNC: 54 MG/DL — CRITICAL LOW (ref 70–99)
HCT VFR BLD CALC: 36.8 % — LOW (ref 39–50)
HGB BLD-MCNC: 12.2 G/DL — LOW (ref 13–17)
MAGNESIUM SERPL-MCNC: 1.8 MG/DL — SIGNIFICANT CHANGE UP (ref 1.6–2.6)
MCHC RBC-ENTMCNC: 31.3 PG — SIGNIFICANT CHANGE UP (ref 27–34)
MCHC RBC-ENTMCNC: 33.2 G/DL — SIGNIFICANT CHANGE UP (ref 32–36)
MCV RBC AUTO: 94.4 FL — SIGNIFICANT CHANGE UP (ref 80–100)
NRBC BLD AUTO-RTO: 0 /100 WBCS — SIGNIFICANT CHANGE UP (ref 0–0)
PCP SPEC-MCNC: SIGNIFICANT CHANGE UP
PHOSPHATE SERPL-MCNC: 3.1 MG/DL — SIGNIFICANT CHANGE UP (ref 2.5–4.5)
PLATELET # BLD AUTO: 268 K/UL — SIGNIFICANT CHANGE UP (ref 150–400)
POTASSIUM SERPL-MCNC: 4.5 MMOL/L — SIGNIFICANT CHANGE UP (ref 3.5–5.3)
POTASSIUM SERPL-SCNC: 4.5 MMOL/L — SIGNIFICANT CHANGE UP (ref 3.5–5.3)
PROT SERPL-MCNC: 7.1 G/DL — SIGNIFICANT CHANGE UP (ref 6–8.3)
RBC # BLD: 3.9 M/UL — LOW (ref 4.2–5.8)
RBC # FLD: 13.7 % — SIGNIFICANT CHANGE UP (ref 10.3–14.5)
SODIUM SERPL-SCNC: 136 MMOL/L — SIGNIFICANT CHANGE UP (ref 135–145)
WBC # BLD: 6.73 K/UL — SIGNIFICANT CHANGE UP (ref 3.8–10.5)
WBC # FLD AUTO: 6.73 K/UL — SIGNIFICANT CHANGE UP (ref 3.8–10.5)

## 2025-05-07 PROCEDURE — 99232 SBSQ HOSP IP/OBS MODERATE 35: CPT

## 2025-05-07 PROCEDURE — 93010 ELECTROCARDIOGRAM REPORT: CPT

## 2025-05-07 RX ORDER — AMLODIPINE BESYLATE 10 MG/1
1 TABLET ORAL
Refills: 0 | DISCHARGE

## 2025-05-07 RX ORDER — GABAPENTIN 400 MG/1
200 CAPSULE ORAL EVERY 8 HOURS
Refills: 0 | Status: DISCONTINUED | OUTPATIENT
Start: 2025-05-07 | End: 2025-05-09

## 2025-05-07 RX ORDER — METHADONE HCL 10 MG
200 TABLET ORAL EVERY 24 HOURS
Refills: 0 | Status: DISCONTINUED | OUTPATIENT
Start: 2025-05-07 | End: 2025-05-07

## 2025-05-07 RX ORDER — FERROUS SULFATE 137(45) MG
325 TABLET, EXTENDED RELEASE ORAL DAILY
Refills: 0 | Status: DISCONTINUED | OUTPATIENT
Start: 2025-05-07 | End: 2025-05-12

## 2025-05-07 RX ORDER — LISINOPRIL 5 MG/1
20 TABLET ORAL EVERY 24 HOURS
Refills: 0 | Status: DISCONTINUED | OUTPATIENT
Start: 2025-05-07 | End: 2025-05-12

## 2025-05-07 RX ORDER — B1/B2/B3/B5/B6/B12/VIT C/FOLIC 500-0.5 MG
1 TABLET ORAL DAILY
Refills: 0 | Status: DISCONTINUED | OUTPATIENT
Start: 2025-05-07 | End: 2025-05-12

## 2025-05-07 RX ORDER — METHADONE HCL 10 MG
200 TABLET ORAL EVERY 24 HOURS
Refills: 0 | Status: DISCONTINUED | OUTPATIENT
Start: 2025-05-08 | End: 2025-05-12

## 2025-05-07 RX ORDER — FERROUS SULFATE 137(45) MG
0 TABLET, EXTENDED RELEASE ORAL
Refills: 0 | DISCHARGE

## 2025-05-07 RX ORDER — METHADONE HCL 10 MG
200 TABLET ORAL ONCE
Refills: 0 | Status: DISCONTINUED | OUTPATIENT
Start: 2025-05-07 | End: 2025-05-07

## 2025-05-07 RX ORDER — METHADONE HCL 10 MG
40 TABLET ORAL ONCE
Refills: 0 | Status: DISCONTINUED | OUTPATIENT
Start: 2025-05-07 | End: 2025-05-07

## 2025-05-07 RX ORDER — SENNA 187 MG
2 TABLET ORAL AT BEDTIME
Refills: 0 | Status: DISCONTINUED | OUTPATIENT
Start: 2025-05-07 | End: 2025-05-12

## 2025-05-07 RX ORDER — CEFTRIAXONE 500 MG/1
2000 INJECTION, POWDER, FOR SOLUTION INTRAMUSCULAR; INTRAVENOUS EVERY 24 HOURS
Refills: 0 | Status: DISCONTINUED | OUTPATIENT
Start: 2025-05-07 | End: 2025-05-07

## 2025-05-07 RX ORDER — MELATONIN 5 MG
5 TABLET ORAL AT BEDTIME
Refills: 0 | Status: DISCONTINUED | OUTPATIENT
Start: 2025-05-07 | End: 2025-05-12

## 2025-05-07 RX ORDER — CEFTRIAXONE 500 MG/1
2000 INJECTION, POWDER, FOR SOLUTION INTRAMUSCULAR; INTRAVENOUS EVERY 24 HOURS
Refills: 0 | Status: DISCONTINUED | OUTPATIENT
Start: 2025-05-07 | End: 2025-05-12

## 2025-05-07 RX ORDER — ACETAMINOPHEN 500 MG/5ML
1000 LIQUID (ML) ORAL ONCE
Refills: 0 | Status: COMPLETED | OUTPATIENT
Start: 2025-05-07 | End: 2025-05-07

## 2025-05-07 RX ORDER — ENOXAPARIN SODIUM 100 MG/ML
40 INJECTION SUBCUTANEOUS EVERY 24 HOURS
Refills: 0 | Status: DISCONTINUED | OUTPATIENT
Start: 2025-05-07 | End: 2025-05-12

## 2025-05-07 RX ORDER — TRAMADOL HYDROCHLORIDE 50 MG/1
1 TABLET, FILM COATED ORAL
Refills: 0 | DISCHARGE

## 2025-05-07 RX ORDER — AMLODIPINE BESYLATE 10 MG/1
10 TABLET ORAL EVERY 24 HOURS
Refills: 0 | Status: DISCONTINUED | OUTPATIENT
Start: 2025-05-07 | End: 2025-05-12

## 2025-05-07 RX ORDER — DEXTROSE 50 % IN WATER 50 %
25 SYRINGE (ML) INTRAVENOUS ONCE
Refills: 0 | Status: COMPLETED | OUTPATIENT
Start: 2025-05-07 | End: 2025-05-07

## 2025-05-07 RX ORDER — TAMSULOSIN HYDROCHLORIDE 0.4 MG/1
0.4 CAPSULE ORAL AT BEDTIME
Refills: 0 | Status: DISCONTINUED | OUTPATIENT
Start: 2025-05-07 | End: 2025-05-12

## 2025-05-07 RX ORDER — TRAMADOL HYDROCHLORIDE 50 MG/1
50 TABLET, FILM COATED ORAL EVERY 8 HOURS
Refills: 0 | Status: DISCONTINUED | OUTPATIENT
Start: 2025-05-07 | End: 2025-05-10

## 2025-05-07 RX ADMIN — Medication 650 MILLIGRAM(S): at 16:02

## 2025-05-07 RX ADMIN — TRAMADOL HYDROCHLORIDE 50 MILLIGRAM(S): 50 TABLET, FILM COATED ORAL at 18:33

## 2025-05-07 RX ADMIN — Medication 2 TABLET(S): at 21:50

## 2025-05-07 RX ADMIN — AMLODIPINE BESYLATE 10 MILLIGRAM(S): 10 TABLET ORAL at 05:27

## 2025-05-07 RX ADMIN — Medication 400 MILLIGRAM(S): at 19:09

## 2025-05-07 RX ADMIN — ENOXAPARIN SODIUM 40 MILLIGRAM(S): 100 INJECTION SUBCUTANEOUS at 10:56

## 2025-05-07 RX ADMIN — Medication 200 MILLIGRAM(S): at 13:10

## 2025-05-07 RX ADMIN — Medication 1 TABLET(S): at 10:56

## 2025-05-07 RX ADMIN — LISINOPRIL 20 MILLIGRAM(S): 5 TABLET ORAL at 05:26

## 2025-05-07 RX ADMIN — Medication 25 MILLILITER(S): at 08:36

## 2025-05-07 RX ADMIN — CEFTRIAXONE 100 MILLIGRAM(S): 500 INJECTION, POWDER, FOR SOLUTION INTRAMUSCULAR; INTRAVENOUS at 17:19

## 2025-05-07 RX ADMIN — Medication 325 MILLIGRAM(S): at 10:57

## 2025-05-07 RX ADMIN — TAMSULOSIN HYDROCHLORIDE 0.4 MILLIGRAM(S): 0.4 CAPSULE ORAL at 21:50

## 2025-05-07 RX ADMIN — Medication 40 MILLIGRAM(S): at 05:27

## 2025-05-07 RX ADMIN — GABAPENTIN 200 MILLIGRAM(S): 400 CAPSULE ORAL at 17:19

## 2025-05-07 RX ADMIN — GABAPENTIN 200 MILLIGRAM(S): 400 CAPSULE ORAL at 10:57

## 2025-05-07 RX ADMIN — Medication 5 MILLIGRAM(S): at 22:36

## 2025-05-07 NOTE — PROGRESS NOTE ADULT - ATTENDING COMMENTS
69M with PMH of chronic Hep C (not on treatment), OUD on methadone, HTN presenting for GAS bacteremia management after signing himself out from [facility].  He has missed a few doses of ceftriaxone.     Plan   Will continue with ceftriaxone, as recommended per ID during their involvement in the recent hospitalization.  Ceftriaxone initially scheduled to end on 5/12, but will extend for 3 days, as unclear if he received a dose on Sunday.    - continue home methadone  - ensure on bowel regimen  - PT/SW consult - home services vs [facility]  rest as above    35 minutes spent on this encounter, including face to face with patient, review of chart, care coordination and documentation.

## 2025-05-07 NOTE — DISCHARGE NOTE PROVIDER - HOSPITAL COURSE
#Discharge: do not delete    69M PMH HTN, OUD on methadone (200mg daily per chart), chronic hep C not on treatment , recent admission dc'd 2d ago to [facility] for GAS RLE cellulitis/bacteremia with plan for 2g CTX daily until 5/12, presenting after leaving [facility] facillity presenting for continued GAS bacteremia management.      Problem/Plan - 1:  ·  Problem: Cellulitis of right lower leg.   ·  Plan: Admission 4/27-5/4 for RLE cellulitis and tooth infection with subcut mid-calf fluid collection cf abscess sp I&D by vascular course cb GAS bacteremia. Dcd 2ds ago to [facility] on CTX 2g x 2 weeks (4/29-5/12). Not meeting SIRS criteria on admission  Extended course of CTX to account for the 2-3 days that were missed (EOT 5/15)  PT Evaluation     Problem/Plan - 2:  ·  Problem: Methadone use.   ·  Plan: Reports taking methadone 200mg qd. QTc 453. Methadone clinic is [address] [phone]     Problem/Plan - 3:  ·  Problem: Hypertension.   ·  Plan: Home meds lisinopril 20-hctz 12.5 and amlodipine 10     Problem/Plan - 4:  ·  Problem: Chronic hepatitis C.   ·  Plan: Pt. has known hepC, not on treatment. HIV negative (4/28).    Patient was discharged to: (home/[facility]/acute rehab/hospice, etc, and with what services – home health PT/RN? Home O2?)    New medications: none  Changes to old medications: none  Medications that were stopped: none    Items to follow up as outpatient: none    Physical exam at the time of discharge: AAOx3; NAD; RRR, no murmurs; lungs CLAB; abd NT/ND; extremities wwp, no peripheral edema.     Patient was medically optimized, stable and ready for discharge. Plan of care and return precautions were discussed with the patient who verbally stated understanding. On the day of discharge, the patient was seen and examined. Symptoms improved. Vital signs are stable. Labs and imaging reviewed. Patient is medically optimized and hemodynamically stable. Return precautions discussed, medication teach back done, and importance of physician follow up emphasized. The patient verbalized understanding.

## 2025-05-07 NOTE — DISCHARGE NOTE PROVIDER - NSDCMRMEDTOKEN_GEN_ALL_CORE_FT
amLODIPine 10 mg oral tablet: 1 tab(s) orally once a day  bisacodyl 10 mg rectal suppository: 1 suppository(ies) rectal once a day As needed Constipation  bisacodyl 5 mg oral delayed release tablet: 1 tab(s) orally once a day  cefTRIAXone: 2 gram(s) intravenous every 24 hours End date: 5/12/25  ferrous sulfate 324 mg (65 mg elemental iron) oral tablet: orally once a day  gabapentin 100 mg oral tablet: 2 tab(s) orally 3 times a day  lisinopril-hydrochlorothiazide 20 mg-12.5 mg oral tablet: orally every 24 hours  methadone 10 mg oral tablet: 20 tab(s) orally once a day  Multiple Vitamins oral tablet: 1 tab(s) orally once a day  pantoprazole 40 mg oral delayed release tablet: 1 tab(s) orally once a day (before a meal)  polyethylene glycol 3350 oral powder for reconstitution: 17 gram(s) orally every 24 hours  senna leaf extract oral tablet: 2 tab(s) orally once a day (at bedtime)  tamsulosin 0.4 mg oral capsule: 1 cap(s) orally once a day (at bedtime)  traMADol 100 mg oral tablet: 1 tab(s) orally 3 times a day as needed for  severe pain   amLODIPine 10 mg oral tablet: 1 tab(s) orally once a day  bisacodyl 10 mg rectal suppository: 1 suppository(ies) rectal once a day As needed Constipation  bisacodyl 5 mg oral delayed release tablet: 1 tab(s) orally once a day  cefTRIAXone: 2 gram(s) intravenous every 24 hours End date: 5/15/25  ferrous sulfate 324 mg (65 mg elemental iron) oral tablet: orally once a day  gabapentin 300 mg oral capsule: 1 cap(s) orally every 8 hours  lisinopril-hydrochlorothiazide 20 mg-12.5 mg oral tablet: orally every 24 hours  methadone 10 mg/mL oral concentrate: 20 milliliter(s) orally every 24 hours  Multiple Vitamins oral tablet: 1 tab(s) orally once a day  pantoprazole 40 mg oral delayed release tablet: 1 tab(s) orally once a day (before a meal)  polyethylene glycol 3350 oral powder for reconstitution: 17 gram(s) orally every 24 hours  senna leaf extract oral tablet: 2 tab(s) orally once a day (at bedtime)  tamsulosin 0.4 mg oral capsule: 1 cap(s) orally once a day (at bedtime)  traMADol 100 mg oral tablet: 1 tab(s) orally 3 times a day as needed for  severe pain

## 2025-05-07 NOTE — PROGRESS NOTE ADULT - PROBLEM SELECTOR PLAN 1
Admission 4/27-5/4 for RLE cellulitis and tooth infection with subcut mid-calf fluid collection cf abscess sp I&D by vascular course cb GAS bacteremia. Dcd 2ds ago to [facility] on CTX 2g x 2 weeks (4/29-5/12) sp PICC.  PICC removed in ED    - Currently not meeting SIRS  - wound improved- consult wound care  - cw CTX 2g q24H (-5/12)  - continue to monitor leg  - PT/SW eval Admission 4/27-5/4 for RLE cellulitis and tooth infection with subcut mid-calf fluid collection cf abscess sp I&D by vascular course cb GAS bacteremia. Dcd 2ds ago to [facility] on CTX 2g x 2 weeks (4/29-5/12) sp PICC.  PICC removed in ED    - Currently not meeting SIRS  - wound improved- consult wound care  - cw CTX 2g q24H (-5/15 [extended 3 days as patient missed 2-3 days])  - continue to monitor leg  - PT/SW eval

## 2025-05-07 NOTE — PROGRESS NOTE ADULT - PROBLEM SELECTOR PLAN 2
[address] [place] [address] Outpatient Physical Therapy   [address] [facility] [address]   Phone: [phone]   Fax: [phone]    Physical Therapy Daily Treatment Note      Date:  3/2/2021  Patient Name:  Kael Kinney    :  1947  MRN: [id number]  Physician: Rosanna Nelson MD approved 12 visits             Insurance:  Medicare, BCBS [id number]    Electronically signed by:  Mellissa Sagastume PTA Reports taking methadone 200mg qd. QTc 453    - f/u UDS  - will give methadone 40 mg then confirm dosage with [facility]- [address] [facility] [phone]  - repeat EKG after starting home methadone Reports taking methadone 200mg qd. QTc 453  [facility]- [address] [facility] [phone]  - continue with home methadone

## 2025-05-07 NOTE — DISCHARGE NOTE PROVIDER - DETAILS OF MALNUTRITION DIAGNOSIS/DIAGNOSES
This patient has been assessed with a concern for Malnutrition and was treated during this hospitalization for the following Nutrition diagnosis/diagnoses:     -  05/08/2025: Severe protein-calorie malnutrition

## 2025-05-07 NOTE — PROGRESS NOTE ADULT - SUBJECTIVE AND OBJECTIVE BOX
INTERVAL/OVERNIGHT EVENTS: None    SUBJECTIVE:  Patient seen and examined at bedside, comfortable, NAD. Denied fever, chest pain, dyspnea, abdominal pain.     Vital Signs Last 12 Hrs  T(F): 97.5 (05-07-25 @ 05:28), Max: 98.6 (05-06-25 @ 23:04)  HR: 80 (05-07-25 @ 07:32) (80 - 90)  BP: 144/82 (05-07-25 @ 07:32) (144/82 - 193/88)  BP(mean): --  RR: 15 (05-07-25 @ 05:28) (15 - 16)  SpO2: 97% (05-07-25 @ 05:28) (97% - 98%)  I&O's Summary    06 May 2025 07:01  -  07 May 2025 07:00  --------------------------------------------------------  IN: 480 mL / OUT: 500 mL / NET: -20 mL        PHYSICAL EXAM:  Constitutional: Resting comfortably in bed; NAD  Eyes: PERRL, EOMI, clear conjunctiva  ENT: no nasal discharge; uvula midline, eroding cavity under R molar; MMM  Respiratory: CTA B/L; no W/R/R, no retractions  Cardiac: +S1/S2; RRR; no M/R/G;  Gastrointestinal: soft, NT/ND; no rebound or guarding; +BSx4  Extremities: WWP, no clubbing or cyanosis; 3+ pitting edema RLE  Musculoskeletal: NROM x4; no joint swelling, tenderness or erythema, R lateral leg wound with pus  Neurologic: AAOx3; CNII-XII grossly intact; no focal deficits    LABS:                        12.2   6.73  )-----------( 268      ( 07 May 2025 06:52 )             36.8     05-07    136  |  103  |  12  ----------------------------<  54[LL]  4.5   |  25  |  0.74    Ca    9.1      07 May 2025 06:52  Phos  3.1     05-07  Mg     1.8     05-07    TPro  7.1  /  Alb  3.2[L]  /  TBili  0.3  /  DBili  x   /  AST  118[H]  /  ALT  73[H]  /  AlkPhos  147[H]  05-07      Urinalysis Basic - ( 07 May 2025 06:52 )    Color: x / Appearance: x / SG: x / pH: x  Gluc: 54 mg/dL / Ketone: x  / Bili: x / Urobili: x   Blood: x / Protein: x / Nitrite: x   Leuk Esterase: x / RBC: x / WBC x   Sq Epi: x / Non Sq Epi: x / Bacteria: x          RADIOLOGY & ADDITIONAL TESTS:    MEDICATIONS  (STANDING):  amLODIPine   Tablet 10 milliGRAM(s) Oral every 24 hours  cefTRIAXone   IVPB 2000 milliGRAM(s) IV Intermittent every 24 hours  enoxaparin Injectable 40 milliGRAM(s) SubCutaneous every 24 hours  ferrous    sulfate 325 milliGRAM(s) Oral daily  gabapentin 200 milliGRAM(s) Oral every 8 hours  hydrochlorothiazide 12.5 milliGRAM(s) Oral every 24 hours  lisinopril 20 milliGRAM(s) Oral every 24 hours  methadone    Tablet 40 milliGRAM(s) Oral once  multivitamin 1 Tablet(s) Oral daily  pantoprazole    Tablet 40 milliGRAM(s) Oral before breakfast  senna 2 Tablet(s) Oral at bedtime  tamsulosin 0.4 milliGRAM(s) Oral at bedtime    MEDICATIONS  (PRN):  acetaminophen     Tablet .. 650 milliGRAM(s) Oral every 6 hours PRN Temp greater or equal to 38C (100.4F), Mild Pain (1 - 3)

## 2025-05-07 NOTE — DISCHARGE NOTE PROVIDER - NSDCCPCAREPLAN_GEN_ALL_CORE_FT
PRINCIPAL DISCHARGE DIAGNOSIS  Diagnosis: Cellulitis  Assessment and Plan of Treatment: You were admitted for continued treatment of your bacteremia. We extended your course of antibiotics to account for the doses/days that you missed. Please ensure that you finish your IV antibiotics as directed.

## 2025-05-08 LAB
ANION GAP SERPL CALC-SCNC: 7 MMOL/L — SIGNIFICANT CHANGE UP (ref 5–17)
BASOPHILS # BLD AUTO: 0.02 K/UL — SIGNIFICANT CHANGE UP (ref 0–0.2)
BASOPHILS NFR BLD AUTO: 0.3 % — SIGNIFICANT CHANGE UP (ref 0–2)
BILIRUB SERPL-MCNC: 0.2 MG/DL — SIGNIFICANT CHANGE UP (ref 0.2–1.2)
BLD GP AB SCN SERPL QL: NEGATIVE — SIGNIFICANT CHANGE UP
BUN SERPL-MCNC: 16 MG/DL — SIGNIFICANT CHANGE UP (ref 7–23)
CALCIUM SERPL-MCNC: 8.9 MG/DL — SIGNIFICANT CHANGE UP (ref 8.4–10.5)
CHLORIDE SERPL-SCNC: 100 MMOL/L — SIGNIFICANT CHANGE UP (ref 96–108)
CO2 SERPL-SCNC: 29 MMOL/L — SIGNIFICANT CHANGE UP (ref 22–31)
CREAT SERPL-MCNC: 0.82 MG/DL — SIGNIFICANT CHANGE UP (ref 0.5–1.3)
EGFR: 94 ML/MIN/1.73M2 — SIGNIFICANT CHANGE UP
EGFR: 94 ML/MIN/1.73M2 — SIGNIFICANT CHANGE UP
EOSINOPHIL # BLD AUTO: 0.04 K/UL — SIGNIFICANT CHANGE UP (ref 0–0.5)
EOSINOPHIL NFR BLD AUTO: 0.7 % — SIGNIFICANT CHANGE UP (ref 0–6)
GLUCOSE SERPL-MCNC: 96 MG/DL — SIGNIFICANT CHANGE UP (ref 70–99)
HCT VFR BLD CALC: 38.4 % — LOW (ref 39–50)
HGB BLD-MCNC: 12.4 G/DL — LOW (ref 13–17)
IMM GRANULOCYTES NFR BLD AUTO: 0.3 % — SIGNIFICANT CHANGE UP (ref 0–0.9)
INR BLD: 1 — SIGNIFICANT CHANGE UP (ref 0.85–1.16)
LYMPHOCYTES # BLD AUTO: 1.67 K/UL — SIGNIFICANT CHANGE UP (ref 1–3.3)
LYMPHOCYTES # BLD AUTO: 28 % — SIGNIFICANT CHANGE UP (ref 13–44)
MAGNESIUM SERPL-MCNC: 1.7 MG/DL — SIGNIFICANT CHANGE UP (ref 1.6–2.6)
MCHC RBC-ENTMCNC: 31.6 PG — SIGNIFICANT CHANGE UP (ref 27–34)
MCHC RBC-ENTMCNC: 32.3 G/DL — SIGNIFICANT CHANGE UP (ref 32–36)
MCV RBC AUTO: 98 FL — SIGNIFICANT CHANGE UP (ref 80–100)
MELD SCORE WITH DIALYSIS: 21 POINTS — SIGNIFICANT CHANGE UP
MELD SCORE WITHOUT DIALYSIS: 6 POINTS — SIGNIFICANT CHANGE UP
MONOCYTES # BLD AUTO: 0.6 K/UL — SIGNIFICANT CHANGE UP (ref 0–0.9)
MONOCYTES NFR BLD AUTO: 10.1 % — SIGNIFICANT CHANGE UP (ref 2–14)
NEUTROPHILS # BLD AUTO: 3.62 K/UL — SIGNIFICANT CHANGE UP (ref 1.8–7.4)
NEUTROPHILS NFR BLD AUTO: 60.6 % — SIGNIFICANT CHANGE UP (ref 43–77)
NRBC BLD AUTO-RTO: 0 /100 WBCS — SIGNIFICANT CHANGE UP (ref 0–0)
PHOSPHATE SERPL-MCNC: 3.7 MG/DL — SIGNIFICANT CHANGE UP (ref 2.5–4.5)
PLATELET # BLD AUTO: 256 K/UL — SIGNIFICANT CHANGE UP (ref 150–400)
POTASSIUM SERPL-MCNC: 4.2 MMOL/L — SIGNIFICANT CHANGE UP (ref 3.5–5.3)
POTASSIUM SERPL-SCNC: 4.2 MMOL/L — SIGNIFICANT CHANGE UP (ref 3.5–5.3)
PROTHROM AB SERPL-ACNC: 11.5 SEC — SIGNIFICANT CHANGE UP (ref 9.9–13.4)
RBC # BLD: 3.92 M/UL — LOW (ref 4.2–5.8)
RBC # FLD: 14.2 % — SIGNIFICANT CHANGE UP (ref 10.3–14.5)
RH IG SCN BLD-IMP: NEGATIVE — SIGNIFICANT CHANGE UP
SODIUM SERPL-SCNC: 136 MMOL/L — SIGNIFICANT CHANGE UP (ref 135–145)
WBC # BLD: 5.97 K/UL — SIGNIFICANT CHANGE UP (ref 3.8–10.5)
WBC # FLD AUTO: 5.97 K/UL — SIGNIFICANT CHANGE UP (ref 3.8–10.5)

## 2025-05-08 PROCEDURE — 99232 SBSQ HOSP IP/OBS MODERATE 35: CPT

## 2025-05-08 RX ORDER — MAGNESIUM SULFATE 500 MG/ML
1 SYRINGE (ML) INJECTION ONCE
Refills: 0 | Status: COMPLETED | OUTPATIENT
Start: 2025-05-08 | End: 2025-05-08

## 2025-05-08 RX ORDER — ACETAMINOPHEN 500 MG/5ML
1000 LIQUID (ML) ORAL ONCE
Refills: 0 | Status: COMPLETED | OUTPATIENT
Start: 2025-05-08 | End: 2025-05-08

## 2025-05-08 RX ORDER — DEXTROSE 50 % IN WATER 50 %
50 SYRINGE (ML) INTRAVENOUS ONCE
Refills: 0 | Status: COMPLETED | OUTPATIENT
Start: 2025-05-08 | End: 2025-05-08

## 2025-05-08 RX ADMIN — Medication 2 TABLET(S): at 21:22

## 2025-05-08 RX ADMIN — LISINOPRIL 20 MILLIGRAM(S): 5 TABLET ORAL at 06:28

## 2025-05-08 RX ADMIN — Medication 40 MILLIGRAM(S): at 06:28

## 2025-05-08 RX ADMIN — Medication 100 GRAM(S): at 10:18

## 2025-05-08 RX ADMIN — TRAMADOL HYDROCHLORIDE 50 MILLIGRAM(S): 50 TABLET, FILM COATED ORAL at 10:19

## 2025-05-08 RX ADMIN — Medication 50 MILLILITER(S): at 06:37

## 2025-05-08 RX ADMIN — TAMSULOSIN HYDROCHLORIDE 0.4 MILLIGRAM(S): 0.4 CAPSULE ORAL at 21:22

## 2025-05-08 RX ADMIN — GABAPENTIN 200 MILLIGRAM(S): 400 CAPSULE ORAL at 18:32

## 2025-05-08 RX ADMIN — TRAMADOL HYDROCHLORIDE 50 MILLIGRAM(S): 50 TABLET, FILM COATED ORAL at 02:36

## 2025-05-08 RX ADMIN — Medication 325 MILLIGRAM(S): at 10:19

## 2025-05-08 RX ADMIN — GABAPENTIN 200 MILLIGRAM(S): 400 CAPSULE ORAL at 10:19

## 2025-05-08 RX ADMIN — TRAMADOL HYDROCHLORIDE 50 MILLIGRAM(S): 50 TABLET, FILM COATED ORAL at 19:32

## 2025-05-08 RX ADMIN — CEFTRIAXONE 100 MILLIGRAM(S): 500 INJECTION, POWDER, FOR SOLUTION INTRAMUSCULAR; INTRAVENOUS at 17:39

## 2025-05-08 RX ADMIN — Medication 1 TABLET(S): at 10:19

## 2025-05-08 RX ADMIN — GABAPENTIN 200 MILLIGRAM(S): 400 CAPSULE ORAL at 01:42

## 2025-05-08 RX ADMIN — Medication 400 MILLIGRAM(S): at 22:06

## 2025-05-08 RX ADMIN — TRAMADOL HYDROCHLORIDE 50 MILLIGRAM(S): 50 TABLET, FILM COATED ORAL at 03:36

## 2025-05-08 RX ADMIN — Medication 200 MILLIGRAM(S): at 06:58

## 2025-05-08 RX ADMIN — TRAMADOL HYDROCHLORIDE 50 MILLIGRAM(S): 50 TABLET, FILM COATED ORAL at 18:32

## 2025-05-08 RX ADMIN — AMLODIPINE BESYLATE 10 MILLIGRAM(S): 10 TABLET ORAL at 06:28

## 2025-05-08 RX ADMIN — Medication 5 MILLIGRAM(S): at 21:22

## 2025-05-08 NOTE — PHYSICAL THERAPY INITIAL EVALUATION ADULT - ADDITIONAL COMMENTS
Patient reports he lives alone in 1st floor apartment with no ANTHONY. PTA patient ambulated with SC or rollator depending on pain level

## 2025-05-08 NOTE — PROGRESS NOTE ADULT - PROBLEM SELECTOR PLAN 1
Admission 4/27-5/4 for RLE cellulitis and tooth infection with subcut mid-calf fluid collection cf abscess sp I&D by vascular course cb GAS bacteremia. Dcd 2ds ago to [facility] on CTX 2g x 2 weeks (4/29-5/12) sp PICC.  PICC removed in ED    - Currently not meeting SIRS  - wound improved- consult wound care  - cw CTX 2g q24H (-5/15 [extended 3 days as patient missed 2-3 days])  - continue to monitor leg  - PT/SW eval

## 2025-05-08 NOTE — PROGRESS NOTE ADULT - PROBLEM SELECTOR PLAN 2
Reports taking methadone 200mg qd. QTc 453  [facility]- [address] [facility] [phone]  - continue with home methadone

## 2025-05-08 NOTE — DIETITIAN INITIAL EVALUATION ADULT - OTHER INFO
Per H&P: 69M PMH HTN, OUD on methadone (200mg daily per chart), chronic hep C not on treatment , recent admission dc'd 2d ago to [facility] for GAS RLE cellulitis/bacteremia with plan for 2g CTX daily until 5/12, presenting after leaving [facility] facillity presenting for GAS bacteremia management    Patient seen at bedside for nutrition assessment. Patient known to this RD from previous admission. Current diet order: regular, Ensure Enlive TID. No known food allergies. Patient endorses some pain when chewing around his jaw, but prefers to self regulate the foods he orders since he enjoys sandwiches and peanut butter which he would not be able to order on a more restrictive diet texture. Reports fair appetite today - observed lunch tray 25-50% consumed and he drank his Ensure supplement. Prior to recent hospital admission, patient was eating well. Endorsed weight loss after hemicolectomy surgery ~1 year ago and had since gained some weight back. Dosing weight: 135 pounds, BMI 21.1, reports UBW of 150 pounds x1 year ago indicating 15 pound/10% weight loss x1 year - not clinically significant. +2 edema to L leg, +3 edema to R leg and foot. Observed with severe wasting to temples, buccals, orbitals, dorsal hand, clavicles. Based on ASPEN guidelines, patient meets criteria for severe malnutrition due to severe muscle and fat wasting. Provided nutrition education - see below. No pressure injuries documented. Denies nausea/vomiting/diarrhea/constipation - last BM 5/7 per chart. Labs: blood sugar  x24hrs. Meds: antibiotic, iron supplement, hydrochlorothiazide, MVI, protonix, bowel regimen. See nutrition recommendations below.

## 2025-05-08 NOTE — DIETITIAN INITIAL EVALUATION ADULT - OTHER CALCULATIONS
pounds. Patient within % IBW (91%) thus actual body weight used for all calculations. Needs adjusted for advanced age and malnutrition. Fluids per team due to edema.

## 2025-05-08 NOTE — DIETITIAN INITIAL EVALUATION ADULT - PROBLEM SELECTOR PLAN 1
Admission 4/27-5/4 for RLE cellulitis and tooth infection with subcut mid-calf fluid collection cf abscess sp I&D by vascular course cb GAS bacteremia. Dcd 2ds ago to [facility] on CTX 2g x 2 weeks (4/29-5/12) sp PICC.  PICC removed in ED    - Currently not meeting SIRS  - wound improved- consult wound care  - cw CTX 2g q24H (-5/12)  - continue to monitor leg  -PT/SW eval

## 2025-05-08 NOTE — DIETITIAN INITIAL EVALUATION ADULT - ADD RECOMMEND
1. Continue with regular diet. Continue to provide Ensure Plus High Protein 3x/day (350kcal, 20g protein per serving). RD to add fortified foods: chocolate peanut butter smoothie (365kcal, 18g protein per serving), fortified mashed potatoes (240kcal, 14g protein per serving)   2. Encourage pt to meet nutritional needs as able   3. Monitor labs: electrolytes, cbc  4. Monitor weights   5. Pain and bowel regimen per team   6. Will continue to assess/honor food preferences as able  7. Monitor adherence to diet education

## 2025-05-08 NOTE — PHYSICAL THERAPY INITIAL EVALUATION ADULT - PERTINENT HX OF CURRENT PROBLEM, REHAB EVAL
Patient is a 69 year old male PMH HTN, OUD on methadone (200mg daily per chart), chronic hep C not on treatment , recent admission dc'd 2d ago to [facility] for GAS RLE cellulitis/bacteremia with plan for 2g CTX daily until 5/12, presenting after leaving [facility] facillity presenting for GAS bacteremia management

## 2025-05-08 NOTE — PROGRESS NOTE ADULT - SUBJECTIVE AND OBJECTIVE BOX
**INCOMPLETE NOTE    INTERVAL/OVERNIGHT EVENTS: None    SUBJECTIVE:  Patient seen and examined at bedside, comfortable, NAD. Denied fever, chest pain, dyspnea, abdominal pain.     Vital Signs Last 12 Hrs  T(F): 98.1 (05-08-25 @ 05:35), Max: 98.1 (05-08-25 @ 05:35)  HR: 90 (05-08-25 @ 05:35) (82 - 97)  BP: 156/100 (05-08-25 @ 05:35) (139/59 - 165/97)  BP(mean): --  RR: 17 (05-08-25 @ 05:35) (17 - 17)  SpO2: 95% (05-08-25 @ 05:35) (95% - 97%)  I&O's Summary    07 May 2025 07:01  -  08 May 2025 07:00  --------------------------------------------------------  IN: 0 mL / OUT: 150 mL / NET: -150 mL        PHYSICAL EXAM:  Constitutional: Resting comfortably in bed; NAD  Eyes: PERRL, EOMI, clear conjunctiva  ENT: no nasal discharge; uvula midline, eroding cavity under R molar; MMM  Respiratory: CTA B/L; no W/R/R, no retractions  Cardiac: +S1/S2; RRR; no M/R/G;  Gastrointestinal: soft, NT/ND; no rebound or guarding; +BSx4  Extremities: WWP, no clubbing or cyanosis; 3+ pitting edema RLE  Musculoskeletal: NROM x4; no joint swelling, tenderness or erythema, R lateral leg wound with pus  Neurologic: AAOx3; CNII-XII grossly intact; no focal deficits    LABS:                        12.4   5.97  )-----------( 256      ( 08 May 2025 05:30 )             38.4     05-08    136  |  100  |  16  ----------------------------<  96  4.2   |  29  |  0.82    Ca    8.9      08 May 2025 05:30  Phos  3.7     05-08  Mg     1.7     05-08    TPro  7.1  /  Alb  3.2[L]  /  TBili  0.3  /  DBili  x   /  AST  118[H]  /  ALT  73[H]  /  AlkPhos  147[H]  05-07    PT/INR - ( 08 May 2025 05:30 )   PT: 11.5 sec;   INR: 1.00            Urinalysis Basic - ( 08 May 2025 05:30 )    Color: x / Appearance: x / SG: x / pH: x  Gluc: 96 mg/dL / Ketone: x  / Bili: x / Urobili: x   Blood: x / Protein: x / Nitrite: x   Leuk Esterase: x / RBC: x / WBC x   Sq Epi: x / Non Sq Epi: x / Bacteria: x          RADIOLOGY & ADDITIONAL TESTS:    MEDICATIONS  (STANDING):  amLODIPine   Tablet 10 milliGRAM(s) Oral every 24 hours  cefTRIAXone   IVPB 2000 milliGRAM(s) IV Intermittent every 24 hours  enoxaparin Injectable 40 milliGRAM(s) SubCutaneous every 24 hours  ferrous    sulfate 325 milliGRAM(s) Oral daily  gabapentin 200 milliGRAM(s) Oral every 8 hours  hydrochlorothiazide 12.5 milliGRAM(s) Oral every 24 hours  lisinopril 20 milliGRAM(s) Oral every 24 hours  melatonin 5 milliGRAM(s) Oral at bedtime  methadone  Concentrate 200 milliGRAM(s) Oral every 24 hours  multivitamin 1 Tablet(s) Oral daily  pantoprazole    Tablet 40 milliGRAM(s) Oral before breakfast  senna 2 Tablet(s) Oral at bedtime  tamsulosin 0.4 milliGRAM(s) Oral at bedtime    MEDICATIONS  (PRN):  acetaminophen     Tablet .. 650 milliGRAM(s) Oral every 6 hours PRN Temp greater or equal to 38C (100.4F), Mild Pain (1 - 3)  traMADol 50 milliGRAM(s) Oral every 8 hours PRN Severe Pain (7 - 10)   INTERVAL/OVERNIGHT EVENTS: None    SUBJECTIVE:  Patient seen and examined at bedside, comfortable, NAD. Denied fever, chest pain, dyspnea, abdominal pain. Reports some persistent shooting pain in his lower legs as well as pain in his R molar, but overall not too bothered.     Vital Signs Last 12 Hrs  T(F): 98.1 (05-08-25 @ 05:35), Max: 98.1 (05-08-25 @ 05:35)  HR: 90 (05-08-25 @ 05:35) (82 - 97)  BP: 156/100 (05-08-25 @ 05:35) (139/59 - 165/97)  BP(mean): --  RR: 17 (05-08-25 @ 05:35) (17 - 17)  SpO2: 95% (05-08-25 @ 05:35) (95% - 97%)  I&O's Summary    07 May 2025 07:01  -  08 May 2025 07:00  --------------------------------------------------------  IN: 0 mL / OUT: 150 mL / NET: -150 mL        PHYSICAL EXAM:  Constitutional: Resting comfortably in bed; NAD  Eyes: PERRL, EOMI, clear conjunctiva  ENT: no nasal discharge; uvula midline, eroding cavity under R molar; MMM  Respiratory: CTA B/L; no W/R/R, no retractions  Cardiac: +S1/S2; RRR; no M/R/G;  Gastrointestinal: soft, NT/ND; no rebound or guarding; +BSx4  Extremities: WWP, no clubbing or cyanosis; 3+ pitting edema RLE  Musculoskeletal: NROM x4; no joint swelling, tenderness or erythema, R lateral leg wound with pus  Neurologic: AAOx3; CNII-XII grossly intact; no focal deficits    LABS:                        12.4   5.97  )-----------( 256      ( 08 May 2025 05:30 )             38.4     05-08    136  |  100  |  16  ----------------------------<  96  4.2   |  29  |  0.82    Ca    8.9      08 May 2025 05:30  Phos  3.7     05-08  Mg     1.7     05-08    TPro  7.1  /  Alb  3.2[L]  /  TBili  0.3  /  DBili  x   /  AST  118[H]  /  ALT  73[H]  /  AlkPhos  147[H]  05-07    PT/INR - ( 08 May 2025 05:30 )   PT: 11.5 sec;   INR: 1.00            Urinalysis Basic - ( 08 May 2025 05:30 )    Color: x / Appearance: x / SG: x / pH: x  Gluc: 96 mg/dL / Ketone: x  / Bili: x / Urobili: x   Blood: x / Protein: x / Nitrite: x   Leuk Esterase: x / RBC: x / WBC x   Sq Epi: x / Non Sq Epi: x / Bacteria: x          RADIOLOGY & ADDITIONAL TESTS:    MEDICATIONS  (STANDING):  amLODIPine   Tablet 10 milliGRAM(s) Oral every 24 hours  cefTRIAXone   IVPB 2000 milliGRAM(s) IV Intermittent every 24 hours  enoxaparin Injectable 40 milliGRAM(s) SubCutaneous every 24 hours  ferrous    sulfate 325 milliGRAM(s) Oral daily  gabapentin 200 milliGRAM(s) Oral every 8 hours  hydrochlorothiazide 12.5 milliGRAM(s) Oral every 24 hours  lisinopril 20 milliGRAM(s) Oral every 24 hours  melatonin 5 milliGRAM(s) Oral at bedtime  methadone  Concentrate 200 milliGRAM(s) Oral every 24 hours  multivitamin 1 Tablet(s) Oral daily  pantoprazole    Tablet 40 milliGRAM(s) Oral before breakfast  senna 2 Tablet(s) Oral at bedtime  tamsulosin 0.4 milliGRAM(s) Oral at bedtime    MEDICATIONS  (PRN):  acetaminophen     Tablet .. 650 milliGRAM(s) Oral every 6 hours PRN Temp greater or equal to 38C (100.4F), Mild Pain (1 - 3)  traMADol 50 milliGRAM(s) Oral every 8 hours PRN Severe Pain (7 - 10)

## 2025-05-08 NOTE — PHYSICAL THERAPY INITIAL EVALUATION ADULT - GAIT DEVIATIONS NOTED, PT EVAL
patient with fairly steady gait, no overt LOB, forward flexed posture/decreased chantelle/decreased step length

## 2025-05-08 NOTE — DIETITIAN INITIAL EVALUATION ADULT - PERTINENT LABORATORY DATA
05-08    136  |  100  |  16  ----------------------------<  96  4.2   |  29  |  0.82    Ca    8.9      08 May 2025 05:30  Phos  3.7     05-08  Mg     1.7     05-08    TPro  x   /  Alb  x   /  TBili  0.2  /  DBili  x   /  AST  x   /  ALT  x   /  AlkPhos  x   05-08  POCT Blood Glucose.: 139 mg/dL (05-08-25 @ 11:18)

## 2025-05-08 NOTE — DIETITIAN INITIAL EVALUATION ADULT - PERTINENT MEDS FT
MEDICATIONS  (STANDING):  amLODIPine   Tablet 10 milliGRAM(s) Oral every 24 hours  cefTRIAXone   IVPB 2000 milliGRAM(s) IV Intermittent every 24 hours  enoxaparin Injectable 40 milliGRAM(s) SubCutaneous every 24 hours  ferrous    sulfate 325 milliGRAM(s) Oral daily  gabapentin 200 milliGRAM(s) Oral every 8 hours  hydrochlorothiazide 12.5 milliGRAM(s) Oral every 24 hours  lisinopril 20 milliGRAM(s) Oral every 24 hours  melatonin 5 milliGRAM(s) Oral at bedtime  methadone  Concentrate 200 milliGRAM(s) Oral every 24 hours  multivitamin 1 Tablet(s) Oral daily  pantoprazole    Tablet 40 milliGRAM(s) Oral before breakfast  senna 2 Tablet(s) Oral at bedtime  tamsulosin 0.4 milliGRAM(s) Oral at bedtime    MEDICATIONS  (PRN):  acetaminophen     Tablet .. 650 milliGRAM(s) Oral every 6 hours PRN Temp greater or equal to 38C (100.4F), Mild Pain (1 - 3)  traMADol 50 milliGRAM(s) Oral every 8 hours PRN Severe Pain (7 - 10)

## 2025-05-08 NOTE — DIETITIAN INITIAL EVALUATION ADULT - PROBLEM SELECTOR PLAN 2
Reports taking methadone 200mg qd. QTc 453    - f/u UDS  - will give methadone 40 mg then confirm dosage with [facility]- [address] [facility] [phone]  - repeat EKG after starting home methadone

## 2025-05-08 NOTE — DIETITIAN INITIAL EVALUATION ADULT - PROBLEM SELECTOR PLAN 5
F: none  E: Replete PRN  N: regular  Lines: pIV  DVT pphx: lovenox    CODE STATUS: Full  DISPO: [facility]

## 2025-05-08 NOTE — DIETITIAN INITIAL EVALUATION ADULT - PERSON TAUGHT/METHOD
Discussed importance of adequate protein, food sources of protein, food first with supplement in between meals. Patient verbalized understanding./verbal instruction/patient instructed

## 2025-05-08 NOTE — DIETITIAN NUTRITION RISK NOTIFICATION - OTHER RISK FACTORS
Not applicable Detail Level: Simple Additional Notes: Negative urine test today. Render Risk Assessment In Note?: no

## 2025-05-08 NOTE — PROGRESS NOTE ADULT - ATTENDING COMMENTS
69M with PMH of chronic Hep C (not on treatment), OUD on methadone, HTN presenting for GAS bacteremia management after signing himself out from [facility].  He has missed a few doses of ceftriaxone.     Plan   Will continue with ceftriaxone, as recommended per ID during their involvement in the recent hospitalization.  Ceftriaxone initially scheduled to end on 5/12, but will extend for 3 days, as unclear if he received a dose on Sunday.    - continue home methadone  - ensure on bowel regimen - patient reports bowel movements   - PT/SW consult - home services vs [facility]  rest as above    35 minutes spent on this encounter, including face to face with patient, review of chart, care coordination and documentation.  Time spent on this encounter excludes teaching and separately reported services. 69M with PMH of chronic Hep C (not on treatment), OUD on methadone, HTN presenting for GAS bacteremia management after signing himself out from [facility].  He has missed a few doses of ceftriaxone.     Plan   Will continue with ceftriaxone, as recommended per ID during their involvement in the recent hospitalization.  Ceftriaxone initially scheduled to end on 5/12, but will extend for 3 days, as unclear if he received a dose on Sunday.    - continue home methadone  - ensure on bowel regimen - patient reports bowel movements   - PT/SW consult - home services vs [facility]  rest as above  - per recent hospitalization documentation - due to his [place] in [place], not able to accommodate home infusions, so will need to got to [facility] for completion of antibiotics  35 minutes spent on this encounter, including face to face with patient, review of chart, care coordination and documentation.  Time spent on this encounter excludes teaching and separately reported services.

## 2025-05-09 DIAGNOSIS — F11.20 OPIOID DEPENDENCE, UNCOMPLICATED: ICD-10-CM

## 2025-05-09 DIAGNOSIS — L03.115 CELLULITIS OF RIGHT LOWER LIMB: ICD-10-CM

## 2025-05-09 DIAGNOSIS — Z90.49 ACQUIRED ABSENCE OF OTHER SPECIFIED PARTS OF DIGESTIVE TRACT: ICD-10-CM

## 2025-05-09 DIAGNOSIS — K59.00 CONSTIPATION, UNSPECIFIED: ICD-10-CM

## 2025-05-09 DIAGNOSIS — K04.7 PERIAPICAL ABSCESS WITHOUT SINUS: ICD-10-CM

## 2025-05-09 DIAGNOSIS — E87.1 HYPO-OSMOLALITY AND HYPONATREMIA: ICD-10-CM

## 2025-05-09 DIAGNOSIS — A41.9 SEPSIS, UNSPECIFIED ORGANISM: ICD-10-CM

## 2025-05-09 DIAGNOSIS — I10 ESSENTIAL (PRIMARY) HYPERTENSION: ICD-10-CM

## 2025-05-09 DIAGNOSIS — Z87.891 PERSONAL HISTORY OF NICOTINE DEPENDENCE: ICD-10-CM

## 2025-05-09 DIAGNOSIS — E43 UNSPECIFIED SEVERE PROTEIN-CALORIE MALNUTRITION: ICD-10-CM

## 2025-05-09 DIAGNOSIS — B18.2 CHRONIC VIRAL HEPATITIS C: ICD-10-CM

## 2025-05-09 DIAGNOSIS — L02.415 CUTANEOUS ABSCESS OF RIGHT LOWER LIMB: ICD-10-CM

## 2025-05-09 PROCEDURE — 99233 SBSQ HOSP IP/OBS HIGH 50: CPT | Mod: GC

## 2025-05-09 RX ORDER — GABAPENTIN 400 MG/1
300 CAPSULE ORAL EVERY 8 HOURS
Refills: 0 | Status: DISCONTINUED | OUTPATIENT
Start: 2025-05-09 | End: 2025-05-12

## 2025-05-09 RX ORDER — GABAPENTIN 400 MG/1
100 CAPSULE ORAL ONCE
Refills: 0 | Status: COMPLETED | OUTPATIENT
Start: 2025-05-09 | End: 2025-05-09

## 2025-05-09 RX ORDER — MELATONIN 5 MG
2 TABLET ORAL ONCE
Refills: 0 | Status: COMPLETED | OUTPATIENT
Start: 2025-05-09 | End: 2025-05-09

## 2025-05-09 RX ADMIN — Medication 1 TABLET(S): at 11:07

## 2025-05-09 RX ADMIN — GABAPENTIN 100 MILLIGRAM(S): 400 CAPSULE ORAL at 11:07

## 2025-05-09 RX ADMIN — Medication 5 MILLIGRAM(S): at 21:55

## 2025-05-09 RX ADMIN — Medication 325 MILLIGRAM(S): at 11:07

## 2025-05-09 RX ADMIN — Medication 40 MILLIGRAM(S): at 06:27

## 2025-05-09 RX ADMIN — TRAMADOL HYDROCHLORIDE 50 MILLIGRAM(S): 50 TABLET, FILM COATED ORAL at 10:22

## 2025-05-09 RX ADMIN — LISINOPRIL 20 MILLIGRAM(S): 5 TABLET ORAL at 06:28

## 2025-05-09 RX ADMIN — Medication 200 MILLIGRAM(S): at 06:29

## 2025-05-09 RX ADMIN — Medication 2 TABLET(S): at 21:55

## 2025-05-09 RX ADMIN — GABAPENTIN 300 MILLIGRAM(S): 400 CAPSULE ORAL at 17:06

## 2025-05-09 RX ADMIN — TRAMADOL HYDROCHLORIDE 50 MILLIGRAM(S): 50 TABLET, FILM COATED ORAL at 19:18

## 2025-05-09 RX ADMIN — Medication 2 MILLIGRAM(S): at 01:18

## 2025-05-09 RX ADMIN — GABAPENTIN 200 MILLIGRAM(S): 400 CAPSULE ORAL at 00:55

## 2025-05-09 RX ADMIN — GABAPENTIN 300 MILLIGRAM(S): 400 CAPSULE ORAL at 17:05

## 2025-05-09 RX ADMIN — AMLODIPINE BESYLATE 10 MILLIGRAM(S): 10 TABLET ORAL at 06:27

## 2025-05-09 RX ADMIN — TRAMADOL HYDROCHLORIDE 50 MILLIGRAM(S): 50 TABLET, FILM COATED ORAL at 18:18

## 2025-05-09 RX ADMIN — TRAMADOL HYDROCHLORIDE 50 MILLIGRAM(S): 50 TABLET, FILM COATED ORAL at 11:22

## 2025-05-09 RX ADMIN — CEFTRIAXONE 100 MILLIGRAM(S): 500 INJECTION, POWDER, FOR SOLUTION INTRAMUSCULAR; INTRAVENOUS at 18:16

## 2025-05-09 RX ADMIN — TAMSULOSIN HYDROCHLORIDE 0.4 MILLIGRAM(S): 0.4 CAPSULE ORAL at 21:54

## 2025-05-09 RX ADMIN — GABAPENTIN 200 MILLIGRAM(S): 400 CAPSULE ORAL at 10:06

## 2025-05-09 NOTE — PROGRESS NOTE ADULT - PROBLEM SELECTOR PLAN 1
Admission 4/27-5/4 for RLE cellulitis and tooth infection with subcut mid-calf fluid collection cf abscess sp I&D by vascular course cb GAS bacteremia. Dcd 2ds ago to [facility] on CTX 2g x 2 weeks (4/29-5/12) sp PICC.  PICC removed in ED    - Currently not meeting SIRS  - wound improved- consult wound care  - cw CTX 2g q24H (-5/15 [extended 3 days as patient missed 2-3 days])  - continue to monitor leg  - PT recc home PT   - SW eval

## 2025-05-09 NOTE — PROGRESS NOTE ADULT - PROBLEM SELECTOR PLAN 2
Reports taking methadone 200mg qd. QTc 453  [facility] [address] [facility] [phone]  - continue with home methadone

## 2025-05-09 NOTE — PROGRESS NOTE ADULT - ATTENDING COMMENTS
69M with PMH of chronic Hep C (not on treatment), OUD on methadone, HTN presenting for GAS bacteremia management after signing himself out from [facility].  He has missed a few doses of ceftriaxone.     Plan   Will continue with ceftriaxone, as recommended per ID during their involvement in the recent hospitalization.  Ceftriaxone initially scheduled to end on 5/12, but will extend for 3 days, as unclear if he received a dose on Sunday.  EOD 5/15   - continue home methadone  - ensure on bowel regimen - patient reports bowel movements   - PT/SW consult - home services vs [facility]  rest as above  - due to his residence in an [place], not able to accommodate home infusions, so will need to got to a new [facility] for completion of antibiotics

## 2025-05-09 NOTE — PROGRESS NOTE ADULT - SUBJECTIVE AND OBJECTIVE BOX
INTERVAL EVENTS:   No acute events overnight.    SUBJECTIVE:   Reports his antibiotics were mixed up at [facility] and would like to continue them here. Continues to have right lower extremity pain and oral pain.     PHYSICAL EXAM:  Constitutional:  resting comfortably in bed; NAD  Eyes: anicteric sclera  Respiratory: CTA B/L; no W/R/R, no retractions  Cardiac: +S1/S2; RRR  Gastrointestinal: soft, NT/ND; no rebound or guarding;  Extremities: no edema, right lower extremity mildly erythematous     VITAL SIGNS:  Vital Signs Last 24 Hrs  T(C): 36.8 (09 May 2025 05:38), Max: 36.9 (08 May 2025 13:30)  T(F): 98.3 (09 May 2025 05:38), Max: 98.5 (08 May 2025 13:30)  HR: 87 (09 May 2025 05:38) (86 - 100)  BP: 148/85 (09 May 2025 05:38) (146/88 - 184/105)  BP(mean): 122 (08 May 2025 13:30) (122 - 129)  RR: 18 (09 May 2025 05:38) (16 - 18)  SpO2: 98% (09 May 2025 05:38) (98% - 100%)    Parameters below as of 09 May 2025 05:38  Patient On (Oxygen Delivery Method): room air      INPATIENT MEDICATIONS:   MEDICATIONS  (STANDING):  amLODIPine   Tablet 10 milliGRAM(s) Oral every 24 hours  cefTRIAXone   IVPB 2000 milliGRAM(s) IV Intermittent every 24 hours  enoxaparin Injectable 40 milliGRAM(s) SubCutaneous every 24 hours  ferrous    sulfate 325 milliGRAM(s) Oral daily  gabapentin 200 milliGRAM(s) Oral every 8 hours  hydrochlorothiazide 12.5 milliGRAM(s) Oral every 24 hours  lisinopril 20 milliGRAM(s) Oral every 24 hours  melatonin 5 milliGRAM(s) Oral at bedtime  methadone  Concentrate 200 milliGRAM(s) Oral every 24 hours  multivitamin 1 Tablet(s) Oral daily  pantoprazole    Tablet 40 milliGRAM(s) Oral before breakfast  senna 2 Tablet(s) Oral at bedtime  tamsulosin 0.4 milliGRAM(s) Oral at bedtime    MEDICATIONS  (PRN):  acetaminophen     Tablet .. 650 milliGRAM(s) Oral every 6 hours PRN Temp greater or equal to 38C (100.4F), Mild Pain (1 - 3)  traMADol 50 milliGRAM(s) Oral every 8 hours PRN Severe Pain (7 - 10)    ALLERGIES:  Allergies    No Known Allergies    Intolerances    LABS:                       12.4   5.97  )-----------( 256      ( 08 May 2025 05:30 )             38.4     05-08    136  |  100  |  16  ----------------------------<  96  4.2   |  29  |  0.82    Ca    8.9      08 May 2025 05:30  Phos  3.7     05-08  Mg     1.7     05-08    TPro  x   /  Alb  x   /  TBili  0.2  /  DBili  x   /  AST  x   /  ALT  x   /  AlkPhos  x   05-08    PT/INR - ( 08 May 2025 05:30 )   PT: 11.5 sec;   INR: 1.00            Urinalysis Basic - ( 08 May 2025 05:30 )    Color: x / Appearance: x / SG: x / pH: x  Gluc: 96 mg/dL / Ketone: x  / Bili: x / Urobili: x   Blood: x / Protein: x / Nitrite: x   Leuk Esterase: x / RBC: x / WBC x   Sq Epi: x / Non Sq Epi: x / Bacteria: x      CAPILLARY BLOOD GLUCOSE      POCT Blood Glucose.: 139 mg/dL (08 May 2025 11:18)    RADIOLOGY & ADDITIONAL TESTS: Reviewed.

## 2025-05-10 PROCEDURE — 99233 SBSQ HOSP IP/OBS HIGH 50: CPT

## 2025-05-10 RX ORDER — TRAMADOL HYDROCHLORIDE 50 MG/1
75 TABLET, FILM COATED ORAL EVERY 8 HOURS
Refills: 0 | Status: DISCONTINUED | OUTPATIENT
Start: 2025-05-10 | End: 2025-05-12

## 2025-05-10 RX ADMIN — Medication 40 MILLIGRAM(S): at 06:48

## 2025-05-10 RX ADMIN — Medication 650 MILLIGRAM(S): at 01:16

## 2025-05-10 RX ADMIN — Medication 5 MILLIGRAM(S): at 22:08

## 2025-05-10 RX ADMIN — GABAPENTIN 300 MILLIGRAM(S): 400 CAPSULE ORAL at 09:21

## 2025-05-10 RX ADMIN — TRAMADOL HYDROCHLORIDE 50 MILLIGRAM(S): 50 TABLET, FILM COATED ORAL at 02:23

## 2025-05-10 RX ADMIN — Medication 650 MILLIGRAM(S): at 02:16

## 2025-05-10 RX ADMIN — GABAPENTIN 300 MILLIGRAM(S): 400 CAPSULE ORAL at 18:02

## 2025-05-10 RX ADMIN — AMLODIPINE BESYLATE 10 MILLIGRAM(S): 10 TABLET ORAL at 06:48

## 2025-05-10 RX ADMIN — Medication 2 TABLET(S): at 22:08

## 2025-05-10 RX ADMIN — TRAMADOL HYDROCHLORIDE 75 MILLIGRAM(S): 50 TABLET, FILM COATED ORAL at 12:21

## 2025-05-10 RX ADMIN — TRAMADOL HYDROCHLORIDE 75 MILLIGRAM(S): 50 TABLET, FILM COATED ORAL at 23:08

## 2025-05-10 RX ADMIN — Medication 1 TABLET(S): at 11:57

## 2025-05-10 RX ADMIN — Medication 200 MILLIGRAM(S): at 06:48

## 2025-05-10 RX ADMIN — Medication 325 MILLIGRAM(S): at 11:57

## 2025-05-10 RX ADMIN — LISINOPRIL 20 MILLIGRAM(S): 5 TABLET ORAL at 07:09

## 2025-05-10 RX ADMIN — TAMSULOSIN HYDROCHLORIDE 0.4 MILLIGRAM(S): 0.4 CAPSULE ORAL at 22:08

## 2025-05-10 RX ADMIN — CEFTRIAXONE 100 MILLIGRAM(S): 500 INJECTION, POWDER, FOR SOLUTION INTRAMUSCULAR; INTRAVENOUS at 18:02

## 2025-05-10 RX ADMIN — TRAMADOL HYDROCHLORIDE 75 MILLIGRAM(S): 50 TABLET, FILM COATED ORAL at 13:20

## 2025-05-10 RX ADMIN — TRAMADOL HYDROCHLORIDE 75 MILLIGRAM(S): 50 TABLET, FILM COATED ORAL at 22:08

## 2025-05-10 NOTE — PROGRESS NOTE ADULT - SUBJECTIVE AND OBJECTIVE BOX
Patient is a 69y old  Male who presents with a chief complaint of ongoing bacteremia treatment (10 May 2025 07:50)      SUBJECTIVE / OVERNIGHT EVENTS: Patient seen and examined at bedside. No acute events overnight. Unchanged right jaw and right calf wound pain.    MEDICATIONS  (STANDING):  amLODIPine   Tablet 10 milliGRAM(s) Oral every 24 hours  cefTRIAXone   IVPB 2000 milliGRAM(s) IV Intermittent every 24 hours  enoxaparin Injectable 40 milliGRAM(s) SubCutaneous every 24 hours  ferrous    sulfate 325 milliGRAM(s) Oral daily  gabapentin 300 milliGRAM(s) Oral every 8 hours  hydrochlorothiazide 12.5 milliGRAM(s) Oral every 24 hours  lisinopril 20 milliGRAM(s) Oral every 24 hours  melatonin 5 milliGRAM(s) Oral at bedtime  methadone  Concentrate 200 milliGRAM(s) Oral every 24 hours  multivitamin 1 Tablet(s) Oral daily  pantoprazole    Tablet 40 milliGRAM(s) Oral before breakfast  senna 2 Tablet(s) Oral at bedtime  tamsulosin 0.4 milliGRAM(s) Oral at bedtime    MEDICATIONS  (PRN):  acetaminophen     Tablet .. 650 milliGRAM(s) Oral every 6 hours PRN Temp greater or equal to 38C (100.4F), Mild Pain (1 - 3)  traMADol 75 milliGRAM(s) Oral every 8 hours PRN Severe Pain (7 - 10)      CAPILLARY BLOOD GLUCOSE      POCT Blood Glucose.: 143 mg/dL (09 May 2025 22:10)  POCT Blood Glucose.: 113 mg/dL (09 May 2025 17:52)    I&O's Summary    10 May 2025 07:01  -  10 May 2025 14:07  --------------------------------------------------------  IN: 0 mL / OUT: 400 mL / NET: -400 mL        PHYSICAL EXAM:  Vital Signs Last 24 Hrs  T(C): 36.1 (10 May 2025 13:01), Max: 36.8 (09 May 2025 20:50)  T(F): 97 (10 May 2025 13:01), Max: 98.3 (09 May 2025 20:50)  HR: 95 (10 May 2025 13:01) (93 - 112)  BP: 167/90 (10 May 2025 13:01) (167/90 - 195/95)  BP(mean): 128 (10 May 2025 06:20) (127 - 128)  RR: 16 (10 May 2025 13:01) (16 - 18)  SpO2: 99% (10 May 2025 13:01) (97% - 100%)    Parameters below as of 10 May 2025 13:01  Patient On (Oxygen Delivery Method): room air        GEN: male in NAD, appears comfortable, no diaphoresis  EYES: No scleral injection, EOMI  ENTM: neck supple & symmetric without tracheal deviation, moist membranes, no gross hearing impairment  CV: +S1/S2, no m/r/g, no abdominal bruit, no LE edema  RESP: breathing comfortably, no respiratory accessory muscle use, CTAB, no w/r/r  GI: normoactive BS, soft, NTND, no rebounding/guarding, no palpable masses    LABS:                      RADIOLOGY & ADDITIONAL TESTS:  Results Reviewed:   Imaging Personally Reviewed:  Electrocardiogram Personally Reviewed:    COORDINATION OF CARE:  Care Discussed with Consultants/Other Providers [Y/N]:  Prior or Outpatient Records Reviewed [Y/N]:   OVERNIGHT EVENTS/INTERVAL HPI: No acute events occurred overnight.     Subjective: Patient seen and examined at the bedside. Notes ongoing pain in the leg and in the jaw. Describes the pain in his leg as a shooting pain sensation. Denies any nausea, vomiting, CP, dizziness. Endorses an appetite. Tolerating antibiotics, denies diarrhea.       MEDICATIONS  (STANDING):  amLODIPine   Tablet 10 milliGRAM(s) Oral every 24 hours  cefTRIAXone   IVPB 2000 milliGRAM(s) IV Intermittent every 24 hours  enoxaparin Injectable 40 milliGRAM(s) SubCutaneous every 24 hours  ferrous    sulfate 325 milliGRAM(s) Oral daily  gabapentin 300 milliGRAM(s) Oral every 8 hours  hydrochlorothiazide 12.5 milliGRAM(s) Oral every 24 hours  lisinopril 20 milliGRAM(s) Oral every 24 hours  melatonin 5 milliGRAM(s) Oral at bedtime  methadone  Concentrate 200 milliGRAM(s) Oral every 24 hours  multivitamin 1 Tablet(s) Oral daily  pantoprazole    Tablet 40 milliGRAM(s) Oral before breakfast  senna 2 Tablet(s) Oral at bedtime  tamsulosin 0.4 milliGRAM(s) Oral at bedtime    MEDICATIONS  (PRN):  acetaminophen     Tablet .. 650 milliGRAM(s) Oral every 6 hours PRN Temp greater or equal to 38C (100.4F), Mild Pain (1 - 3)  traMADol 75 milliGRAM(s) Oral every 8 hours PRN Severe Pain (7 - 10)      CAPILLARY BLOOD GLUCOSE      POCT Blood Glucose.: 143 mg/dL (09 May 2025 22:10)  POCT Blood Glucose.: 113 mg/dL (09 May 2025 17:52)    I&O's Summary    10 May 2025 07:01  -  10 May 2025 14:07  --------------------------------------------------------  IN: 0 mL / OUT: 400 mL / NET: -400 mL        PHYSICAL EXAM:  Vital Signs Last 24 Hrs  T(C): 36.1 (10 May 2025 13:01), Max: 36.8 (09 May 2025 20:50)  T(F): 97 (10 May 2025 13:01), Max: 98.3 (09 May 2025 20:50)  HR: 95 (10 May 2025 13:01) (93 - 112)  BP: 167/90 (10 May 2025 13:01) (167/90 - 195/95)  BP(mean): 128 (10 May 2025 06:20) (127 - 128)  RR: 16 (10 May 2025 13:01) (16 - 18)  SpO2: 99% (10 May 2025 13:01) (97% - 100%)    Parameters below as of 10 May 2025 13:01  Patient On (Oxygen Delivery Method): room air        GEN: male in NAD, appears comfortable, no diaphoresis  EYES: No scleral injection, EOMI  ENTM: neck supple & symmetric without tracheal deviation, moist membranes, no gross hearing impairment  CV: +S1/S2, no m/r/g, no abdominal bruit, no LE edema  RESP: breathing comfortably, no respiratory accessory muscle use, CTAB, no w/r/r  GI: normoactive BS, soft, NTND, no rebounding/guarding, no palpable masses    LABS:                      RADIOLOGY & ADDITIONAL TESTS:  Results Reviewed:   Imaging Personally Reviewed:  Electrocardiogram Personally Reviewed:    COORDINATION OF CARE:  Care Discussed with Consultants/Other Providers [Y/N]:  Prior or Outpatient Records Reviewed [Y/N]:

## 2025-05-10 NOTE — PROGRESS NOTE ADULT - PROBLEM SELECTOR PLAN 4
Pt. has known hepC, not on treatment. HIV negative (4/28)    OP follow up Pt. has known hepC, not on treatment. HIV negative (4/28)  - Plan for outpatient follow up     #BPH   - c/w home medication tamsulosin 0.4 mg q24 hours PO

## 2025-05-10 NOTE — PROGRESS NOTE ADULT - TIME BILLING
Quality 226: Preventive Care And Screening: Tobacco Use: Screening And Cessation Intervention: Patient screened for tobacco use and is an ex/non-smoker Detail Level: Detailed - Ordering, reviewing, and interpreting labs, testing, and imaging.  - Independently obtaining a review of systems and performing a physical exam  - Reviewing consultant documentation/recommendations in addition to discussing plan of care with consultants.  - Counselling and educating patient and family regarding interpretation of aforementioned items and plan of care.

## 2025-05-10 NOTE — INPATIENT CERTIFICATION FOR MEDICARE PATIENTS - CURRENT MEDICAL NEEDS AND CARE PLANS
Caller: Desiree Dereck E    Relationship: Self    Best call back number: [phone]     Medication needed:   Requested Prescriptions     Pending Prescriptions Disp Refills   • clopidogrel (PLAVIX) 75 MG tablet 90 tablet 1     Sig: Take 1 tablet by mouth Daily.   • atorvastatin (LIPITOR) 20 MG tablet 90 tablet 1     Sig: Take 1 tablet by mouth Daily.       When do you need the refill by: ASAP    What additional details did the patient provide when requesting the medication: THE PATIENT IS OUT OF THE MEDICATION. THE PATIENT IS ALSO OUT OF HIS EMERGENCY MEDICATION.      Does the patient have less than a 3 day supply:  [x] Yes  [] No    What is the patient's preferred pharmacy: [id number] [place], [place] - [address] AT [facility] [phone] [facility] [phone]       [facility]

## 2025-05-10 NOTE — PROGRESS NOTE ADULT - PROBLEM SELECTOR PLAN 2
Reports taking methadone 200mg qd. QTc 453  [facility] [address] [facility] [phone]  - continue with home methadone

## 2025-05-11 PROCEDURE — 99232 SBSQ HOSP IP/OBS MODERATE 35: CPT | Mod: GC

## 2025-05-11 RX ADMIN — GABAPENTIN 300 MILLIGRAM(S): 400 CAPSULE ORAL at 02:50

## 2025-05-11 RX ADMIN — CEFTRIAXONE 100 MILLIGRAM(S): 500 INJECTION, POWDER, FOR SOLUTION INTRAMUSCULAR; INTRAVENOUS at 17:53

## 2025-05-11 RX ADMIN — Medication 200 MILLIGRAM(S): at 07:15

## 2025-05-11 RX ADMIN — Medication 650 MILLIGRAM(S): at 22:12

## 2025-05-11 RX ADMIN — Medication 650 MILLIGRAM(S): at 22:38

## 2025-05-11 RX ADMIN — AMLODIPINE BESYLATE 10 MILLIGRAM(S): 10 TABLET ORAL at 07:14

## 2025-05-11 RX ADMIN — Medication 40 MILLIGRAM(S): at 07:14

## 2025-05-11 RX ADMIN — GABAPENTIN 300 MILLIGRAM(S): 400 CAPSULE ORAL at 09:04

## 2025-05-11 RX ADMIN — Medication 325 MILLIGRAM(S): at 11:24

## 2025-05-11 RX ADMIN — TAMSULOSIN HYDROCHLORIDE 0.4 MILLIGRAM(S): 0.4 CAPSULE ORAL at 22:12

## 2025-05-11 RX ADMIN — TRAMADOL HYDROCHLORIDE 75 MILLIGRAM(S): 50 TABLET, FILM COATED ORAL at 16:22

## 2025-05-11 RX ADMIN — Medication 5 MILLIGRAM(S): at 22:12

## 2025-05-11 RX ADMIN — Medication 1 TABLET(S): at 11:25

## 2025-05-11 RX ADMIN — LISINOPRIL 20 MILLIGRAM(S): 5 TABLET ORAL at 07:14

## 2025-05-11 RX ADMIN — Medication 2 TABLET(S): at 22:12

## 2025-05-11 RX ADMIN — TRAMADOL HYDROCHLORIDE 75 MILLIGRAM(S): 50 TABLET, FILM COATED ORAL at 15:22

## 2025-05-11 RX ADMIN — GABAPENTIN 300 MILLIGRAM(S): 400 CAPSULE ORAL at 17:53

## 2025-05-11 NOTE — PROGRESS NOTE ADULT - PROBLEM SELECTOR PLAN 2
Reports taking methadone 200mg qd. QTc 453  [facility] [address] [facility] [phone]  - continue with home methadone

## 2025-05-11 NOTE — PROGRESS NOTE ADULT - PROBLEM SELECTOR PLAN 4
Pt. has known hepC, not on treatment. HIV negative (4/28)  - Plan for outpatient follow up     #BPH   - c/w home medication tamsulosin 0.4 mg q24 hours PO

## 2025-05-11 NOTE — PROGRESS NOTE ADULT - SUBJECTIVE AND OBJECTIVE BOX
Patient is a 69y old  Male who presents with a chief complaint of ongoing bacteremia treatment, (10 May 2025 07:50)      SUBJECTIVE / OVERNIGHT EVENTS: Patient seen and examined at bedside. No acute events overnight. Some discomfort in jaw and right calf wound. No fever or chills.    MEDICATIONS  (STANDING):  amLODIPine   Tablet 10 milliGRAM(s) Oral every 24 hours  cefTRIAXone   IVPB 2000 milliGRAM(s) IV Intermittent every 24 hours  enoxaparin Injectable 40 milliGRAM(s) SubCutaneous every 24 hours  ferrous    sulfate 325 milliGRAM(s) Oral daily  gabapentin 300 milliGRAM(s) Oral every 8 hours  hydrochlorothiazide 25 milliGRAM(s) Oral daily  lisinopril 20 milliGRAM(s) Oral every 24 hours  melatonin 5 milliGRAM(s) Oral at bedtime  methadone  Concentrate 200 milliGRAM(s) Oral every 24 hours  multivitamin 1 Tablet(s) Oral daily  pantoprazole    Tablet 40 milliGRAM(s) Oral before breakfast  senna 2 Tablet(s) Oral at bedtime  tamsulosin 0.4 milliGRAM(s) Oral at bedtime    MEDICATIONS  (PRN):  acetaminophen     Tablet .. 650 milliGRAM(s) Oral every 6 hours PRN Temp greater or equal to 38C (100.4F), Mild Pain (1 - 3)  traMADol 75 milliGRAM(s) Oral every 8 hours PRN Severe Pain (7 - 10)      CAPILLARY BLOOD GLUCOSE        I&O's Summary    10 May 2025 07:01  -  11 May 2025 07:00  --------------------------------------------------------  IN: 0 mL / OUT: 700 mL / NET: -700 mL        PHYSICAL EXAM:  Vital Signs Last 24 Hrs  T(C): 36.7 (11 May 2025 11:20), Max: 36.9 (11 May 2025 06:41)  T(F): 98 (11 May 2025 11:20), Max: 98.4 (11 May 2025 06:41)  HR: 85 (11 May 2025 11:20) (85 - 100)  BP: 175/78 (11 May 2025 11:20) (153/79 - 193/98)  BP(mean): --  RR: 18 (11 May 2025 11:20) (18 - 18)  SpO2: 100% (11 May 2025 11:20) (100% - 100%)    Parameters below as of 11 May 2025 11:20  Patient On (Oxygen Delivery Method): room air        GEN: male in NAD, appears comfortable, no diaphoresis  EYES: No scleral injection, EOMI  ENTM: neck supple & symmetric without tracheal deviation, moist membranes, no gross hearing impairment  CV: +S1/S2, no m/r/g, no abdominal bruit, no LE edema  RESP: breathing comfortably, no respiratory accessory muscle use, CTAB, no w/r/r  GI: normoactive BS, soft, NTND, no rebounding/guarding, no palpable masses    LABS:                      RADIOLOGY & ADDITIONAL TESTS:  Results Reviewed:   Imaging Personally Reviewed:  Electrocardiogram Personally Reviewed:    COORDINATION OF CARE:  Care Discussed with Consultants/Other Providers [Y/N]:  Prior or Outpatient Records Reviewed [Y/N]:

## 2025-05-11 NOTE — PROGRESS NOTE ADULT - PROBLEM SELECTOR PLAN 1
Admission 4/27-5/4 for RLE cellulitis and tooth infection with subcut mid-calf fluid collection cf abscess sp I&D by vascular course cb GAS bacteremia. Dcd 2ds ago to [facility] on CTX 2g x 2 weeks (4/29-5/12) sp PICC.  PICC removed in ED    - wound improved- consult wound care  - cw CTX 2g q24H (-5/15 [extended 3 days as patient missed 2-3 days])  - Tramadol 75 mg q8 hours for severe pain   - Gabapentin 300 q 8 hours   - continue to monitor leg  - PT recc home PT   - DAVIDE white  - Has skilled nursing need so trying to get him to [facility]

## 2025-05-12 ENCOUNTER — TRANSCRIPTION ENCOUNTER (OUTPATIENT)
Age: 70
End: 2025-05-12

## 2025-05-12 VITALS — SYSTOLIC BLOOD PRESSURE: 151 MMHG | HEART RATE: 91 BPM | DIASTOLIC BLOOD PRESSURE: 92 MMHG

## 2025-05-12 PROCEDURE — 99233 SBSQ HOSP IP/OBS HIGH 50: CPT | Mod: GC

## 2025-05-12 RX ORDER — TAMSULOSIN HYDROCHLORIDE 0.4 MG/1
1 CAPSULE ORAL
Qty: 0 | Refills: 0 | DISCHARGE
Start: 2025-05-12

## 2025-05-12 RX ORDER — ACETAMINOPHEN 500 MG/5ML
1000 LIQUID (ML) ORAL ONCE
Refills: 0 | Status: COMPLETED | OUTPATIENT
Start: 2025-05-12 | End: 2025-05-12

## 2025-05-12 RX ORDER — TAMSULOSIN HYDROCHLORIDE 0.4 MG/1
1 CAPSULE ORAL
Refills: 0 | DISCHARGE

## 2025-05-12 RX ORDER — GABAPENTIN 400 MG/1
2 CAPSULE ORAL
Refills: 0 | DISCHARGE

## 2025-05-12 RX ORDER — GABAPENTIN 400 MG/1
1 CAPSULE ORAL
Qty: 0 | Refills: 0 | DISCHARGE
Start: 2025-05-12

## 2025-05-12 RX ORDER — METHADONE HCL 10 MG
20 TABLET ORAL
Refills: 0 | DISCHARGE

## 2025-05-12 RX ORDER — METHADONE HCL 10 MG
20 TABLET ORAL
Qty: 0 | Refills: 0 | DISCHARGE
Start: 2025-05-12

## 2025-05-12 RX ADMIN — Medication 400 MILLIGRAM(S): at 12:23

## 2025-05-12 RX ADMIN — Medication 325 MILLIGRAM(S): at 11:20

## 2025-05-12 RX ADMIN — TRAMADOL HYDROCHLORIDE 75 MILLIGRAM(S): 50 TABLET, FILM COATED ORAL at 10:19

## 2025-05-12 RX ADMIN — TRAMADOL HYDROCHLORIDE 75 MILLIGRAM(S): 50 TABLET, FILM COATED ORAL at 09:19

## 2025-05-12 RX ADMIN — AMLODIPINE BESYLATE 10 MILLIGRAM(S): 10 TABLET ORAL at 07:16

## 2025-05-12 RX ADMIN — Medication 40 MILLIGRAM(S): at 07:15

## 2025-05-12 RX ADMIN — Medication 200 MILLIGRAM(S): at 07:15

## 2025-05-12 RX ADMIN — GABAPENTIN 300 MILLIGRAM(S): 400 CAPSULE ORAL at 17:22

## 2025-05-12 RX ADMIN — GABAPENTIN 300 MILLIGRAM(S): 400 CAPSULE ORAL at 02:01

## 2025-05-12 RX ADMIN — Medication 1000 MILLIGRAM(S): at 12:53

## 2025-05-12 RX ADMIN — GABAPENTIN 300 MILLIGRAM(S): 400 CAPSULE ORAL at 09:20

## 2025-05-12 RX ADMIN — CEFTRIAXONE 100 MILLIGRAM(S): 500 INJECTION, POWDER, FOR SOLUTION INTRAMUSCULAR; INTRAVENOUS at 18:17

## 2025-05-12 RX ADMIN — LISINOPRIL 20 MILLIGRAM(S): 5 TABLET ORAL at 07:16

## 2025-05-12 RX ADMIN — Medication 1 TABLET(S): at 11:21

## 2025-05-12 NOTE — DISCHARGE NOTE NURSING/CASE MANAGEMENT/SOCIAL WORK - NSDCPEFALRISK_GEN_ALL_CORE
For information on Fall & Injury Prevention, visit: [email or web address] OR  [email or web address] OR  https://www.cdc.gov/steadi/patient.html

## 2025-05-12 NOTE — CHART NOTE - NSCHARTNOTEFT_GEN_A_CORE
Admitting Diagnosis:   Patient is a 69y old  Male who presents with a chief complaint of ongoing bacteremia treatment (12 May 2025 08:40)    PAST MEDICAL & SURGICAL HISTORY:  Methadone use  H/O right inguinal hernia repair  H/O right hemicolectomy    Current Nutrition Order:  Regular  Ensure Enlive x3/day     PO Intake: Good (%) [ x ]  Fair (50-75%) [   ] Poor (<25%) [   ]    GI Issues:   Pt denies nausea/vomiting/diarrhea/constipation  Reports last BM yesterday   No abdominal distension/discomfort noted    Pain:  Notes leg and jaw pain - RN notified and pain regimen administered per pt    Skin Integrity:  No pressure injuries documented  1+ left leg and 2+ right foot/leg edema noted  Bilateral leg cellulitis documented  Mendez score 18    CAPILLARY BLOOD GLUCOSE  POCT Blood Glucose.: 118 mg/dL (12 May 2025 12:41)  POCT Blood Glucose.: 91 mg/dL (12 May 2025 08:53)    Medications:  MEDICATIONS  (STANDING):  amLODIPine   Tablet 10 milliGRAM(s) Oral every 24 hours  cefTRIAXone   IVPB 2000 milliGRAM(s) IV Intermittent every 24 hours  enoxaparin Injectable 40 milliGRAM(s) SubCutaneous every 24 hours  ferrous    sulfate 325 milliGRAM(s) Oral daily  gabapentin 300 milliGRAM(s) Oral every 8 hours  hydrochlorothiazide 25 milliGRAM(s) Oral daily  lisinopril 20 milliGRAM(s) Oral every 24 hours  melatonin 5 milliGRAM(s) Oral at bedtime  methadone  Concentrate 200 milliGRAM(s) Oral every 24 hours  multivitamin 1 Tablet(s) Oral daily  pantoprazole    Tablet 40 milliGRAM(s) Oral before breakfast  senna 2 Tablet(s) Oral at bedtime  tamsulosin 0.4 milliGRAM(s) Oral at bedtime    MEDICATIONS  (PRN):  acetaminophen     Tablet .. 650 milliGRAM(s) Oral every 6 hours PRN Temp greater or equal to 38C (100.4F), Mild Pain (1 - 3)  traMADol 75 milliGRAM(s) Oral every 8 hours PRN Severe Pain (7 - 10)    Anthropometrics:  Height: 5'7"  Weight: 135 pounds / 61.2 kilograms   BMI: 21.1  IBW: 148 pounds / 67.3 kilograms             91%IBW    Weight Change:   No new weights obtained since admission. Recommend nursing to trend weekly weights. RD to continue monitoring weights as able.    Severe Chronic Protein Calorie Malnutrition: Risk Assessment Completed   - NFPE: severe muscle and fat wasting     Estimated energy needs:   Calories: 25-30kcal/k-1836kcal/d  Protein: 1.25-1.5g/k-92g/d  Defer fluids to team.   Estimated needs based on dosing wt as within % IBW. Needs adjusted for age and malnutrition.     Subjective:   69M with PMH of HTN, OUD on methadone (200mg daily per chart), and chronic hep C not on treatment with recent admission dc'd x2d ago to [facility] for GAS RLE cellulitis/bacteremia with plan for 2g CTX daily until , presenting after leaving [facility] facility presenting for GAS bacteremia management.    Pt seen on 7UR for follow-up. Labs and medication orders reviewed. Ordered for hydrochlorothiazide, ferrous sulfate, multivitamin. On Regular diet with Ensure Plus x3/day. Pt out of bed standing at bedside on visit this AM. Reports good appetite and intake in-house, endorses completing >50% meal trays and x3 Ensure shakes daily. Pt denies difficulty chewing/swallowing, notes preference for softer foods in setting of jaw pain, denies need/desire for modified diet texture. Reports no GI discomfort, endorses regular BMs. See nutrition recommendations. RD to remain available.     Previous Nutrition Diagnosis:  Severe chronic malnutrition related to suspected inadequate PO intake as evidenced by severe muscle and fat wasting.     Active [ x ]  Resolved [   ]    Goal:  Pt to consistently meet >75% nutrient needs. Reduce signs and symptoms of protein-calorie malnutrition.     Recommendations:  1. Continue Regular diet with Ensure Plus High Protein (350kcal, 20g protein) x3/day.   >>Dietary to provide chocolate peanut butter smoothie (365kcal, 18g protein), fortified mashed potatoes (240kcal, 14g protein), and Gelatein Plus (160kcal, 20g protein) x1/day.   >>Encourage and monitor PO intake. [place] dietary preferences as able.   2. Continue micronutrient supplementation.   3. Monitor GI tolerance, weight trends, labs, and skin integrity.  4. Defer bowel and pain regimens to team.   5. RD to remain available for diet education/intervention prn.     Education:  Encouraged continued adequate intake of meals and hydration, reviewed purpose of fortified foods. Discussed prioritizing whole foods with use of oral nutrition supplement between meals. Pt aware RD remains available for additional questions/concerns. Admitting Diagnosis:   Patient is a 69y old  Male who presents with a chief complaint of ongoing bacteremia treatment (12 May 2025 08:40)    PAST MEDICAL & SURGICAL HISTORY:  Methadone use  H/O right inguinal hernia repair  H/O right hemicolectomy    Current Nutrition Order:  Regular  Ensure Enlive x3/day     PO Intake: Good (%) [ x ]  Fair (50-75%) [   ] Poor (<25%) [   ]    GI Issues:   Pt denies nausea/vomiting/diarrhea/constipation  Reports last BM yesterday   No abdominal distension/discomfort noted    Pain:  Notes leg and jaw pain - RN notified and pain regimen administered per pt    Skin Integrity:  No pressure injuries documented  1+ left leg and 2+ right foot/leg edema noted  Bilateral leg cellulitis documented  Mendez score 18    CAPILLARY BLOOD GLUCOSE  POCT Blood Glucose.: 118 mg/dL (12 May 2025 12:41)  POCT Blood Glucose.: 91 mg/dL (12 May 2025 08:53)    Medications:  MEDICATIONS  (STANDING):  amLODIPine   Tablet 10 milliGRAM(s) Oral every 24 hours  cefTRIAXone   IVPB 2000 milliGRAM(s) IV Intermittent every 24 hours  enoxaparin Injectable 40 milliGRAM(s) SubCutaneous every 24 hours  ferrous    sulfate 325 milliGRAM(s) Oral daily  gabapentin 300 milliGRAM(s) Oral every 8 hours  hydrochlorothiazide 25 milliGRAM(s) Oral daily  lisinopril 20 milliGRAM(s) Oral every 24 hours  melatonin 5 milliGRAM(s) Oral at bedtime  methadone  Concentrate 200 milliGRAM(s) Oral every 24 hours  multivitamin 1 Tablet(s) Oral daily  pantoprazole    Tablet 40 milliGRAM(s) Oral before breakfast  senna 2 Tablet(s) Oral at bedtime  tamsulosin 0.4 milliGRAM(s) Oral at bedtime    MEDICATIONS  (PRN):  acetaminophen     Tablet .. 650 milliGRAM(s) Oral every 6 hours PRN Temp greater or equal to 38C (100.4F), Mild Pain (1 - 3)  traMADol 75 milliGRAM(s) Oral every 8 hours PRN Severe Pain (7 - 10)    Anthropometrics:  Height: 5'7"  Weight: 135 pounds / 61.2 kilograms   BMI: 21.1  IBW: 148 pounds / 67.3 kilograms             91%IBW    Weight Change:   No new weights obtained since admission. Recommend nursing to trend weekly weights. RD to continue monitoring weights as able.    Severe Chronic Protein Calorie Malnutrition: Risk Assessment Completed   - NFPE: severe muscle and fat wasting     Estimated energy needs:   Calories: 25-30kcal/k-1836kcal/d  Protein: 1.25-1.5g/k-92g/d  Defer fluids to team.   Estimated needs based on dosing wt as within % IBW. Needs adjusted for age and malnutrition.     Subjective:   69M with PMH of HTN, OUD on methadone (200mg daily per chart), and chronic hep C not on treatment with recent admission dc'd x2d ago to [facility] for GAS RLE cellulitis/bacteremia with plan for 2g CTX daily until , presenting after leaving [facility] facility presenting for GAS bacteremia management.    Pt seen on  for follow-up. Labs and medication orders reviewed. No updated labs , POC blood glucose () . Ordered for hydrochlorothiazide, ferrous sulfate, multivitamin. On Regular diet with Ensure Plus x3/day. Pt out of bed standing at bedside on visit this AM. Reports good appetite and intake in-house, endorses completing >50% meal trays and x3 Ensure shakes daily. Pt denies difficulty chewing/swallowing, notes preference for softer foods in setting of jaw pain, denies need/desire for modified diet texture. Reports no GI discomfort, endorses regular BMs. See nutrition recommendations. RD to remain available.     Previous Nutrition Diagnosis:  Severe chronic malnutrition related to suspected inadequate PO intake as evidenced by severe muscle and fat wasting.     Active [ x ]  Resolved [   ]    Goal:  Pt to consistently meet >75% nutrient needs. Reduce signs and symptoms of protein-calorie malnutrition.     Recommendations:  1. Continue Regular diet with Ensure Plus High Protein (350kcal, 20g protein) x3/day.   >>Dietary to provide chocolate peanut butter smoothie (365kcal, 18g protein), fortified mashed potatoes (240kcal, 14g protein), and Gelatein Plus (160kcal, 20g protein) x1/day.   >>Encourage and monitor PO intake. [place] dietary preferences as able.   2. Continue micronutrient supplementation.   3. Monitor GI tolerance, weight trends, labs, and skin integrity.  4. Defer bowel and pain regimens to team.   5. RD to remain available for diet education/intervention prn.     Education:  Encouraged continued adequate intake of meals and hydration, reviewed purpose of fortified foods. Discussed prioritizing whole foods with use of oral nutrition supplement between meals. Pt aware RD remains available for additional questions/concerns.

## 2025-05-12 NOTE — PROGRESS NOTE ADULT - PROBLEM SELECTOR PLAN 3
Home meds lisinopril 20-hctz 12.5 and amlodipine 10    - cw home meds

## 2025-05-12 NOTE — PROGRESS NOTE ADULT - SUBJECTIVE AND OBJECTIVE BOX
INCOMPLETE NOTE    INTERVAL EVENTS:   No acute events overnight.    SUBJECTIVE:   Patient seen and examined at bedside. Condition largely unchanged from yesterday. No acute complaints at this time.    ROS:  Negative unless otherwise stated above.    PHYSICAL EXAM:  General: Alert and oriented x 3. No acute distress.   HEENT: Moist mucous membranes. Anicteric. No cervical lymphadenopathy.  Cardiovascular: Regular rate and rhythm. No murmur. Normal JVP.  Lungs: Clear to auscultation bilaterally. No accessory muscle use.  Abdomen: Soft, non-tender and non-distended. No palpable masses.  Extremities: No edema. Non-tender. Warm.  Skin: No rashes or lesions.   Neurologic: No apparent focal neurological deficits. CN II-XII grossly intact, but not individually tested.  Psychiatric: Cooperative. Appropriate mood and affect.    VITAL SIGNS:  Vital Signs Last 24 Hrs  T(C): 36.6 (12 May 2025 06:26), Max: 36.9 (11 May 2025 21:40)  T(F): 97.8 (12 May 2025 06:26), Max: 98.4 (11 May 2025 21:40)  HR: 92 (12 May 2025 06:26) (85 - 92)  BP: 182/109 (12 May 2025 06:26) (172/97 - 182/109)  BP(mean): --  RR: 19 (12 May 2025 06:26) (18 - 19)  SpO2: 100% (12 May 2025 06:26) (97% - 100%)    Parameters below as of 12 May 2025 06:26  Patient On (Oxygen Delivery Method): room air      INPATIENT MEDICATIONS:   MEDICATIONS  (STANDING):  amLODIPine   Tablet 10 milliGRAM(s) Oral every 24 hours  cefTRIAXone   IVPB 2000 milliGRAM(s) IV Intermittent every 24 hours  enoxaparin Injectable 40 milliGRAM(s) SubCutaneous every 24 hours  ferrous    sulfate 325 milliGRAM(s) Oral daily  gabapentin 300 milliGRAM(s) Oral every 8 hours  hydrochlorothiazide 25 milliGRAM(s) Oral daily  lisinopril 20 milliGRAM(s) Oral every 24 hours  melatonin 5 milliGRAM(s) Oral at bedtime  methadone  Concentrate 200 milliGRAM(s) Oral every 24 hours  multivitamin 1 Tablet(s) Oral daily  pantoprazole    Tablet 40 milliGRAM(s) Oral before breakfast  senna 2 Tablet(s) Oral at bedtime  tamsulosin 0.4 milliGRAM(s) Oral at bedtime    MEDICATIONS  (PRN):  acetaminophen     Tablet .. 650 milliGRAM(s) Oral every 6 hours PRN Temp greater or equal to 38C (100.4F), Mild Pain (1 - 3)  traMADol 75 milliGRAM(s) Oral every 8 hours PRN Severe Pain (7 - 10)    ALLERGIES:  Allergies    No Known Allergies    Intolerances    LABS:             CAPILLARY BLOOD GLUCOSE        RADIOLOGY & ADDITIONAL TESTS: Reviewed. INTERVAL EVENTS:   No acute events overnight.    SUBJECTIVE:   Continued mouth/teeth pain, rest of ros negative.     PHYSICAL EXAM:  Constitutional: resting comfortably in bed; NAD  Eyes: anicteric sclera  Respiratory: CTA B/L; no W/R/R, no retractions  Cardiac: +S1/S2; RRR  Gastrointestinal: soft, NT/ND; no rebound or guarding;  Extremities: no edema    VITAL SIGNS:  Vital Signs Last 24 Hrs  T(C): 36.6 (12 May 2025 06:26), Max: 36.9 (11 May 2025 21:40)  T(F): 97.8 (12 May 2025 06:26), Max: 98.4 (11 May 2025 21:40)  HR: 92 (12 May 2025 06:26) (85 - 92)  BP: 182/109 (12 May 2025 06:26) (172/97 - 182/109)  BP(mean): --  RR: 19 (12 May 2025 06:26) (18 - 19)  SpO2: 100% (12 May 2025 06:26) (97% - 100%)    Parameters below as of 12 May 2025 06:26  Patient On (Oxygen Delivery Method): room air      INPATIENT MEDICATIONS:   MEDICATIONS  (STANDING):  amLODIPine   Tablet 10 milliGRAM(s) Oral every 24 hours  cefTRIAXone   IVPB 2000 milliGRAM(s) IV Intermittent every 24 hours  enoxaparin Injectable 40 milliGRAM(s) SubCutaneous every 24 hours  ferrous    sulfate 325 milliGRAM(s) Oral daily  gabapentin 300 milliGRAM(s) Oral every 8 hours  hydrochlorothiazide 25 milliGRAM(s) Oral daily  lisinopril 20 milliGRAM(s) Oral every 24 hours  melatonin 5 milliGRAM(s) Oral at bedtime  methadone  Concentrate 200 milliGRAM(s) Oral every 24 hours  multivitamin 1 Tablet(s) Oral daily  pantoprazole    Tablet 40 milliGRAM(s) Oral before breakfast  senna 2 Tablet(s) Oral at bedtime  tamsulosin 0.4 milliGRAM(s) Oral at bedtime    MEDICATIONS  (PRN):  acetaminophen     Tablet .. 650 milliGRAM(s) Oral every 6 hours PRN Temp greater or equal to 38C (100.4F), Mild Pain (1 - 3)  traMADol 75 milliGRAM(s) Oral every 8 hours PRN Severe Pain (7 - 10)    ALLERGIES:  Allergies    No Known Allergies    Intolerances    LABS:             CAPILLARY BLOOD GLUCOSE        RADIOLOGY & ADDITIONAL TESTS: Reviewed.

## 2025-05-12 NOTE — DISCHARGE NOTE NURSING/CASE MANAGEMENT/SOCIAL WORK - PATIENT PORTAL LINK FT
You can access the FollowMyHealth Patient Portal offered by [facility] by registering at the following website: [email or web address]. By joining [employer]’s FollowMyHealth portal, you will also be able to view your health information using other applications (apps) compatible with our system.

## 2025-05-12 NOTE — PROGRESS NOTE ADULT - PROBLEM SELECTOR PLAN 2
Reports taking methadone 200mg qd. QTc 453  [facility] [address] [facility] [phone]  - continue with home methadone

## 2025-05-12 NOTE — PROGRESS NOTE ADULT - PROBLEM SELECTOR PLAN 5
F: none  E: Replete PRN  N: regular  Lines: pIV  DVT pphx: lovenox    CODE STATUS: Full  DISPO: [facility]
F: none  E: Replete PRN  N: regular  Lines: pIV  DVT pphx: lovenox    CODE STATUS: Full  DISPO: [facility]
F: none  E: Replete PRN  N: regular  Lines: pIV  DVT pphx: lovenox    CODE STATUS: Full  DISPO: [facility]
F: none  E: Replete PRN  N: regular  Lines: pIV  DVT pphx: lovenox    CODE STATUS: Full  DISPO: [facility]
F: none  E: Replete PRN  N: regular  Lines: pIV  DVT pphx: lovenox    CODE STATUS: Full  DISPO: [facility]
F: none  E: Replete PRN  N: regular  Lines: pIV  DVT pphx: lovenox    CODE STATUS: Full  DISPO: [facility]

## 2025-05-12 NOTE — PROGRESS NOTE ADULT - NUTRITIONAL ASSESSMENT
This patient has been assessed with a concern for Malnutrition and has been determined to have a diagnosis/diagnoses of Severe protein-calorie malnutrition.    This patient is being managed with:   Diet Regular-  Supplement Feeding Modality:  Oral  Ensure Enlive Cans or Servings Per Day:  1       Frequency:  Three Times a day  Entered: May  7 2025 10:28AM  

## 2025-05-12 NOTE — PROGRESS NOTE ADULT - PROBLEM SELECTOR PLAN 1
Admission 4/27-5/4 for RLE cellulitis and tooth infection with subcut mid-calf fluid collection cf abscess sp I&D by vascular course cb GAS bacteremia. Dcd 2ds ago to [facility] on CTX 2g x 2 weeks (4/29-5/12) sp PICC.  PICC removed in ED    - wound improved- consult wound care  - cw CTX 2g q24H (-5/15 [extended 3 days as patient missed 2-3 days])  - Tramadol 75 mg q8 hours for severe pain   - Gabapentin 300 q 8 hours   - continue to monitor leg  - PT recc home PT   - DAVIDE white  - Has skilled nursing need so trying to get him to [facility] Admission 4/27-5/4 for RLE cellulitis and tooth infection with subcut mid-calf fluid collection cf abscess sp I&D by vascular course cb GAS bacteremia. Dcd 2ds ago to [facility] on CTX 2g x 2 weeks (4/29-5/12) sp PICC.  PICC removed in ED    - wound care   - cw CTX 2g q24H (-5/15 [extended 3 days as patient missed 2-3 days])  - Tramadol 75 mg q8 hours for severe pain   - Gabapentin 300 q 8 hours   - continue to monitor leg  - PT recc home PT   - DAVIDE white  - Has skilled nursing need so trying to get him to [facility] No

## 2025-05-12 NOTE — PROGRESS NOTE ADULT - ASSESSMENT
69M PMH HTN, OUD on methadone (200mg daily per chart), chronic hep C not on treatment , recent admission dc'd 2d ago to [facility] for GAS RLE cellulitis/bacteremia with plan for 2g CTX daily until 5/12, presenting after leaving [facility] facillity presenting for GAS bacteremia management
69M PMH HTN, OUD on methadone (200mg daily per chart), chronic hep C not on treatment , recent admission dc'd 2d ago to [facility] for GAS RLE cellulitis/bacteremia with plan for 2g CTX daily until 5/12, presenting after leaving [facility] facillity presenting for GAS bacteremia management
69M PMH HTN, OUD on methadone (200mg daily per chart), chronic hep C not on treatment , recent admission dc'd 2d ago to [facility] for GAS RLE cellulitis/bacteremia with plan for 2g CTX daily until 5/12, presenting after leaving [facility] facillity presenting for GAS bacteremia management
69M PMH HTN, OUD on methadone (200mg daily per chart), chronic hep C not on treatment , recent admission dc'd 2d ago to [facility] for GAS RLE cellulitis/bacteremia with plan for 2g CTX daily until 5/12, presenting after leaving [facility] facility presenting for GAS bacteremia management.
69M PMH HTN, OUD on methadone (200mg daily per chart), chronic hep C not on treatment , recent admission dc'd 2d ago to [facility] for GAS RLE cellulitis/bacteremia with plan for 2g CTX daily until 5/12, presenting after leaving [facility] facility presenting for GAS bacteremia management.
69M PMH HTN, OUD on methadone (200mg daily per chart), chronic hep C not on treatment , recent admission dc'd 2d ago to [facility] for GAS RLE cellulitis/bacteremia with plan for 2g CTX daily until 5/12, presenting after leaving [facility] facility presenting for GAS bacteremia management.

## 2025-05-12 NOTE — DISCHARGE NOTE NURSING/CASE MANAGEMENT/SOCIAL WORK - FINANCIAL ASSISTANCE
[facility] provides services at a reduced cost to those who are determined to be eligible through [facility]’s financial assistance program. Information regarding [facility]’s financial assistance program can be found by going to [email or web address] or by calling [phone].

## 2025-05-12 NOTE — PROGRESS NOTE ADULT - PROBLEM SELECTOR PROBLEM 4
Chronic hepatitis C

## 2025-05-16 DIAGNOSIS — I10 ESSENTIAL (PRIMARY) HYPERTENSION: ICD-10-CM

## 2025-05-16 DIAGNOSIS — Z41.8 ENCOUNTER FOR OTHER PROCEDURES FOR PURPOSES OTHER THAN REMEDYING HEALTH STATE: ICD-10-CM

## 2025-05-16 DIAGNOSIS — R78.81 BACTEREMIA: ICD-10-CM

## 2025-05-16 DIAGNOSIS — E43 UNSPECIFIED SEVERE PROTEIN-CALORIE MALNUTRITION: ICD-10-CM

## 2025-05-16 DIAGNOSIS — B18.2 CHRONIC VIRAL HEPATITIS C: ICD-10-CM

## 2025-05-16 DIAGNOSIS — F11.20 OPIOID DEPENDENCE, UNCOMPLICATED: ICD-10-CM

## 2025-05-16 DIAGNOSIS — L03.115 CELLULITIS OF RIGHT LOWER LIMB: ICD-10-CM

## 2025-05-16 DIAGNOSIS — B95.0 STREPTOCOCCUS, GROUP A, AS THE CAUSE OF DISEASES CLASSIFIED ELSEWHERE: ICD-10-CM

## 2025-06-02 PROCEDURE — 80053 COMPREHEN METABOLIC PANEL: CPT

## 2025-06-02 PROCEDURE — 86850 RBC ANTIBODY SCREEN: CPT

## 2025-06-02 PROCEDURE — 86900 BLOOD TYPING SEROLOGIC ABO: CPT

## 2025-06-02 PROCEDURE — 93005 ELECTROCARDIOGRAM TRACING: CPT

## 2025-06-02 PROCEDURE — 82962 GLUCOSE BLOOD TEST: CPT

## 2025-06-02 PROCEDURE — 86901 BLOOD TYPING SEROLOGIC RH(D): CPT

## 2025-06-02 PROCEDURE — 82247 BILIRUBIN TOTAL: CPT

## 2025-06-02 PROCEDURE — 80048 BASIC METABOLIC PNL TOTAL CA: CPT

## 2025-06-02 PROCEDURE — 97161 PT EVAL LOW COMPLEX 20 MIN: CPT

## 2025-06-02 PROCEDURE — 97116 GAIT TRAINING THERAPY: CPT

## 2025-06-02 PROCEDURE — 85610 PROTHROMBIN TIME: CPT

## 2025-06-02 PROCEDURE — 80307 DRUG TEST PRSMV CHEM ANLYZR: CPT

## 2025-06-02 PROCEDURE — 99285 EMERGENCY DEPT VISIT HI MDM: CPT

## 2025-06-02 PROCEDURE — 36415 COLL VENOUS BLD VENIPUNCTURE: CPT

## 2025-06-02 PROCEDURE — 85025 COMPLETE CBC W/AUTO DIFF WBC: CPT

## 2025-06-02 PROCEDURE — 84100 ASSAY OF PHOSPHORUS: CPT

## 2025-06-02 PROCEDURE — 85027 COMPLETE CBC AUTOMATED: CPT

## 2025-06-02 PROCEDURE — 83735 ASSAY OF MAGNESIUM: CPT

## 2025-07-18 ENCOUNTER — APPOINTMENT (OUTPATIENT)
Dept: UROLOGY | Facility: CLINIC | Age: 70
End: 2025-07-18

## 2025-07-23 ENCOUNTER — APPOINTMENT (OUTPATIENT)
Dept: NEPHROLOGY | Facility: CLINIC | Age: 70
End: 2025-07-23

## (undated) DEVICE — SUT SILK 2-0 30" TIES

## (undated) DEVICE — VENODYNE/SCD SLEEVE CALF MEDIUM

## (undated) DEVICE — SUT PROLENE 5-0 30" RB-1

## (undated) DEVICE — GLV 7.5 PROTEXIS (WHITE)

## (undated) DEVICE — Device

## (undated) DEVICE — SUT SILK 4-0 30" SH

## (undated) DEVICE — SUT SILK 2-0 30" PSL

## (undated) DEVICE — POSITIONER FOAM EGG CRATE ULNAR 2PCS (PINK)

## (undated) DEVICE — SUT SILK 3-0 30" SH

## (undated) DEVICE — SUT SILK 3-0 30" TIES

## (undated) DEVICE — STAPLER COVIDIEN ENDO GIA SHORT HANDLE

## (undated) DEVICE — SUT SILK 5-0 30" RB-1

## (undated) DEVICE — WARMING BLANKET UPPER ADULT

## (undated) DEVICE — LIGASURE IMPACT

## (undated) DEVICE — SUT PROLENE 5-0 36" C-1 VISI-BLACK NDL

## (undated) DEVICE — PACK EXPLORATORY LAPAROTOMY

## (undated) DEVICE — SUT SILK 3-0 18" SH (POP-OFF)

## (undated) DEVICE — FOLEY TRAY 16FR 5CC LF UMETER CLOSED

## (undated) DEVICE — SUT PROLENE 4-0 36" SH

## (undated) DEVICE — SUT SILK 0 30" TIES